# Patient Record
Sex: MALE | Race: WHITE | Employment: OTHER | URBAN - METROPOLITAN AREA
[De-identification: names, ages, dates, MRNs, and addresses within clinical notes are randomized per-mention and may not be internally consistent; named-entity substitution may affect disease eponyms.]

---

## 2017-01-04 ENCOUNTER — APPOINTMENT (OUTPATIENT)
Dept: PHYSICAL THERAPY | Age: 37
End: 2017-01-04
Payer: COMMERCIAL

## 2017-01-05 ENCOUNTER — HOSPITAL ENCOUNTER (OUTPATIENT)
Dept: PHYSICAL THERAPY | Age: 37
Discharge: HOME | End: 2017-01-05
Payer: COMMERCIAL

## 2017-01-05 PROCEDURE — 97530 THERAPEUTIC ACTIVITIES: CPT

## 2017-01-05 NOTE — PROGRESS NOTES
PT DAILY TREATMENT NOTE     Patient Name: Adilson Bowie  Date:2017  : 1980  [x]  Patient  Verified  Payor: Unknown Ades / Plan: [employer] / Product Type: HMO /    In time:1035  Out time:1105  Total Treatment Time (min): 30  Visit #: 6 of 10    Treatment Area: Vertigo [R42]    SUBJECTIVE  Pain Level (0-10 scale): 3/10  Any medication changes, allergies to medications, adverse drug reactions, diagnosis change, or new procedure performed?: [x] No    [] Yes (see summary sheet for update)  Subjective functional status/changes:   [] No changes reported  Have been having less intense dizziness. Tolerated shopping recently. Reports deep c/s ache and left C/S tightness. OBJECTIVE    30 min Therapeutic Activity:  []  See flow sheet :   Rationale: increase ROM, increase strength, improve coordination, improve balance and increase proprioception  to improve the patients ability to ease with ADL's. With   [] TE   [] TA   [] neuro   [] other: Patient Education: [x] Review HEP    [] Progressed/Changed HEP based on:   [] positioning   [] body mechanics   [] transfers   [] heat/ice application    [] other:      Other Objective/Functional Measures: Ambulate with eyes only increased sx >with head turns. Saccades x 1 min in tandem. Ball toss with mild dizziness. Unilateral and bilateral convergence. Pain Level (0-10 scale) post treatment: 6/10    ASSESSMENT/Changes in Function: Slow progression. Pt may benefit from initiating Laser tracing to encourage c/s endurance, core strength b/c pt spends a lot of time on the computer for work.     Patient will continue to benefit from skilled PT services to modify and progress therapeutic interventions, address functional mobility deficits, address ROM deficits, address strength deficits, analyze and address soft tissue restrictions, analyze and cue movement patterns, analyze and modify body mechanics/ergonomics, assess and modify postural abnormalities and address imbalance/dizziness to attain remaining goals. []  See Plan of Care  []  See progress note/recertification  []  See Discharge Summary         Progress towards goals / Updated goals:  Pt will be compliant with HEP to improve function. Met: 12/28/16  Pt will perform SOT to determine sensory input to determine function. MET. SOT=72      Long Term Goals: To be accomplished in 4 weeks:  Pt will increase FOTO by 6 pts to improve function. Not met. Decreased from 73 to 60. 12/30/16  Pt will report dizziness decreased by 70% to be able to improve QOL and return to PLOF. Pt will increase SOT score by 5 pts to improve function. Pt will preform ambulation on TM>5mins to return to the gym activities.   Met (12/28/16)    PLAN  [x]  Upgrade activities as tolerated     [x]  Continue plan of care  []  Update interventions per flow sheet       []  Discharge due to:_  []  Other:_      Danielle Zhu, PT 1/5/2017  11:02 AM    Future Appointments  Date Time Provider Cristo Buenrostro   1/6/2017 10:00 AM Cathy Soriano PT [id number]   1/9/2017 11:30 AM Cathy Soriano PT [id number] [facility]   4/24/2017 2:40 PM Lily Ennis DO [address] [place] [address]

## 2017-01-06 ENCOUNTER — HOSPITAL ENCOUNTER (OUTPATIENT)
Dept: PHYSICAL THERAPY | Age: 37
Discharge: HOME | End: 2017-01-06
Payer: COMMERCIAL

## 2017-01-06 PROCEDURE — 97110 THERAPEUTIC EXERCISES: CPT

## 2017-01-06 PROCEDURE — 97140 MANUAL THERAPY 1/> REGIONS: CPT

## 2017-01-06 NOTE — PROGRESS NOTES
PT DAILY TREATMENT NOTE - [facility] 3-16    Patient Name: Narinder Guevara  Date:2017  : 1980  [x]  Patient  Verified  Payor: Wily Bermeo / Plan: [employer] / Product Type: HMO /    In time:10:00  Out time: 10:30  Total Treatment Time (min):  30  Total Timed Codes (min):  30     Visit #: 7 of 10    Treatment Area: Vertigo [R42]    SUBJECTIVE  Pain Level (0-10 scale): 4/10  Any medication changes, allergies to medications, adverse drug reactions, diagnosis change, or new procedure performed?: [x] No    [] Yes (see summary sheet for update)  Subjective functional status/changes:   [] No changes reported  Pt. Reports he is a little more dizzy today. He reports his symptoms continue to fluctuate     OBJECTIVE    22 min Therapeutic Exercise:  [x] See flow sheet :   Rationale: increase ROM and increase strength to improve the patients ability to increase ease of ADLs    8 min Manual Therapy:   Trigger point release B SCM   Rationale: decrease pain, increase ROM and increase tissue extensibility to increase ease of ADLs          With   [x] TE   [] TA   [] neuro   [] other: Patient Education: [x] Review HEP    [] Progressed/Changed HEP based on:   [] positioning   [] body mechanics   [] transfers   [] heat/ice application    [] other:      Other Objective/Functional Measures:   Pt. Has trigger points in B SCM but reports no change in dizziness following manual  He was challenged with laser proprioception  He also had difficulty with EC with head turns on compliant surface     Pain Level (0-10 scale) post treatment: 5/10    ASSESSMENT/Changes in Function:  Pt. Is making limited progress towards goals. He continues to have decreased balance with EC on compliant surface and has decreased cervical strength.      Patient will continue to benefit from skilled PT services to modify and progress therapeutic interventions, address functional mobility deficits, address ROM deficits, address strength deficits, analyze and address soft tissue restrictions, analyze and cue movement patterns, analyze and modify body mechanics/ergonomics, assess and modify postural abnormalities and address imbalance/dizziness to attain remaining goals. Progress towards goals / Updated goals:  Pt will be compliant with HEP to improve function. Met: 12/28/16  Pt will perform SOT to determine sensory input to determine function. MET. SOT=72      Long Term Goals: To be accomplished in 4 weeks:  Pt will increase FOTO by 6 pts to improve function. Not met. Decreased from 73 to 60. 12/30/16  Pt will report dizziness decreased by 70% to be able to improve QOL and return to PLOF. Pt will increase SOT score by 5 pts to improve function. Pt will preform ambulation on TM>5mins to return to the gym activities.   Met (12/28/16)    PLAN  []  Upgrade activities as tolerated     [x]  Continue plan of care  []  Update interventions per flow sheet       []  Discharge due to:_  []  Other:_      Jonatan Parsons, PT 1/6/2017  10:03 AM

## 2017-01-09 ENCOUNTER — APPOINTMENT (OUTPATIENT)
Dept: PHYSICAL THERAPY | Age: 37
End: 2017-01-09
Payer: COMMERCIAL

## 2017-01-20 ENCOUNTER — HOSPITAL ENCOUNTER (OUTPATIENT)
Dept: PHYSICAL THERAPY | Age: 37
Discharge: HOME | End: 2017-01-20
Payer: COMMERCIAL

## 2017-01-20 PROCEDURE — 97110 THERAPEUTIC EXERCISES: CPT

## 2017-01-20 PROCEDURE — 97140 MANUAL THERAPY 1/> REGIONS: CPT

## 2017-01-20 NOTE — PROGRESS NOTES
PT DAILY TREATMENT NOTE - [facility] 3-16    Patient Name: Jacky Luz  Date:2017  : 1980  [x]  Patient  Verified  Payor: Kody Mikeopshire / Plan: [employer] / Product Type: HMO /    In time: 11:30  Out time: 12:00  Total Treatment Time (min):  30  Total Timed Codes (min):  30     Visit #: 8 of 10    Treatment Area: Vertigo [R42]    SUBJECTIVE  Pain Level (0-10 scale): 6/10  Any medication changes, allergies to medications, adverse drug reactions, diagnosis change, or new procedure performed?: [x] No    [] Yes (see summary sheet for update)  Subjective functional status/changes:   [] No changes reported  Pt. Reports he is feeling more dizzy today.      OBJECTIVE    15 min Therapeutic Exercise:  [x] See flow sheet :   Rationale: increase ROM and increase strength to improve the patients ability to increase ease of ADLs    15 min: manual: UT trigger point release, SCM trigger point release  Rationale: increase ROM to increase ease of ADLs          With   [x] TE   [] TA   [] neuro   [] other: Patient Education: [x] Review HEP    [] Progressed/Changed HEP based on:   [] positioning   [] body mechanics   [] transfers   [] heat/ice application    [] other:      Other Objective/Functional Measures:   Pt. Reports no significant improvement in PT so far  He continues to have good tolerance on treadmill  Convergence is improving but continues to have difficulty before 10 cm     Pain Level (0-10 scale) post treatment:  6/10    ASSESSMENT/Changes in Function:      []  See Plan of Care  [x]  See progress note/recertification  []  See Discharge Summary         Progress towards goals / Updated goals:  See progress note    PLAN  []  Upgrade activities as tolerated     [x]  Continue plan of care  []  Update interventions per flow sheet       []  Discharge due to:_  []  Other:_      Janie Noonan, PT 2017  12:37 PM

## 2017-01-20 NOTE — PROGRESS NOTES
[facility] L[facility] Ibarra  [address] [facility]  [phone] fax    Physical Therapy Progress Note  Patient name: Rosemary Garber Start of Care:  16   Referral source: Sonia Felton MD : 1980   Medical/Treatment Diagnosis: Vertigo [R42] Onset Date: 10 yrs plus     Prior Hospitalization: see medical history Provider#: [id number]   Medications: Verified on Patient Summary List    Comorbidities:  Low blood pressure, anxiety, depression  Prior Level of Function: self employed,   Visits from Start of Care: 8    Missed Visits: 3    Established Goals:         Excellent           Good         Limited           None  [x] Increased ROM   []  []  [x]  []  [x] Increased Strength  []  []  [x]  []  [x] Increased Mobility  []  []  [x]  []   [] Decreased Pain   []  []  []  []  [] Decreased Swelling  []  []  []  []  X dizziness        X    Key Functional Changes: pt. Continues to c/o dizziness symptoms that has not had any significant improvement since [facility]. His symptoms continue to fluctuate with recent worsening of symptoms after taking a trip to [place]. He continues to have increased symptoms with occular convergence. He also has trigger points in B UT and SCM. He also continues to have decreased balance with eye closed while in tandem stance. Skilled PT is medically necessary in order to improve balance and dizziness symptoms. If pt. Doesn't have improvement in next few visits will be D/C. Updated Goals: to be achieved in 4 weeks:  1. Patient will improve FOTO score by 6 points in order to demonstrate a significant improvement in function. 2. Patient will report a 70% improvement in symptoms since [facility] in order to improve quality of life. 3. Patient will demonstrate understanding of updated HEP in order to continue to improve following D/C.      ASSESSMENT/RECOMMENDATIONS:  [x]Continue therapy per initial plan/protocol at a frequency of  2 x per week for 4 weeks  []Continue therapy with the following recommended changes:_____________________      _____________________________________________________________________  []Discontinue therapy progressing towards or have reached established goals  []Discontinue therapy due to lack of appreciable progress towards goals  []Discontinue therapy due to lack of attendance or compliance  []Await Physician's recommendations/decisions regarding therapy  []Other:________________________________________________________________    Thank you for this referral.   Isabelle Blount, PT 1/20/2017 1:35 PM    NOTE TO PHYSICIAN:  Enedina Coronado [address]   [facility]X TO [facility]: [phone]  If you are unable to process this request in 24 hours please contact our office: [phone] I have read the above report and request that my patient continue as recommended. ? I have read the above report and request that my patient continue therapy with the following changes/special instructions:____________________________________  ? I have read the above report and request that my patient be discharged from therapy.     Physicians signature: ______________________________Date: ______Time:______

## 2017-01-25 ENCOUNTER — HOSPITAL ENCOUNTER (OUTPATIENT)
Dept: PHYSICAL THERAPY | Age: 37
Discharge: HOME | End: 2017-01-25
Payer: COMMERCIAL

## 2017-01-25 PROCEDURE — 97140 MANUAL THERAPY 1/> REGIONS: CPT

## 2017-01-25 PROCEDURE — 97110 THERAPEUTIC EXERCISES: CPT

## 2017-01-25 NOTE — PROGRESS NOTES
PT DAILY TREATMENT NOTE - [facility] 3-16    Patient Name: Phuc Outlaw  Date:2017  : 1980  [x]  Patient  Verified  Payor: Yokasta Laxmi / Plan: [employer] / Product Type: HMO /    In time: 4:30  Out time: 5:00  Total Treatment Time (min): 30  Total Timed Codes (min): 30     Visit #: 9 of 10    Treatment Area: Vertigo [R42]    SUBJECTIVE  Pain Level (0-10 scale): 4/10  Any medication changes, allergies to medications, adverse drug reactions, diagnosis change, or new procedure performed?: [x] No    [] Yes (see summary sheet for update)  Subjective functional status/changes:   [] No changes reported  Pt. Reports he is feeling a little better than last time. OBJECTIVE    10 min Therapeutic Exercise:  [x] See flow sheet :   Rationale: improve coordination and improve balance to improve the patients ability to increase ease of ADLs    20 min Manual Therapy:  Trigger point release/STM to B upper traps, SCM, scalenes    Rationale: decrease pain, increase ROM and increase tissue extensibility to increase ease of turning his head and to decrease dizziness symptoms. With   [x] TE   [] TA   [] neuro   [] other: Patient Education: [x] Review HEP    [] Progressed/Changed HEP based on:   [] positioning   [] body mechanics   [] transfers   [] heat/ice application    [] other:      Other Objective/Functional Measures:   Pt. Tolerated PT well with no reports of increased pain  He continues to be challenged with EC tandem balance but had some improvement compared to last visit  He has no difficulty with foam [place] with eyes closed     Pain Level (0-10 scale) post treatment: 4/10    ASSESSMENT/Changes in Function: pt. Is making limited progress towards goals. He continues to have significant dizziness symptoms that continue to fluctuate.      Patient will continue to benefit from skilled PT services to modify and progress therapeutic interventions, address functional mobility deficits, address ROM deficits, address strength deficits, analyze and address soft tissue restrictions, analyze and cue movement patterns, analyze and modify body mechanics/ergonomics, assess and modify postural abnormalities and address imbalance/dizziness to attain remaining goals. Progress towards goals / Updated goals:  1. Patient will improve FOTO score by 6 points in order to demonstrate a significant improvement in function. 2. Patient will report a 70% improvement in symptoms since [facility] in order to improve quality of life. 3. Patient will demonstrate understanding of updated HEP in order to continue to improve following D/C.      PLAN  []  Upgrade activities as tolerated     [x]  Continue plan of care  []  Update interventions per flow sheet       []  Discharge due to:_  []  Other:_      Dawson Glez, PT 1/25/2017  5:51 PM

## 2017-01-26 ENCOUNTER — HOSPITAL ENCOUNTER (OUTPATIENT)
Dept: PHYSICAL THERAPY | Age: 37
Discharge: HOME | End: 2017-01-26
Payer: COMMERCIAL

## 2017-01-26 PROCEDURE — 97530 THERAPEUTIC ACTIVITIES: CPT

## 2017-01-26 NOTE — PROGRESS NOTES
PT DAILY TREATMENT NOTE - [facility] 3-16    Patient Name: Abelardo Constantino  Date:2017  : 1980  [x]  Patient  Verified  Payor: Glen Loyd / Plan: [employer] / Product Type: HMO /    In time: 4:00  Out time: 4:30  Total Treatment Time (min):  30  Total Timed Codes (min):  30     Visit #: 2 of 8    Treatment Area: Vertigo [R42]    SUBJECTIVE  Pain Level (0-10 scale): 3/10  Any medication changes, allergies to medications, adverse drug reactions, diagnosis change, or new procedure performed?: [x] No    [] Yes (see summary sheet for update)  Subjective functional status/changes:   [] No changes reported   pt. Reports he doesn't feel too bad today    OBJECTIVE    30 min Therapeutic Activity:  [x]  See flow sheet : ambulation with head turns, ambulation with ball toss, ambulation with target  Fwd/back   Rationale: increase strength, improve coordination and improve balance  to improve the patients ability to increase ease of ADLs           With   [x] TE   [] TA   [] neuro   [] other: Patient Education: [x] Review HEP    [] Progressed/Changed HEP based on:   [] positioning   [] body mechanics   [] transfers   [] heat/ice application    [] other:      Other Objective/Functional Measures: FOTO: 69 points  Pt. Demonstrated improving convergence today   He continues to have increased symptoms with saccades and VORx1     Pain Level (0-10 scale) post treatment: 3/10    ASSESSMENT/Changes in Function:  Pt. Is progressing slowly towards goals. He continues to have dizziness symptoms but had some improvement today. He continues to have increased symptoms with more activity.      Patient will continue to benefit from skilled PT services to modify and progress therapeutic interventions, address functional mobility deficits, address ROM deficits, address strength deficits, analyze and address soft tissue restrictions, analyze and cue movement patterns, analyze and modify body mechanics/ergonomics and assess and modify postural abnormalities to attain remaining goals. Progress towards goals / Updated goals:  1. Patient will improve FOTO score by 6 points in order to demonstrate a significant improvement in function. Not met: 69 points (1/26/17)  2. Patient will report a 70% improvement in symptoms since [facility] in order to improve quality of life. 3. Patient will demonstrate understanding of updated HEP in order to continue to improve following D/C.     PLAN  []  Upgrade activities as tolerated     [x]  Continue plan of care  []  Update interventions per flow sheet       []  Discharge due to:_  []  Other:_      Tamiko Reza, PT 1/26/2017  3:55 PM

## 2017-02-01 ENCOUNTER — HOSPITAL ENCOUNTER (OUTPATIENT)
Dept: PHYSICAL THERAPY | Age: 37
Discharge: HOME | End: 2017-02-01
Payer: COMMERCIAL

## 2017-02-01 PROCEDURE — 97140 MANUAL THERAPY 1/> REGIONS: CPT

## 2017-02-01 PROCEDURE — 97110 THERAPEUTIC EXERCISES: CPT

## 2017-02-01 NOTE — PROGRESS NOTES
PT DAILY TREATMENT NOTE - [facility] 3-16    Patient Name: Michelle Hamlin  Date:2017  : 1980  [x]  Patient  Verified  Payor: Mika Alhaji / Plan: [employer] / Product Type: HMO /    In time: 4:33  Out time: 5:00  Total Treatment Time (min): 27  Total Timed Codes (min): 27     Visit #: 3 of 8    Treatment Area: Vertigo [R42]    SUBJECTIVE  Pain Level (0-10 scale): 3/10  Any medication changes, allergies to medications, adverse drug reactions, diagnosis change, or new procedure performed?: [x] No    [] Yes (see summary sheet for update)  Subjective functional status/changes:   [] No changes reported  Pt. Reports he isn't feeling too bad today. OBJECTIVE    17 min Therapeutic Exercise:  [x] See flow sheet :   Rationale: increase ROM and increase strength to improve the patients ability to increase ease of ADLs    10 min: manual: trigger point release B UT and SCM  Rationale: increase ROM to increase ease of ADLs          With   [x] TE   [] TA   [] neuro   [] other: Patient Education: [x] Review HEP    [] Progressed/Changed HEP based on:   [] positioning   [] body mechanics   [] transfers   [] heat/ice application    [] other:      Other Objective/Functional Measures:   Pt. Was challenged with saccades at faster pace  He reports refferal to eye with trigger point in R UT   He continues to demonstrate compliance with HEP    Pain Level (0-10 scale) post treatment: 3/10    ASSESSMENT/Changes in Function:  Pt. Is progressing slowly towards goals. He continues to have dizziness symptoms but they have been less severe the last couple of visits.      Patient will continue to benefit from skilled PT services to modify and progress therapeutic interventions, address functional mobility deficits, address ROM deficits, address strength deficits, analyze and address soft tissue restrictions, analyze and cue movement patterns, analyze and modify body mechanics/ergonomics and address imbalance/dizziness to attain remaining goals. Progress towards goals / Updated goals:  1. Patient will improve FOTO score by 6 points in order to demonstrate a significant improvement in function. Not met: 69 points (1/26/17)  2. Patient will report a 70% improvement in symptoms since [facility] in order to improve quality of life. 3. Patient will demonstrate understanding of updated HEP in order to continue to improve following D/C.   Pt. Is compliant with saccades and VORx1 for HEP (2/1/17)    PLAN  []  Upgrade activities as tolerated     [x]  Continue plan of care  []  Update interventions per flow sheet       []  Discharge due to:_  []  Other:_      Dawson Glez, PT 2/1/2017  4:45 PM

## 2017-02-03 ENCOUNTER — HOSPITAL ENCOUNTER (OUTPATIENT)
Dept: PHYSICAL THERAPY | Age: 37
Discharge: HOME | End: 2017-02-03
Payer: COMMERCIAL

## 2017-02-03 PROCEDURE — 97110 THERAPEUTIC EXERCISES: CPT

## 2017-02-03 PROCEDURE — 97140 MANUAL THERAPY 1/> REGIONS: CPT

## 2017-02-03 NOTE — PROGRESS NOTES
PT DAILY TREATMENT NOTE - [facility] 3-16    Patient Name: Ernestina Trujillo  Date:2/3/2017  : 1980  [x]  Patient  Verified  Payor: Randall Oquendo / Plan: [employer] / Product Type: HMO /    In time: 4:30  Out time: 5:00  Total Treatment Time (min):  30  Total Timed Codes (min):  30     Visit #: 4 of 8    Treatment Area: Vertigo [R42]    SUBJECTIVE  Pain Level (0-10 scale): 4/10  Any medication changes, allergies to medications, adverse drug reactions, diagnosis change, or new procedure performed?: [x] No    [] Yes (see summary sheet for update)  Subjective functional status/changes:   [] No changes reported  Pt. Reports he is feeling about the same today. OBJECTIVE    17 min Therapeutic Exercise:  [x] See flow sheet :   Rationale: increase strength, improve coordination and improve balance to improve the patients ability to increase ease of ADLs    5 min Therapeutic Activity:  []  See flow sheet : ambulation with head turns, ambulation with eyes closed, slow walking   Rationale: improve coordination and improve balance  to improve the patients ability to increase ease of ambulation    8 min Manual Therapy:  Trigger point release R UT and SCM   Rationale: decrease pain, increase ROM, increase tissue extensibility and decrease trigger points to increase ease of ADLs          With   [x] TE   [] TA   [] neuro   [] other: Patient Education: [x] Review HEP    [] Progressed/Changed HEP based on:   [] positioning   [] body mechanics   [] transfers   [] heat/ice application    [] other:      Other Objective/Functional Measures:   Pt. Was challenged with DNF nods with towel  He continues to have increased symptoms with saccades  Pt. Required modification of tandem stance in order to perform successfully     Pain Level (0-10 scale) post treatment: 4/10    ASSESSMENT/Changes in Function: pt. Tolerated PT well but has mild increase in dizziness with most visual activities.  He continues to have difficulty with eyes closed tandem balance. He demonstrates no LOB with ambulation with head turns. Patient will continue to benefit from skilled PT services to modify and progress therapeutic interventions, address functional mobility deficits, address ROM deficits, address strength deficits, analyze and address soft tissue restrictions, analyze and cue movement patterns and analyze and modify body mechanics/ergonomics to attain remaining goals. Progress towards goals / Updated goals:  1. Patient will improve FOTO score by 6 points in order to demonstrate a significant improvement in function. Not met: 69 points (1/26/17)  2. Patient will report a 70% improvement in symptoms since [facility] in order to improve quality of life. Pt. Reports improvement with quick movements to head and with taking longer to have increase in symptoms (2/3/17)  3. Patient will demonstrate understanding of updated HEP in order to continue to improve following D/C.   Pt. Is compliant with saccades and VORx1 for HEP (2/1/17)    PLAN  []  Upgrade activities as tolerated     [x]  Continue plan of care  []  Update interventions per flow sheet       []  Discharge due to:_  []  Other:_      Naina Perry, PT 2/3/2017  4:33 PM

## 2017-02-07 ENCOUNTER — HOSPITAL ENCOUNTER (OUTPATIENT)
Dept: PHYSICAL THERAPY | Age: 37
Discharge: HOME | End: 2017-02-07
Payer: COMMERCIAL

## 2017-02-07 PROCEDURE — 97110 THERAPEUTIC EXERCISES: CPT

## 2017-02-07 PROCEDURE — 97140 MANUAL THERAPY 1/> REGIONS: CPT

## 2017-02-07 NOTE — PROGRESS NOTES
PT DAILY TREATMENT NOTE - [facility]     Patient Name: Kris Conklin  Date:2017  : 1980  [x]  Patient  Verified  Payor: Danielle Screws / Plan: Leticia Naranjo HMO / Product Type: HMO /    In time:930  Out time:1004  Total Treatment Time (min): 34  Total Timed Codes (min): 34     Visit #: 5 of 8    Treatment Area: Vertigo [R42]    SUBJECTIVE  Pain Level (0-10 scale): 5/10  Any medication changes, allergies to medications, adverse drug reactions, diagnosis change, or new procedure performed?: [x] No    [] Yes (see summary sheet for update)  Subjective functional status/changes:   [] No changes reported  Same sx. No change reported    OBJECTIVE    26 min Therapeutic Activity:  []  See flow sheet :     8 min Manual Therapy:  SOR with 1st rib mob, TPR to SCM and scalene. Rationale: decrease pain, increase ROM, increase tissue extensibility and decrease trigger points to ease with ADL's. With   [] TE   [] TA   [] neuro   [] other: Patient Education: [x] Review HEP    [] Progressed/Changed HEP based on:   [] positioning   [] body mechanics   [] transfers   [] heat/ice application    [] other:      Other Objective/Functional Measures: Performing saccades with greater ease and increased speed with focus on target. Balance with EC continues to be a challenge. Scalene tenderness. Pain Level (0-10 scale) post treatment: 4-5/10 dizzy. ASSESSMENT/Changes in Function: Progressing slowly. Pt continues to have sx of dizziness but his focus is improving. HA  And c/s type sx are present. He has been prescribed Topamax but does not want to take \"meds because of side effects. \"  Pt to gradually add a busy background to his ex's. Reports he gets frequent massages and will have masseuse concentrate on his neck.     Patient will continue to benefit from skilled PT services to address functional mobility deficits, address ROM deficits, address strength deficits, analyze and address soft tissue restrictions, analyze and cue movement patterns, analyze and modify body mechanics/ergonomics, assess and modify postural abnormalities and address imbalance/dizziness to attain remaining goals. []  See Plan of Care  [x]  See progress note/recertification  []  See Discharge Summary         Progress towards goals / Updated goals:  1. Patient will improve FOTO score by 6 points in order to demonstrate a significant improvement in function. Not met: 69 points (1/26/17)  2. Patient will report a 70% improvement in symptoms since [facility] in order to improve quality of life. Pt. Reports improvement with quick movements to head and with taking longer to have increase in symptoms (2/3/17)  3. Patient will demonstrate understanding of updated HEP in order to continue to improve following D/C.   Pt. Is compliant with saccades and VORx1 for HEP (2/1/17)    PLAN  [x]  Upgrade activities as tolerated     [x]  Continue plan of care  []  Update interventions per flow sheet       []  Discharge due to:_  []  Other:_      Michael Felton, PT 2/7/2017  9:04 AM    Future Appointments  Date Time Provider Cristo Buenrostro   2/7/2017 9:30 AM Michael Felton, PT [facility]   2/9/2017 3:30 PM Delon Subramanian PT [facility]   4/24/2017 2:40 PM Mindy Connell DO [address] [place] [address]

## 2017-02-09 ENCOUNTER — HOSPITAL ENCOUNTER (OUTPATIENT)
Dept: PHYSICAL THERAPY | Age: 37
Discharge: HOME | End: 2017-02-09
Payer: COMMERCIAL

## 2017-02-09 PROCEDURE — 97110 THERAPEUTIC EXERCISES: CPT

## 2017-02-09 PROCEDURE — 97530 THERAPEUTIC ACTIVITIES: CPT

## 2017-02-09 NOTE — PROGRESS NOTES
PT DAILY TREATMENT NOTE - [facility] 3-16    Patient Name: Maury Macedo  Date:2017  : 1980  [x]  Patient  Verified  Payor: Sona Hall / Plan: Alaina Mesa HMO / Product Type: HMO /    In time: 3:30  Out time: 4:00   Total Treatment Time (min): 30  Total Timed Codes (min): 30     Visit #: 6 of 8    Treatment Area: Vertigo [R42]    SUBJECTIVE  Pain Level (0-10 scale): 3/10  Any medication changes, allergies to medications, adverse drug reactions, diagnosis change, or new procedure performed?: [x] No    [] Yes (see summary sheet for update)  Subjective functional status/changes:   [] No changes reported  Pt. Reports doesn't feel too bad today. OBJECTIVE      22 min Therapeutic Exercise:  [x] See flow sheet :   Rationale: increase ROM and increase strength to improve the patients ability to increase ease of ADLs    8 min: Therapeutic Activities: ambulation with convergence, ambulation with head turns, slow walking  Rationale: increase balance and coordination for increased ease of ambulation           With   [x] TE   [] TA   [] neuro   [] other: Patient Education: [x] Review HEP    [] Progressed/Changed HEP based on:   [] positioning   [] body mechanics   [] transfers   [] heat/ice application    [] other:      Other Objective/Functional Measures:   Pt. Was challenged with longer hold during DNF with towel    Pt.  Was challenged with head turning balance exercises with eyes closed  He had less cervical tightness and trigger points today    Pain Level (0-10 scale) post treatment: 3/10    ASSESSMENT/Changes in Function:      []  See Plan of Care  [x]  See progress note/recertification  []  See Discharge Summary         Progress towards goals / Updated goals:  See progress note    PLAN  []  Upgrade activities as tolerated     [x]  Continue plan of care  []  Update interventions per flow sheet       []  Discharge due to:_  []  Other:_      Isabelle Blount, PT 2017  3:36 PM

## 2017-02-10 NOTE — PROGRESS NOTES
[facility] [place]  [address] [facility]  [phone] fax    Physician Update  [x] Progress Note  [x] Discharge Summary  Patient name: Rajendra Sandoval Start of Care: 16   Referral source: Kathryn Tineo MD : 1980   Medical/Treatment Diagnosis: Vertigo [R42] Onset Date: 10 years plus     Prior Hospitalization: see medical history Provider#: [id number]   Medications: Verified on Patient Summary List    Comorbidities:  Low blood pressure, anxiety, depression  Prior Level of Function: self employed,   Visits from Start of Care: 14    Missed Visits: 3    Status at Evaluation/Last Progress Note:  Increased symptoms with occular convergence, multiple trigger points in B UT and SCM. He has decreased eyes closed balance in tandem stance. FOTO score 60 points    Progress towards Goals:  Pt. Has made some improvement with physical therapy. He continues to have increased dizziness with head movements but it takes longer for symptoms to increase. He also has increased ease of performing saccades, convergence, and VOR exercises. He continues to have decreased balance with eyes closed tandem stance. He also continues to have significant tightness and trigger points along cervical and shoulder musculature and has seen a massage therapist which seemed to help some. Skilled PT is medically necessary in order to continue to improve dizziness symptoms and prepare pt. For D/C in 2 visits. Goals: to be achieved in 2 visits  1. Patient will report a 70% improvement in symptoms since [facility] in order to improve quality of life. 2. Patient will demonstrate understanding of updated HEP in order to continue to improve following D/C.      ASSESSMENT/RECOMMENDATIONS:  [x]Continue therapy per initial plan/protocol at a frequency of  2 x per week for 2 visits  []Continue therapy with the following recommended changes:_____________________ _____________________________________________________________________  []Discontinue therapy progressing towards or have reached established goals  []Discontinue therapy due to lack of appreciable progress towards goals  []Discontinue therapy due to lack of attendance or compliance  []Await Physician's recommendations/decisions regarding therapy  []Other:________________________________________________________________    Thank you for this referral.   Erica Glover, PT 2/9/2017 7:09 PM  NOTE TO PHYSICIAN:  [address]   FAX TO [facility]: [phone]  If you are unable to process this request in 24 hours please contact our office: [phone]    ? I have read the above report and request that my patient continue as recommended. ? I have read the above report and request that my patient continue therapy with the following changes/special instructions:____________________________________  ? I have read the above report and request that my patient be discharged from therapy.     Physicians signature: ______________________________Date: ______Time:______

## 2017-02-15 ENCOUNTER — HOSPITAL ENCOUNTER (OUTPATIENT)
Dept: PHYSICAL THERAPY | Age: 37
Discharge: HOME | End: 2017-02-15
Payer: COMMERCIAL

## 2017-02-15 PROCEDURE — 97110 THERAPEUTIC EXERCISES: CPT

## 2017-02-15 NOTE — PROGRESS NOTES
PT DAILY TREATMENT NOTE - [facility] 3-16    Patient Name: Michelle Hamlin  Date:2/15/2017  : 1980  [x]  Patient  Verified  Payor: Mika Single / Plan: [employer] / Product Type: HMO /    In time: 3:00  Out time: 3:35  Total Treatment Time (min): 35  Total Timed Codes (min): 35     Visit #: 7 of 8    Treatment Area: Vertigo [R42]    SUBJECTIVE  Pain Level (0-10 scale): 3/10  Any medication changes, allergies to medications, adverse drug reactions, diagnosis change, or new procedure performed?: [x] No    [] Yes (see summary sheet for update)  Subjective functional status/changes:   [] No changes reported  Pt. Reports he is doing good but continues to have decreased balance with turning in shower with eyes closed. OBJECTIVE    35 min Therapeutic Exercise:  [x] See flow sheet :   Rationale: increase ROM, increase strength and improve balance to improve the patients ability to increase ease of ADLs          With   [x] TE   [] TA   [] neuro   [] other: Patient Education: [x] Review HEP    [] Progressed/Changed HEP based on:   [] positioning   [] body mechanics   [] transfers   [] heat/ice application    [] other:      Other Objective/Functional Measures:   SOT: 81 points  Pt. Was educated on [employer] for HEP  He continued to be challenged with convergence     Pain Level (0-10 scale) post treatment: 3/10    ASSESSMENT/Changes in Function:  Pt. Is progressing slowly towards goals. He continues to have decreased balance and continues to have difficulty with convergence. Patient will continue to benefit from skilled PT services to modify and progress therapeutic interventions, address functional mobility deficits, address ROM deficits, address strength deficits, analyze and address soft tissue restrictions, analyze and cue movement patterns and analyze and modify body mechanics/ergonomics to attain remaining goals. Progress towards goals / Updated goals:  1.  Patient will report a 70% improvement in symptoms since [facility] in order to improve quality of life. 2. Patient will demonstrate understanding of updated HEP in order to continue to improve following D/C.      PLAN  []  Upgrade activities as tolerated     [x]  Continue plan of care  []  Update interventions per flow sheet       []  Discharge due to:_  []  Other:_      Viktor Burris, PT 2/15/2017  3:37 PM

## 2017-02-20 ENCOUNTER — HOSPITAL ENCOUNTER (OUTPATIENT)
Dept: PHYSICAL THERAPY | Age: 37
Discharge: HOME | End: 2017-02-20
Payer: COMMERCIAL

## 2017-02-20 PROCEDURE — 97110 THERAPEUTIC EXERCISES: CPT

## 2017-02-20 NOTE — PROGRESS NOTES
PT DAILY TREATMENT NOTE - [facility] 3-16    Patient Name: Sanchez Leal  Date:2017  : 1980  [x]  Patient  Verified  Payor: Bobby Duran / Plan: Natalya Daly HMO / Product Type: HMO /    In time: 5:00  Out time: 5:30  Total Treatment Time (min): 30  Total Timed Codes (min): 30     Visit #: 8 of 8    Treatment Area: Vertigo [R42]    SUBJECTIVE  Pain Level (0-10 scale): 4/10  Any medication changes, allergies to medications, adverse drug reactions, diagnosis change, or new procedure performed?: [x] No    [] Yes (see summary sheet for update)  Subjective functional status/changes:   [] No changes reported  Pt. Reports he feels a little off today but he is doing ok    OBJECTIVE      30 min Therapeutic Exercise:  [x] See flow sheet :   Rationale: increase ROM and increase strength to improve the patients ability to increase ease of working          With   [x] TE   [] TA   [] neuro   [] other: Patient Education: [x] Review HEP    [] Progressed/Changed HEP based on:   [] positioning   [] body mechanics   [] transfers   [] heat/ice application    [] other:      Other Objective/Functional Measures:   % improvement: 50  Pt.  Was educated on updated HEP and demonstrates good understanding of progression of exercises  He was challenged with walking VORx1       Pain Level (0-10 scale) post treatment: 4/10    ASSESSMENT/Changes in Function:      []  See Plan of Care  []  See progress note/recertification  [x]  See Discharge Summary         Progress towards goals / Updated goals  See D/C note    PLAN  []  Upgrade activities as tolerated     []  Continue plan of care  []  Update interventions per flow sheet       [x]  Discharge due to:_  []  Other:_      Dami Graves, PT 2017  5:12 PM

## 2017-03-30 ENCOUNTER — HOSPITAL ENCOUNTER (OUTPATIENT)
Dept: GENERAL RADIOLOGY | Age: 37
Discharge: HOME | End: 2017-03-30
Payer: COMMERCIAL

## 2017-03-30 DIAGNOSIS — M25.561 RIGHT KNEE PAIN: ICD-10-CM

## 2017-03-30 PROCEDURE — 73562 X-RAY EXAM OF KNEE 3: CPT

## 2017-04-19 ENCOUNTER — OFFICE VISIT (OUTPATIENT)
Dept: ORTHOPEDIC SURGERY | Age: 37
End: 2017-04-19

## 2017-04-19 VITALS
SYSTOLIC BLOOD PRESSURE: 103 MMHG | WEIGHT: 154 LBS | DIASTOLIC BLOOD PRESSURE: 65 MMHG | BODY MASS INDEX: 25.66 KG/M2 | HEART RATE: 66 BPM | TEMPERATURE: 98.3 F | HEIGHT: 65 IN

## 2017-04-19 DIAGNOSIS — M25.561 RIGHT KNEE PAIN, UNSPECIFIED CHRONICITY: ICD-10-CM

## 2017-04-19 DIAGNOSIS — M54.31 SCIATICA, RIGHT SIDE: Primary | ICD-10-CM

## 2017-04-19 DIAGNOSIS — M54.16 LUMBAR RADICULOPATHY: ICD-10-CM

## 2017-04-19 DIAGNOSIS — I49.1 PAC (PREMATURE ATRIAL CONTRACTION): ICD-10-CM

## 2017-04-19 DIAGNOSIS — R55 SYNCOPE, VASOVAGAL: ICD-10-CM

## 2017-04-19 DIAGNOSIS — M77.8 TENDONITIS OF RIGHT HAND: ICD-10-CM

## 2017-04-19 DIAGNOSIS — M25.512 LEFT SHOULDER PAIN, UNSPECIFIED CHRONICITY: ICD-10-CM

## 2017-04-19 DIAGNOSIS — M79.641 RIGHT HAND PAIN: ICD-10-CM

## 2017-04-19 DIAGNOSIS — M50.30 DDD (DEGENERATIVE DISC DISEASE), CERVICAL: ICD-10-CM

## 2017-04-19 NOTE — PATIENT INSTRUCTIONS
Learning About How to Have a Healthy Back  What causes back pain? Back pain is often caused by overuse, strain, or injury. For example, people often hurt their backs playing sports or working in the yard, being jolted in a car accident, or lifting something too heavy. Aging plays a part too. Your bones and muscles tend to lose strength as you age, which makes injury more likely. The spongy discs between the bones of the spine (vertebrae) may suffer from wear and tear and no longer provide enough cushion between the bones. A disc that bulges or breaks open (herniated disc) can press on nerves, causing back pain. In some people, back pain is the result of arthritis, broken vertebrae caused by bone loss (osteoporosis), illness, or a spine problem. Although most people have back pain at one time or another, there are steps you can take to make it less likely. How can you have a healthy back? Reduce stress on your back through good posture  Slumping or slouching alone may not cause low back pain. But after the back has been strained or injured, bad posture can make pain worse. · Sleep in a position that maintains your back's normal curves and on a mattress that feels comfortable. Sleep on your side with a pillow between your knees, or sleep on your back with a pillow under your knees. These positions can reduce strain on your back. · Stand and sit up straight. \"Good posture\" generally means your ears, shoulders, and hips are in a straight line. · If you must stand for a long time, put one foot on a stool, ledge, or box. Switch feet every now and then. · Sit in a chair that is low enough to let you place both feet flat on the floor with both knees nearly level with your hips. If your chair or desk is too high, use a footrest to raise your knees. Place a small pillow, a rolled-up towel, or a lumbar roll in the curve of your back if you need extra support.   · Try a kneeling chair, which helps tilt your hips forward. This takes pressure off your lower back. · Try sitting on an exercise ball. It can rock from side to side, which helps keep your back loose. · When driving, keep your knees nearly level with your hips. Sit straight, and drive with both hands on the steering wheel. Your arms should be in a slightly bent position. Reduce stress on your back through careful lifting  · Squat down, bending at the hips and knees only. If you need to, put one knee to the floor and extend your other knee in front of you, bent at a right angle (half kneeling). · Press your chest straight forward. This helps keep your upper back straight while keeping a slight arch in your low back. · Hold the load as close to your body as possible, at the level of your belly button (navel). · Use your feet to change direction, taking small steps. · Lead with your hips as you change direction. Keep your shoulders in line with your hips as you move. · Set down your load carefully, squatting with your knees and hips only. Exercise and stretch your back  · Do some exercise on most days of the week, if your doctor says it is okay. You can walk, run, swim, or cycle. · Stretch your back muscles. Here are a few exercises to try:  [place] on your back, and gently pull one bent knee to your chest. Put that foot back on the floor, and then pull the other knee to your chest.  ¨ Do pelvic tilts. Lie on your back with your knees bent. Tighten your stomach muscles. Pull your belly button (navel) in and up toward your ribs. You should feel like your back is pressing to the floor and your hips and pelvis are slightly lifting off the floor. Hold for 6 seconds while breathing smoothly. ¨ Sit with your back flat against a wall. · Keep your core muscles strong. The muscles of your back, belly (abdomen), and buttocks support your spine. ¨ Pull in your belly and imagine pulling your navel toward your spine. Hold this for 6 seconds, then relax.  Remember to keep breathing normally as you tense your muscles. ¨ Do curl-ups. Always do them with your knees bent. Keep your low back on the floor, and curl your shoulders toward your knees using a smooth, slow motion. Keep your arms folded across your chest. If this bothers your neck, try putting your hands behind your neck (not your head), with your elbows spread apart. ¨ Lie on your back with your knees bent and your feet flat on the floor. Tighten your belly muscles, and then push with your feet and raise your buttocks up a few inches. Hold this position 6 seconds as you continue to breathe normally, then lower yourself slowly to the floor. Repeat 8 to 12 times. ¨ If you like group exercise, try Pilates or yoga. These classes have poses that strengthen the core muscles. Lead a healthy lifestyle  · Stay at a healthy weight to avoid strain on your back. · Do not smoke. Smoking increases the risk of osteoporosis, which weakens the spine. If you need help quitting, talk to your doctor about stop-smoking programs and medicines. These can increase your chances of quitting for good. Where can you learn more? Go to http://chikiCareinSynczhao.info/. Enter L315 in the search box to learn more about \"Learning About How to Have a Healthy Back. \"  Current as of: May 23, 2016  Content Version: 11.2  © 3986-8299 [employer]. Care instructions adapted under license by [employer] (which disclaims liability or warranty for this information). If you have questions about a medical condition or this instruction, always ask your healthcare professional. [place] [address] any warranty or liability for your use of this information. Sciatica: Exercises  Your Care Instructions  Here are some examples of typical rehabilitation exercises for your condition. Start each exercise slowly. Ease off the exercise if you start to have pain.   Your doctor or physical therapist will tell you when you can start these exercises and which ones will work best for you. When you are not being active, find a comfortable position for rest. Some people are comfortable on the floor or a medium-firm bed with a small pillow under their head and another under their knees. Some people prefer to lie on their side with a pillow between their knees. Don't stay in one position for too long. Take short walks (10 to 20 minutes) every 2 to 3 hours. Avoid slopes, hills, and stairs until you feel better. Walk only distances you can manage without pain, especially leg pain. How to do the exercises  Back stretches    1. Get down on your hands and knees on the floor. 2. Relax your head and allow it to droop. Round your back up toward the ceiling until you feel a nice stretch in your upper, middle, and lower back. Hold this stretch for as long as it feels comfortable, or about 15 to 30 seconds. 3. Return to the starting position with a flat back while you are on your hands and knees. 4. Let your back sway by pressing your stomach toward the floor. Lift your buttocks toward the ceiling. 5. Hold this position for 15 to 30 seconds. 6. Repeat 2 to 4 times. Follow-up care is a key part of your treatment and safety. Be sure to make and go to all appointments, and call your doctor if you are having problems. It's also a good idea to know your test results and keep a list of the medicines you take. Where can you learn more? Go to http://chiki-zhao.info/. Enter F436 in the search box to learn more about \"Sciatica: Exercises. \"  Current as of: May 23, 2016  Content Version: 11.2  © 9356-5281 [employer]. Care instructions adapted under license by [employer] (which disclaims liability or warranty for this information).  If you have questions about a medical condition or this instruction, always ask your healthcare professional. [address]  any warranty or liability for your use of this information.

## 2017-04-19 NOTE — PROGRESS NOTES
Patient: Joseph Ledbetter                MRN: [id number]       SSN: [id number]  YOB: 1980        AGE: 43 y.o. SEX: male    PCP: Leopoldo Rick, MD  04/19/17    Chief Complaint   Patient presents with    Knee Pain     Right     HISTORY:  Joseph Ledbetter is a 43 y.o. male who is seen for right leg and left shoulder pain. He has pain radiating from his right posterior thigh to his right foot. He reports increased right leg pain while in a course of PT to his neck and back in March of 2017. He was previously seen by Dr. Beka Oro for neck and back pain. He was previously seen for thumb pain about 2 years ago. He noted increased right knee pain while walking a lot on a trip to [facility] and [place], [place] with his father and brother in March of 2017. He reports chronic left shoulder pain at times with motion. Pain Assessment  4/19/2017   Location of Pain Knee   Location Modifiers Right   Severity of Pain 4   Quality of Pain Aching   Duration of Pain Persistent   Frequency of Pain Constant   Aggravating Factors Walking;Standing   Aggravating Factors Comment -   Limiting Behavior Yes   Relieving Factors Rest   Result of Injury No     Occupation, etc:  Mr. 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 He is not diabetic or hypertensive. Current weight is 156 pounds. He is 5'5\" tall. He is a fan of the [employer] 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 He lives alone in [place]. He does not exercise regularly.       Weight Metrics 4/19/2017 12/2/2016 10/11/2016 10/4/2016 7/27/2016 7/1/2016 6/28/2016   Weight 154 lb 156 lb 12.8 oz 160 lb 157 lb 12.8 oz 157 lb 158 lb 158 lb   BMI 25.63 kg/m2 26.09 kg/m2 26.63 kg/m2 26.26 kg/m2 26.13 kg/m2 26.29 kg/m2 26.29 kg/m2     Patient Active Problem List   Diagnosis Code    Palpitations R00.2  Anxiety F41.9    Depression F32.9    Chronic cough R05    Dizziness R42    Dyspnea R06.00    PAC (premature atrial contraction) I49.1    Esophageal reflux K21.9    Hypercholesterolemia E78.00    Syncope, vasovagal R55    Abnormal digital rectal exam R68.89    Cervical spinal cord injury ([address] S14.109A     REVIEW OF SYSTEMS: All Below are Negative except: See HPI   Constitutional: negative for fever, chills, and weight loss. Cardiovascular: negative for chest pain, claudication, leg swelling, SOB, CARLIN   Gastrointestinal: Negative for pain, N/V/C/D, Blood in stool or urine, dysuria,  hematuria, incontinence, pelvic pain. Musculoskeletal: See HPI   Neurological: Negative for dizziness and weakness. Negative for headaches, Visual changes, confusion, seizures   Phychiatric/Behavioral: Negative for depression, memory loss, substance  abuse. Extremities: Negative for hair changes, rash, or skin lesion changes. Hematologic: Negative for bleeding problems, bruising, pallor or swollen lymph  nodes   Peripheral Vascular: No calf pain, no circulation deficits.     Social History     Social History    Marital status: SINGLE     Spouse name: N/A    Number of children: N/A    Years of education: N/A     Occupational History    real estate      Social History Main Topics    Smoking status: Current Every Day Smoker     Packs/day: 1.00     Years: 20.00     Types: Cigarettes    Smokeless tobacco: Never Used    Alcohol use No    Drug use: No    Sexual activity: Not on file     Other Topics Concern    Not on file     Social History Narrative      Allergies   Allergen Reactions    Opioids - Morphine Analogues Myalgia    Other Medication Other (comments)     Pt reports allergy to steroids, with psychological side effects    Pollen Extracts Unable to Obtain    Zithromax [Azithromycin] Itching      Current Outpatient Prescriptions   Medication Sig    ALPRAZolam (XANAX) 0.5 mg tablet Take 0.5 mg by mouth two (2) times a day.  ascorbic acid (VITAMIN C) 500 mg tablet Take 500 mg by mouth daily.  cholecalciferol, vitamin D3, (VITAMIN D3) 2,000 unit Tab Take 1 Tab by mouth daily.  escitalopram (LEXAPRO) 10 mg tablet Take 10 mg by mouth daily.  omeprazole-sodium bicarbonate (ZEGERID) 40-1.1 mg-gram capsule Take 1 Cap by mouth daily.  acetaminophen (TYLENOL EXTRA STRENGTH) 500 mg tablet Take 500 mg by mouth every six (6) hours as needed.  MULTIVITAMIN PO Take 1 Tab by mouth daily.  mometasone-formoterol (DULERA) 100-5 mcg/actuation HFA inhaler Take 2 Puffs by inhalation two (2) times a day.  mometasone (NASONEX) 50 mcg/actuation nasal spray 1 [place] by Both Nostrils route daily. No current facility-administered medications for this visit. PHYSICAL EXAMINATION:  Visit Vitals    /65    Pulse 66    Temp 98.3 °F (36.8 °C) (Oral)    Ht 5' 5\" (1.651 m)    Wt 154 lb (69.9 kg)    BMI 25.63 kg/m2      ORTHO EXAMINATION:  Examination Right shoulder Left shoulder   Skin Intact Intact   Effusion - -   Biceps deformity - -   Atrophy - -   AC joint tenderness - -   Acromial tenderness + +   Biceps tenderness - -   Forward flexion/Elevation  170   Active abduction  160   External rotation ROM 30 30   Internal rotation ROM 70 70   Apprehension - -   Impingement - -   Drop Arm Test - -   Neurovascular Intact Intact     Examination Right knee Left knee   Skin Intact Intact   Range of motion 120-0 120-0   Effusion - -   Medial joint line tenderness + +   Lateral joint line tenderness - -   Popliteal tenderness + -   Osteophytes palpable - -   Joes - -   Patella crepitus - -   Anterior drawer - -   Lateral laxity - -   Medial laxity - -   Varus deformity - -   Valgus deformity - -   Pretibial edema - -   Calf tenderness - -   Left calf is soft and non-tender    MRI CERVICAL SPINE WO CONT 11/23/16  IMPRESSION:  Stable mild degenerative disease at C3-C4 and C4-C5.   No significant central stenosis, cord compression or foraminal stenosis. MRI LUMBAR SPINE WO CONT 6/27/11  IMPRESSION:  Mild broad-based left paracentral disc protrusion at L5-S1 without significant encroachment on the central canal but with possible contact of the descending left S1 root crossing into the lateral recess. The exit foramina are patent. Canal and foramina are patent at the remaining levels without evidence of significant abnormality. RADIOGRAPHS:  XR RIGHT KNEE 3/30/17  IMPRESSION:  Normal radiographs of right knee. IMPRESSION:      ICD-10-CM ICD-9-CM    1. Sciatica, right side M54.31 724.3    2. Right knee pain, unspecified chronicity M25.561 719.46    3. Tendonitis of right hand M77.8 727.05    4. Right hand pain M79.641 729.5    5. PAC (premature atrial contraction) I49.1 427.61    6. Syncope, vasovagal R55 780.2    7. Lumbar radiculopathy M54.16 724.4    8. DDD (degenerative disc disease), cervical M50.30 722.4     C3-5   9. Left shoulder pain, unspecified chronicity M25.512 719.41      PLAN:  He will do low back exercises on his own at home. He will follow up as needeed. He will continue to follow up with Dr. Beka Oro at the spine center if radicular low back pain continues. We discussed a possible right knee MRI in the future if pain continues.       Scribed by [facility] ([address]) as dictated by Grisel Cordoba MD

## 2017-04-19 NOTE — MR AVS SNAPSHOT
Visit Information Date & Time Provider Department Dept. Phone Encounter #  
 4/19/2017 11:30 AM Diane Cleo, [address] [facility] [phone] [id number] Follow-up Instructions Return if symptoms worsen or fail to improve. Your Appointments 4/24/2017  2:40 PM  
Follow Up with Laura Sarmiento DO [facility] Appt Note: Return in about 6 months [place] [address] [address] [address] [phone] [address] [address] [address] [address]  
  
    
 5/1/2017  1:45 PM  
PROBLEM VISIT with Ada Mcleod MD  
[address] [facility], [address] and [facility]) Appt Note: BACK & NECK PAIN  
 [address] [facility] [address]  
[phone]  
  
   
 [address] [facility] [address] Upcoming Health Maintenance Date Due Pneumococcal 19-64 Medium Risk (1 of 1 - PPSV23) 5/3/1997 DTaP/Tdap/Td series (1 - Tdap) 5/3/1999 INFLUENZA AGE 9 TO ADULT 8/1/2016 Allergies as of 4/19/2017  Review Complete On: 4/19/2017 By: Harjeet Arias Severity Noted Reaction Type Reactions Opioids - Morphine Analogues  08/27/2015    Myalgia Other Medication  03/03/2016    Other (comments) Pt reports allergy to steroids, with psychological side effects Pollen Extracts  08/07/2012    Unable to Obtain Zithromax [Azithromycin]    Itching Current Immunizations  Never Reviewed No immunizations on file. Not reviewed this visit You Were Diagnosed With   
  
 Codes Comments Sciatica, right side    -  Primary ICD-10-CM: M54.31 
ICD-9-CM: 724.3 Right knee pain, unspecified chronicity     ICD-10-CM: M25.561 ICD-9-CM: 719.46 Tendonitis of right hand     ICD-10-CM: M77.8 ICD-9-CM: 727.05 Right hand pain     ICD-10-CM: M79.641 ICD-9-CM: 729.5 PAC (premature atrial contraction)     ICD-10-CM: I49.1 ICD-9-CM: 427.61 Syncope, vasovagal     ICD-10-CM: R55 
ICD-9-CM: 780.2 Lumbar radiculopathy     ICD-10-CM: M54.16 
ICD-9-CM: 724.4 DDD (degenerative disc disease), cervical     ICD-10-CM: M50.30 ICD-9-CM: 722.4 C3-5 Left shoulder pain, unspecified chronicity     ICD-10-CM: M25.512 ICD-9-CM: 719.41 Vitals BP Pulse Temp Height(growth percentile) Weight(growth percentile) BMI  
 103/65 66 98.3 °F (36.8 °C) (Oral) 5' 5\" (1.651 m) 154 lb (69.9 kg) 25.63 kg/m2 Smoking Status Current Every Day Smoker BMI and BSA Data Body Mass Index Body Surface Area  
 25.63 kg/m 2 1.79 m 2 Preferred Pharmacy Pharmacy Name Phone [id number]- [phone] Your Updated Medication List  
  
   
This list is accurate as of: 4/19/17 12:32 PM.  Always use your most recent med list.  
  
  
  
  
 ALPRAZolam 0.5 mg tablet Commonly known as:  Giselle Dials Take 0.5 mg by mouth two (2) times a day. LEXAPRO 10 mg tablet Generic drug:  escitalopram oxalate Take 10 mg by mouth daily. mometasone 50 mcg/actuation nasal spray Commonly known as:  NASONEX  
1 [place] by Both Nostrils route daily. mometasone-formoterol 100-5 mcg/actuation HFA inhaler Commonly known as:  Marijean Balls Take 2 Puffs by inhalation two (2) times a day. MULTIVITAMIN PO Take 1 Tab by mouth daily. TYLENOL EXTRA STRENGTH 500 mg tablet Generic drug:  acetaminophen Take 500 mg by mouth every six (6) hours as needed. VITAMIN C 500 mg tablet Generic drug:  ascorbic acid (vitamin C) Take 500 mg by mouth daily. VITAMIN D3 2,000 unit Tab Generic drug:  cholecalciferol (vitamin D3) Take 1 Tab by mouth daily. ZEGERID 40-1.1 mg-gram capsule Generic drug:  omeprazole-sodium bicarbonate Take 1 Cap by mouth daily. Follow-up Instructions Return if symptoms worsen or fail to improve. Patient Instructions Learning About How to Have a Healthy Back What causes back pain? Back pain is often caused by overuse, strain, or injury. For example, people often hurt their backs playing sports or working in the yard, being jolted in a car accident, or lifting something too heavy. Aging plays a part too. Your bones and muscles tend to lose strength as you age, which makes injury more likely. The spongy discs between the bones of the spine (vertebrae) may suffer from wear and tear and no longer provide enough cushion between the bones. A disc that bulges or breaks open (herniated disc) can press on nerves, causing back pain. In some people, back pain is the result of arthritis, broken vertebrae caused by bone loss (osteoporosis), illness, or a spine problem. Although most people have back pain at one time or another, there are steps you can take to make it less likely. How can you have a healthy back? Reduce stress on your back through good posture Slumping or slouching alone may not cause low back pain. But after the back has been strained or injured, bad posture can make pain worse. · Sleep in a position that maintains your back's normal curves and on a mattress that feels comfortable. Sleep on your side with a pillow between your knees, or sleep on your back with a pillow under your knees. These positions can reduce strain on your back. · Stand and sit up straight. \"Good posture\" generally means your ears, shoulders, and hips are in a straight line. · If you must stand for a long time, put one foot on a stool, ledge, or box. Switch feet every now and then. · Sit in a chair that is low enough to let you place both feet flat on the floor with both knees nearly level with your hips. If your chair or desk is too high, use a footrest to raise your knees.  Place a small pillow, a rolled-up towel, or a lumbar roll in the curve of your back if you need extra support. · Try a kneeling chair, which helps tilt your hips forward. This takes pressure off your lower back. · Try sitting on an exercise ball. It can rock from side to side, which helps keep your back loose. · When driving, keep your knees nearly level with your hips. Sit straight, and drive with both hands on the steering wheel. Your arms should be in a slightly bent position. Reduce stress on your back through careful lifting · Squat down, bending at the hips and knees only. If you need to, put one knee to the floor and extend your other knee in front of you, bent at a right angle (half kneeling). · Press your chest straight forward. This helps keep your upper back straight while keeping a slight arch in your low back. · Hold the load as close to your body as possible, at the level of your belly button (navel). · Use your feet to change direction, taking small steps. · Lead with your hips as you change direction. Keep your shoulders in line with your hips as you move. · Set down your load carefully, squatting with your knees and hips only. Exercise and stretch your back · Do some exercise on most days of the week, if your doctor says it is okay. You can walk, run, swim, or cycle. · Stretch your back muscles. Here are a few exercises to try: ¨ Lie on your back, and gently pull one bent knee to your chest. Put that foot back on the floor, and then pull the other knee to your chest. 
¨ Do pelvic tilts. Lie on your back with your knees bent. Tighten your stomach muscles. Pull your belly button (navel) in and up toward your ribs. You should feel like your back is pressing to the floor and your hips and pelvis are slightly lifting off the floor. Hold for 6 seconds while breathing smoothly. ¨ Sit with your back flat against a wall. · Keep your core muscles strong.  The muscles of your back, belly (abdomen), and buttocks support your spine. ¨ Pull in your belly and imagine pulling your navel toward your spine. Hold this for 6 seconds, then relax. Remember to keep breathing normally as you tense your muscles. ¨ Do curl-ups. Always do them with your knees bent. Keep your low back on the floor, and curl your shoulders toward your knees using a smooth, slow motion. Keep your arms folded across your chest. If this bothers your neck, try putting your hands behind your neck (not your head), with your elbows spread apart. ¨ Lie on your back with your knees bent and your feet flat on the floor. Tighten your belly muscles, and then push with your feet and raise your buttocks up a few inches. Hold this position 6 seconds as you continue to breathe normally, then lower yourself slowly to the floor. Repeat 8 to 12 times. ¨ If you like group exercise, try Pilates or yoga. These classes have poses that strengthen the core muscles. Lead a healthy lifestyle · Stay at a healthy weight to avoid strain on your back. · Do not smoke. Smoking increases the risk of osteoporosis, which weakens the spine. If you need help quitting, talk to your doctor about stop-smoking programs and medicines. These can increase your chances of quitting for good. Where can you learn more? Go to http://chiki-zhao.info/. Enter L315 in the search box to learn more about \"Learning About How to Have a Healthy Back. \" Current as of: May 23, 2016 Content Version: 11.2 © 4859-6416 [employer]. Care instructions adapted under license by [employer] (which disclaims liability or warranty for this information). If you have questions about a medical condition or this instruction, always ask your healthcare professional. [address] any warranty or liability for your use of this information. Sciatica: Exercises Your Care Instructions Here are some examples of typical rehabilitation exercises for your condition. Start each exercise slowly. Ease off the exercise if you start to have pain. Your doctor or physical therapist will tell you when you can start these exercises and which ones will work best for you. When you are not being active, find a comfortable position for rest. Some people are comfortable on the floor or a medium-firm bed with a small pillow under their head and another under their knees. Some people prefer to lie on their side with a pillow between their knees. Don't stay in one position for too long. Take short walks (10 to 20 minutes) every 2 to 3 hours. Avoid slopes, hills, and stairs until you feel better. Walk only distances you can manage without pain, especially leg pain. How to do the exercises Back stretches 1. Get down on your hands and knees on the floor. 2. Relax your head and allow it to droop. Round your back up toward the ceiling until you feel a nice stretch in your upper, middle, and lower back. Hold this stretch for as long as it feels comfortable, or about 15 to 30 seconds. 3. Return to the starting position with a flat back while you are on your hands and knees. 4. Let your back sway by pressing your stomach toward the floor. Lift your buttocks toward the ceiling. 5. Hold this position for 15 to 30 seconds. 6. Repeat 2 to 4 times. Follow-up care is a key part of your treatment and safety. Be sure to make and go to all appointments, and call your doctor if you are having problems. It's also a good idea to know your test results and keep a list of the medicines you take. Where can you learn more? Go to http://chiki-zhao.info/. Enter T258 in the search box to learn more about \"Sciatica: Exercises. \" Current as of: May 23, 2016 Content Version: 11.2 © 0565-9796 [employer].  Care instructions adapted under license by [address] (which disclaims liability or warranty for this information). If you have questions about a medical condition or this instruction, always ask your healthcare professional. [address] any warranty or liability for your use of this information. Introducing [facility] & HEALTH SERVICES! Dear Blanco Villalobos: Thank you for requesting a [facility] account. Our records indicate that you already have an active [facility] account. You can access your account anytime at [email or web address] Did you know that you can access your hospital and ER discharge instructions at any time in [facility]? You can also review all of your test results from your hospital stay or ER visit. Additional Information If you have questions, please visit the Frequently Asked Questions section of the [employer] website at [email or web address] ([email or web address] Remember, [facility] is NOT to be used for urgent needs. For medical emergencies, dial 911. Now available from your iPhone and Android! Please provide this summary of care documentation to your next provider. Your primary care clinician is listed as SAROJ MCNEIL. If you have any questions after today's visit, please call [phone].

## 2017-04-24 ENCOUNTER — OFFICE VISIT (OUTPATIENT)
Dept: CARDIOLOGY CLINIC | Age: 37
End: 2017-04-24

## 2017-04-24 VITALS
DIASTOLIC BLOOD PRESSURE: 68 MMHG | HEART RATE: 82 BPM | SYSTOLIC BLOOD PRESSURE: 102 MMHG | BODY MASS INDEX: 25.83 KG/M2 | HEIGHT: 65 IN | OXYGEN SATURATION: 98 % | WEIGHT: 155 LBS

## 2017-04-24 DIAGNOSIS — F41.9 ANXIETY: ICD-10-CM

## 2017-04-24 DIAGNOSIS — R00.2 PALPITATIONS: ICD-10-CM

## 2017-04-24 DIAGNOSIS — I49.1 PAC (PREMATURE ATRIAL CONTRACTION): Primary | ICD-10-CM

## 2017-04-24 DIAGNOSIS — E78.00 HYPERCHOLESTEROLEMIA: ICD-10-CM

## 2017-04-24 NOTE — PROGRESS NOTES
HPI: I saw Carlos Spencer in my office today in cardiovascular evaluation regarding his problems with palpitations in the past and some dizziness issues. Mr. Brooklyn Sevilla is a pleasant 43year old white male with history of palpitations and some anxiety issues with Holters in the past, dating back to 2006, showing some PACs and reconfirmed on a Holter in November of 2014. He also has history of dizziness problems, which back in November of 2012 prompted us to do a tilt table test and the stress portion of that test using sublingual nitroglycerin demonstrated a severe bradycardia with junctional rhythm and an 8 second pause secondary to very high vagal tone. We decided not to place a pacemaker and the patient has had no further syncopal episodes, but he still complaints of some vague lightheadedness from time to time. He unfortunately continues to smoke a pack of cigarettes per day. He has been somewhat concerned about the possibility of developing heart disease, so we did do a calcium score on him back in February of 2011, which was 0, suggesting he has a very low ten year risk of cardiovascular event. He comes into the office today and relates that he continues to have palpitations lasting for a few seconds described as a fluttering or sometimes hard heart beating occurring a few times a day may be a few days in a row and then stopping for a few days, but these are not significantly worse than they were before. He still has some potentially vaguely mediated episodes where he had significant palpitations if he tries to be active after eating and he continues to relate that he gets anxiety with exercise because of the problem he had with pauses when he stopped on his tilt table test. He has been trying to cut down his cigarettes and is down to three quarters of a pack of cigarettes per day. Encounter Diagnoses   Name Primary?     Palpitations     PAC (premature atrial contraction) Yes    Hypercholesterolemia     Anxiety        Discussion: This gentleman I suspect he has simply high vagal tone which is creating some of his problems with feeling bad after he exercises with his heart rate blood pressure dropping fairly quickly, but I told him that I thought it would be reasonable for him to exercise to 60-80% of his predicted maximal heart rate which would be in the 125 to 145 range and see if by keeping his exercise and only a moderate increased level that he can decrease some of his post exercise symptomatology. He does have hypercholesterolemia which is not being treated, but he does also have a 0 calcium score in the past couple of years. I did tell him however that I thought with his persistent smoking and high cholesterol that he could develop significant coronary artery disease over time and again quitting smoking would be paramount to improving his long-term functional capacity and possibly his longevity. I will see him again in several months or as needed if new cardiovascular symptoms should surface in the interim. PCP:  Lisandro Diaz MD      Past Medical History:   Diagnosis Date    Anxiety     Arm fatigue     Forearms    Balance problems     Cardiac Agatston CAC score 100-199 02/04/2015    Coronary calcium score 0.    Cardiac echocardiogram 10/29/2014    EF 55-60%. No RWMA. Normal diastolic fx. RVSP 30 mmHg.  Cardiac Holter monitor study 10/29/2014    Normal Holter study.     Chronic cough     chronically coughing up sputum    Depression     Dizziness     Dyspnea     Esophageal reflux     Headache     Hypercholesterolemia     Lumbar pain     Myofascial pain     Neck pain     Numbness and tingling     PAC (premature atrial contraction)     Palpitations     presumably benign    Panic disorder     Reflux     Thoracic back pain     Mid-thoracic    Upper extremity weakness     Vertigo, intermittent          Past Surgical History:   Procedure Laterality Date    APPENDECTOMY  11/23/2010    HX HEENT      corrective hearing for left ear     HX HEENT      plastic surgery on both ears    HX HEENT  10/08 & 09/09    Reformed jaw bone, bone graft    HX RENAL BIOPSY      HX TONSILLECTOMY           Current Outpatient Rx   Name  Route  Sig  Dispense  Refill    ALPRAZolam (XANAX) 0.5 mg tablet    Oral    Take 0.5 mg by mouth two (2) times a day.  mometasone (NASONEX) 50 mcg/actuation nasal spray    Both Nostrils    1 Spray by Both Nostrils route daily.  ascorbic acid (VITAMIN C) 500 mg tablet    Oral    Take 500 mg by mouth daily.  cholecalciferol, vitamin D3, (VITAMIN D3) 2,000 unit Tab    Oral    Take 1 Tab by mouth daily.  escitalopram (LEXAPRO) 10 mg tablet    Oral    Take 10 mg by mouth daily.  omeprazole-sodium bicarbonate (ZEGERID) 40-1.1 mg-gram capsule    Oral    Take 1 Cap by mouth daily.  acetaminophen (TYLENOL EXTRA STRENGTH) 500 mg tablet    Oral    Take 500 mg by mouth every six (6) hours as needed.  MULTIVITAMIN PO    Oral    Take 1 Tab by mouth daily. Allergies   Allergen Reactions    Opioids - Morphine Analogues Myalgia    Other Medication Other (comments)     Pt reports allergy to steroids, with psychological side effects    Pollen Extracts Unable to Obtain    Zithromax [Azithromycin] Itching       Review of Systems:   Constitutional: Positive for diaphoresis and malaise/fatigue. Respiratory: Positive for cough, sputum production, shortness of breath and wheezing. Cardiovascular: Positive for palpitations and claudication. Gastrointestinal: Positive for abdominal pain, heartburn and nausea. Musculoskeletal: Positive for back pain, myalgias and neck pain. Physical Exam:  This is a well-developed, well-nourished, anxious 43year-old  male in no acute distress at the time of the examination.    Visit Vitals    BP 102/68 (BP 1 Location: Right arm, BP Patient Position: Sitting)    Pulse 82    Ht 5' 5\" (1.651 m)    Wt 70.3 kg (155 lb)    SpO2 98%    BMI 25.79 kg/m2       HEENT: Conjuctiva white, mucosa moist, no pallor or cyanosis. NECK: Supple without masses, tenderness or thyromegaly. There was no jugular venous distention. Carotid are full bilaterally without bruits. CHEST: Symmetrical with good excursion. LUNGS: Clear to auscultation in all fields. HEART: The apex is not displaced. There were no lifts, thrills or heaves. There is a normal S1 and S2 without appreciable murmurs rubs clicks or gallops auscultated. ABDOMEN: Soft without masses, tenderness or organomegaly. EXTREMITIES: Full peripheral pulses without peripheral edema. Review of Data: Please refer to past medical history for most recent cardiac testing. Results for orders placed or performed in visit on 04/24/17   AMB POC EKG ROUTINE W/ 12 LEADS, INTER & REP     Status: None    Narrative    Normal sinus rhythm rate 82. There is artifact in lead V2, but this tracing is otherwise within normal limits and similar to the EKG of October 4, 2016. Michel Scanlon D.O., F.A.C.C. [facility]  [facility] [place]  [address] [place] [address]. [address] [place]    PLEASE NOTE:  This document has been produced using voice recognition software. Unrecognized errors in transcription may be present.

## 2017-04-24 NOTE — PROGRESS NOTES
Review of Systems   Constitutional: Positive for diaphoresis and malaise/fatigue. Respiratory: Positive for cough, sputum production, shortness of breath and wheezing. Cardiovascular: Positive for palpitations and claudication. Gastrointestinal: Positive for abdominal pain, heartburn and nausea. Musculoskeletal: Positive for back pain, myalgias and neck pain.

## 2017-04-24 NOTE — PROGRESS NOTES
1. Have you been to the ER, urgent care clinic since your last visit? Hospitalized since your last visit? no  2. Have you seen or consulted any other health care providers outside of the [address] [place] [address] since your last visit? Include any pap smears or colon screening.   no

## 2017-04-24 NOTE — MR AVS SNAPSHOT
Visit Information Date & Time Provider Department Dept. Phone Encounter #  
 4/24/2017  2:40 PM Sara Ziegler, [address] [facility] [address] [id number] Follow-up Instructions Return in about 6 months (around 10/24/2017), or if symptoms worsen or fail to improve. Your Appointments 5/1/2017  1:45 PM  
PROBLEM VISIT with Tomas Traore MD  
[address] [facility], [address] and [facility] [address]) Appt Note: BACK & NECK PAIN  
 [address] [facility] [id number] [phone]  
  
   
 [address] [facility] [address] Upcoming Health Maintenance Date Due Pneumococcal 19-64 Medium Risk (1 of 1 - PPSV23) 5/3/1997 DTaP/Tdap/Td series (1 - Tdap) 5/3/1999 INFLUENZA AGE 9 TO ADULT 8/1/2016 Allergies as of 4/24/2017  Review Complete On: 4/24/2017 By: Sara Ziegler DO Severity Noted Reaction Type Reactions Opioids - Morphine Analogues  08/27/2015    Myalgia Other Medication  03/03/2016    Other (comments) Pt reports allergy to steroids, with psychological side effects Pollen Extracts  08/07/2012    Unable to Obtain Zithromax [Azithromycin]    Itching Current Immunizations  Never Reviewed No immunizations on file. Not reviewed this visit You Were Diagnosed With   
  
 Codes Comments PAC (premature atrial contraction)    -  Primary ICD-10-CM: I49.1 ICD-9-CM: 427.61 Palpitations     ICD-10-CM: R00.2 ICD-9-CM: 785.1 Hypercholesterolemia     ICD-10-CM: E78.00 ICD-9-CM: 272.0 Anxiety     ICD-10-CM: F41.9 ICD-9-CM: 300.00 Vitals BP Pulse Height(growth percentile) Weight(growth percentile) SpO2 BMI  
 102/68 (BP 1 Location: Right arm, BP Patient Position: Sitting) 82 5' 5\" (1.651 m) 155 lb (70.3 kg) 98% 25.79 kg/m2 Smoking Status Current Every Day Smoker Vitals History BMI and BSA Data Body Mass Index Body Surface Area 25.79 kg/m 2 1.8 m 2 Preferred Pharmacy Pharmacy Name Phone [id number], [address] [place] [address] Your Updated Medication List  
  
   
This list is accurate as of: 4/24/17  3:31 PM.  Always use your most recent med list.  
  
  
  
  
 ALPRAZolam 0.5 mg tablet Commonly known as:  Kathy Cumins Take 0.5 mg by mouth two (2) times a day. LEXAPRO 10 mg tablet Generic drug:  escitalopram oxalate Take 10 mg by mouth daily. MULTIVITAMIN PO Take 1 Tab by mouth daily. TYLENOL EXTRA STRENGTH 500 mg tablet Generic drug:  acetaminophen Take 500 mg by mouth every six (6) hours as needed. VITAMIN C 500 mg tablet Generic drug:  ascorbic acid (vitamin C) Take 500 mg by mouth daily. VITAMIN D3 2,000 unit Tab Generic drug:  cholecalciferol (vitamin D3) Take 1 Tab by mouth daily. ZEGERID 40-1.1 mg-gram capsule Generic drug:  omeprazole-sodium bicarbonate Take 1 Cap by mouth daily. We Performed the Following AMB POC EKG ROUTINE W/ 12 LEADS, INTER & REP [43909 CPT(R)] Follow-up Instructions Return in about 6 months (around 10/24/2017), or if symptoms worsen or fail to improve. Introducing [facility] & HEALTH SERVICES! Dear Randee Kam: Thank you for requesting a [employer] account. Our records indicate that you already have an active [employer] account. You can access your account anytime at [email or web address] Did you know that you can access your hospital and ER discharge instructions at any time in [employer]? You can also review all of your test results from your hospital stay or ER visit. Additional Information If you have questions, please visit the Frequently Asked Questions section of the [employer] website at [email or web address] Remember, [employer] is NOT to be used for urgent needs. For medical emergencies, dial 911. Now available from your iPhone and Android! Please provide this summary of care documentation to your next provider. Your primary care clinician is listed as SAROJ MCNEIL. If you have any questions after today's visit, please call [phone].

## 2017-05-01 ENCOUNTER — OFFICE VISIT (OUTPATIENT)
Dept: ORTHOPEDIC SURGERY | Age: 37
End: 2017-05-01

## 2017-05-01 VITALS
WEIGHT: 152.2 LBS | HEART RATE: 78 BPM | HEIGHT: 65 IN | RESPIRATION RATE: 20 BRPM | DIASTOLIC BLOOD PRESSURE: 57 MMHG | TEMPERATURE: 98.1 F | OXYGEN SATURATION: 99 % | BODY MASS INDEX: 25.36 KG/M2 | SYSTOLIC BLOOD PRESSURE: 102 MMHG

## 2017-05-01 DIAGNOSIS — M54.16 LUMBAR RADICULOPATHY: Primary | ICD-10-CM

## 2017-05-01 NOTE — MR AVS SNAPSHOT
Visit Information Date & Time Provider Department Dept. Phone Encounter #  
 5/1/2017  1:45 PM Olman Mancilla MD [facility] [phone] [id number] Follow-up Instructions Return in about 1 week (around 5/8/2017), or if symptoms worsen or fail to improve. Your Appointments 11/7/2017  2:00 PM  
Follow Up with Michel Scanlon DO [facility] [address] [facility] Appt Note: 6 month follow up [place] [address] [address] [id number] [phone] [address] Upcoming Health Maintenance Date Due Pneumococcal 19-64 Medium Risk (1 of 1 - PPSV23) 5/3/1997 DTaP/Tdap/Td series (1 - Tdap) 5/3/1999 INFLUENZA AGE 9 TO ADULT 8/1/2017 Allergies as of 5/1/2017  Review Complete On: 5/1/2017 By: Ora Reyes LPN Severity Noted Reaction Type Reactions Opioids - Morphine Analogues  08/27/2015    Myalgia Other Medication  03/03/2016    Other (comments) Pt reports allergy to steroids, with psychological side effects Pollen Extracts  08/07/2012    Unable to Obtain Zithromax [Azithromycin]    Itching Current Immunizations  Never Reviewed No immunizations on file. Not reviewed this visit You Were Diagnosed With   
  
 Codes Comments Lumbar radiculopathy    -  Primary ICD-10-CM: M54.16 
ICD-9-CM: 724.4 Vitals BP Pulse Temp Resp Height(growth percentile) Weight(growth percentile) 102/57 78 98.1 °F (36.7 °C) (Oral) 20 5' 5\" (1.651 m) 152 lb 3.2 oz (69 kg) SpO2 BMI Smoking Status 99% 25.33 kg/m2 Current Every Day Smoker BMI and BSA Data Body Mass Index Body Surface Area  
 25.33 kg/m 2 1.78 m 2 Preferred Pharmacy Pharmacy Name Phone [id number]ger, [address] [place] [address] Your Updated Medication List  
  
   
 This list is accurate as of: 5/1/17  2:49 PM.  Always use your most recent med list.  
  
  
  
  
 ALPRAZolam 0.5 mg tablet Commonly known as:  David Messier Take 0.5 mg by mouth two (2) times a day. LEXAPRO 10 mg tablet Generic drug:  escitalopram oxalate Take 10 mg by mouth daily. MULTIVITAMIN PO Take 1 Tab by mouth daily. TYLENOL EXTRA STRENGTH 500 mg tablet Generic drug:  acetaminophen Take 500 mg by mouth every six (6) hours as needed. VITAMIN C 500 mg tablet Generic drug:  ascorbic acid (vitamin C) Take 500 mg by mouth daily. VITAMIN D3 2,000 unit Tab Generic drug:  cholecalciferol (vitamin D3) Take 1 Tab by mouth daily. ZEGERID 40-1.1 mg-gram capsule Generic drug:  omeprazole-sodium bicarbonate Take 1 Cap by mouth daily. Follow-up Instructions Return in about 1 week (around 5/8/2017), or if symptoms worsen or fail to improve. Patient Instructions Herniated Disc: Exercises Your Care Instructions Here are some examples of typical rehabilitation exercises for your condition. Start each exercise slowly. Ease off the exercise if you start to have pain. Your doctor or physical therapist will tell you when you can start these exercises and which ones will work best for you. How to do the exercises Note: These exercises can help you move easier and feel better. But when you first start doing them, you may have more pain in your back. This is normal. But it is important to pay close attention to your pain during and after each exercise. · Keep doing these exercises if your pain stays the same or moves from your leg and buttock more toward the middle of your spine. Pain moving out of your leg and buttock is a good sign. · Stop doing these exercises if your pain gets worse in your leg and buttock. Stop if you start to have pain in your leg and buttock that you didn't have before. Be sure to do these exercises in the order they appear. Note how your pain changes before you move to the next one. If your pain is much worse right after exercise and stays worse the next day, do not do any of these exercises. 1. Rest on belly 1. Lie on your stomach, face down, with your head turned to the side. Place your arms beside your body. If this bothers your neck, place your hands, one on top of the other, underneath your forehead. This will help support your head and neck. 2. Try to relax your lower back muscles as much as you can. 3. Continue to lie on your stomach for 2 minutes. 4. If your pain spreads down your leg or increases down your leg, stop this exercise and do not do the next exercises. 2. Press-up 1. Lie on your stomach, face down. Keep your elbows tucked into your sides and under your shoulders. 2. Press your elbows down into the floor to raise your upper back. As you do this, relax your stomach muscles. Allow your back to arch without using your back muscles. Let your low back relax completely as you arch up. 3. Hold this position for 2 minutes. 4. Repeat 2 to 4 times. 5. If your pain spreads down your leg or increases down your leg, stop this exercise and do not do the next exercises. 3. Full press-up 1. Lie on your stomach, face down. Keep your elbows tucked into your sides and under your shoulders. 2. Straighten your elbows, and push your upper body up as far as you can. Allow your lower back to sag. Keep your hips, pelvis, and legs relaxed. 3. Hold this position for 5 seconds, and then relax. 4. Repeat 10 times. Each time, try to raise your upper body a little higher and hold your arms a bit straighter. 5. If your pain spreads down your leg or gets worse down your leg, stop this exercise and do not move to the next exercise. 6. If you can't do this exercise, you may instead try the backward bend exercise that follows. 4. Backward bend · Stand with your feet hip-width apart. Your toes should point forward. Do not lock your knees. · Place your hands in the small of your back. · Bend backward as far as you can, keeping your knees straight. Hold this position for 2 to 3 seconds. Then return to your starting position. · Repeat 2 to 4 times. Each time, try to bend backward a little farther, until you bend backward as far as you can. · If your pain spreads down your leg or increases down your leg, stop this exercise. Follow-up care is a key part of your treatment and safety. Be sure to make and go to all appointments, and call your doctor if you are having problems. It's also a good idea to know your test results and keep a list of the medicines you take. Where can you learn more? Go to http://chiki-zhao.info/. Enter [phone] in the search box to learn more about \"Herniated Disc: Exercises. \" Current as of: May 23, 2016 Content Version: 11.2 © 7260-3226 [employer]. Care instructions adapted under license by [employer] (which disclaims liability or warranty for this information). If you have questions about a medical condition or this instruction, always ask your healthcare professional. [place] [address] any warranty or liability for your use of this information. Exercises discussed today: Patrizia exercises Introducing [facility] & HEALTH SERVICES! Dear Will Little: Thank you for requesting a [employer] account. Our records indicate that you already have an active [employer] account. You can access your account anytime at [email or web address] Did you know that you can access your hospital and ER discharge instructions at any time in [employer]? You can also review all of your test results from your hospital stay or ER visit. Additional Information If you have questions, please visit the Frequently Asked Questions section of the [employer] website at [email or web address] Remember, [employer] is NOT to be used for urgent needs. For medical emergencies, dial 911. Now available from your iPhone and Android! Please provide this summary of care documentation to your next provider. Your primary care clinician is listed as SAROJ MCNEIL. If you have any questions after today's visit, please call [phone].

## 2017-05-01 NOTE — PROGRESS NOTES
[address], [address]  [place] [address]  Phone: [phone]  Fax: [phone]        Michelle Floresby  : 1980  PCP: Sierra Painting MD  2017    PROGRESS NOTE      ASSESSMENT AND PLAN    Ewa Muniz comes in to the office today for a prn f/u. He previously saw me four months ago for cervical pain which was managed with a HEP. Today he returns for a new pain in his posterior right leg. He states that over the last three years, he has had three incidents of this pain and comes today to have it evaluated. This is likely due to a lumbar radiculopathy. He was given a HEP to use and see if it will relieve this intermittent pain. We discussed a Toradol injection but pt states he would like to defer this until after his upcoming trip to [facility]. Pt also continues to have pain near his left masseter muscle. Another physician believed he may have TMJ and gave him a mouth guard which provided slight relief. Pt states he may get botox injections soon to see if they will provide relief. Pt will f/u in 1 week or prn. Will Little was seen today for back pain and neck pain. Diagnoses and all orders for this visit:    Lumbar radiculopathy         Follow-up Disposition:  Return in about 1 week (around 2017), or if symptoms worsen or fail to improve. HISTORY OF PRESENT ILLNESS  Ewa Muniz is a 43 y.o. male. A&P / HPI from 2016:  Monica Daley was in the office today for a f/u appointment. His pain has remained myofascial in nature. He has not found relief from PT in the past or from using an antiinflammatory cream. Pt was advised to continue using his theracane and doing his home PT exercises.       Since the cream did not provide relief and NSAIDs upset his stomach, he was prescribed Celebrex 200mg BID prn.  The risks, benefits, and potential side effects of this medication were discussed.       Trigger point injections were offered but declined at this time. Pt said he would like to get a massage first.       He will f/u prn.      Updates from 12/02/16:  Pt presents for a prn f/u. He has had a cervical and brain MRI since his last visit. The cervical MRI does not show any obvious cause for his pain. 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     He comes in today with c/o cervical pain. His biggest complaint is a headache that seems consistent with occipital neuralgia, radiating from his left eye to a region below his left ear. Updates from 05/01/17:  Pt presents for a prn f/u. He previously saw me four months ago for cervical pain which was managed with a HEP. Today he returns for a new pain in his posterior right leg. He states that over the last three years, he has had three incidents of this pain and comes today to have it evaluated. Pt also continues to have pain near his left masseter muscle. Another physician believed he may have TMJ and gave him a mouth guard which provided slight relief. Pt states he may get botox injections soon to see if they will provide relief. Pt states he cannot take steroid packs because it gives him anxiety. PAST MEDICAL HISTORY   Past Medical History:   Diagnosis Date    Anxiety     Arm fatigue     Forearms    Balance problems     Cardiac Agatston CAC score 100-199 02/04/2015    Coronary calcium score 0.    Cardiac echocardiogram 10/29/2014    EF 55-60%. No RWMA. Normal diastolic fx. RVSP 30 mmHg.  Cardiac Holter monitor study 10/29/2014    Normal Holter study.     Chronic cough     chronically coughing up sputum    Depression     Dizziness     Dyspnea     Esophageal reflux     Headache     Hypercholesterolemia     Lumbar pain     Myofascial pain     Neck pain     Numbness and tingling     PAC (premature atrial contraction)     Palpitations     presumably benign    Panic disorder     Reflux     Thoracic back pain     Mid-thoracic  Upper extremity weakness     Vertigo, intermittent        Past Surgical History:   Procedure Laterality Date    APPENDECTOMY  11/23/2010    HX HEENT      corrective hearing for left ear     HX HEENT      plastic surgery on both ears    HX HEENT  10/08 & 09/09    Reformed jaw bone, bone graft    HX RENAL BIOPSY      HX TONSILLECTOMY     . MEDICATIONS      Current Outpatient Prescriptions   Medication Sig Dispense Refill    ALPRAZolam (XANAX) 0.5 mg tablet Take 0.5 mg by mouth two (2) times a day.  ascorbic acid (VITAMIN C) 500 mg tablet Take 500 mg by mouth daily.  cholecalciferol, vitamin D3, (VITAMIN D3) 2,000 unit Tab Take 1 Tab by mouth daily.  escitalopram (LEXAPRO) 10 mg tablet Take 10 mg by mouth daily.  omeprazole-sodium bicarbonate (ZEGERID) 40-1.1 mg-gram capsule Take 1 Cap by mouth daily.  acetaminophen (TYLENOL EXTRA STRENGTH) 500 mg tablet Take 500 mg by mouth every six (6) hours as needed.  MULTIVITAMIN PO Take 1 Tab by mouth daily.           ALLERGIES    Allergies   Allergen Reactions    Opioids - Morphine Analogues Myalgia    Other Medication Other (comments)     Pt reports allergy to steroids, with psychological side effects    Pollen Extracts Unable to Obtain    Zithromax [Azithromycin] Itching          SOCIAL HISTORY    Social History     Social History    Marital status: SINGLE     Spouse name: N/A    Number of children: N/A    Years of education: N/A     Occupational History    real estate      Social History Main Topics    Smoking status: Current Every Day Smoker     Packs/day: 1.00     Years: 20.00     Types: Cigarettes    Smokeless tobacco: Never Used    Alcohol use No    Drug use: No    Sexual activity: Not Asked     Other Topics Concern    None     Social History Narrative     Social History Narrative      Problem Relation Age of Onset    Hypertension Father     Liver Disease Father     Hypertension Mother          REVIEW OF SYSTEMS  Review of Systems   Constitutional: Negative for chills, diaphoresis, fever, malaise/fatigue and weight loss. Respiratory: Negative for shortness of breath. Cardiovascular: Negative for chest pain and leg swelling. Gastrointestinal: Negative for constipation, nausea and vomiting. Neurological: Negative for dizziness, tingling, seizures, loss of consciousness and headaches. Psychiatric/Behavioral: The patient does not have insomnia. PHYSICAL EXAMINATION  Visit Vitals    /57    Pulse 78    Temp 98.1 °F (36.7 °C) (Oral)    Resp 20    Ht 5' 5\" (1.651 m)    Wt 152 lb 3.2 oz (69 kg)    SpO2 99%    BMI 25.33 kg/m2       Pain Assessment  4/19/2017   Location of Pain Knee   Location Modifiers Right   Severity of Pain 4   Quality of Pain Aching   Duration of Pain Persistent   Frequency of Pain Constant   Aggravating Factors Walking;Standing   Aggravating Factors Comment -   Limiting Behavior Yes   Relieving Factors Rest   Result of Injury No           Constitutional:  Well developed, well nourished, in no acute distress. Psychiatric: Affect and mood are appropriate. Integumentary: No rashes or abrasions noted on exposed areas. SPINE/MUSCULOSKELETAL EXAM    Cervical spine:  Neck is midline.    Normal muscle tone.    No focal atrophy is noted.    ROM pain free.    Shoulder ROM intact.    Tenderness to palpation at left scalene's.    Negative Spurling's sign.    Negative Tinel's sign.    Negative Garcia's sign.    Flex forward posture.    No clonus.      Sensation in the bilateral arms grossly intact to light touch.        Lumbar spine:  No rash, ecchymosis, or gross obliquity.    No fasciculations.    No focal atrophy is noted.    No pain with hip ROM.    Range of motion is normal.    Tenderness to palpation.   No tenderness to palpation at the sciatic notch.    SI joints non-tender.    Trochanters non tender.      Sensation in the bilateral legs grossly intact to light touch.     Updates from 12/02/16:  Negative Garcia's sign bilaterally. No clonus. Pain in left scalene muscles with turning head to the right. Updates from 05/01/17:  Positive right straight leg raise. MOTOR:      Biceps  Triceps Deltoids Wrist Ext Wrist Flex Hand Intrin   Right 5/5 5/5 5/5 5/5 5/5 5/5   Left 5/5 5/5 5/5 5/5 5/5 5/5             Hip Flex  Quads Hamstrings Ankle DF EHL Ankle PF   Right 5/5 5/5 5/5 5/5 5/5 5/5   Left 5/5 5/5 5/5 5/5 5/5 5/5     DTRs are 2+ biceps, triceps, brachioradialis, patella, and Achilles.      Negative Straight Leg raise.    Squat not tested.    No difficulty with tandem gait.    Normal heel walk.    Normal toe rise.       Ambulation without assistive device. FWB.       RADIOGRAPHS  Cervical MRI from 9/15/2014 personally reviewed with patient:  1. Stable appearance of cervical spine examination. 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 Mild multilevel degenerative changes. No high-grade spinal or foraminal  Stenosis.     Cervical MRI images taken on 11/23/2016 personally reviewed with patient:  Mild reversal of cervical curvature, centered at C3-C4, stable. Partial fusion  at C6-C7, stable. No compression fracture or pathologic marrow signal. No  prevertebral soft tissue abnormalities. Spinous processes and posterior soft  tissues unremarkable. Craniocervical junction and spinal cord are also  unremarkable. Dominant left vertebral artery, stable.      C2-C3: No disc herniation, central or foraminal stenosis.     C3-C4: Mild posterior disc bulge. No central stenosis, cord contact or foraminal  stenosis.     C4-C5: Mild posterior disc bulge with no central or foraminal stenosis.     C5-C6: Mild posterior disc bulge. No central or foraminal stenosis.     C6-7 and C7-T1: No disc herniation, central or foraminal stenosis.      IMPRESSION  IMPRESSION: Stable mild degenerative disease at C3-C4 and C4-C5.  No significant  central stenosis, cord compression or foraminal stenosis.      reviewed      This plan was reviewed with the patient and patient agrees. All questions were answered. More than half of this 30 minute visit today was spent on counseling.     Written by Aleen Babinski, as dictated by Dr. Albert Ledesma, Dr. Caity Choi, confirm that all documentation is accurate.

## 2017-07-28 ENCOUNTER — OFFICE VISIT (OUTPATIENT)
Dept: ORTHOPEDIC SURGERY | Age: 37
End: 2017-07-28

## 2017-07-28 VITALS
HEART RATE: 74 BPM | WEIGHT: 152.8 LBS | RESPIRATION RATE: 20 BRPM | BODY MASS INDEX: 25.46 KG/M2 | SYSTOLIC BLOOD PRESSURE: 101 MMHG | HEIGHT: 65 IN | DIASTOLIC BLOOD PRESSURE: 53 MMHG | TEMPERATURE: 98.3 F | OXYGEN SATURATION: 98 %

## 2017-07-28 DIAGNOSIS — M54.12 CERVICAL RADICULOPATHY: Primary | ICD-10-CM

## 2017-07-28 NOTE — PROGRESS NOTES
[address] [facility] [address]  [address], [address] [facility],[address]  [place], [address] [place] [address] Street  Phone: [phone]  Fax: [phone]        Suzanne Life  : 1980  PCP: Luis E Obando MD  2017    PROGRESS NOTE      ASSESSMENT AND PLAN    Naresh Hernandez comes in to the office today for a new onset of symptoms that include numbness and a tingling sensation that is more prominent in the left upper extremity and ends in the last two fingers. He also has some symptoms in the right hand encompassing all the fingers intermittently    At this time, I referred him to get a BUE EMG to determine peripheral nerve damage versus cervical radiculopathy. I gave him rotator cuff exercises, due to his pain with Montoya test and empty can testing. Pt will f/u in 5 weeks or sooner as needed. Diagnoses and all orders for this visit:    1. Cervical radiculopathy  -     EMG TWO EXTREMITIES UPPER; Future       Follow-up Disposition:  Return in about 5 weeks (around 2017), or if symptoms worsen or fail to improve. HISTORY OF PRESENT ILLNESS  Naresh Hernandez is a 42 y.o. male. A&P / HPI from 2016:  Matt Hackettronnievianey was in the office today for a f/u appointment. His pain has remained myofascial in nature. He has not found relief from PT in the past or from using an antiinflammatory cream. Pt was advised to continue using his theracane and doing his home PT exercises.       Since the cream did not provide relief and NSAIDs upset his stomach, he was prescribed Celebrex 200mg BID prn. The risks, benefits, and potential side effects of this medication were discussed.       Trigger point injections were offered but declined at this time. Pt said he would like to get a massage first.       He will f/u prn.      Updates from 16:  Pt presents for a prn f/u. He has had a cervical and brain MRI since his last visit. The cervical MRI does not show any obvious cause for his pain.  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     He comes in today with c/o cervical pain. His biggest complaint is a headache that seems consistent with occipital neuralgia, radiating from his left eye to a region below his left ear.       Updates from 07/28/17:  Pt presents for a new onset of numbness and tingling that radiate down the left upper extremity that ends in the last two finger. He states that his pain in the cervical region has decreased since the last visit. He is also complaining of chronic pain in the left shoulder. PAST MEDICAL HISTORY   Past Medical History:   Diagnosis Date    Anxiety     Arm fatigue     Forearms    Balance problems     Cardiac Agatston CAC score 100-199 02/04/2015    Coronary calcium score 0.    Cardiac echocardiogram 10/29/2014    EF 55-60%. No RWMA. Normal diastolic fx. RVSP 30 mmHg.  Cardiac Holter monitor study 10/29/2014    Normal Holter study.  Chronic cough     chronically coughing up sputum    Depression     Dizziness     Dyspnea     Esophageal reflux     Headache     Hypercholesterolemia     Lumbar pain     Myofascial pain     Neck pain     Numbness and tingling     PAC (premature atrial contraction)     Palpitations     presumably benign    Panic disorder     Reflux     Thoracic back pain     Mid-thoracic    Upper extremity weakness     Vertigo, intermittent        Past Surgical History:   Procedure Laterality Date    APPENDECTOMY  11/23/2010    HX HEENT      corrective hearing for left ear     HX HEENT      plastic surgery on both ears    HX HEENT  10/08 & 09/09    Reformed jaw bone, bone graft    HX RENAL BIOPSY      HX TONSILLECTOMY     . MEDICATIONS      Current Outpatient Prescriptions   Medication Sig Dispense Refill    ALPRAZolam (XANAX) 0.5 mg tablet Take 0.5 mg by mouth two (2) times a day.  ascorbic acid (VITAMIN C) 500 mg tablet Take 500 mg by mouth daily.       cholecalciferol, vitamin D3, (VITAMIN D3) 2,000 unit Tab Take 1 Tab by mouth daily.  escitalopram (LEXAPRO) 10 mg tablet Take 10 mg by mouth daily.  omeprazole-sodium bicarbonate (ZEGERID) 40-1.1 mg-gram capsule Take 1 Cap by mouth daily.  acetaminophen (TYLENOL EXTRA STRENGTH) 500 mg tablet Take 500 mg by mouth every six (6) hours as needed.  MULTIVITAMIN PO Take 1 Tab by mouth daily. ALLERGIES    Allergies   Allergen Reactions    Opioids - Morphine Analogues Myalgia    Other Medication Other (comments)     Pt reports allergy to steroids, with psychological side effects    Pollen Extracts Unable to Obtain    Zithromax [Azithromycin] Itching          SOCIAL HISTORY    Social History     Social History    Marital status: SINGLE     Spouse name: N/A    Number of children: N/A    Years of education: N/A     Occupational History    real estate      Social History Main Topics    Smoking status: Current Every Day Smoker     Packs/day: 1.00     Years: 20.00     Types: Cigarettes    Smokeless tobacco: Never Used    Alcohol use No    Drug use: No    Sexual activity: Not Asked     Other Topics Concern    None     Social History Narrative       FAMILY HISTORY    Family History   Problem Relation Age of Onset    Hypertension Father     Liver Disease Father     Hypertension Mother          REVIEW OF SYSTEMS  Review of Systems   Constitutional: Negative for chills, diaphoresis, fever, malaise/fatigue and weight loss. Respiratory: Negative for shortness of breath. Cardiovascular: Negative for chest pain and leg swelling. Gastrointestinal: Negative for constipation, nausea and vomiting. Neurological: Negative for dizziness, tingling, seizures, loss of consciousness, weakness and headaches. Psychiatric/Behavioral: The patient does not have insomnia.            PHYSICAL EXAMINATION  Visit Vitals    /53    Pulse 74    Temp 98.3 °F (36.8 °C) (Oral)    Resp 20    Ht 5' 5\" (1.651 m)    Wt 152 lb 12.8 oz (69.3 kg)    SpO2 98%    BMI 25.43 kg/m2       Pain Assessment  7/28/2017   Location of Pain -   Location Modifiers -   Severity of Pain 0   Quality of Pain -   Quality of Pain Comment -   Duration of Pain -   Frequency of Pain -   Date Pain First Started -   Aggravating Factors -   Aggravating Factors Comment -   Limiting Behavior -   Relieving Factors -   Relieving Factors Comment -   Result of Injury -           Constitutional:  Well developed, well nourished, in no acute distress. Psychiatric: Affect and mood are appropriate. Integumentary: No rashes or abrasions noted on exposed areas. SPINE/MUSCULOSKELETAL EXAM    Cervical spine:  Neck is midline.    Normal muscle tone.    No focal atrophy is noted.    ROM pain free.    Shoulder ROM intact.    Tenderness to palpation at left scalene's.    Negative Spurling's sign.    Negative Tinel's sign.    Negative Garcia's sign.    Flex forward posture.    No clonus.      Sensation in the bilateral arms grossly intact to light touch.        Lumbar spine:  No rash, ecchymosis, or gross obliquity.    No fasciculations.    No focal atrophy is noted.    No pain with hip ROM.    Range of motion is normal.    Tenderness to palpation. No tenderness to palpation at the sciatic notch.    SI joints non-tender.    Trochanters non tender.      Sensation in the bilateral legs grossly intact to light touch.     Updates from 12/02/16:  Negative Garcia's sign bilaterally. No clonus. Pain in left scalene muscles with turning head to the right.      MOTOR:      Biceps  Triceps Deltoids Wrist Ext Wrist Flex Hand Intrin   Right 5/5 5/5 5/5 5/5 5/5 5/5   Left 5/5 5/5 5/5 5/5 5/5 5/5             Hip Flex  Quads Hamstrings Ankle DF EHL Ankle PF   Right 5/5 5/5 5/5 5/5 5/5 5/5   Left 5/5 5/5 5/5 5/5 5/5 5/5     DTRs are 2+ biceps, triceps, brachioradialis, patella, and Achilles.      Negative Straight Leg raise.    Squat not tested.    No difficulty with tandem gait.    Normal heel walk.    Normal toe rise.       Ambulation without assistive device. FWB.       RADIOGRAPHS  Cervical MRI from 9/15/2014 personally reviewed with patient:  1. Stable appearance of cervical spine examination. 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 Mild multilevel degenerative changes. No high-grade spinal or foraminal  Stenosis.     Cervical MRI images taken on 11/23/2016 personally reviewed with patient:  Mild reversal of cervical curvature, centered at C3-C4, stable. Partial fusion  at C6-C7, stable. No compression fracture or pathologic marrow signal. No  prevertebral soft tissue abnormalities. Spinous processes and posterior soft  tissues unremarkable. Craniocervical junction and spinal cord are also  unremarkable. Dominant left vertebral artery, stable.      C2-C3: No disc herniation, central or foraminal stenosis.     C3-C4: Mild posterior disc bulge. No central stenosis, cord contact or foraminal  stenosis.     C4-C5: Mild posterior disc bulge with no central or foraminal stenosis.     C5-C6: Mild posterior disc bulge. No central or foraminal stenosis.     C6-7 and C7-T1: No disc herniation, central or foraminal stenosis.      IMPRESSION  IMPRESSION: Stable mild degenerative disease at C3-C4 and C4-C5. No significant  central stenosis, cord compression or foraminal stenosis.      reviewed      This plan was reviewed with the patient and patient agrees. All questions were answered. More than half of this 30 minute visit today was spent on counseling. Written by Jacquie Pacheco, as dictated by Dr. Thai Woo. I, Dr. Thai Woo, confirm that all documentation is accurate.

## 2017-07-28 NOTE — PATIENT INSTRUCTIONS
Rotator Cuff: Exercises  Your Care Instructions  Here are some examples of typical rehabilitation exercises for your condition. Start each exercise slowly. Ease off the exercise if you start to have pain. Your doctor or physical therapist will tell you when you can start these exercises and which ones will work best for you. How to do the exercises  Pendulum swing    Note: If you have pain in your back, do not do this exercise. 1. Hold on to a table or the back of a chair with your good arm. Then bend forward a little and let your sore arm hang straight down. This exercise does not use the arm muscles. Rather, use your legs and your hips to create movement that makes your arm swing freely. 2. Use the movement from your hips and legs to guide the slightly swinging arm back and forth like a pendulum (or elephant trunk). Then guide it in circles that start small (about the size of a dinner plate). Make the circles a bit larger each day, as your pain allows. 3. Do this exercise for 5 minutes, 5 to 7 times each day. 4. As you have less pain, try bending over a little farther to do this exercise. This will increase the amount of movement at your shoulder. Posterior stretching exercise    1. Hold the elbow of your injured arm with your other hand. 2. Use your hand to pull your injured arm gently up and across your body. You will feel a gentle stretch across the back of your injured shoulder. 3. Hold for at least 15 to 30 seconds. Then slowly lower your arm. 4. Repeat 2 to 4 times. Up-the-back stretch    Note: Your doctor or physical therapist may want you to wait to do this stretch until you have regained most of your range of motion and strength. You can do this stretch in different ways. Hold any of these stretches for at least 15 to 30 seconds. Repeat them 2 to 4 times. 1. Put your hand in your back pocket. Let it rest there to stretch your shoulder.   2. With your other hand, hold your injured arm (palm outward) behind your back by the wrist. Pull your arm up gently to stretch your shoulder. 3. Next, put a towel over your other shoulder. Put the hand of your injured arm behind your back. Now hold the back end of the towel. With the other hand, hold the front end of the towel in front of your body. Pull gently on the front end of the towel. This will bring your hand farther up your back to stretch your shoulder. Overhead stretch    1. Standing about an arm's length away, grasp onto a solid surface. You could use a countertop, a doorknob, or the back of a sturdy chair. 2. With your knees slightly bent, bend forward with your arms straight. Lower your upper body, and let your shoulders stretch. 3. As your shoulders are able to stretch farther, you may need to take a step or two backward. 4. Hold for at least 15 to 30 seconds. Then stand up and relax. If you had stepped back during your stretch, step forward so you can keep your hands on the solid surface. 5. Repeat 2 to 4 times. Shoulder flexion (lying down)    Note: To make a wand for this exercise, use a piece of PVC pipe or a broom handle with the broom removed. Make the wand about a foot wider than your shoulders. 1. Lie on your back, holding a wand with both hands. Your palms should face down as you hold the wand. 2. Keeping your elbows straight, slowly raise your arms over your head. Raise them until you feel a stretch in your shoulders, upper back, and chest.  3. Hold for 15 to 30 seconds. 4. Repeat 2 to 4 times. Shoulder rotation (lying down)    Note: To make a wand for this exercise, use a piece of PVC pipe or a broom handle with the broom removed. Make the wand about a foot wider than your shoulders. 1. Lie on your back. Hold a wand with both hands with your elbows bent and palms up. 2. Keep your elbows close to your body, and move the wand across your body toward the sore arm. 3. Hold for 8 to 12 seconds. 4. Repeat 2 to 4 times.   [place] climbing (to the side)    Note: Avoid any movement that is straight to your side, and be careful not to arch your back. Your arm should stay about 30 degrees to the front of your side. 1. Stand with your side to a wall so that your fingers can just touch it at an angle about 30 degrees toward the front of your body. 2. Walk the fingers of your injured arm up the wall as high as pain permits. Try not to shrug your shoulder up toward your ear as you move your arm up. 3. Hold that position for a count of at least 15 to 20.  4. Walk your fingers back down to the starting position. 5. Repeat at least 2 to 4 times. Try to reach higher each time. Wall climbing (to the front)    Note: During this stretching exercise, be careful not to arch your back. 1. Face a wall, and stand so your fingers can just touch it. 2. Keeping your shoulder down, walk the fingers of your injured arm up the wall as high as pain permits. (Don't shrug your shoulder up toward your ear.)  3. Hold your arm in that position for at least 15 to 30 seconds. 4. Slowly walk your fingers back down to where you started. 5. Repeat at least 2 to 4 times. Try to reach higher each time. Shoulder blade squeeze    1. Stand with your arms at your sides, and squeeze your shoulder blades together. Do not raise your shoulders up as you squeeze. 2. Hold 6 seconds. 3. Repeat 8 to 12 times. Scapular exercise: Arm reach    1. Lie flat on your back. This exercise is a very slight motion that starts with your arms raised (elbows straight, arms straight). 2. From this position, reach higher toward the kobe or ceiling. Keep your elbows straight. All motion should be from your shoulder blade only. 3. Relax your arms back to where you started. 4. Repeat 8 to 12 times. Arm raise to the side    Note: During this strengthening exercise, your arm should stay about 30 degrees to the front of your side.   1. Slowly raise your injured arm to the side, with your thumb facing up. Raise your arm 60 degrees at the most (shoulder level is 90 degrees). 2. Hold the position for 3 to 5 seconds. Then lower your arm back to your side. If you need to, bring your \"good\" arm across your body and place it under the elbow as you lower your injured arm. Use your good arm to keep your injured arm from dropping down too fast.  3. Repeat 8 to 12 times. 4. When you first start out, don't hold any extra weight in your hand. As you get stronger, you may use a 1-pound to 2-pound dumbbell or a small can of food. Shoulder flexor and extensor exercise    Note: These are isometric exercises. That means you contract your muscles without actually moving. · Push forward (flex): Stand facing a wall or doorjamb, about 6 inches or less back. Hold your injured arm against your body. Make a closed fist with your thumb on top. Then gently push your hand forward into the wall with about 25% to 50% of your strength. Don't let your body move backward as you push. Hold for about 6 seconds. Relax for a few seconds. Repeat 8 to 12 times. · Push backward (extend): Stand with your back flat against a wall. Your upper arm should be against the wall, with your elbow bent 90 degrees (your hand straight ahead). Push your elbow gently back against the wall with about 25% to 50% of your strength. Don't let your body move forward as you push. Hold for about 6 seconds. Relax for a few seconds. Repeat 8 to 12 times. Scapular exercise: Wall push-ups    Note: This exercise is best done with your fingers somewhat turned out, rather than straight up and down. 1. Stand facing a wall, about 12 inches to 18 inches away. 2. Place your hands on the wall at shoulder height. 3. Slowly bend your elbows and bring your face to the wall. Keep your back and hips straight. 4. Push back to where you started. 5. Repeat 8 to 12 times. 6. When you can do this exercise against a wall comfortably, you can try it against a counter.  You can then slowly progress to the end of a couch, then to a sturdy chair, and finally to the floor. Scapular exercise: Retraction    Note: For this exercise, you will need elastic exercise material, such as surgical tubing or Thera-Band. 1. Put the band around a solid object at about waist level. (A bedpost will work well.) Each hand should hold an end of the band. 2. With your elbows at your sides and bent to 90 degrees, pull the band back. Your shoulder blades should move toward each other. Then move your arms back where you started. 3. Repeat 8 to 12 times. 4. If you have good range of motion in your shoulders, try this exercise with your arms lifted out to the sides. Keep your elbows at a 90-degree angle. Raise the elastic band up to about shoulder level. Pull the band back to move your shoulder blades toward each other. Then move your arms back where you started. Internal rotator strengthening exercise    1. Start by tying a piece of elastic exercise material to a doorknob. You can use surgical tubing or Thera-Band. 2. Stand or sit with your shoulder relaxed and your elbow bent 90 degrees. Your upper arm should rest comfortably against your side. Squeeze a rolled towel between your elbow and your body for comfort. This will help keep your arm at your side. 3. Hold one end of the elastic band in the hand of the painful arm. 4. Slowly rotate your forearm toward your body until it touches your belly. Slowly move it back to where you started. 5. Keep your elbow and upper arm firmly tucked against the towel roll or at your side. 6. Repeat 8 to 12 times. External rotator strengthening exercise    1. Start by tying a piece of elastic exercise material to a doorknob. You can use surgical tubing or Thera-Band. (You may also hold one end of the band in each hand.)  2. Stand or sit with your shoulder relaxed and your elbow bent 90 degrees. Your upper arm should rest comfortably against your side.  Squeeze a rolled towel between your elbow and your body for comfort. This will help keep your arm at your side. 3. Hold one end of the elastic band with the hand of the painful arm. 4. Start with your forearm across your belly. Slowly rotate the forearm out away from your body. Keep your elbow and upper arm tucked against the towel roll or the side of your body until you begin to feel tightness in your shoulder. Slowly move your arm back to where you started. 5. Repeat 8 to 12 times. Follow-up care is a key part of your treatment and safety. Be sure to make and go to all appointments, and call your doctor if you are having problems. It's also a good idea to know your test results and keep a list of the medicines you take. Where can you learn more? Go to http://chiki-zhao.info/. Enter Jennifer Tran in the search box to learn more about \"Rotator Cuff: Exercises. \"  Current as of: March 21, 2017  Content Version: 11.3  © 3022-1523 [employer]. Care instructions adapted under license by [employer] (which disclaims liability or warranty for this information). If you have questions about a medical condition or this instruction, always ask your healthcare professional. [address] any warranty or liability for your use of this information. Rotator Cuff: Exercises  Your Care Instructions  Here are some examples of typical rehabilitation exercises for your condition. Start each exercise slowly. Ease off the exercise if you start to have pain. Your doctor or physical therapist will tell you when you can start these exercises and which ones will work best for you. How to do the exercises  Pendulum swing    Note: If you have pain in your back, do not do this exercise. 5. Hold on to a table or the back of a chair with your good arm. Then bend forward a little and let your sore arm hang straight down. This exercise does not use the arm muscles.  Rather, use your legs and your hips to create movement that makes your arm swing freely. 6. Use the movement from your hips and legs to guide the slightly swinging arm back and forth like a pendulum (or elephant trunk). Then guide it in circles that start small (about the size of a dinner plate). Make the circles a bit larger each day, as your pain allows. 7. Do this exercise for 5 minutes, 5 to 7 times each day. 8. As you have less pain, try bending over a little farther to do this exercise. This will increase the amount of movement at your shoulder. Posterior stretching exercise    5. Hold the elbow of your injured arm with your other hand. 6. Use your hand to pull your injured arm gently up and across your body. You will feel a gentle stretch across the back of your injured shoulder. 7. Hold for at least 15 to 30 seconds. Then slowly lower your arm. 8. Repeat 2 to 4 times. Up-the-back stretch    Note: Your doctor or physical therapist may want you to wait to do this stretch until you have regained most of your range of motion and strength. You can do this stretch in different ways. Hold any of these stretches for at least 15 to 30 seconds. Repeat them 2 to 4 times. 4. Put your hand in your back pocket. Let it rest there to stretch your shoulder. 5. With your other hand, hold your injured arm (palm outward) behind your back by the wrist. Pull your arm up gently to stretch your shoulder. 6. Next, put a towel over your other shoulder. Put the hand of your injured arm behind your back. Now hold the back end of the towel. With the other hand, hold the front end of the towel in front of your body. Pull gently on the front end of the towel. This will bring your hand farther up your back to stretch your shoulder. Overhead stretch    6. Standing about an arm's length away, grasp onto a solid surface. You could use a countertop, a doorknob, or the back of a sturdy chair.   7. With your knees slightly bent, bend forward with your arms straight. Lower your upper body, and let your shoulders stretch. 8. As your shoulders are able to stretch farther, you may need to take a step or two backward. 9. Hold for at least 15 to 30 seconds. Then stand up and relax. If you had stepped back during your stretch, step forward so you can keep your hands on the solid surface. 10. Repeat 2 to 4 times. Shoulder flexion (lying down)    Note: To make a wand for this exercise, use a piece of PVC pipe or a broom handle with the broom removed. Make the wand about a foot wider than your shoulders. 5. Lie on your back, holding a wand with both hands. Your palms should face down as you hold the wand. 6. Keeping your elbows straight, slowly raise your arms over your head. Raise them until you feel a stretch in your shoulders, upper back, and chest.  7. Hold for 15 to 30 seconds. 8. Repeat 2 to 4 times. Shoulder rotation (lying down)    Note: To make a wand for this exercise, use a piece of PVC pipe or a broom handle with the broom removed. Make the wand about a foot wider than your shoulders. 5. Lie on your back. Hold a wand with both hands with your elbows bent and palms up. 6. Keep your elbows close to your body, and move the wand across your body toward the sore arm. 7. Hold for 8 to 12 seconds. 8. Repeat 2 to 4 times. Wall climbing (to the side)    Note: Avoid any movement that is straight to your side, and be careful not to arch your back. Your arm should stay about 30 degrees to the front of your side. 6. Stand with your side to a wall so that your fingers can just touch it at an angle about 30 degrees toward the front of your body. 7. Walk the fingers of your injured arm up the wall as high as pain permits. Try not to shrug your shoulder up toward your ear as you move your arm up. 8. Hold that position for a count of at least 15 to 20.  9. Walk your fingers back down to the starting position. 10. Repeat at least 2 to 4 times.  Try to reach higher each time.  Wall climbing (to the front)    Note: During this stretching exercise, be careful not to arch your back. 6. Face a wall, and stand so your fingers can just touch it. 7. Keeping your shoulder down, walk the fingers of your injured arm up the wall as high as pain permits. (Don't shrug your shoulder up toward your ear.)  8. Hold your arm in that position for at least 15 to 30 seconds. 9. Slowly walk your fingers back down to where you started. 10. Repeat at least 2 to 4 times. Try to reach higher each time. Shoulder blade squeeze    4. Stand with your arms at your sides, and squeeze your shoulder blades together. Do not raise your shoulders up as you squeeze. 5. Hold 6 seconds. 6. Repeat 8 to 12 times. Scapular exercise: Arm reach    5. Lie flat on your back. This exercise is a very slight motion that starts with your arms raised (elbows straight, arms straight). 6. From this position, reach higher toward the kobe or ceiling. Keep your elbows straight. All motion should be from your shoulder blade only. 7. Relax your arms back to where you started. 8. Repeat 8 to 12 times. Arm raise to the side    Note: During this strengthening exercise, your arm should stay about 30 degrees to the front of your side. 5. Slowly raise your injured arm to the side, with your thumb facing up. Raise your arm 60 degrees at the most (shoulder level is 90 degrees). 6. Hold the position for 3 to 5 seconds. Then lower your arm back to your side. If you need to, bring your \"good\" arm across your body and place it under the elbow as you lower your injured arm. Use your good arm to keep your injured arm from dropping down too fast.  7. Repeat 8 to 12 times. 8. When you first start out, don't hold any extra weight in your hand. As you get stronger, you may use a 1-pound to 2-pound dumbbell or a small can of food. Shoulder flexor and extensor exercise    Note: These are isometric exercises.  That means you contract your muscles without actually moving. · Push forward (flex): Stand facing a wall or doorjamb, about 6 inches or less back. Hold your injured arm against your body. Make a closed fist with your thumb on top. Then gently push your hand forward into the wall with about 25% to 50% of your strength. Don't let your body move backward as you push. Hold for about 6 seconds. Relax for a few seconds. Repeat 8 to 12 times. · Push backward (extend): Stand with your back flat against a wall. Your upper arm should be against the wall, with your elbow bent 90 degrees (your hand straight ahead). Push your elbow gently back against the wall with about 25% to 50% of your strength. Don't let your body move forward as you push. Hold for about 6 seconds. Relax for a few seconds. Repeat 8 to 12 times. Scapular exercise: Wall push-ups    Note: This exercise is best done with your fingers somewhat turned out, rather than straight up and down. 7. Stand facing a wall, about 12 inches to 18 inches away. 8. Place your hands on the wall at shoulder height. 9. Slowly bend your elbows and bring your face to the wall. Keep your back and hips straight. 10. Push back to where you started. 11. Repeat 8 to 12 times. 12. When you can do this exercise against a wall comfortably, you can try it against a counter. You can then slowly progress to the end of a couch, then to a sturdy chair, and finally to the floor. Scapular exercise: Retraction    Note: For this exercise, you will need elastic exercise material, such as surgical tubing or Thera-Band. 5. Put the band around a solid object at about waist level. (A bedpost will work well.) Each hand should hold an end of the band. 6. With your elbows at your sides and bent to 90 degrees, pull the band back. Your shoulder blades should move toward each other. Then move your arms back where you started. 7. Repeat 8 to 12 times.   8. If you have good range of motion in your shoulders, try this exercise with your arms lifted out to the sides. Keep your elbows at a 90-degree angle. Raise the elastic band up to about shoulder level. Pull the band back to move your shoulder blades toward each other. Then move your arms back where you started. Internal rotator strengthening exercise    7. Start by tying a piece of elastic exercise material to a doorknob. You can use surgical tubing or Thera-Band. 8. Stand or sit with your shoulder relaxed and your elbow bent 90 degrees. Your upper arm should rest comfortably against your side. Squeeze a rolled towel between your elbow and your body for comfort. This will help keep your arm at your side. 9. Hold one end of the elastic band in the hand of the painful arm. 10. Slowly rotate your forearm toward your body until it touches your belly. Slowly move it back to where you started. 11. Keep your elbow and upper arm firmly tucked against the towel roll or at your side. 12. Repeat 8 to 12 times. External rotator strengthening exercise    6. Start by tying a piece of elastic exercise material to a doorknob. You can use surgical tubing or Thera-Band. (You may also hold one end of the band in each hand.)  7. Stand or sit with your shoulder relaxed and your elbow bent 90 degrees. Your upper arm should rest comfortably against your side. Squeeze a rolled towel between your elbow and your body for comfort. This will help keep your arm at your side. 8. Hold one end of the elastic band with the hand of the painful arm. 9. Start with your forearm across your belly. Slowly rotate the forearm out away from your body. Keep your elbow and upper arm tucked against the towel roll or the side of your body until you begin to feel tightness in your shoulder. Slowly move your arm back to where you started. 10. Repeat 8 to 12 times. Follow-up care is a key part of your treatment and safety. Be sure to make and go to all appointments, and call your doctor if you are having problems.  It's also a good idea to know your test results and keep a list of the medicines you take. Where can you learn more? Go to http://chiki-zhao.info/. Enter Natalya Mota in the search box to learn more about \"Rotator Cuff: Exercises. \"  Current as of: March 21, 2017  Content Version: 11.3  © 3855-1074 [employer]. Care instructions adapted under license by [employer] (which disclaims liability or warranty for this information). If you have questions about a medical condition or this instruction, always ask your healthcare professional. [address] any warranty or liability for your use of this information.

## 2017-07-28 NOTE — MR AVS SNAPSHOT
Visit Information Date & Time Provider Department Dept. Phone Encounter #  
 7/28/2017  3:45 PM Patricia Vieyra MD [facility] [phone] [id number] Follow-up Instructions Return in about 5 weeks (around 9/1/2017), or if symptoms worsen or fail to improve. Your Appointments 11/7/2017  2:00 PM  
Follow Up with Leah Nichols DO [facility] ([address]) Appt Note: 6 month follow up [place] [id number] [address] [address] [phone] [address] [place] [address] [address] Upcoming Health Maintenance Date Due Pneumococcal 19-64 Medium Risk (1 of 1 - PPSV23) 5/3/1997 DTaP/Tdap/Td series (1 - Tdap) 5/3/1999 INFLUENZA AGE 9 TO ADULT 8/1/2017 Allergies as of 7/28/2017  Review Complete On: 7/28/2017 By: Cj Lopez LPN Severity Noted Reaction Type Reactions Opioids - Morphine Analogues  08/27/2015    Myalgia Other Medication  03/03/2016    Other (comments) Pt reports allergy to steroids, with psychological side effects Pollen Extracts  08/07/2012    Unable to Obtain Zithromax [Azithromycin]    Itching Current Immunizations  Never Reviewed No immunizations on file. Not reviewed this visit You Were Diagnosed With   
  
 Codes Comments Cervical radiculopathy    -  Primary ICD-10-CM: M54.12 
ICD-9-CM: 723.4 Vitals BP Pulse Temp Resp Height(growth percentile) Weight(growth percentile) 101/53 74 98.3 °F (36.8 °C) (Oral) 20 5' 5\" (1.651 m) 152 lb 12.8 oz (69.3 kg) SpO2 BMI Smoking Status 98% 25.43 kg/m2 Current Every Day Smoker BMI and BSA Data Body Mass Index Body Surface Area  
 25.43 kg/m 2 1.78 m 2 Preferred Pharmacy Pharmacy Name Phone [id number] [address] Your Updated Medication List  
  
   
This list is accurate as of: 7/28/17  4:37 PM.  Always use your most recent med list.  
  
  
  
  
 ALPRAZolam 0.5 mg tablet Commonly known as:  Shermon Collie Take 0.5 mg by mouth two (2) times a day. LEXAPRO 10 mg tablet Generic drug:  escitalopram oxalate Take 10 mg by mouth daily. MULTIVITAMIN PO Take 1 Tab by mouth daily. TYLENOL EXTRA STRENGTH 500 mg tablet Generic drug:  acetaminophen Take 500 mg by mouth every six (6) hours as needed. VITAMIN C 500 mg tablet Generic drug:  ascorbic acid (vitamin C) Take 500 mg by mouth daily. VITAMIN D3 2,000 unit Tab Generic drug:  cholecalciferol (vitamin D3) Take 1 Tab by mouth daily. ZEGERID 40-1.1 mg-gram capsule Generic drug:  omeprazole-sodium bicarbonate Take 1 Cap by mouth daily. Follow-up Instructions Return in about 5 weeks (around 9/1/2017), or if symptoms worsen or fail to improve. To-Do List   
 08/04/2017 Neurology:  [facility] TWO EXTREMITIES UPPER Referral Information Referral ID Referred By Referred To  
  
 [id number] Digna Jeff Not Available Visits Status Start Date End Date 1 New Request 7/28/17 7/28/18 If your referral has a status of pending review or denied, additional information will be sent to support the outcome of this decision. Patient Instructions Rotator Cuff: Exercises Your Care Instructions Here are some examples of typical rehabilitation exercises for your condition. Start each exercise slowly. Ease off the exercise if you start to have pain. Your doctor or physical therapist will tell you when you can start these exercises and which ones will work best for you. How to do the exercises Pendulum swing Note: If you have pain in your back, do not do this exercise. 1. Hold on to a table or the back of a chair with your good arm.  Then bend forward a little and let your sore arm hang straight down. This exercise does not use the arm muscles. Rather, use your legs and your hips to create movement that makes your arm swing freely. 2. Use the movement from your hips and legs to guide the slightly swinging arm back and forth like a pendulum (or elephant trunk). Then guide it in circles that start small (about the size of a dinner plate). Make the circles a bit larger each day, as your pain allows. 3. Do this exercise for 5 minutes, 5 to 7 times each day. 4. As you have less pain, try bending over a little farther to do this exercise. This will increase the amount of movement at your shoulder. Posterior stretching exercise 1. Hold the elbow of your injured arm with your other hand. 2. Use your hand to pull your injured arm gently up and across your body. You will feel a gentle stretch across the back of your injured shoulder. 3. Hold for at least 15 to 30 seconds. Then slowly lower your arm. 4. Repeat 2 to 4 times. Up-the-back stretch Note: Your doctor or physical therapist may want you to wait to do this stretch until you have regained most of your range of motion and strength. You can do this stretch in different ways. Hold any of these stretches for at least 15 to 30 seconds. Repeat them 2 to 4 times. 1. Put your hand in your back pocket. Let it rest there to stretch your shoulder. 2. With your other hand, hold your injured arm (palm outward) behind your back by the wrist. Pull your arm up gently to stretch your shoulder. 3. Next, put a towel over your other shoulder. Put the hand of your injured arm behind your back. Now hold the back end of the towel. With the other hand, hold the front end of the towel in front of your body. Pull gently on the front end of the towel. This will bring your hand farther up your back to stretch your shoulder. Overhead stretch 1. Standing about an arm's length away, grasp onto a solid surface.  You could use a countertop, a doorknob, or the back of a sturdy chair. 2. With your knees slightly bent, bend forward with your arms straight. Lower your upper body, and let your shoulders stretch. 3. As your shoulders are able to stretch farther, you may need to take a step or two backward. 4. Hold for at least 15 to 30 seconds. Then stand up and relax. If you had stepped back during your stretch, step forward so you can keep your hands on the solid surface. 5. Repeat 2 to 4 times. Shoulder flexion (lying down) Note: To make a wand for this exercise, use a piece of PVC pipe or a broom handle with the broom removed. Make the wand about a foot wider than your shoulders. 1. Lie on your back, holding a wand with both hands. Your palms should face down as you hold the wand. 2. Keeping your elbows straight, slowly raise your arms over your head. Raise them until you feel a stretch in your shoulders, upper back, and chest. 
3. Hold for 15 to 30 seconds. 4. Repeat 2 to 4 times. Shoulder rotation (lying down) Note: To make a wand for this exercise, use a piece of PVC pipe or a broom handle with the broom removed. Make the wand about a foot wider than your shoulders. 1. Lie on your back. Hold a wand with both hands with your elbows bent and palms up. 2. Keep your elbows close to your body, and move the wand across your body toward the sore arm. 3. Hold for 8 to 12 seconds. 4. Repeat 2 to 4 times. Wall climbing (to the side) Note: Avoid any movement that is straight to your side, and be careful not to arch your back. Your arm should stay about 30 degrees to the front of your side. 1. Stand with your side to a wall so that your fingers can just touch it at an angle about 30 degrees toward the front of your body. 2. Walk the fingers of your injured arm up the wall as high as pain permits. Try not to shrug your shoulder up toward your ear as you move your arm up. 3. Hold that position for a count of at least 15 to 20. 
4. Walk your fingers back down to the starting position. 5. Repeat at least 2 to 4 times. Try to reach higher each time. Wall climbing (to the front) Note: During this stretching exercise, be careful not to arch your back. 1. Face a wall, and stand so your fingers can just touch it. 2. Keeping your shoulder down, walk the fingers of your injured arm up the wall as high as pain permits. (Don't shrug your shoulder up toward your ear.) 3. Hold your arm in that position for at least 15 to 30 seconds. 4. Slowly walk your fingers back down to where you started. 5. Repeat at least 2 to 4 times. Try to reach higher each time. Shoulder blade squeeze 1. Stand with your arms at your sides, and squeeze your shoulder blades together. Do not raise your shoulders up as you squeeze. 2. Hold 6 seconds. 3. Repeat 8 to 12 times. Scapular exercise: Arm reach 1. Lie flat on your back. This exercise is a very slight motion that starts with your arms raised (elbows straight, arms straight). 2. From this position, reach higher toward the kobe or ceiling. Keep your elbows straight. All motion should be from your shoulder blade only. 3. Relax your arms back to where you started. 4. Repeat 8 to 12 times. Arm raise to the side Note: During this strengthening exercise, your arm should stay about 30 degrees to the front of your side. 1. Slowly raise your injured arm to the side, with your thumb facing up. Raise your arm 60 degrees at the most (shoulder level is 90 degrees). 2. Hold the position for 3 to 5 seconds. Then lower your arm back to your side. If you need to, bring your \"good\" arm across your body and place it under the elbow as you lower your injured arm. Use your good arm to keep your injured arm from dropping down too fast. 
3. Repeat 8 to 12 times. 4. When you first start out, don't hold any extra weight in your hand.  As you get stronger, you may use a 1-pound to 2-pound dumbbell or a small can of food. Shoulder flexor and extensor exercise Note: These are isometric exercises. That means you contract your muscles without actually moving. · Push forward (flex): Stand facing a wall or doorjamb, about 6 inches or less back. Hold your injured arm against your body. Make a closed fist with your thumb on top. Then gently push your hand forward into the wall with about 25% to 50% of your strength. Don't let your body move backward as you push. Hold for about 6 seconds. Relax for a few seconds. Repeat 8 to 12 times. · Push backward (extend): Stand with your back flat against a wall. Your upper arm should be against the wall, with your elbow bent 90 degrees (your hand straight ahead). Push your elbow gently back against the wall with about 25% to 50% of your strength. Don't let your body move forward as you push. Hold for about 6 seconds. Relax for a few seconds. Repeat 8 to 12 times. Scapular exercise: Wall push-ups Note: This exercise is best done with your fingers somewhat turned out, rather than straight up and down. 1. Stand facing a wall, about 12 inches to 18 inches away. 2. Place your hands on the wall at shoulder height. 3. Slowly bend your elbows and bring your face to the wall. Keep your back and hips straight. 4. Push back to where you started. 5. Repeat 8 to 12 times. 6. When you can do this exercise against a wall comfortably, you can try it against a counter. You can then slowly progress to the end of a couch, then to a sturdy chair, and finally to the floor. Scapular exercise: Retraction Note: For this exercise, you will need elastic exercise material, such as surgical tubing or Thera-Band. 1. Put the band around a solid object at about waist level. (A bedpost will work well.) Each hand should hold an end of the band.  
2. With your elbows at your sides and bent to 90 degrees, pull the band back. Your shoulder blades should move toward each other. Then move your arms back where you started. 3. Repeat 8 to 12 times. 4. If you have good range of motion in your shoulders, try this exercise with your arms lifted out to the sides. Keep your elbows at a 90-degree angle. Raise the elastic band up to about shoulder level. Pull the band back to move your shoulder blades toward each other. Then move your arms back where you started. Internal rotator strengthening exercise 1. Start by tying a piece of elastic exercise material to a doorknob. You can use surgical tubing or Thera-Band. 2. Stand or sit with your shoulder relaxed and your elbow bent 90 degrees. Your upper arm should rest comfortably against your side. Squeeze a rolled towel between your elbow and your body for comfort. This will help keep your arm at your side. 3. Hold one end of the elastic band in the hand of the painful arm. 4. Slowly rotate your forearm toward your body until it touches your belly. Slowly move it back to where you started. 5. Keep your elbow and upper arm firmly tucked against the towel roll or at your side. 6. Repeat 8 to 12 times. External rotator strengthening exercise 1. Start by tying a piece of elastic exercise material to a doorknob. You can use surgical tubing or Thera-Band. (You may also hold one end of the band in each hand.) 2. Stand or sit with your shoulder relaxed and your elbow bent 90 degrees. Your upper arm should rest comfortably against your side. Squeeze a rolled towel between your elbow and your body for comfort. This will help keep your arm at your side. 3. Hold one end of the elastic band with the hand of the painful arm. 4. Start with your forearm across your belly. Slowly rotate the forearm out away from your body.  Keep your elbow and upper arm tucked against the towel roll or the side of your body until you begin to feel tightness in your shoulder. Slowly move your arm back to where you started. 5. Repeat 8 to 12 times. Follow-up care is a key part of your treatment and safety. Be sure to make and go to all appointments, and call your doctor if you are having problems. It's also a good idea to know your test results and keep a list of the medicines you take. Where can you learn more? Go to http://chiki-zhao.info/. Enter Haylee Sanchez in the search box to learn more about \"Rotator Cuff: Exercises. \" Current as of: March 21, 2017 Content Version: 11.3 © 1281-9459 [employer]. Care instructions adapted under license by [employer] (which disclaims liability or warranty for this information). If you have questions about a medical condition or this instruction, always ask your healthcare professional. [address] any warranty or liability for your use of this information. Rotator Cuff: Exercises Your Care Instructions Here are some examples of typical rehabilitation exercises for your condition. Start each exercise slowly. Ease off the exercise if you start to have pain. Your doctor or physical therapist will tell you when you can start these exercises and which ones will work best for you. How to do the exercises Pendulum swing Note: If you have pain in your back, do not do this exercise. 5. Hold on to a table or the back of a chair with your good arm. Then bend forward a little and let your sore arm hang straight down. This exercise does not use the arm muscles. Rather, use your legs and your hips to create movement that makes your arm swing freely. 6. Use the movement from your hips and legs to guide the slightly swinging arm back and forth like a pendulum (or elephant trunk). Then guide it in circles that start small (about the size of a dinner plate). Make the circles a bit larger each day, as your pain allows. 7. Do this exercise for 5 minutes, 5 to 7 times each day. 8. As you have less pain, try bending over a little farther to do this exercise. This will increase the amount of movement at your shoulder. Posterior stretching exercise 5. Hold the elbow of your injured arm with your other hand. 6. Use your hand to pull your injured arm gently up and across your body. You will feel a gentle stretch across the back of your injured shoulder. 7. Hold for at least 15 to 30 seconds. Then slowly lower your arm. 8. Repeat 2 to 4 times. Up-the-back stretch Note: Your doctor or physical therapist may want you to wait to do this stretch until you have regained most of your range of motion and strength. You can do this stretch in different ways. Hold any of these stretches for at least 15 to 30 seconds. Repeat them 2 to 4 times. 4. Put your hand in your back pocket. Let it rest there to stretch your shoulder. 5. With your other hand, hold your injured arm (palm outward) behind your back by the wrist. Pull your arm up gently to stretch your shoulder. 6. Next, put a towel over your other shoulder. Put the hand of your injured arm behind your back. Now hold the back end of the towel. With the other hand, hold the front end of the towel in front of your body. Pull gently on the front end of the towel. This will bring your hand farther up your back to stretch your shoulder. Overhead stretch 6. Standing about an arm's length away, grasp onto a solid surface. You could use a countertop, a doorknob, or the back of a sturdy chair. 7. With your knees slightly bent, bend forward with your arms straight. Lower your upper body, and let your shoulders stretch. 8. As your shoulders are able to stretch farther, you may need to take a step or two backward. 9. Hold for at least 15 to 30 seconds. Then stand up and relax. If you had stepped back during your stretch, step forward so you can keep your hands on the solid surface. 10. Repeat 2 to 4 times. Shoulder flexion (lying down) Note: To make a wand for this exercise, use a piece of PVC pipe or a broom handle with the broom removed. Make the wand about a foot wider than your shoulders. 5. Lie on your back, holding a wand with both hands. Your palms should face down as you hold the wand. 6. Keeping your elbows straight, slowly raise your arms over your head. Raise them until you feel a stretch in your shoulders, upper back, and chest. 
7. Hold for 15 to 30 seconds. 8. Repeat 2 to 4 times. Shoulder rotation (lying down) Note: To make a wand for this exercise, use a piece of PVC pipe or a broom handle with the broom removed. Make the wand about a foot wider than your shoulders. 5. Lie on your back. Hold a wand with both hands with your elbows bent and palms up. 6. Keep your elbows close to your body, and move the wand across your body toward the sore arm. 7. Hold for 8 to 12 seconds. 8. Repeat 2 to 4 times. Wall climbing (to the side) Note: Avoid any movement that is straight to your side, and be careful not to arch your back. Your arm should stay about 30 degrees to the front of your side. 6. Stand with your side to a wall so that your fingers can just touch it at an angle about 30 degrees toward the front of your body. 7. Walk the fingers of your injured arm up the wall as high as pain permits. Try not to shrug your shoulder up toward your ear as you move your arm up. 8. Hold that position for a count of at least 15 to 20. 
9. Walk your fingers back down to the starting position. 10. Repeat at least 2 to 4 times. Try to reach higher each time. Wall climbing (to the front) Note: During this stretching exercise, be careful not to arch your back. 6. Face a wall, and stand so your fingers can just touch it. 7. Keeping your shoulder down, walk the fingers of your injured arm up the wall as high as pain permits. (Don't shrug your shoulder up toward your ear.) 8. Hold your arm in that position for at least 15 to 30 seconds. 9. Slowly walk your fingers back down to where you started. 10. Repeat at least 2 to 4 times. Try to reach higher each time. Shoulder blade squeeze 4. Stand with your arms at your sides, and squeeze your shoulder blades together. Do not raise your shoulders up as you squeeze. 5. Hold 6 seconds. 6. Repeat 8 to 12 times. Scapular exercise: Arm reach 5. Lie flat on your back. This exercise is a very slight motion that starts with your arms raised (elbows straight, arms straight). 6. From this position, reach higher toward the kobe or ceiling. Keep your elbows straight. All motion should be from your shoulder blade only. 7. Relax your arms back to where you started. 8. Repeat 8 to 12 times. Arm raise to the side Note: During this strengthening exercise, your arm should stay about 30 degrees to the front of your side. 5. Slowly raise your injured arm to the side, with your thumb facing up. Raise your arm 60 degrees at the most (shoulder level is 90 degrees). 6. Hold the position for 3 to 5 seconds. Then lower your arm back to your side. If you need to, bring your \"good\" arm across your body and place it under the elbow as you lower your injured arm. Use your good arm to keep your injured arm from dropping down too fast. 
7. Repeat 8 to 12 times. 8. When you first start out, don't hold any extra weight in your hand. As you get stronger, you may use a 1-pound to 2-pound dumbbell or a small can of food. Shoulder flexor and extensor exercise Note: These are isometric exercises. That means you contract your muscles without actually moving. · Push forward (flex): Stand facing a wall or doorjamb, about 6 inches or less back. Hold your injured arm against your body. Make a closed fist with your thumb on top. Then gently push your hand forward into the wall with about 25% to 50% of your strength.  Don't let your body move backward as you push. Hold for about 6 seconds. Relax for a few seconds. Repeat 8 to 12 times. · Push backward (extend): Stand with your back flat against a wall. Your upper arm should be against the wall, with your elbow bent 90 degrees (your hand straight ahead). Push your elbow gently back against the wall with about 25% to 50% of your strength. Don't let your body move forward as you push. Hold for about 6 seconds. Relax for a few seconds. Repeat 8 to 12 times. Scapular exercise: Wall push-ups Note: This exercise is best done with your fingers somewhat turned out, rather than straight up and down. 7. Stand facing a wall, about 12 inches to 18 inches away. 8. Place your hands on the wall at shoulder height. 9. Slowly bend your elbows and bring your face to the wall. Keep your back and hips straight. 10. Push back to where you started. 11. Repeat 8 to 12 times. 12. When you can do this exercise against a wall comfortably, you can try it against a counter. You can then slowly progress to the end of a couch, then to a sturdy chair, and finally to the floor. Scapular exercise: Retraction Note: For this exercise, you will need elastic exercise material, such as surgical tubing or Thera-Band. 5. Put the band around a solid object at about waist level. (A bedpost will work well.) Each hand should hold an end of the band. 6. With your elbows at your sides and bent to 90 degrees, pull the band back. Your shoulder blades should move toward each other. Then move your arms back where you started. 7. Repeat 8 to 12 times. 8. If you have good range of motion in your shoulders, try this exercise with your arms lifted out to the sides. Keep your elbows at a 90-degree angle. Raise the elastic band up to about shoulder level. Pull the band back to move your shoulder blades toward each other. Then move your arms back where you started. Internal rotator strengthening exercise 7. Start by tying a piece of elastic exercise material to a doorknob. You can use surgical tubing or Thera-Band. 8. Stand or sit with your shoulder relaxed and your elbow bent 90 degrees. Your upper arm should rest comfortably against your side. Squeeze a rolled towel between your elbow and your body for comfort. This will help keep your arm at your side. 9. Hold one end of the elastic band in the hand of the painful arm. 10. Slowly rotate your forearm toward your body until it touches your belly. Slowly move it back to where you started. 11. Keep your elbow and upper arm firmly tucked against the towel roll or at your side. 12. Repeat 8 to 12 times. External rotator strengthening exercise 6. Start by tying a piece of elastic exercise material to a doorknob. You can use surgical tubing or Thera-Band. (You may also hold one end of the band in each hand.) 7. Stand or sit with your shoulder relaxed and your elbow bent 90 degrees. Your upper arm should rest comfortably against your side. Squeeze a rolled towel between your elbow and your body for comfort. This will help keep your arm at your side. 8. Hold one end of the elastic band with the hand of the painful arm. 9. Start with your forearm across your belly. Slowly rotate the forearm out away from your body. Keep your elbow and upper arm tucked against the towel roll or the side of your body until you begin to feel tightness in your shoulder. Slowly move your arm back to where you started. 10. Repeat 8 to 12 times. Follow-up care is a key part of your treatment and safety. Be sure to make and go to all appointments, and call your doctor if you are having problems. It's also a good idea to know your test results and keep a list of the medicines you take. Where can you learn more? Go to http://chiki-zhao.info/. Enter Mahendra Durant in the search box to learn more about \"Rotator Cuff: Exercises. \" Current as of: March 21, 2017 Content Version: 11.3 © 4502-6845 phorus. Care instructions adapted under license by [employer] (which disclaims liability or warranty for this information). If you have questions about a medical condition or this instruction, always ask your healthcare professional. [address] any warranty or liability for your use of this information. Introducing [address]! Dear Vilma Palomares: Thank you for requesting a "[employer] Simple[employer] Fast [employer] Eleg[employer]t [employer] App" account. Our records indicate that you already have an active "[employer] Simple, Fast and Elegant Tablet Sales App" account. You can access your account anytime at [email or web address] Did you know that you can access your hospital and ER discharge instructions at any time in "[employer] The Simple, Fast and Elegant Tablet Sales App"? You can also review all of your test results from your hospital stay or ER visit. Additional Information If you have questions, please visit the Frequently Asked Questions section of the "[employer] Simple, Fast and Elegant Tablet Sales App" website at [email or web address] Remember, "[employer]ell - The Simple, Fast and Elegant Tablet Sales App" is NOT to be used for urgent needs. For medical emergencies, dial 911. Now available from your iPhone and Android! Please provide this summary of care documentation to your next provider. Your primary care clinician is listed as SAROJ MCNEIL. If you have any questions after today's visit, please call [phone].

## 2017-07-31 ENCOUNTER — TELEPHONE (OUTPATIENT)
Dept: ORTHOPEDIC SURGERY | Age: 37
End: 2017-07-31

## 2017-08-04 ENCOUNTER — OFFICE VISIT (OUTPATIENT)
Dept: NEUROLOGY | Age: 37
End: 2017-08-04

## 2017-08-04 DIAGNOSIS — R20.9 DISTURBANCE OF SKIN SENSATION: Primary | ICD-10-CM

## 2017-08-04 NOTE — LETTER
8/4/2017 10:22 AM 
 
Patient:  Trino Howard YOB: 1980 Date of Visit: 8/4/2017 Dear Kevin Matta MD 
[address] [address] VIA In Basket Zehra Solorzano MD 
[address] [place] [address] 200 [facility] [address] VIA In Basket 
 : Thank you for referring Mr. Dennis Arellano to me for evaluation/treatment. Below are the relevant portions of my assessment and plan of care. [facility] [address] [place], [address] 
[phone]      [facility] 117 Neurophysiology Report Patient: Dennis Arellano ID: [id number] Physician: Burke Foy MD  
Gender: Male Ref Phys: Duane Christensen MD  
Handedness:     
Study Date: August 4, 2017 Patient History: This 38-year-old man has had chronic neck pain with tingling in his fingers for at least the last 10 years. Over the past several months she has noted an increase in the left much worse than the right side in terms of tingling of the fingers. On brief exam he has intact strength and sensation. Reflexes are trace and symmetrical. 
 
 
Nerve Conduction Studies Anti Sensory Summary Table Stim Site NR Peak (ms) Norm Peak (ms) O-P Amp (µV) Norm O-P Amp Dist (cm) Mehul (m/s) Left Median 2nd Digit Anti Sensory (2nd Digit) Wrist    2.5 <3.5 63.6 >20 13.0 65.0 Site 2    2.5  67.5 Right Median 2nd Digit Anti Sensory (2nd Digit) Wrist    2.8 <3.5 47.8 >20 13.0 59.1  
    2.8  47.4 Left Ulnar Anti Sensory (5th Digit ) Wrist    2.3 <3.1 45.5 >17.0 11.0 57.9 Site 2    2.3  43.6 Right Ulnar Anti Sensory (5th Digit ) Wrist    2.4 <3.1 37.2 >17.0 11.0 57.9 Site 2    2.5  41.2 Motor Summary Table Stim Site NR Onset (ms) Norm Onset (ms) O-P Amp (mV) Norm O-P Amp Dist (cm) Mehul (m/s) Norm Mehul (m/s) Left Median Motor (Abd Poll Brev) Wrist    2.5 <4.4 9.0 >4.0 25.9 70.0 >49 Elbow    6.2  5.1 Right Median Motor (Abd Poll Brev) Wrist    2.7 <4.4 6.5 >4.0 24.0 66.7 >49 Elbow    6.3  5.5 Left Ulnar Motor (Abd Dig Minimi ) Wrist    2.1 <3.3 8.7 >6.0 16.0 69.6 >49 B Elbow    4.4  7.9  16.0 59.3 >50 A Elbow    7.1  7.6 Right Ulnar Motor (Abd Dig Minimi ) Wrist    2.3 <3.3 7.2 >6.0 16.0 64.0 >49 B Elbow    4.8  6.8  16.0 72.7 >50 A Elbow    7.0  6.5 EMG Side Muscle Nerve Root Ins Act Fibs Psw Fasc Amp Dur Poly Recrt Int Bardales Pickerel Comment Left Deltoid Axillary C5-6 Nml Nml Nml None Nml Nml 0 Nml Nml Left Biceps Musculocut C5-6 Nml Nml Nml None Nml Nml 0 Nml Nml Left Triceps Radial C6-7-8 Nml Nml Nml None Nml Nml 0 Nml Nml Left FlexCarRad Median C6-7 Nml Nml Nml None Nml Nml 0 Nml Nml Left 1stDorInt Ulnar C8-T1 Nml Nml Nml None Nml Nml 0 Nml Nml Left Abd Poll Brev Median C8-T1 Nml Nml Nml None Nml Nml 0 Nml Nml Left Cervical Parasp Up Rami C1-3 Nml Nml Nml Left Cervical Parasp Mid Rami C4-6 Nml Nml Nml Left Cervical Parasp Low Rami C7-8 Nml Nml Nml NCS/EMG FINDINGS: 
 
? Evaluation of the Left median motor, the Right median motor, the Left ulnar motor, the Right ulnar motor, the Left Median 2nd Digit sensory, the Right Median 2nd Digit sensory, the Left ulnar sensory, and the Right ulnar sensory nerves were unremarkable. INTERPRETATION: This was a normal nerve conduction and EMG study showing no signs of neuropathy myopathy or radiculopathy in the nerves and muscles tested. 
 
 
___________________________ Dayton Wiley MD 
 
 
 
 
Waveforms: If you have questions, please do not hesitate to call me. I look forward to following Mr. Valentina Becka along with you.  
 
 
 
Sincerely, 
 
 
Sudheer Akins MD

## 2017-08-04 NOTE — COMMUNICATION BODY
[facility]  [address] [facility] [place], [address]  [phone]      [address]    Neurophysiology Report      Patient: Yury Deaner     ID: [id number] Physician: Mikaela Luciano MD   Gender: Male Ref Phys: Willard Mcclain MD   Handedness:      Study Date: August 4, 2017         Patient History: This 27-year-old man has had chronic neck pain with tingling in his fingers for at least the last 10 years. Over the past several months she has noted an increase in the left much worse than the right side in terms of tingling of the fingers. On brief exam he has intact strength and sensation.   Reflexes are trace and symmetrical.      Nerve Conduction Studies  Anti Sensory Summary Table     Stim Site NR Peak (ms) Norm Peak (ms) O-P Amp (µV) Norm O-P Amp Dist (cm) Mehul (m/s)   Left Median 2nd Digit Anti Sensory (2nd Digit)   Wrist    2.5 <3.5 63.6 >20 13.0 65.0   Site 2    2.5  67.5      Right Median 2nd Digit Anti Sensory (2nd Digit)   Wrist    2.8 <3.5 47.8 >20 13.0 59.1       2.8  47.4      Left Ulnar Anti Sensory (5th Digit )   Wrist    2.3 <3.1 45.5 >17.0 11.0 57.9   Site 2    2.3  43.6      Right Ulnar Anti Sensory (5th Digit )   Wrist    2.4 <3.1 37.2 >17.0 11.0 57.9   Site 2    2.5  41.2        Motor Summary Table     Stim Site NR Onset (ms) Norm Onset (ms) O-P Amp (mV) Norm O-P Amp Dist (cm) Mehul (m/s) Norm Mehul (m/s)   Left Median Motor (Abd Poll Brev)   Wrist    2.5 <4.4 9.0 >4.0 25.9 70.0 >49   Elbow    6.2  5.1       Right Median Motor (Abd Poll Brev)   Wrist    2.7 <4.4 6.5 >4.0 24.0 66.7 >49   Elbow    6.3  5.5       Left Ulnar Motor (Abd Dig Minimi )   Wrist    2.1 <3.3 8.7 >6.0 16.0 69.6 >49   B Elbow    4.4  7.9  16.0 59.3 >50   A Elbow    7.1  7.6       Right Ulnar Motor (Abd Dig Minimi )   Wrist    2.3 <3.3 7.2 >6.0 16.0 64.0 >49   B Elbow    4.8  6.8  16.0 72.7 >50   A Elbow    7.0  6.5           EMG     Side Muscle Nerve Root Ins Act Fibs Psw Fasc Amp Dur Poly Recrt Int Pat Comment   Left Deltoid Axillary C5-6 Nml Nml Nml None Nml Nml 0 Nml Nml    Left Biceps Musculocut C5-6 Nml Nml Nml None Nml Nml 0 Nml Nml    Left Triceps Radial C6-7-8 Nml Nml Nml None Nml Nml 0 Nml Nml    Left FlexCarRad Median C6-7 Nml Nml Nml None Nml Nml 0 Nml Nml    Left 1stDorInt Ulnar C8-T1 Nml Nml Nml None Nml Nml 0 Nml Nml    Left Abd Poll Brev Median C8-T1 Nml Nml Nml None Nml Nml 0 Nml Nml    Left Cervical Parasp Up Rami C1-3 Nml Nml Nml          Left Cervical Parasp Mid Rami C4-6 Nml Nml Nml          Left Cervical Parasp Low Rami C7-8 Nml Nml Nml            NCS/EMG FINDINGS:     Evaluation of the Left median motor, the Right median motor, the Left ulnar motor, the Right ulnar motor, the Left Median 2nd Digit sensory, the Right Median 2nd Digit sensory, the Left ulnar sensory, and the Right ulnar sensory nerves were unremarkable.       INTERPRETATION: This was a normal nerve conduction and EMG study showing no signs of neuropathy myopathy or radiculopathy in the nerves and muscles tested.      ___________________________  Uriel Hatch MD          Waveforms:

## 2017-08-04 NOTE — PROGRESS NOTES
[facility]  [address] [facility] [place], [address]  [phone]      [facility] [address]    Neurophysiology Report      Patient: Bonney Boxer     ID: [id number] Physician: Eliud Huff MD   Gender: Male Ref Phys: Vianca Segura MD   Handedness:      Study Date: August 4, 2017         Patient History: This 20-year-old man has had chronic neck pain with tingling in his fingers for at least the last 10 years. Over the past several months she has noted an increase in the left much worse than the right side in terms of tingling of the fingers. On brief exam he has intact strength and sensation.   Reflexes are trace and symmetrical.      Nerve Conduction Studies  Anti Sensory Summary Table     Stim Site NR Peak (ms) Norm Peak (ms) O-P Amp (µV) Norm O-P Amp Dist (cm) Mehul (m/s)   Left Median 2nd Digit Anti Sensory (2nd Digit)   Wrist    2.5 <3.5 63.6 >20 13.0 65.0   Site 2    2.5  67.5      Right Median 2nd Digit Anti Sensory (2nd Digit)   Wrist    2.8 <3.5 47.8 >20 13.0 59.1       2.8  47.4      Left Ulnar Anti Sensory (5th Digit )   Wrist    2.3 <3.1 45.5 >17.0 11.0 57.9   Site 2    2.3  43.6      Right Ulnar Anti Sensory (5th Digit )   Wrist    2.4 <3.1 37.2 >17.0 11.0 57.9   Site 2    2.5  41.2        Motor Summary Table     Stim Site NR Onset (ms) Norm Onset (ms) O-P Amp (mV) Norm O-P Amp Dist (cm) Mehul (m/s) Norm Mehul (m/s)   Left Median Motor (Abd Poll Brev)   Wrist    2.5 <4.4 9.0 >4.0 25.9 70.0 >49   Elbow    6.2  5.1       Right Median Motor (Abd Poll Brev)   Wrist    2.7 <4.4 6.5 >4.0 24.0 66.7 >49   Elbow    6.3  5.5       Left Ulnar Motor (Abd Dig Minimi )   Wrist    2.1 <3.3 8.7 >6.0 16.0 69.6 >49   B Elbow    4.4  7.9  16.0 59.3 >50   A Elbow    7.1  7.6       Right Ulnar Motor (Abd Dig Minimi )   Wrist    2.3 <3.3 7.2 >6.0 16.0 64.0 >49   B Elbow    4.8  6.8  16.0 72.7 >50   A Elbow    7.0  6.5           EMG     Side Muscle Nerve Root Ins Act Fibs Psw Fasc Amp Dur Poly Recrt Int Pat Comment   Left Deltoid Axillary C5-6 Nml Nml Nml None Nml Nml 0 Nml Nml    Left Biceps Musculocut C5-6 Nml Nml Nml None Nml Nml 0 Nml Nml    Left Triceps Radial C6-7-8 Nml Nml Nml None Nml Nml 0 Nml Nml    Left FlexCarRad Median C6-7 Nml Nml Nml None Nml Nml 0 Nml Nml    Left 1stDorInt Ulnar C8-T1 Nml Nml Nml None Nml Nml 0 Nml Nml    Left Abd Poll Brev Median C8-T1 Nml Nml Nml None Nml Nml 0 Nml Nml    Left Cervical Parasp Up Rami C1-3 Nml Nml Nml          Left Cervical Parasp Mid Rami C4-6 Nml Nml Nml          Left Cervical Parasp Low Rami C7-8 Nml Nml Nml            NCS/EMG FINDINGS:     Evaluation of the Left median motor, the Right median motor, the Left ulnar motor, the Right ulnar motor, the Left Median 2nd Digit sensory, the Right Median 2nd Digit sensory, the Left ulnar sensory, and the Right ulnar sensory nerves were unremarkable.       INTERPRETATION: This was a normal nerve conduction and EMG study showing no signs of neuropathy myopathy or radiculopathy in the nerves and muscles tested.      ___________________________  Mikaela Luciano MD          Waveforms:                      [id number] AT [facility]  OFFICE PROCEDURE PROGRESS NOTE        Chart reviewed for the following:   Awa Castillo MD, have reviewed the History, Physical and updated the Allergic reactions for [address] performed immediately prior to start of procedure:   Awa Castillo MD, have performed the following reviews on Ilya Haagensen prior to the start of the procedure:            * Patient was identified by name and date of birth   * Agreement on procedure being performed was verified  * Risks and Benefits explained to the patient  * Procedure site verified and marked as necessary  * Patient was positioned for comfort  * Consent was signed and verified     Time: 10:21 AM    Date of procedure: 8/4/2017    Procedure performed by:  Patricia Tucker MD    Patient assisted by: self    How tolerated by patient: tolerated the procedure well with no complications    Post Procedural Pain Scale: 0 - No Hurt    Comments: none

## 2017-08-23 ENCOUNTER — OFFICE VISIT (OUTPATIENT)
Dept: ORTHOPEDIC SURGERY | Age: 37
End: 2017-08-23

## 2017-08-23 VITALS
DIASTOLIC BLOOD PRESSURE: 54 MMHG | TEMPERATURE: 98.1 F | HEART RATE: 75 BPM | HEIGHT: 65 IN | SYSTOLIC BLOOD PRESSURE: 100 MMHG | RESPIRATION RATE: 18 BRPM | OXYGEN SATURATION: 95 % | BODY MASS INDEX: 25.66 KG/M2 | WEIGHT: 154 LBS

## 2017-08-23 DIAGNOSIS — M79.18 MYOFASCIAL PAIN: Primary | ICD-10-CM

## 2017-08-23 NOTE — MR AVS SNAPSHOT
Visit Information Date & Time Provider Department Dept. Phone Encounter #  
 8/23/2017 10:15 AM Lynette Dumas MD [facility] [phone] [id number] Follow-up Instructions Return in about 6 months (around 2/23/2018), or if symptoms worsen or fail to improve. Your Appointments 11/7/2017  2:00 PM  
Follow Up with Donya Guo DO [facility] Appt Note: 6 month follow up Yvette Lim [id number] [phone] [facility] [place] [address] Upcoming Health Maintenance Date Due Pneumococcal 19-64 Medium Risk (1 of 1 - PPSV23) 5/3/1997 DTaP/Tdap/Td series (1 - Tdap) 5/3/1999 INFLUENZA AGE 9 TO ADULT 8/1/2017 Allergies as of 8/23/2017  Review Complete On: 8/23/2017 By: Lynette Figueroa Severity Noted Reaction Type Reactions Opioids - Morphine Analogues  08/27/2015    Myalgia Other Medication  03/03/2016    Other (comments) Pt reports allergy to steroids, with psychological side effects Pollen Extracts  08/07/2012    Unable to Obtain Zithromax [Azithromycin]    Itching Current Immunizations  Never Reviewed No immunizations on file. Not reviewed this visit You Were Diagnosed With   
  
 Codes Comments Myofascial pain    -  Primary ICD-10-CM: M79.1 ICD-9-CM: 729.1 Vitals BP Pulse Temp Resp Height(growth percentile) Weight(growth percentile) 100/54 75 98.1 °F (36.7 °C) (Oral) 18 5' 5\" (1.651 m) 154 lb (69.9 kg) SpO2 BMI Smoking Status 95% 25.63 kg/m2 Current Every Day Smoker BMI and BSA Data Body Mass Index Body Surface Area  
 25.63 kg/m 2 1.79 m 2 Preferred Pharmacy Pharmacy Name Phone [id number] [place], [address] [facility] [id number] [place] [address] Your Updated Medication List  
  
   
 This list is accurate as of: 8/23/17 10:59 AM.  Always use your most recent med list.  
  
  
  
  
 ALPRAZolam 0.5 mg tablet Commonly known as:  Trula Crick Take 0.5 mg by mouth two (2) times a day. LEXAPRO 10 mg tablet Generic drug:  escitalopram oxalate Take 10 mg by mouth daily. MULTIVITAMIN PO Take 1 Tab by mouth daily. TYLENOL EXTRA STRENGTH 500 mg tablet Generic drug:  acetaminophen Take 500 mg by mouth every six (6) hours as needed. VITAMIN C 500 mg tablet Generic drug:  ascorbic acid (vitamin C) Take 500 mg by mouth daily. VITAMIN D3 2,000 unit Tab Generic drug:  cholecalciferol (vitamin D3) Take 1 Tab by mouth daily. ZEGERID 40-1.1 mg-gram capsule Generic drug:  omeprazole-sodium bicarbonate Take 1 Cap by mouth daily. Follow-up Instructions Return in about 6 months (around 2/23/2018), or if symptoms worsen or fail to improve. Introducing [facility]! Dear Anna Abrams: Thank you for requesting a [employer] account. Our records indicate that you already have an active [employer] account. You can access your account anytime at [email or web address] Did you know that you can access your hospital and ER discharge instructions at any time in [employer]? You can also review all of your test results from your hospital stay or ER visit. Additional Information If you have questions, please visit the Frequently Asked Questions section of the [employer] website at [email or web address] Remember, [employer] is NOT to be used for urgent needs. For medical emergencies, dial 911. Now available from your iPhone and Android! Please provide this summary of care documentation to your next provider. Your primary care clinician is listed as SAROJ MCNEIL. If you have any questions after today's visit, please call [phone].

## 2017-08-23 NOTE — PROGRESS NOTES
[address]  [address], [address]  [place]  Phone: [phone]  Fax: [phone]        Leobardo Cha  : 1980  PCP: Juanice Lesch, MD  2017    PROGRESS NOTE      ASSESSMENT AND PLAN    Stevan Cabrera comes in to the office today for a BUE EMG f/u. The study was a normal nerve conduction with no positive findings of neuropathy myelopathy, or radiculopathy. These findings do not show a significant cause for his radicular symptoms. We discussed his HEP in detail. Pt will f/u in 6 months or sooner as needed. Diagnoses and all orders for this visit:    1. Myofascial pain       Follow-up Disposition:  Return in about 6 months (around 2018), or if symptoms worsen or fail to improve. HISTORY OF PRESENT ILLNESS  Stevan Cabrera is a 42 y.o. male.       A&P / HPI from 2016:  Esvin Lewis was in the office today for a f/u appointment. His pain has remained myofascial in nature. He has not found relief from PT in the past or from using an antiinflammatory cream. Pt was advised to continue using his theracane and doing his home PT exercises.       Since the cream did not provide relief and NSAIDs upset his stomach, he was prescribed Celebrex 200mg BID prn. The risks, benefits, and potential side effects of this medication were discussed.       Trigger point injections were offered but declined at this time. Pt said he would like to get a massage first.       He will f/u prn.       Updates from 16:  Pt presents for a prn f/u. He has had a cervical and brain MRI since his last visit. The cervical MRI does not show any obvious cause for his pain. 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      He comes in today with c/o cervical pain.  His biggest complaint is a headache that seems consistent with occipital neuralgia, radiating from his left eye to a region below his left ear.        Updates from 07/28/17:  Pt presents for a new onset of numbness and tingling that radiate down the left upper extremity that ends in the last two finger. He states that his pain in the cervical region has decreased since the last visit. He is also complaining of chronic pain in the left shoulder. Updates from 08/23/17:  Pt presents with continued numbness and tingling in the left upper extremity. He was last referred to get a BUE EMG, which did not show significant signs of neuropathy myopathy or radiculopathy. PAST MEDICAL HISTORY   Past Medical History:   Diagnosis Date    Anxiety     Arm fatigue     Forearms    Balance problems     Cardiac Agatston CAC score 100-199 02/04/2015    Coronary calcium score 0.    Cardiac echocardiogram 10/29/2014    EF 55-60%. No RWMA. Normal diastolic fx. RVSP 30 mmHg.  Cardiac Holter monitor study 10/29/2014    Normal Holter study.  Chronic cough     chronically coughing up sputum    Depression     Dizziness     Dyspnea     Esophageal reflux     Headache     Hypercholesterolemia     Lumbar pain     Myofascial pain     Neck pain     Numbness and tingling     PAC (premature atrial contraction)     Palpitations     presumably benign    Panic disorder     Reflux     Thoracic back pain     Mid-thoracic    Upper extremity weakness     Vertigo, intermittent        Past Surgical History:   Procedure Laterality Date    APPENDECTOMY  11/23/2010    HX HEENT      corrective hearing for left ear     HX HEENT      plastic surgery on both ears    HX HEENT  10/08 & 09/09    Reformed jaw bone, bone graft    HX RENAL BIOPSY      HX TONSILLECTOMY     . MEDICATIONS      Current Outpatient Prescriptions   Medication Sig Dispense Refill    ALPRAZolam (XANAX) 0.5 mg tablet Take 0.5 mg by mouth two (2) times a day.  ascorbic acid (VITAMIN C) 500 mg tablet Take 500 mg by mouth daily.       cholecalciferol, vitamin D3, (VITAMIN D3) 2,000 unit Tab Take 1 Tab by mouth daily.  escitalopram (LEXAPRO) 10 mg tablet Take 10 mg by mouth daily.  omeprazole-sodium bicarbonate (ZEGERID) 40-1.1 mg-gram capsule Take 1 Cap by mouth daily.  acetaminophen (TYLENOL EXTRA STRENGTH) 500 mg tablet Take 500 mg by mouth every six (6) hours as needed.  MULTIVITAMIN PO Take 1 Tab by mouth daily. ALLERGIES    Allergies   Allergen Reactions    Opioids - Morphine Analogues Myalgia    Other Medication Other (comments)     Pt reports allergy to steroids, with psychological side effects    Pollen Extracts Unable to Obtain    Zithromax [Azithromycin] Itching          SOCIAL HISTORY    Social History     Social History    Marital status: SINGLE     Spouse name: N/A    Number of children: N/A    Years of education: N/A     Occupational History    real estate      Social History Main Topics    Smoking status: Current Every Day Smoker     Packs/day: 1.00     Years: 20.00     Types: Cigarettes    Smokeless tobacco: Never Used    Alcohol use No    Drug use: No    Sexual activity: Not on file     Other Topics Concern    Not on file     Social History Narrative       FAMILY HISTORY    Family History   Problem Relation Age of Onset    Hypertension Father     Liver Disease Father     Hypertension Mother          REVIEW OF SYSTEMS  Review of Systems   Constitutional: Negative for chills, diaphoresis, fever, malaise/fatigue and weight loss. Respiratory: Negative for shortness of breath. Cardiovascular: Negative for chest pain and leg swelling. Gastrointestinal: Negative for constipation, nausea and vomiting. Neurological: Negative for dizziness, tingling, seizures, loss of consciousness, weakness and headaches. Psychiatric/Behavioral: The patient does not have insomnia.            PHYSICAL EXAMINATION  Visit Vitals    /54    Pulse 75    Temp 98.1 °F (36.7 °C) (Oral)    Resp 18    Ht 5' 5\" (1.651 m)    Wt 154 lb (69.9 kg)    SpO2 95%    BMI 25.63 kg/m2       Pain Assessment  7/28/2017   Location of Pain -   Location Modifiers -   Severity of Pain 0   Quality of Pain -   Quality of Pain Comment -   Duration of Pain -   Frequency of Pain -   Date Pain First Started -   Aggravating Factors -   Aggravating Factors Comment -   Limiting Behavior -   Relieving Factors -   Relieving Factors Comment -   Result of Injury -           Constitutional:  Well developed, well nourished, in no acute distress. Psychiatric: Affect and mood are appropriate. Integumentary: No rashes or abrasions noted on exposed areas. SPINE/MUSCULOSKELETAL EXAM    Cervical spine:  Neck is midline.    Normal muscle tone.    No focal atrophy is noted.    ROM pain free.    Shoulder ROM intact.    Tenderness to palpation at left scalene's.    Negative Spurling's sign.    Negative Tinel's sign.    Negative Garcia's sign.    Flex forward posture.    No clonus.      Sensation in the bilateral arms grossly intact to light touch.        Lumbar spine:  No rash, ecchymosis, or gross obliquity.    No fasciculations.    No focal atrophy is noted.    No pain with hip ROM.    Range of motion is normal.    Tenderness to palpation. No tenderness to palpation at the sciatic notch.    SI joints non-tender.    Trochanters non tender.      Sensation in the bilateral legs grossly intact to light touch.      Updates from 12/02/16:  Negative Garcia's sign bilaterally. No clonus. Pain in left scalene muscles with turning head to the right. MOTOR:      Biceps  Triceps Deltoids Wrist Ext Wrist Flex Hand Intrin   Right 5/5 5/5 5/5 5/5 5/5 5/5   Left 5/5 5/5 5/5 5/5 5/5 5/5             Hip Flex  Quads Hamstrings Ankle DF EHL Ankle PF   Right 5/5 5/5 5/5 5/5 5/5 5/5   Left 5/5 5/5 5/5 5/5 5/5 5/5     DTRs are 2+ biceps, triceps, brachioradialis, patella, and Achilles.      Negative Straight Leg raise.    Squat not tested.    No difficulty with tandem gait.    Normal heel walk.    Normal toe rise.     Ambulation without assistive device. FWB.       RADIOGRAPHS  BUE EMG taken on 08/04/2017 personally reviewed with patient:  NCS/EMG FINDINGS:     · Evaluation of the Left median motor, the Right median motor, the Left ulnar motor, the Right ulnar motor, the Left Median 2nd Digit sensory, the Right Median 2nd Digit sensory, the Left ulnar sensory, and the Right ulnar sensory nerves were unremarkable.        INTERPRETATION: This was a normal nerve conduction and EMG study showing no signs of neuropathy myopathy or radiculopathy in the nerves and muscles tested. Cervical MRI from 9/15/2014 personally reviewed with patient:  1. Stable appearance of cervical spine examination. 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 Mild multilevel degenerative changes. No high-grade spinal or foraminal  Stenosis.     Cervical MRI images taken on 11/23/2016 personally reviewed with patient:  Mild reversal of cervical curvature, centered at C3-C4, stable. Partial fusion  at C6-C7, stable. No compression fracture or pathologic marrow signal. No  prevertebral soft tissue abnormalities. Spinous processes and posterior soft  tissues unremarkable. Craniocervical junction and spinal cord are also  unremarkable. Dominant left vertebral artery, stable.      C2-C3: No disc herniation, central or foraminal stenosis.     C3-C4: Mild posterior disc bulge. No central stenosis, cord contact or foraminal  stenosis.     C4-C5: Mild posterior disc bulge with no central or foraminal stenosis.     C5-C6: Mild posterior disc bulge. No central or foraminal stenosis.     C6-7 and C7-T1: No disc herniation, central or foraminal stenosis.      IMPRESSION  IMPRESSION: Stable mild degenerative disease at C3-C4 and C4-C5. No significant  central stenosis, cord compression or foraminal stenosis.         reviewed      This plan was reviewed with the patient and patient agrees. All questions were answered.  More than half of this 30 minute visit today was spent on counseling.     Written by [employer], as dictated by Dr. Selina Horne, Dr. Greg Retana, confirm that all documentation is accurate.

## 2017-09-17 ENCOUNTER — APPOINTMENT (OUTPATIENT)
Dept: GENERAL RADIOLOGY | Age: 37
End: 2017-09-17
Attending: PHYSICIAN ASSISTANT
Payer: COMMERCIAL

## 2017-09-17 ENCOUNTER — HOSPITAL ENCOUNTER (EMERGENCY)
Age: 37
Discharge: HOME | End: 2017-09-17
Attending: EMERGENCY MEDICINE
Payer: COMMERCIAL

## 2017-09-17 VITALS
HEART RATE: 70 BPM | HEIGHT: 65 IN | WEIGHT: 155 LBS | SYSTOLIC BLOOD PRESSURE: 108 MMHG | RESPIRATION RATE: 18 BRPM | DIASTOLIC BLOOD PRESSURE: 61 MMHG | OXYGEN SATURATION: 100 % | BODY MASS INDEX: 25.83 KG/M2 | TEMPERATURE: 98.4 F

## 2017-09-17 DIAGNOSIS — T50.Z95A VACCINATION REACTION, INITIAL ENCOUNTER: ICD-10-CM

## 2017-09-17 DIAGNOSIS — J06.9 ACUTE UPPER RESPIRATORY INFECTION: Primary | ICD-10-CM

## 2017-09-17 DIAGNOSIS — R59.1 LYMPHADENOPATHY: ICD-10-CM

## 2017-09-17 PROCEDURE — 73000 X-RAY EXAM OF COLLAR BONE: CPT

## 2017-09-17 PROCEDURE — 99282 EMERGENCY DEPT VISIT SF MDM: CPT

## 2017-09-17 RX ORDER — AMOXICILLIN AND CLAVULANATE POTASSIUM 875; 125 MG/1; MG/1
1 TABLET, FILM COATED ORAL 2 TIMES DAILY
Qty: 14 TAB | Refills: 0 | Status: SHIPPED | OUTPATIENT
Start: 2017-09-17 | End: 2017-09-24

## 2017-09-17 NOTE — ED TRIAGE NOTES
Pt presents to the ED with left neck and shoulder pain onset Thursday. Pt reports injury hand on the cat cage onset Tuesday, states seen at PCP for Tetanus vaccine on Thursday. Pt reports soreness and redness of the injection site on the left deltoid. Pt reports feeling \"nodule\" in the left clavicular region on this morning, states concerns because felt \"pulsating\" in the area.

## 2017-09-17 NOTE — ED PROVIDER NOTES
HPI Comments: Pt is a 43 yo male with noted PMH presents to the ED for a \"painful lump above the left clavicle\", as well as pain and erythema surround tetanus vaccination site. Pt reports that he has had nasal congestion for the past week as well. 2 days ago he cut his right hand on a cat cage, and was seen at PCP for tetanus booster at that time. Since then the left deltoid has had worsening pain and erythema. Then this morning he felt a small nodule in left anterior neck/clavicle region that was tender. He also notes hx of chronic neck pain and \"pinched nerves\". Pt has not taken anything for his symptoms. Also notes non-productive cough, but feels this is chronic. Pt denies fever, chills, sore throat, HA, ear pain, N/V/D/C, abd pain, weakness, new numbness, wheezing, SOB, CP, dizziness, lightheadedness, or any other complaints at this time. The history is provided by the patient. Past Medical History:   Diagnosis Date    Anxiety     Arm fatigue     Forearms    Balance problems     Cardiac Agatston CAC score 100-199 02/04/2015    Coronary calcium score 0.    Cardiac echocardiogram 10/29/2014    EF 55-60%. No RWMA. Normal diastolic fx. RVSP 30 mmHg.  Cardiac Holter monitor study 10/29/2014    Normal Holter study.     Chronic cough     chronically coughing up sputum    Depression     Dizziness     Dyspnea     Esophageal reflux     Headache     Hypercholesterolemia     Lumbar pain     Myofascial pain     Neck pain     Numbness and tingling     PAC (premature atrial contraction)     Palpitations     presumably benign    Panic disorder     Reflux     Thoracic back pain     Mid-thoracic    Upper extremity weakness     Vertigo, intermittent        Past Surgical History:   Procedure Laterality Date    APPENDECTOMY  11/23/2010    HX HEENT      corrective hearing for left ear     HX HEENT      plastic surgery on both ears    HX HEENT  10/08 & 09/09    Reformed jaw bone, bone graft  HX RENAL BIOPSY      HX TONSILLECTOMY           Family History:   Problem Relation Age of Onset    Hypertension Father     Liver Disease Father     Hypertension Mother        Social History     Social History    Marital status: SINGLE     Spouse name: N/A    Number of children: N/A    Years of education: N/A     Occupational History    real estate      Social History Main Topics    Smoking status: Current Every Day Smoker     Packs/day: 1.00     Years: 20.00     Types: Cigarettes    Smokeless tobacco: Never Used    Alcohol use No    Drug use: No    Sexual activity: Not on file     Other Topics Concern    Not on file     Social History Narrative         ALLERGIES: Opioids - morphine analogues; Other medication; Pollen extracts; and Zithromax [azithromycin]    Review of Systems   Constitutional: Negative for chills and fever. HENT: Positive for congestion and rhinorrhea. Negative for drooling, ear pain, facial swelling, sneezing, sore throat, trouble swallowing and voice change. Eyes: Negative for pain and redness. Respiratory: Positive for cough. Negative for shortness of breath, wheezing and stridor. Cardiovascular: Negative for chest pain and leg swelling. Gastrointestinal: Negative for abdominal pain, constipation, diarrhea, nausea and vomiting. Genitourinary: Negative for dysuria. Musculoskeletal: Positive for neck pain. Negative for arthralgias, back pain and joint swelling. Skin: Positive for color change and wound. Neurological: Negative for dizziness, weakness, light-headedness, numbness and headaches. Hematological: Positive for adenopathy. Psychiatric/Behavioral: Negative. Vitals:    09/17/17 1642   BP: 108/61   Pulse: 70   Resp: 18   Temp: 98.4 °F (36.9 °C)   SpO2: 100%   Weight: 70.3 kg (155 lb)   Height: 5' 5\" (1.651 m)            Physical Exam   Constitutional: He is oriented to person, place, and time.  Vital signs are normal. He appears well-developed and well-nourished. Non-toxic appearance. He does not have a sickly appearance. He does not appear ill. No distress. HENT:   Head: Normocephalic and atraumatic. Right Ear: Hearing, tympanic membrane, external ear and ear canal normal.   Left Ear: Hearing, tympanic membrane, external ear and ear canal normal.   Nose: Rhinorrhea present. Right sinus exhibits no maxillary sinus tenderness and no frontal sinus tenderness. Left sinus exhibits no maxillary sinus tenderness and no frontal sinus tenderness. Mouth/Throat: Uvula is midline, oropharynx is clear and moist and mucous membranes are normal. No oropharyngeal exudate, posterior oropharyngeal edema or posterior oropharyngeal erythema. Eyes: Conjunctivae and EOM are normal. Pupils are equal, round, and reactive to light. Neck: Normal range of motion. Neck supple. Muscular tenderness present. No spinous process tenderness present. No rigidity. No edema and no erythema present. Cardiovascular: Normal rate, regular rhythm and normal heart sounds. Pulmonary/Chest: Effort normal and breath sounds normal.   Abdominal: Soft. Bowel sounds are normal. There is no tenderness. Musculoskeletal: Normal range of motion. Lymphadenopathy:        Head (right side): Submandibular adenopathy present. No occipital adenopathy present. Head (left side): Submandibular adenopathy present. No occipital adenopathy present. Left supraclavicular TTP lymphadenopathy. Neurological: He is alert and oriented to person, place, and time. Skin: Skin is warm and dry. He is not diaphoretic. Psychiatric: He has a normal mood and affect. His behavior is normal. Judgment and thought content normal.   Nursing note and vitals reviewed. MDM  Number of Diagnoses or Management Options  Diagnosis management comments: Clavicular xr reviewed, no acute findings noted.      Exam c/w lymphadenopathy, likely reactive d/t URI and vaccination reaction, will rx antibiotics for URI. Pt had tetanus in left deltoid, erythema likely d/t this, not indurated or fluctuant. Outlined area and instructed pt to return should it spread. Will have pt f/u with PCP in 2 days. Pt given strict ED return precautions. Pt results have been reviewed with them. They have been counseled regarding diagnosis, treatment, and plan. Pt verbally conveys understanding and agreement of the signs, symptoms, diagnosis, treatment and prognosis and additionally agrees to follow up as discussed. Pt also agrees with the care-plan and conveys that all of their questions have been answered. I have also provided discharge instructions for them that include: educational information regarding their diagnosis and treatment, and list of reasons why they would want to return to the ED prior to their follow-up appointment, should their condition change. Aida Escamilla PA-C 5:55 PM        Amount and/or Complexity of Data Reviewed  Tests in the radiology section of CPT®: ordered and reviewed  Review and summarize past medical records: yes  Discuss the patient with other providers: yes    Risk of Complications, Morbidity, and/or Mortality  Presenting problems: low  Diagnostic procedures: low  Management options: low    Patient Progress  Patient progress: stable    ED Course       Procedures    Diagnosis:   1. Acute upper respiratory infection    2. Vaccination reaction, initial encounter    3. Lymphadenopathy          Disposition: Discharge to home. Follow-up Information     Follow up With Details Comments Contact Info    [facility] EMERGENCY DEPT  As needed, If symptoms worsen [address] [facility] Maty Hernandez MD Go in 2 days  [address] [address] [phone]            Patient's Medications   Start Taking    AMOXICILLIN-CLAVULANATE (AUGMENTIN) 875-125 MG PER TABLET    Take 1 Tab by mouth two (2) times a day for 7 days.    Continue Taking    ACETAMINOPHEN (TYLENOL EXTRA STRENGTH) 500 MG TABLET    Take 500 mg by mouth every six (6) hours as needed. ALPRAZOLAM (XANAX) 0.5 MG TABLET    Take 0.5 mg by mouth two (2) times a day. ASCORBIC ACID (VITAMIN C) 500 MG TABLET    Take 500 mg by mouth daily. CHOLECALCIFEROL, VITAMIN D3, (VITAMIN D3) 2,000 UNIT TAB    Take 1 Tab by mouth daily. ESCITALOPRAM (LEXAPRO) 10 MG TABLET    Take 10 mg by mouth daily. MULTIVITAMIN PO    Take 1 Tab by mouth daily. OMEPRAZOLE-SODIUM BICARBONATE (ZEGERID) 40-1.1 MG-GRAM CAPSULE    Take 1 Cap by mouth daily.    These Medications have changed    No medications on file   Stop Taking    No medications on file

## 2017-09-27 ENCOUNTER — HOSPITAL ENCOUNTER (OUTPATIENT)
Dept: OTHER | Age: 37
Discharge: HOME | End: 2017-09-27
Payer: COMMERCIAL

## 2017-09-27 DIAGNOSIS — M25.571 RIGHT ANKLE PAIN, UNSPECIFIED CHRONICITY: ICD-10-CM

## 2017-09-27 PROCEDURE — 73610 X-RAY EXAM OF ANKLE: CPT

## 2017-11-06 ENCOUNTER — HOSPITAL ENCOUNTER (OUTPATIENT)
Dept: PHYSICAL THERAPY | Age: 37
Discharge: HOME | End: 2017-11-06
Payer: COMMERCIAL

## 2017-11-06 PROCEDURE — 97110 THERAPEUTIC EXERCISES: CPT

## 2017-11-06 PROCEDURE — 97162 PT EVAL MOD COMPLEX 30 MIN: CPT

## 2017-11-06 NOTE — PROGRESS NOTES
[facility]  [place] [employer] [facility]  [address] [place] [address]  [phone] fax    Plan of Care/ Statement of Necessity for Physical Therapy Services    Patient name: Anne Rico Start of Care: 2017   Referral source: Luis Pompa MD : 1980    Medical Diagnosis: Left shoulder pain [M25.512]   Onset Date:2 months ago    Treatment Diagnosis: Left Shoulder Pain   Prior Hospitalization: see medical history Provider#: [id number]   Medications: Verified on Patient summary List    Comorbidities: Scoliosis,    Prior Level of Function: Full use of Left Shoulder. Pt is self Employed. The Plan of Care and following information is based on the information from the initial evaluation. Assessment/ key information: Pt is a 42 yr old male with complaints of anterior shoulder pain that flared up 2 months ago. He received a cortisone shot 2 weeks ago and reports 50% relief. Pt is right handed. He reports pain mostly at >90 deg and during IR with resistance. He has a positive [employer]. There is TTP along the Bicipital groove and muscle belly. MMT is grossly 4/5. ROM is WNL with pain at end range. Pt reports there is no pain at rest. Scapulohumeral rhythm is decreased. There is slight winging of his left scapula as well. TTP tu left UT and Rhomboids. Pt will benefit from skilled therapy to improve left shoulder ROM, decrease pain and improve strength to return to PLOF. Evaluation Complexity History MEDIUM  Complexity : 1-2 comorbidities / personal factors will impact the outcome/ POC ; Examination MEDIUM Complexity : 3 Standardized tests and measures addressing body structure, function, activity limitation and / or participation in recreation  ;Presentation MEDIUM Complexity : Evolving with changing characteristics  ; Clinical Decision Making MEDIUM Complexity : FOTO score of 26-74  Overall Complexity Rating: MEDIUM  Problem List: pain affecting function, decrease ROM, decrease strength, impaired gait/ balance, decrease ADL/ functional abilitiies, decrease activity tolerance and decrease flexibility/ joint mobility   Treatment Plan may include any combination of the following: Therapeutic exercise, Therapeutic activities, Neuromuscular re-education, Physical agent/modality, Gait/balance training, Manual therapy and Patient education  Patient / Family readiness to learn indicated by: asking questions, trying to perform skills and interest  Persons(s) to be included in education: patient (P)  Barriers to Learning/Limitations: None  Patient Goal (s): Pain free, strength and mobility  Patient Self Reported Health Status: fair  Rehabilitation Potential: good    Short Term Goals: To be accomplished in 1 weeks:   1. Pt will be compliant with a HEP to improve the function for over head reaching. Long Term Goals: To be accomplished in 4 weeks:   1. Pt will increase FOTO score by 8 pts to improve function. 2. Pt will increase left shoulder strength to 4+/5 or greater. to ease with lifting activities. 3. Pt will increase left shoulder ROM to WNL in all planes w/o pain to return to PLOF. Frequency / Duration: Patient to be seen 2 times per week for 4 weeks. Patient/ CarPatient/ Caregiver education and instruction: Diagnosis, prognosis, exercises   [x]  Plan of care has been reviewed with NAKIA Tejeda, PT 11/6/2017 2:37 PM    ________________________________________________________________________    I certify that the above Therapy Services are being furnished while the patient is under my care. I agree with the treatment plan and certify that this therapy is necessary.     Physician's Signature:____________________  Date:____________Time: _________    Please sign and return to [facility] [address] [facility]  [phone] fax

## 2017-11-07 ENCOUNTER — OFFICE VISIT (OUTPATIENT)
Dept: CARDIOLOGY CLINIC | Age: 37
End: 2017-11-07

## 2017-11-07 VITALS
WEIGHT: 155 LBS | SYSTOLIC BLOOD PRESSURE: 102 MMHG | DIASTOLIC BLOOD PRESSURE: 68 MMHG | HEIGHT: 65 IN | BODY MASS INDEX: 25.83 KG/M2 | HEART RATE: 71 BPM | OXYGEN SATURATION: 97 %

## 2017-11-07 DIAGNOSIS — E78.00 HYPERCHOLESTEROLEMIA: ICD-10-CM

## 2017-11-07 DIAGNOSIS — F41.9 ANXIETY: ICD-10-CM

## 2017-11-07 DIAGNOSIS — R00.2 PALPITATIONS: Primary | ICD-10-CM

## 2017-11-07 DIAGNOSIS — I49.1 PAC (PREMATURE ATRIAL CONTRACTION): ICD-10-CM

## 2017-11-07 DIAGNOSIS — R42 DIZZINESS: ICD-10-CM

## 2017-11-07 NOTE — PROGRESS NOTES
1. Have you been to the ER, urgent care clinic since your last visit? Hospitalized since your last visit? yes, 9-17- collar bone pain    2. Have you seen or consulted any other health care providers outside of the [address] [place] [address] since your last visit? Include any pap smears or colon screening.  no

## 2017-11-07 NOTE — MR AVS SNAPSHOT
Visit Information Date & Time Provider Department Dept. Phone Encounter #  
 11/7/2017  2:00 PM Rolando Dash, [address] [facility] [id number] Follow-up Instructions Return in about 6 months (around 5/7/2018), or if symptoms worsen or fail to improve. Your Appointments 2/21/2018 10:15 AM  
Follow Up with Devante Lyman MD  
[address] [facility], [address] and [facility] ONE [address] [place] [address]) Appt Note: 6 mo f/u  
 [address] [facility] [address]  
[phone]  
  
   
 [address] [facility] [address] Upcoming Health Maintenance Date Due Pneumococcal 19-64 Medium Risk (1 of 1 - PPSV23) 5/3/1997 DTaP/Tdap/Td series (1 - Tdap) 5/3/1999 Influenza Age 5 to Adult 8/1/2017 Allergies as of 11/7/2017  Review Complete On: 11/6/2017 By: Bhaskar Tolentino, PT Severity Noted Reaction Type Reactions Opioids - Morphine Analogues  08/27/2015    Myalgia Other Medication  03/03/2016    Other (comments) Pt reports allergy to steroids, with psychological side effects Pollen Extracts  08/07/2012    Unable to Obtain Zithromax [Azithromycin]    Itching Current Immunizations  Never Reviewed No immunizations on file. Not reviewed this visit You Were Diagnosed With   
  
 Codes Comments PAC (premature atrial contraction)    -  Primary ICD-10-CM: I49.1 ICD-9-CM: 427.61 Palpitations     ICD-10-CM: R00.2 ICD-9-CM: 785.1 Hypercholesterolemia     ICD-10-CM: E78.00 ICD-9-CM: 272.0 Dizziness     ICD-10-CM: A01 ICD-9-CM: 780.4 Vitals BP Pulse Height(growth percentile) Weight(growth percentile) SpO2 BMI  
 102/68 71 5' 5\" (1.651 m) 155 lb (70.3 kg) 97% 25.79 kg/m2 Smoking Status Current Every Day Smoker Vitals History BMI and BSA Data Body Mass Index Body Surface Area  25.79 kg/m 2 1.8 m 2  
  
  
 Preferred Pharmacy Pharmacy Name Phone [id number] [address] [place] [address] Your Updated Medication List  
  
   
This list is accurate as of: 11/7/17  2:55 PM.  Always use your most recent med list.  
  
  
  
  
 ALPRAZolam 0.5 mg tablet Commonly known as:  Maribel Fell Take 0.5 mg by mouth two (2) times a day. LEXAPRO 10 mg tablet Generic drug:  escitalopram oxalate Take 10 mg by mouth daily. MULTIVITAMIN PO Take 1 Tab by mouth daily. TYLENOL EXTRA STRENGTH 500 mg tablet Generic drug:  acetaminophen Take 500 mg by mouth every six (6) hours as needed. VITAMIN C 500 mg tablet Generic drug:  ascorbic acid (vitamin C) Take 500 mg by mouth daily. VITAMIN D3 2,000 unit Tab Generic drug:  cholecalciferol (vitamin D3) Take 1 Tab by mouth daily. ZEGERID 40-1.1 mg-gram capsule Generic drug:  omeprazole-sodium bicarbonate Take 1 Cap by mouth daily. We Performed the Following AMB POC EKG ROUTINE W/ 12 LEADS, INTER & REP [01819 CPT(R)] Follow-up Instructions Return in about 6 months (around 5/7/2018), or if symptoms worsen or fail to improve. To-Do List   
 11/09/2017 10:30 AM  
  Appointment with Pramod Law PT at [facility] PT [address] ([phone])  
  
 11/14/2017 9:30 AM  
  Appointment with Sparkle Banuelos PTA at [facility] PT [address] ([phone])  
  
 11/16/2017 10:00 AM  
  Appointment with Dawson Glez PT at [facility] PT [address] ([phone])  
  
 11/20/2017 10:00 AM  
  Appointment with Pramod Law PT at [facility] PT [address] ([phone])  
  
 11/22/2017 10:00 AM  
  Appointment with Dawson Glez PT at [facility] PT [address] ([phone])  
  
 11/28/2017 10:30 AM  
  Appointment with Pramod Law PT at [facility] PT [address] ([phone])  
  
 11/30/2017 10:00 AM  
  Appointment with Dawson Glez PT at [facility] PT [address] [facility] ([phone]) Introducing [facility]! Dear Vijay Worley: Thank you for requesting a New Screens account. Our records indicate that you already have an active New Screens account. You can access your account anytime at [email or web address] Did you know that you can access your hospital and ER discharge instructions at any time in New Screens? You can also review all of your test results from your hospital stay or ER visit. Additional Information If you have questions, please visit the Frequently Asked Questions section of the New Screens website at [email or web address] Remember, New Screens is NOT to be used for urgent needs. For medical emergencies, dial 911. Now available from your iPhone and Android! Please provide this summary of care documentation to your next provider. Your primary care clinician is listed as SAROJ MCNEIL. If you have any questions after today's visit, please call [phone].

## 2017-11-07 NOTE — PROGRESS NOTES
HPI:  I saw Roman Flores in my office today in cardiovascular evaluation regarding his problems with palpitations in the past and some dizziness issues. Mr. Lit Estrada is a pleasant 42year old white male with history of palpitations and some anxiety issues with Holters in the past, dating back to 2006, showing some PACs and reconfirmed on a Holter in November of 2014. He also has history of dizziness problems, which back in November of 2012 prompted us to do a tilt table test and the stress portion of that test using sublingual nitroglycerin demonstrated a severe bradycardia with junctional rhythm and an 8 second pause secondary to very high vagal tone. We decided not to place a pacemaker and the patient has had no further syncopal episodes, but he still complaints of some vague lightheadedness from time to time. He unfortunately continues to smoke a pack of cigarettes per day. He has been somewhat concerned about the possibility of developing heart disease, so we did do a calcium score on him back in February of 2011, which was 0, suggesting he has a very low ten year risk of cardiovascular event. He comes in the office today and relates that he is continuing to do about the same. He does have palpitations which seem to run cycles and sometimes or every day for a few days and then he will have them just a few times a week. The palpitations very between fast heart beats for a few seconds or a skipped or forceful beat from time to time during the day. These palpitations do not seem to be worsening. He does get a little lightheaded after eating and occasionally when he lies down he gets up quickly, but relates that these symptoms are unchanged from what they have been and he is been fairly stable in this regard for some time. Encounter Diagnoses   Name Primary?     Palpitations Yes    PAC (premature atrial contraction)     Hypercholesterolemia     Dizziness     Anxiety        Discussion: This patient appears to be doing about as well as we could expect and really of no recommendations for change at this time. His palpitations are rather infrequent and rather transient and I do not feel that any specific therapy needs to be considered for these palpitations. He does have some dizziness issues but he is known to have a high vagal tone and his dizziness is not markedly different than what it has been in the past so I do not think any workup is needed for this problem either. He does have history of hypercholesterolemia with total cholesterols in the 200-250 range. He has a coronary calcium score of 0, but I told him that over time the high cholesterol could be an issue and he may wish to consider taking a statin drug at some point in the future to get his total cholesterol down closer to the 150 range and hopefully decrease his risk of developing symptomatic coronary artery disease in his 52's or 60's and he is going to consider this. Since he is otherwise doing well I will plan to see him again in several months. PCP:  Robin Mcfarland MD      Past Medical History:   Diagnosis Date    Anxiety     Arm fatigue     Forearms    Balance problems     Cardiac Agatston CAC score 100-199 02/04/2015    Coronary calcium score 0.    Cardiac echocardiogram 10/29/2014    EF 55-60%. No RWMA. Normal diastolic fx. RVSP 30 mmHg.  Cardiac Holter monitor study 10/29/2014    Normal Holter study.     Chronic cough     chronically coughing up sputum    Depression     Dizziness     Dyspnea     Esophageal reflux     Headache     Hypercholesterolemia     Lumbar pain     Myofascial pain     Neck pain     Numbness and tingling     PAC (premature atrial contraction)     Palpitations     presumably benign    Panic disorder     Reflux     Thoracic back pain     Mid-thoracic    Upper extremity weakness     Vertigo, intermittent          Past Surgical History:   Procedure Laterality Date    APPENDECTOMY  11/23/2010    HX HEENT      corrective hearing for left ear     HX HEENT      plastic surgery on both ears    HX HEENT  10/08 & 09/09    Reformed jaw bone, bone graft    HX RENAL BIOPSY      HX TONSILLECTOMY           Current Outpatient Rx   Name  Route  Sig  Dispense  Refill    ALPRAZolam (XANAX) 0.5 mg tablet    Oral    Take 0.5 mg by mouth two (2) times a day.  mometasone (NASONEX) 50 mcg/actuation nasal spray    Both Nostrils    1 Spray by Both Nostrils route daily.  ascorbic acid (VITAMIN C) 500 mg tablet    Oral    Take 500 mg by mouth daily.  cholecalciferol, vitamin D3, (VITAMIN D3) 2,000 unit Tab    Oral    Take 1 Tab by mouth daily.  escitalopram (LEXAPRO) 10 mg tablet    Oral    Take 10 mg by mouth daily.  omeprazole-sodium bicarbonate (ZEGERID) 40-1.1 mg-gram capsule    Oral    Take 1 Cap by mouth daily.  acetaminophen (TYLENOL EXTRA STRENGTH) 500 mg tablet    Oral    Take 500 mg by mouth every six (6) hours as needed.  MULTIVITAMIN PO    Oral    Take 1 Tab by mouth daily. Allergies   Allergen Reactions    Opioids - Morphine Analogues Myalgia    Other Medication Other (comments)     Pt reports allergy to steroids, with psychological side effects    Pollen Extracts Unable to Obtain    Zithromax [Azithromycin] Itching       Review of Systems:   Constitutional: Positive for some fatigue, weakness, and occasional diaphoresis. Respiratory: Positive for cough and some sputum production. Gastrointestinal: Positive for heartburn, nausea, and some abdominal pain  Musculoskeletal positive for myalgias neck pain and back pain. Physical Exam:  This is a well-developed, well-nourished, anxious 43year-old  male in no acute distress at the time of the examination.    Visit Vitals    /68    Pulse 71    Ht 5' 5\" (1.651 m)    Wt 70.3 kg (155 lb)    SpO2 97%    BMI 25.79 kg/m2       HEENT: Conjuctiva white, mucosa moist, no pallor or cyanosis. NECK: Supple without masses, tenderness or thyromegaly. There was no jugular venous distention. Carotid are full bilaterally without bruits. CHEST: Symmetrical with good excursion. LUNGS: Clear to auscultation in all fields. HEART: The apex is not displaced. There were no lifts, thrills or heaves. There is a normal S1 and S2 without appreciable murmurs rubs clicks or gallops auscultated. ABDOMEN: Soft without masses, tenderness or organomegaly. EXTREMITIES: Full peripheral pulses without peripheral edema. Review of Data: Please refer to past medical history for most recent cardiac testing. Results for orders placed or performed in visit on 11/07/17   AMB POC EKG ROUTINE W/ 12 LEADS, INTER & REP     Status: None    Narrative    Normal sinus rhythm, rate 71. This EKG is within normal limits and similar to the EKG of April 24, 2017. Deandre Fisher D.O., F.A.C.C. [facility]  [facility]  [address] [place] [address]. [address]    PLEASE NOTE:  This document has been produced using voice recognition software. Unrecognized errors in transcription may be present.

## 2017-11-09 ENCOUNTER — HOSPITAL ENCOUNTER (OUTPATIENT)
Dept: PHYSICAL THERAPY | Age: 37
Discharge: HOME | End: 2017-11-09
Payer: COMMERCIAL

## 2017-11-09 PROCEDURE — 97110 THERAPEUTIC EXERCISES: CPT

## 2017-11-09 PROCEDURE — 97035 APP MDLTY 1+ULTRASOUND EA 15: CPT

## 2017-11-09 NOTE — PROGRESS NOTES
PT DAILY TREATMENT NOTE - [facility]     Patient Name: Nahed Goodwin  Date:2017  : 1980  [x]  Patient  Verified  Payor: Darby Jimenez / Plan: [address] [place] [employer] / Product Type: HMO /    In time:1040  Out time:1115  Total Treatment Time (min): 35  Visit #: 2 of 8    Treatment Area: Left shoulder pain [M25.512]    SUBJECTIVE  Pain Level (0-10 scale): 3/10  Any medication changes, allergies to medications, adverse drug reactions, diagnosis change, or new procedure performed?: [x] No    [] Yes (see summary sheet for update)  Subjective functional status/changes:   [] No changes reported  Reports his shoulder has been irritated since eval.    OBJECTIVE    Modality rationale: decrease inflammation and decrease pain to improve the patients ability to ease with ADL's   Min Type Additional Details    [] Estim:  []Unatt       []IFC  []Premod                        []Other:  []w/ice   []w/heat  Position:  Location:    [] Estim: []Att    []TENS instruct  []NMES                    []Other:  []w/US   []w/ice   []w/heat  Position:  Location:    []  Traction: [] Cervical       []Lumbar                       [] Prone          []Supine                       []Intermittent   []Continuous Lbs:  [] before manual  [] after manual   8 [x]  Ultrasound: []Continuous   [x] Pulsed                           [x]1MHz   []3MHz W/cm2:1.5  Location:bicep tendon insertion    []  Iontophoresis with dexamethasone         Location: [] Take home patch   [] In clinic   10 []  Ice     [x]  heat  []  Ice massage  []  Laser   []  Anodyne Position:seated  Location:left shoulder    []  Laser with stim  []  Other:  Position:  Location:    []  Vasopneumatic Device Pressure:       [] lo [] med [] hi   Temperature: [] lo [] med [] hi   [] Skin assessment post-treatment:  []intact []redness- no adverse reaction    []redness  adverse reaction:     17 min Therapeutic Exercise:  [] See flow sheet :   Rationale: increase ROM and increase strength to improve the patients ability to ease with overhead reaching        With   [] TE   [] TA   [] neuro   [] other: Patient Education: [x] Review HEP    [] Progressed/Changed HEP based on:   [] positioning   [] body mechanics   [] transfers   [] heat/ice application    [] other:      Other Objective/Functional Measures: IR with theraband increased pain to shoulder, anterior portion. Tolerated Ultrasound well   TTP to left bicep tendon insertion and bicep belly. Pain Level (0-10 scale) post treatment: 2/10    ASSESSMENT/Changes in Function: slow progress. Pt going for massage at 2 pm today. Pt educated on concentrating on left UT release. Patient will continue to benefit from skilled PT services to modify and progress therapeutic interventions, address functional mobility deficits, address ROM deficits, address strength deficits, analyze and address soft tissue restrictions, analyze and cue movement patterns, analyze and modify body mechanics/ergonomics and assess and modify postural abnormalities to attain remaining goals. [x]  See Plan of Care  []  See progress note/recertification  []  See Discharge Summary         Progress towards goals / Updated goals:                 1. Pt will be compliant with a HEP to improve the function for over head reaching. MET. 11/19/17  Long Term Goals: To be accomplished in 4 weeks:                         1. Pt will increase FOTO score by 8 pts to improve function. 2. Pt will increase left shoulder strength to 4+/5 or greater. to ease with lifting activities. 3. Pt will increase left shoulder ROM to WNL in all planes w/o pain to return to PLOF.                                PLAN  []  Upgrade activities as tolerated     [x]  Continue plan of care  []  Update interventions per flow sheet       []  Discharge due to:_  []  Other:_      Lynn Mckay, PT 11/9/2017  10:56 AM    Future Appointments  Date Time Provider Cristo [address]   11/14/2017 9:30 AM Sandy Cristina, PTA [id number] [facility]   11/16/2017 10:00 AM Adele Hearing, PT [id number] [facility]   11/16/2017 11:00 AM Thao Sanchez, PT [id number] [facility]   11/20/2017 10:00 AM Rao Foster, PT [id number] [facility]   11/22/2017 10:00 AM Adele Weston, PT EYCLIMP [facility]   11/28/2017 10:30 AM Rao Foster, PT [id number] [facility]   11/30/2017 10:00 AM Adele Weston, PT [id number] [facility]   2/21/2018 10:15 AM Amrik Hsieh  [address]

## 2017-11-14 ENCOUNTER — HOSPITAL ENCOUNTER (OUTPATIENT)
Dept: PHYSICAL THERAPY | Age: 37
Discharge: HOME | End: 2017-11-14
Payer: COMMERCIAL

## 2017-11-14 PROCEDURE — 97110 THERAPEUTIC EXERCISES: CPT

## 2017-11-14 NOTE — PROGRESS NOTES
PT DAILY TREATMENT NOTE     Patient Name: Olena Disla  Date:2017  : 1980  [x]  Patient  Verified  Payor: Keon Mercer / Plan: [address] [place] [employer]MO / Product Type: HMO /    In time:935  Out time:1017  Total Treatment Time (min): 42  Visit #: 3 of 8    Treatment Area: Left shoulder pain [M25.512]    SUBJECTIVE  Pain Level (0-10 scale): 5/10  Any medication changes, allergies to medications, adverse drug reactions, diagnosis change, or new procedure performed?: [x] No    [] Yes (see summary sheet for update)  Subjective functional status/changes:   [] No changes reported  Pt stated that he still has pain in his anterior shoulder    OBJECTIVE    Modality rationale: decrease inflammation and decrease pain to improve the patients ability to increase ease with ADLs   Min Type Additional Details    [] Estim:  []Unatt       []IFC  []Premod                        []Other:  []w/ice   []w/heat  Position:  Location:    [] Estim: []Att    []TENS instruct  []NMES                    []Other:  []w/US   []w/ice   []w/heat  Position:  Location:    []  Traction: [] Cervical       []Lumbar                       [] Prone          []Supine                       []Intermittent   []Continuous Lbs:  [] before manual  [] after manual    []  Ultrasound: []Continuous   [] Pulsed                           []1MHz   []3MHz W/cm2:  Location:    []  Iontophoresis with dexamethasone         Location: [] Take home patch   [] In clinic   10 []  Ice     [x]  heat  []  Ice massage  []  Laser   []  Anodyne Position:seated  Location:L sh    []  Laser with stim  []  Other:  Position:  Location:    []  Vasopneumatic Device Pressure:       [] lo [] med [] hi   Temperature: [] lo [] med [] hi   [x] Skin assessment post-treatment:  [x]intact []redness- no adverse reaction    []redness  adverse reaction:     32 min Therapeutic Exercise:  [x] See flow sheet :   Rationale: increase ROM and increase strength to improve the patients ability to increase ease with ADLs    With   [x] TE   [] TA   [] neuro   [] other: Patient Education: [x] Review HEP    [] Progressed/Changed HEP based on:   [] positioning   [] body mechanics   [] transfers   [] heat/ice application    [] other:      Other Objective/Functional Measures:   Pt had slight increase in pain with TB IR  No difficulty or increased pain with wall pushups +     Pain Level (0-10 scale) post treatment: 3-4/10    ASSESSMENT/Changes in Function:   Pt is making slow progress toward goals. Pt cont with decreased range of motion and strength in L sh. Patient will continue to benefit from skilled PT services to modify and progress therapeutic interventions, address functional mobility deficits, address ROM deficits and address strength deficits to attain remaining goals. [x]  See Plan of Care  []  See progress note/recertification  []  See Discharge Summary         Progress towards goals / Updated goals:   1. Pt will be compliant with a HEP to improve the function for over head reaching. MET. 11/19/17  Long Term Goals: To be accomplished in 4 weeks:                         1. Pt will increase FOTO score by 8 pts to improve function.                         2. Pt will increase left shoulder strength to 4+/5 or greater. to ease with lifting activities.                        7. Pt will increase left shoulder ROM to WNL in all planes w/o pain to return to PLOF.     PLAN  []  Upgrade activities as tolerated     [x]  Continue plan of care  []  Update interventions per flow sheet       []  Discharge due to:_  []  Other:_      Janelle Medicine, [facility] 11/14/2017  9:53 AM    Future Appointments  Date Time Provider Cristo Buenrostro   11/16/2017 10:00 AM Viktor Burris, PT [id number] [facility]   11/16/2017 11:00 AM Ross Mckeon PT [id number] [facility]   11/20/2017 10:00 AM Siddhartha Kolb, PT [id number] [facility]   11/22/2017 10:00 AM Viktor Burris, PT [id number] [facility]   11/28/2017 10:30 AM Siddhartha Kolb, PT [id number] [facility]   11/30/2017 10:00 AM Janie Noonan, PT [id number] [facility]   2/21/2018 10:15 AM Bree Abreu  [address]

## 2017-11-16 ENCOUNTER — HOSPITAL ENCOUNTER (OUTPATIENT)
Dept: PHYSICAL THERAPY | Age: 37
Discharge: HOME | End: 2017-11-16
Payer: COMMERCIAL

## 2017-11-16 PROCEDURE — 97110 THERAPEUTIC EXERCISES: CPT

## 2017-11-16 PROCEDURE — 97162 PT EVAL MOD COMPLEX 30 MIN: CPT

## 2017-11-16 PROCEDURE — 97140 MANUAL THERAPY 1/> REGIONS: CPT

## 2017-11-16 PROCEDURE — 97530 THERAPEUTIC ACTIVITIES: CPT

## 2017-11-16 NOTE — PROGRESS NOTES
PT DAILY TREATMENT NOTE - [facility]     Patient Name: Anne Rico  Date:2017  : 1980  [x]  Patient  Verified  Payor: Silvio To / Plan: [employer] / Product Type: HMO /    In time:10.05  Out time:10:45   Total Treatment Time (min): 40'  Visit #: 4 of 8    Treatment Area: Left shoulder pain [M25.512]    SUBJECTIVE  Pain Level (0-10 scale): 5/10  Any medication changes, allergies to medications, adverse drug reactions, diagnosis change, or new procedure performed?: [x] No    [] Yes (see summary sheet for update)  Subjective functional status/changes:   [] No changes reported  Pt states he hasn't noticed any change in status since beginning PT  Pt states doing some of the exercises in HEP but certain movement cause pain. Pt reports Elbow flexion and shoulder adduction Isometric cause pain. Pt reports no pain with wall wipes or shoulder extension   Pt expressed interest in dry-needling  Pt reports US wasn't effected during previous treatment  Pt reports increases symptoms during forward rotation on UBE. OBJECTIVE    Modality rationale: decrease pain to improve the patients ability to symptom management.    Min Type Additional Details    [] Estim:  []Unatt       []IFC  []Premod                        []Other:  []w/ice   []w/heat  Position:  Location:    [] Estim: []Att    []TENS instruct  []NMES                    []Other:  []w/US   []w/ice   []w/heat  Position:  Location:    []  Traction: [] Cervical       []Lumbar                       [] Prone          []Supine                       []Intermittent   []Continuous Lbs:  [] before manual  [] after manual    []  Ultrasound: []Continuous   [] Pulsed                           []1MHz   []3MHz W/cm2:  Location:    []  Iontophoresis with dexamethasone         Location: [] Take home patch   [] In clinic   10' []  Ice     [x]  heat  []  Ice massage  []  Laser   []  Anodyne Position: seated   Location: L shoulder     []  Laser with stim  [] Other:  Position:  Location:    []  Vasopneumatic Device Pressure:       [] lo [] med [] hi   Temperature: [] lo [] med [] hi   [x] Skin assessment post-treatment:  [x]intact []redness- no adverse reaction    []redness  adverse reaction:              10' min Therapeutic Exercise:  [x] See flow sheet :   Rationale: increase ROM and increase strength to improve the patients ability to perform ADL's     20' min Manual Therapy:  TPR infraspinatus and pec major, Grade 4 GH mobs inferior and posterior. Rationale: decrease pain, increase ROM and increase tissue extensibility to perform ADL's with increased proficiency. With   [x] TE   [] TA   [] neuro   [x] other: Patient Education: [x] Review HEP    [] Progressed/Changed HEP based on:   [] positioning   [] body mechanics   [] transfers   [] heat/ice application    [] other:      Other Objective/Functional Measures:   Pt required tactile cueing to prevent scapular elevation during rowing/extentions  Pt demonstrated increased AROM and decresed pain following manual therapy. Pt educated to decrease intensity of isometric contraction for HEP to work within pain-free range. Pain Level (0-10 scale) post treatment: 4/10     ASSESSMENT/Changes in Function: Pt demonstrates complexity as evidenced by increased symptoms with HEP isometrics. Pt presents with trigger points in infraspinatus and pec major,   demonstrates pototiental as evidenced by increased AROM and decreased pain following manual therapy. Patient will continue to benefit from skilled PT services to modify and progress therapeutic interventions, address functional mobility deficits, address ROM deficits and analyze and address soft tissue restrictions to attain remaining goals. [x]  See Plan of Care  []  See progress note/recertification  []  See Discharge Summary         Progress towards goals / Updated goals:  Progress towards goals / Updated goals:                 1.  Pt will be compliant with a HEP to improve the function for over head reaching. MET. 11/19/17  Long Term Goals: To be accomplished in 4 weeks:                         1. Pt will increase FOTO score by 8 pts to improve function.                         2. Pt will increase left shoulder strength to 4+/5 or greater. to ease with lifting activities.                          8. Pt will increase left shoulder ROM to WNL in all planes w/o pain to return to PLOF.          PLAN  []  Upgrade activities as tolerated     [x]  Continue plan of care  []  Update interventions per flow sheet       []  Discharge due to:_  []  Other:_      Elner Pickup 11/16/2017  10:08 AM    Future Appointments  Date Time Provider Cristo Chitra   11/16/2017 11:00 AM Terry Steele, PT [id number] [facility]   11/20/2017 10:00 AM Karolina Tejeda, PT [id number] [facility]   11/22/2017 10:00 AM Sumi Sousa, PT [id number] [facility]   11/28/2017 10:30 AM Karolina Tejeda, PT [id number] [facility]   11/30/2017 10:00 AM Sumi Sousa, PT [id number] [facility]   2/21/2018 10:15 AM Pablo Rico  [address]

## 2017-11-16 NOTE — PROGRESS NOTES
[facility]  [place] [facility] [address]  [phone] fax    Plan of Care/ Statement of Necessity for Physical Therapy Services    Patient name: Edelmira Lange Start of Care: 2017   Referral source: Eric Garner MD : 1980    Medical Diagnosis: Achilles tendinitis, right leg [M76.61]   Onset Date:R knee couple years ago, R ankle within the past month    Treatment Diagnosis: R Knee Pain, R ankle pain. 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 Assessment/ key information: Pt is a 41 yo M who presents with posterior R knee pain for a couple years as well as anterior ankle pain within the past month. He saw a podiatrist who gave him orthotics to account for his leg length discrepancy as well his decreased arch height. His ankle has been feeling better since trial of orthotics. Knee and ankle pain are aggravated with prolonged standing/ambulation by the end of the day. Intensity of pain depends on the day. Pain is relieved with rest.  Xrays of R knee and R ankle were unremarkable. He is mildly TTP over R medial hamstring tendons, fibular head, dorsal calcaneous/cuboid, and distal tibialis anterior/fibularis musculature. Pt presents with decreased hamstring, gastroc, and soleus mobility, with hamstring 90/90 limited B (R 38 dgrs, L 42 dgrs). Hypermobility of lateral metatarsals evident on R as well as significant collapse of medial longitudinal arch. Irritation of dorsiflexors/everters consistent with overuse injury from repetitive eccentric control utilized to compensate for arch collapse.   Pt will benefit from skilled PT to address strength, flexibility, and kinetic chain alignment for reduced pain with WB tasks and return to PLOF. Evaluation Complexity History MEDIUM  Complexity : 1-2 comorbidities / personal factors will impact the outcome/ POC ; Examination HIGH Complexity : 4+ Standardized tests and measures addressing body structure, function, activity limitation and / or participation in recreation  ;Presentation MEDIUM Complexity : Evolving with changing characteristics  ; Clinical Decision Making MEDIUM Complexity : FOTO score of 26-74  Overall Complexity Rating: MEDIUM  Problem List: pain affecting function, decrease ROM, decrease strength, impaired gait/ balance, decrease ADL/ functional abilitiies, decrease activity tolerance and decrease flexibility/ joint mobility   Treatment Plan may include any combination of the following: Therapeutic exercise, Therapeutic activities, Neuromuscular re-education, Physical agent/modality, Gait/balance training, Manual therapy, Patient education, Self Care training, Functional mobility training, Home safety training and Stair training  Patient / Family readiness to learn indicated by: asking questions, trying to perform skills and interest  Persons(s) to be included in education: patient (P)  Barriers to Learning/Limitations: None  Patient Goal (s): strength, mobility, pain-free  Patient Self Reported Health Status: fair  Rehabilitation Potential: good    Short Term Goals: To be accomplished in 1 weeks:  1. Pt will be compliant with initial HEP to improve therapy outcomes   Long Term Goals: To be accomplished in 6 weeks:  1. Pt will improve FOTO by 16 points in order to demonstrate functional improvement  2. Pt will improve R hamstring 90/90 stretch to 30 dgrs in order to improve ease of prolonged ambulation   3. Pt will improve B hip abd/ext strength to 4-/5 in order to improve kinetic chain alignment for reduced knee/ankle pain and improved WB tolerance   4.  Pt will report 50% improvement in sx in order to improve QOL     Frequency / Duration: Patient to be seen 1-2 times per week for 6 weeks. Patient/ CarPatient/ Caregiver education and instruction: Diagnosis, prognosis, exercises   [x]  Plan of care has been reviewed with NAKIA Villaseñor PT 11/16/2017 11:03 AM    ________________________________________________________________________    I certify that the above Therapy Services are being furnished while the patient is under my care. I agree with the treatment plan and certify that this therapy is necessary.     Physician's Signature:____________________  Date:____________Time: _________    Please sign and return to [facility]  [address] [facility]  [phone] fax

## 2017-11-16 NOTE — PROGRESS NOTES
PT DAILY TREATMENT NOTE/FOOT AND ANKLE EVAL 3-16    Patient Name: Arnel Henry  Date:2017  : 1980  [x]  Patient  Verified  Payor: Debbie Albarran / Plan: [employer] / Product Type: HMO /    In time: 11:06  Out time: 12:00  Total Treatment Time (min): 54  Visit #: 1 of     Treatment Area: Achilles tendinitis, right leg [M76.61]    SUBJECTIVE  Pain Level (0-10 scale): 2.8/10 ankle, 4/10 knee   []constant []intermittent []improving []worsening []no change since onset    Any medication changes, allergies to medications, adverse drug reactions, diagnosis change, or new procedure performed?: [x] No    [] Yes (see summary sheet for update)  Subjective functional status/changes:      Pt is a 43 yo M who presents with posterior R knee pain for a couple years as well as anterior ankle pain within the past month. He saw a podiatrist who gave him orthotics to account for his leg length discrepancy as well his decreased arch height. His ankle has been feeling better since trial of orthotics.        Barriers: []pain []financial []time []transportation []other  Motivation: high   Substance use: []Alcohol []Tobacco []other:   FABQ Score: []low [x]elevate - based on FOTO  Cognition: A & O x 4    Other:    OBJECTIVE/EXAMINATION      28 min [x]Eval                  []Re-Eval       10 min Therapeutic Exercise:  [x] See flow sheet : See HEP    Rationale: increase ROM and increase strength to improve the patients ability to improve ease of prolonged ambulation with work tasks    12 min Therapeutic Activity:  []  See flow sheet :   Rationale: Educated patient regarding findings of evaluation, proper kinetic chain alignment, importance of cushioned/supportive shoes, ice use R foot 10-15 minutes 1-3x per day, new therex technique and purpose, purpose of therapy, and therapy plan in order to ensure pt understanding/compliance with therapy       With   [] TE   [] TA   [] neuro   [] other: Patient Education: [x] Review HEP    [] Progressed/Changed HEP based on:   [] positioning   [] body mechanics   [] transfers   [] heat/ice application    [] other:      Other Objective/Functional Measures:     /79 taken manually prior to therapy     Physical Therapy Evaluation  - Foot and Ankle    Gait: [] Normal    [x] Abnormal    [] Antalgic    [] NWB    Device:  Describe: over-pronation stance phase R > L     ROM/Strength  [] Unable to assess at this time      AROM        PROM            Strength (1-5)   Left Right Left Right Left  Right   Dorsiflexion 15 12 20 16 5 5   Plantarflexion 50 52 52 56     Inversion 30 38 31 40 5 4+   Eversion 2 12 10  4+ 4+   Great Toe Ext         Great Toe Flex             MMT   Hip abd R 3+/5, L 4/5  Hip ext 3+/5 B  Glute max  L 3/5, R 3+/5   Knee extension: R 4/5, L 4+/5   Knee flexion: R 4+/5, L 4/5        Flexibility: [] Unable to assess at this time  Gastroc:    (L) Tightness [x] WNL   [] Min   [] Mod   [] Severe    (R) Tightness [] WNL   [x] Min   [] Mod   [] Severe  Soleus:    (L) Tightness [] WNL   [x] Min   [] Mod   [] Severe    (R) Tightness [] WNL   [x] Min   [] Mod   [] Severe      Palpation:   Location:  Patient's Pain Response: [x] Min   [] Mod   [] Severe  Location: TTP medial hamstring tendons, fibular head on R      Patient's Pain Response: [x] Min   [] Mod   [] Severe   Location: dorsal calcaneous/cuboid     Optional Tests:    Sub-talor alignment: [] Neutral     [x] Pronation      [] Supination R > L    Forefoot alignment:  [] Neutral     [] Varus            [x] Valgus R > L     Swelling:No formally assessed, minor swelling evident over dorsal aspect of cuboid on R     Other tests/ comments:    Hamstring 90/90: R 38 dgrs, L 42 dgrs     No increased pain with therapy        Pain Level (0-10 scale) post treatment: 2/10 ankle and knee     ASSESSMENT/Changes in Function: See POC    Patient will continue to benefit from skilled PT services to modify and progress therapeutic interventions, address functional mobility deficits, address ROM deficits, address strength deficits, analyze and address soft tissue restrictions, analyze and cue movement patterns, assess and modify postural abnormalities and instruct in home and community integration to attain remaining goals.      [x]  See Plan of Care  []  See progress note/recertification  []  See Discharge Summary         Progress towards goals / Updated goals:  See POC    PLAN  [x]  Upgrade activities as tolerated     []  Continue plan of care  [x]  Update interventions per flow sheet       []  Discharge due to:_  []  Other:_      Ahsan Pascual, PT 11/16/2017  11:12 AM

## 2017-11-16 NOTE — PROGRESS NOTES
Request for use of Dry Needling/Intramuscular Manual Therapy  Patient: Faraz Damon     Referral Source: Patricio Fox MD  Diagnosis: Left shoulder pain [M25.512]      : 1980  Date of initial visit:  17   Attended visits: 4  Missed Visits: 0    Based on findings from the physical therapy examination and evaluation, the evaluating therapist believes the patient, Faraz Damon  would benefit from including Dry Needling as part of the plan of care. Dry needling is a treatment technique utilized in conjunction with other PT interventions to inactivate myofascial trigger points and the pain and dysfunction they cause. Dry Needling is an advanced procedure that requires additional training including greater than 54 hours of intensive course work. Physical Therapists at [address] [place] [address] are trained and/or certified through [employer] for their education. PROCEDURE:   Solid filament sterile needle (typically 0.3mm/30 gauge) inserted into a trigger point   Repeated movements inactivate the trigger points, taking 30-60 seconds per site   Typically consists of 1 dry needling session per week and a possible second treatment including muscle re-education, flexibility, strengthening and other manual techniques to facilitate the benefits of dry needling     BENEFITS:   Inactivation of trigger points   Decreased pain   Increased muscle length   Improved movement patterns   Restoration of function POTENTIAL RISKS:   Post-needling soreness   Infection   Bruising/bleeding   Penetration of a nerve   Pneumothorax   All treating PTs have been thoroughly educated in avoiding adverse reactions    If you agree with this recommendation, please sign this form and fax it to us at [phone].   If you have questions or concerns regarding dry needling or any other treatment we may be providing, please contact us at [phone]    Thank you for allowing us to assist in the care of your patient. Verito Mckenzie, PT    11/16/2017 10:55 AM     NOTE TO PHYSICIAN:  PLEASE COMPLETE THE ORDERS BELOW AND   FAX TO [facility]: [phone]  If you are unable to process this request in 24 hours please contact our office:   [phone]    I have read the above request and AGREE to the recommendation of including dry needling as part of the plan of care.     Physicians signature: _________________________Date: _________Time:________

## 2017-11-20 ENCOUNTER — HOSPITAL ENCOUNTER (OUTPATIENT)
Dept: PHYSICAL THERAPY | Age: 37
Discharge: HOME | End: 2017-11-20
Payer: COMMERCIAL

## 2017-11-20 PROCEDURE — 97140 MANUAL THERAPY 1/> REGIONS: CPT

## 2017-11-20 PROCEDURE — 97110 THERAPEUTIC EXERCISES: CPT

## 2017-11-20 NOTE — PROGRESS NOTES
PT DAILY TREATMENT NOTE - [facility]     Patient Name: Arnel Henry  Date:2017  : 1980  [x]  Patient  Verified  Payor: Debbie Albarran / Plan: [employer] / Product Type: HMO /    In time:1006  Out time:1046  Total Treatment Time (min): 40  Visit #: 5 of 8    Treatment Area: Left shoulder pain [M25.512]    SUBJECTIVE  Pain Level (0-10 scale): 3/10  Any medication changes, allergies to medications, adverse drug reactions, diagnosis change, or new procedure performed?: [x] No    [] Yes (see summary sheet for update)  Subjective functional status/changes:   [] No changes reported  Still have some soreness but not as bad, less pain today.     OBJECTIVE    Modality rationale: decrease pain to improve the patients ability to ease with ADL's   Min Type Additional Details    [] Estim:  []Unatt       []IFC  []Premod                        []Other:  []w/ice   []w/heat  Position:  Location:    [] Estim: []Att    []TENS instruct  []NMES                    []Other:  []w/US   []w/ice   []w/heat  Position:  Location:    []  Traction: [] Cervical       []Lumbar                       [] Prone          []Supine                       []Intermittent   []Continuous Lbs:  [] before manual  [] after manual    []  Ultrasound: []Continuous   [] Pulsed                           []1MHz   []3MHz W/cm2:  Location:    []  Iontophoresis with dexamethasone         Location: [] Take home patch   [] In clinic   10 []  Ice     [x]  heat  []  Ice massage  []  Laser   []  Anodyne Position: seated  Location:left shoulder    []  Laser with stim  []  Other:  Position:  Location:    []  Vasopneumatic Device Pressure:       [] lo [] med [] hi   Temperature: [] lo [] med [] hi   [] Skin assessment post-treatment:  []intact []redness- no adverse reaction    []redness  adverse reaction:       20 min Therapeutic Exercise:  [] See flow sheet :   Rationale: increase ROM and increase strength to improve the patients ability to ease with ADL's    10 min Manual Therapy:  DTM to left UT. Rationale: decrease pain, increase ROM, increase tissue extensibility and decrease trigger points to ease with ADL's. With   [] TE   [] TA   [] neuro   [] other: Patient Education: [x] Review HEP    [] Progressed/Changed HEP based on:   [] positioning   [] body mechanics   [] transfers   [] heat/ice application    [] other:      Other Objective/Functional Measures: Left UT and levator scapula trigger point with tenderneess/.    Decreased shoulder endurance and fatigued easily during ball stabs on the wall. Left shoulder AROM is WNL. Pain Level (0-10 scale) post treatment: 2/10    ASSESSMENT/Changes in Function: Pt reports he carries his stress on this shoulders and upper back. He has been doing work on his new house. Pt will call his MD to get permission for dry needling. Patient will continue to benefit from skilled PT services to modify and progress therapeutic interventions, address functional mobility deficits, address ROM deficits, address strength deficits, analyze and address soft tissue restrictions, analyze and cue movement patterns, analyze and modify body mechanics/ergonomics and assess and modify postural abnormalities to attain remaining goals. [x]  See Plan of Care  []  See progress note/recertification  []  See Discharge Summary           Progress towards goals / Updated goals:                 1. Pt will be compliant with a HEP to improve the function for over head reaching. MET. 11/19/17  Long Term Goals: To be accomplished in 4 weeks:                         1. Pt will increase FOTO score by 8 pts to improve function.                         2. Pt will increase left shoulder strength to 4+/5 or greater. to ease with lifting activities.                          3. Pt will increase left shoulder ROM to WNL in all planes w/o pain to return to PLOF.  MET. 11/20/17        PLAN  [x]  Upgrade activities as tolerated     [x]  Continue plan of care  [] Update interventions per flow sheet       []  Discharge due to:_  []  Other:_      Dunia Wheatley, PT 11/20/2017  2:07 PM    Future Appointments  Date Time Provider Cristo Buenrostro   11/22/2017 10:00 AM Melvin Mcfarlnae, PT [id number] [facility]   11/28/2017 10:00 AM Shyann Cobb, PT [id number] [facility]   11/28/2017 10:30 AM Dason Verduzco, PTA DUOICIA [facility]   11/30/2017 10:00 AM Melvin Mcfarlane, PT [id number] [facility]   12/4/2017 11:30 AM Melvin Mcfarlane, PT [id number] [facility]   12/4/2017 12:00 PM Melvin Mcfarlane, PT [id number] [facility]   12/6/2017 11:00 AM Dunia Wheatley, PT [id number] [facility]   12/11/2017 12:00 PM Melvin Mcfarlane, PT [id number] [facility]   12/18/2017 11:00 AM Caryl Desai, PT [id number] [facility]   2/21/2018 10:15 AM Doris Harris  [address]

## 2017-11-22 ENCOUNTER — HOSPITAL ENCOUNTER (OUTPATIENT)
Dept: PHYSICAL THERAPY | Age: 37
Discharge: HOME | End: 2017-11-22
Payer: COMMERCIAL

## 2017-11-22 PROCEDURE — 97110 THERAPEUTIC EXERCISES: CPT

## 2017-11-22 PROCEDURE — 97140 MANUAL THERAPY 1/> REGIONS: CPT

## 2017-11-22 NOTE — PROGRESS NOTES
PT DAILY TREATMENT NOTE - [facility]     Patient Name: Christopher Simons  Date:2017  : 1980  [x]  Patient  Verified  Payor: Hussain Medico / Plan: [employer] / Product Type: HMO /    In time:10:06  Out time:10:45  Total Treatment Time (min): 39   Visit #: 6 of 8     Treatment Area: Left shoulder pain [M25.512]    SUBJECTIVE  Pain Level (0-10 scale): 2/10  Any medication changes, allergies to medications, adverse drug reactions, diagnosis change, or new procedure performed?: [x] No    [] Yes (see summary sheet for update)  Subjective functional status/changes:   [] No changes reported  Pt reports difficulty with dish washing and reaching overhead and work. Pt reports pain is not as bad this morning this during UBE compared to last week. Pt reports anterior chest pain duhring cross body stretch    Pt reports sleeping on his involved side. Pt reports symptoms in anterior shoulder during IR isometric  Pt reports not feeling stretch in standing cross body stretch (posterior capsule stretch.)   Pt reports IR pain presented after doorway stretch. OBJECTIVE    29' min Therapeutic Exercise:  [x] See flow sheet :   Rationale: increase ROM, increase strength and improve coordination to improve the patients ability to preform ADL's and work duties     10' min Manual Therapy:  Posterior and inferior grade 1-4 mobs with distraction. Rationale: decrease pain and increase ROM to perform functional task such as reaching overhead. With   [x] TE   [] TA   [] neuro   [] other: Patient Education: [x] Review HEP    [] Progressed/Changed HEP based on:   [] positioning   [] body mechanics   [] transfers   [] heat/ice application    [] other:      Other Objective/Functional Measures:  Pt required tactile cue at elbow during internal rotation to reinforce [address] depression to prevent impingement, pt reported decreased symptom post cueing.    Pt required tactile cueing to prevent trunk rotation during Serratus punches and apple pickers. Pt demonstrated improved posture post scap squeezes  Pt articulated muscle soreness opposed to joint pain during exercises  Pt was educated in modified cross body stretch in side lying to stabilize scapula to prevent compensation. Pt educated to work within pain-free range and decrease intensity of IR isometric accordingly  Pain Level (0-10 scale) post treatment: 2/10   Pt present with forward rounded shoulders     ASSESSMENT/Changes in Function: Pt demonstrates improvement as evidenced by decreased symptoms during exercise following cueing to facilitate [address] depression during shoulder elevation. Pt continues to present with increased anterior shoulder pain during shoulder IR (pain decreased following following). Pt may have strained subscapularis during excessive doorway stretch. Pt will benefit with continuance of scapular retractation exercises to correct posture as well as cueing and exercises to increase GH depression to prevent impingement during shoulder elevation. Patient will continue to benefit from skilled PT services to modify and progress therapeutic interventions, address functional mobility deficits, address ROM deficits, address strength deficits and analyze and modify body mechanics/ergonomics to attain remaining goals. [x]  See Plan of Care  []  See progress note/recertification  []  See Discharge Summary         Progress towards goals / Updated goals:   1. Pt will be compliant with a HEP to improve the function for over head reaching. MET. 11/19/17  Long Term Goals: To be accomplished in 4 weeks:                         1. Pt will increase FOTO score by 8 pts to improve function.                         2. Pt will increase left shoulder strength to 4+/5 or greater. to ease with lifting activities.                          7. Pt will increase left shoulder ROM to WNL in all planes w/o pain to return to PLOF.  MET. 11/20/17    PLAN  []  Upgrade activities as tolerated     [x]  Continue plan of care  []  Update interventions per flow sheet       []  Discharge due to:_  []  Other:_      Santiago Dominguez 11/22/2017  9:27 AM    Future Appointments  Date Time Provider Cristo Buenrostro   11/22/2017 10:00 AM Medhat Jones, PT [email or web address] [facility]   11/28/2017 10:00 AM Shyann Tolentino, PT [email or web address] [facility]   11/28/2017 10:30 AM Laura Berger, PTA [id number] [facility]   11/30/2017 10:00 AM Medhat Jones, PT [id number] [facility]   12/4/2017 11:30 AM Medhat Jones, PT [email or web address] [facility]   12/4/2017 12:00 PM Medhat Jones, PT [id number] [facility]   12/6/2017 11:00 AM aGgan Fischer, PT [id number] [facility]   12/11/2017 12:00 PM Medhat Jones, PT [id number] [facility]   12/18/2017 11:00 AM Niya Benitez, PT [id number] [facility]   2/21/2018 10:15 AM Baldomero Richard  [address]

## 2017-11-28 ENCOUNTER — HOSPITAL ENCOUNTER (OUTPATIENT)
Dept: PHYSICAL THERAPY | Age: 37
Discharge: HOME | End: 2017-11-28
Payer: COMMERCIAL

## 2017-11-28 PROCEDURE — 97110 THERAPEUTIC EXERCISES: CPT

## 2017-11-28 NOTE — PROGRESS NOTES
PT DAILY TREATMENT NOTE     Patient Name: Jacky Luz  Date:2017  : 1980  [x]  Patient  Verified  Payor: Kody Mikeopshire / Plan: [address] [place], [place] [address] HMO / Product Type: HMO /    In time:10:30  Out time:11:16  Total Treatment Time (min): 55  Visit #: 2 of     Treatment Area: Pain in right knee [M25.561]  Right ankle pain [M25.571]    SUBJECTIVE  Pain Level (0-10 scale): knee 4/10; ankle 2/10  Any medication changes, allergies to medications, adverse drug reactions, diagnosis change, or new procedure performed?: [x] No    [] Yes (see summary sheet for update)  Subjective functional status/changes:   [] No changes reported  Pt reports his ankle is doing better since getting the orthotics. He notes the knee pain he generally feels is behind the knee. He states the bone spur is in the middle behind his knee as well and that he goes back to the MD in 6 months to see if it has changed. OBJECTIVE    46 min Therapeutic Exercise:  [x] See flow sheet :   Rationale: increase ROM and increase strength to improve the patients ability to improve ease of ambulation          With   [] TE   [] TA   [] neuro   [] other: Patient Education: [x] Review HEP    [] Progressed/Changed HEP based on:   [] positioning   [] body mechanics   [] transfers   [] heat/ice application    [] other:      Other Objective/Functional Measures:   Educated on  for evertors and hamstrings due to multiple TPs  Severely limited HS flexibility and L piriformis tightness  Reviewed SL bridge for home; educated he could cross his leg over rather than keep extended if it causes hip discomfort  Good form with squats after initial cueing  Educated to not lock R knee in standing in extension     Pain Level (0-10 scale) post treatment: 2/10 tired    ASSESSMENT/Changes in Function: Pt with good initiation of exercise program per POC. He has tightness of the hamstrings, evertors, and piriformis contributing to pain in the knee and ankle.  He hyperextends his R knee in standing likely habitual compensation from LLD prior to receiving orthotics. Will continue working on stabilizing exercises to decrease pain for ease of prolonged standing and ambulating with decreased pain. Patient will continue to benefit from skilled PT services to modify and progress therapeutic interventions, address functional mobility deficits, address ROM deficits, address strength deficits, analyze and address soft tissue restrictions, analyze and cue movement patterns, analyze and modify body mechanics/ergonomics, assess and modify postural abnormalities, address imbalance/dizziness and instruct in home and community integration to attain remaining goals. Progress towards goals / Updated goals:  Short Term Goals: To be accomplished in 1 weeks:  1. Pt will be compliant with initial HEP to improve therapy outcomes MET (11/28/17)  Long Term Goals: To be accomplished in 6 weeks:  1. Pt will improve FOTO by 16 points in order to demonstrate functional improvement  2. Pt will improve R hamstring 90/90 stretch to 30 dgrs in order to improve ease of prolonged ambulation   3. Pt will improve B hip abd/ext strength to 4-/5 in order to improve kinetic chain alignment for reduced knee/ankle pain and improved WB tolerance   4.  Pt will report 50% improvement in sx in order to improve QOL    PLAN  [x]  Upgrade activities as tolerated     [x]  Continue plan of care  []  Update interventions per flow sheet       []  Discharge due to:_  []  Other:_      Glenda March PTA 11/28/2017  11:16 AM    Future Appointments  Date Time Provider Cristo Buenrostro   11/30/2017 10:00 AM Delgado Matamoros, PT [email or web address] [facility]   12/4/2017 11:30 AM Shyann Rodriguez, PT [email or web address] [facility]   12/4/2017 12:00 PM Glenda March PTA [email or web address] [facility]   12/6/2017 11:00 AM Gill Christine, PT [id number] [facility]   12/11/2017 12:00 PM Delgado Matamoros, PT [email or web address] [facility]   12/18/2017 11:00 AM Riana Loya, PT DARRIUS [facility]   2/21/2018 10:15 AM Sierra Carlos  [address]

## 2017-11-28 NOTE — PROGRESS NOTES
PT DAILY TREATMENT NOTE     Patient Name: Abelardo Constantino  Date:2017  : 1980  [x]  Patient  Verified  Payor: Glen Loyd / Plan: [employer] / Product Type: HMO /    In time:1011  Out time:1035  Total Treatment Time (min): 24  Visit #: 7 of 8    Treatment Area: Left shoulder pain [M25.512]    SUBJECTIVE  Pain Level (0-10 scale): 4/10  Any medication changes, allergies to medications, adverse drug reactions, diagnosis change, or new procedure performed?: [x] No    [] Yes (see summary sheet for update)  Subjective functional status/changes:   [] No changes reported  Pt wants to continue with therapy. Reports 30% improvement. OBJECTIVE    24 min Therapeutic Exercise:  [] See flow sheet :   Rationale: increase ROM to improve the patients ability to ease with ADL's. With   [] TE   [] TA   [] neuro   [] other: Patient Education: [x] Review HEP    [] Progressed/Changed HEP based on:   [] positioning   [] body mechanics   [] transfers   [] heat/ice application    [] other:      Other Objective/Functional Measures: Left shoulder MMT 4+/5   Pain to anterior shoulder during resisted/active IR/adduction. Added the Blade 30 sec  ROM=WNL  TB IR/ER at 45 deg with less pain reported. Tactile cueing during rows to depress scapula and squeeze. Pain Level (0-10 scale) post treatment: 2/10      ASSESSMENT/Changes in Function: Progressing well in therapy . Pt reports still a bit challenging holding his shoulder overhead and reaching across his body. Demonstrates good serratus strength. Patient will continue to benefit from skilled PT services to modify and progress therapeutic interventions, address functional mobility deficits, address ROM deficits, address strength deficits, analyze and address soft tissue restrictions, analyze and cue movement patterns, analyze and modify body mechanics/ergonomics and assess and modify postural abnormalities to attain remaining goals.      [x]  See Plan of Care  []  See progress note/recertification  []  See Discharge Summary         Progress towards goals / Updated goals:   1. Pt will be compliant with a HEP to improve the function for over head reaching. MET. 11/19/17  Long Term Goals: To be accomplished in 4 weeks:                         1. Pt will increase FOTO score by 8 pts to improve function.                         2. Pt will increase left shoulder strength to 4+/5 or greater. to ease with lifting activities. Progressing.  11/28/17                         3. Pt will increase left shoulder ROM to WNL in all planes w/o pain to return to PLOF.  MET. 11/20/17      PLAN  []  Upgrade activities as tolerated     [x]  Continue plan of care  []  Update interventions per flow sheet       []  Discharge due to:_  []  Other:_      Karlos Velazquez, PT 11/28/2017  10:22 AM    Future Appointments  Date Time Provider Cristo Buenrostro   11/28/2017 10:30 AM Sheilda Castleman, PTA [email or web address] [facility]   11/30/2017 10:00 AM Tong Viveros, PT [id number] [facility]   12/4/2017 11:30 AM Tong Viveros, PT [id number] [facility]   12/4/2017 12:00 PM Sheilda Castleman, PTA MMCPTPB [facility]   12/6/2017 11:00 AM Karlos Velazquez, PT [id number] [facility]   12/11/2017 12:00 PM Tong Viveros, PT MMCPTPB [facility]   12/18/2017 11:00 AM Ahsan Pascual, PT [email or web address] [facility]   2/21/2018 10:15 AM Ella Ruiz  [address]

## 2017-11-29 NOTE — PROGRESS NOTES
[facility] Odilia Chacko  [address] [facility]  [phone] fax    Physical Therapy Progress Note    Patient name: Katie Schultz Start of Care: 2017   Referral source: Diana Dockery MD : 1980                          Medical Diagnosis: Left shoulder pain [M25.512] Onset Date:2 months ago                          Treatment Diagnosis: Left Shoulder Pain   Prior Hospitalization: see medical history Provider#: [id number]   Medications: Verified on Patient summary List    Comorbidities: Scoliosis,    Prior Level of Function: Full use of Left Shoulder. Pt is self Employed. Visits from Start of Care: 7    Missed Visits: 0    Established Goals:         Excellent           Good         Limited           None  [x] Increased ROM   []  [x]  []  []  [x] Increased Strength  []  [x]  []  []  [x] Increased Mobility  []  [x]  []  []   [x] Decreased Pain   []  [x]  []  []  [] Decreased Swelling  []  []  []  []    Key Functional Changes: Pt making slow steady progress in therapy. He reports approx 30% improvement so far. His shoulder strength is good but overhead endurance is poor. He reports anterior shoulder pain during resisted isometric IR and active shoulder adduction. MMT=4+/5. Pt reports fluctuating pain levels from 2-5/10. He has possible impingement and will continue to benefit from continued therapy to further improve his left shoulder function. Pt is compliant with his HEP and attendance. Updated Goals: to be achieved in 3 weeks:    1. Pt will increase FOTO score by 8 pts to improve function.                2. Pt will increase left shoulder strength to 5/5  to ease with lifting activities. 3. Pt will report >60% improvement to return to PLOF and  Activities including golfing.     ASSESSMENT/RECOMMENDATIONS:  [x]Continue therapy per initial plan/protocol at a frequency of  2 x per week for 3 weeks  []Continue therapy with the following recommended changes:_____________________      _____________________________________________________________________  []Discontinue therapy progressing towards or have reached established goals  []Discontinue therapy due to lack of appreciable progress towards goals  []Discontinue therapy due to lack of attendance or compliance  []Await Physician's recommendations/decisions regarding therapy  []Other:________________________________________________________________    Thank you for this referral.   Venancio Nageotte, PT 11/28/2017 10:38 PM    NOTE TO PHYSICIAN:  [address]   FAX TO [facility]: [phone]  If you are unable to process this request in 24 hours please contact our office: [phone]    ? I have read the above report and request that my patient continue as recommended. ? I have read the above report and request that my patient continue therapy with the following changes/special instructions:____________________________________  ? I have read the above report and request that my patient be discharged from therapy.     Physicians signature: ______________________________Date: ______Time:______

## 2017-11-30 ENCOUNTER — HOSPITAL ENCOUNTER (OUTPATIENT)
Dept: PHYSICAL THERAPY | Age: 37
End: 2017-11-30
Payer: COMMERCIAL

## 2017-12-04 ENCOUNTER — HOSPITAL ENCOUNTER (OUTPATIENT)
Dept: PHYSICAL THERAPY | Age: 37
Discharge: HOME | End: 2017-12-04
Payer: COMMERCIAL

## 2017-12-04 ENCOUNTER — APPOINTMENT (OUTPATIENT)
Dept: PHYSICAL THERAPY | Age: 37
End: 2017-12-04
Payer: COMMERCIAL

## 2017-12-04 PROCEDURE — 97110 THERAPEUTIC EXERCISES: CPT

## 2017-12-04 NOTE — PROGRESS NOTES
PT DAILY TREATMENT NOTE     Patient Name: Demetri Nolan  Date:2017  : 1980  [x]  Patient  Verified  Payor: Latrice Stevens / Plan: [employer] / Product Type: HMO /    In time:1130  Out time:1210  Total Treatment Time (min): 40  Visit #: 8 of 8    Treatment Area: Left shoulder pain [M25.512]    SUBJECTIVE  Pain Level (0-10 scale): 1-2/10  Any medication changes, allergies to medications, adverse drug reactions, diagnosis change, or new procedure performed?: [x] No    [] Yes (see summary sheet for update)  Subjective functional status/changes:   [] No changes reported  Pt reports he had middle finger tingling a few days ago.     OBJECTIVE    Modality rationale: decrease pain to improve the patients ability to ease with ADL's   Min Type Additional Details    [] Estim:  []Unatt       []IFC  []Premod                        []Other:  []w/ice   []w/heat  Position:  Location:    [] Estim: []Att    []TENS instruct  []NMES                    []Other:  []w/US   []w/ice   []w/heat  Position:  Location:    []  Traction: [] Cervical       []Lumbar                       [] Prone          []Supine                       []Intermittent   []Continuous Lbs:  [] before manual  [] after manual    []  Ultrasound: []Continuous   [] Pulsed                           []1MHz   []3MHz W/cm2:  Location:    []  Iontophoresis with dexamethasone         Location: [] Take home patch   [] In clinic   10 []  Ice     [x]  heat  []  Ice massage  []  Laser   []  Anodyne Position:seated  Location:left shoulder    []  Laser with stim  []  Other:  Position:  Location:    []  Vasopneumatic Device Pressure:       [] lo [] med [] hi   Temperature: [] lo [] med [] hi   [] Skin assessment post-treatment:  []intact []redness- no adverse reaction        30 min Therapeutic Exercise:  [] See flow sheet :   Rationale: increase ROM and increase strength to improve the patients ability to ease with ADL's          With   [] TE   [] TA   [] neuro [] other: Patient Education: [x] Review HEP    [] Progressed/Changed HEP based on:   [] positioning   [] body mechanics   [] transfers   [] heat/ice application    [] other:      Other Objective/Functional Measures: Pt reports chronic snapping tendon on his right elbow during bed pushups. UT continues to be painful and tight on the left. Pain Level (0-10 scale) post treatment: 2/10    ASSESSMENT/Changes in Function: Approved for 6 more visits. Patient will continue to benefit from skilled PT services to modify and progress therapeutic interventions, address functional mobility deficits, address ROM deficits, address strength deficits, analyze and address soft tissue restrictions, analyze and cue movement patterns, analyze and modify body mechanics/ergonomics and assess and modify postural abnormalities to attain remaining goals. []  See Plan of Care  [x]  See progress note  []  See Discharge Summary         Progress towards goals / Updated goals:                1. Pt will increase FOTO score by 8 pts to improve function.                2. Pt will increase left shoulder strength to 5/5  to ease with lifting activities. 3. Pt will report >60% improvement to return to PLOF and  Activities including golfing.   PLAN  [x]  Upgrade activities as tolerated     [x]  Continue plan of care  []  Update interventions per flow sheet       []  Discharge due to:_  []  Other:_      Orville Le, PT 12/4/2017  11:49 AM    Future Appointments  Date Time Provider Crisot Buenrostro   12/6/2017 11:00 AM Orville Le, PT [email or web address] [facility]   12/11/2017 12:00 PM Verito Mckenzie, PT [id number] [facility]   12/18/2017 11:00 AM Mónica Perez, PTA [id number] [facility]   2/21/2018 10:15 AM Rogerio Jara  [address]

## 2017-12-06 ENCOUNTER — HOSPITAL ENCOUNTER (OUTPATIENT)
Dept: PHYSICAL THERAPY | Age: 37
Discharge: HOME | End: 2017-12-06
Payer: COMMERCIAL

## 2017-12-06 PROCEDURE — 97140 MANUAL THERAPY 1/> REGIONS: CPT

## 2017-12-06 PROCEDURE — 97110 THERAPEUTIC EXERCISES: CPT

## 2017-12-06 NOTE — PROGRESS NOTES
PT DAILY TREATMENT NOTE - [facility]     Patient Name: Linus Blizzard  Date:2017  : 1980  [x]  Patient  Verified  Payor: Daphne Jasmine / Plan: [employer] / Product Type: HMO /    In time:1108  Out time:1151  Total Treatment Time (min): 43  Visit #: 9 of 12    Treatment Area: Left shoulder pain [M25.512]    SUBJECTIVE  Pain Level (0-10 scale): 2/10  Any medication changes, allergies to medications, adverse drug reactions, diagnosis change, or new procedure performed?: [x] No    [] Yes (see summary sheet for update)  Subjective functional status/changes:   [] No changes reported  Pt  Late. Pt reports he is getting better. His pain is less.     OBJECTIVE    Modality rationale: decrease pain to improve the patients ability to ease with   ADL's   Min Type Additional Details    [] Estim:  []Unatt       []IFC  []Premod                        []Other:  []w/ice   []w/heat  Position:  Location:    [] Estim: []Att    []TENS instruct  []NMES                    []Other:  []w/US   []w/ice   []w/heat  Position:  Location:    []  Traction: [] Cervical       []Lumbar                       [] Prone          []Supine                       []Intermittent   []Continuous Lbs:  [] before manual  [] after manual    []  Ultrasound: []Continuous   [] Pulsed                           []1MHz   []3MHz W/cm2:  Location:    []  Iontophoresis with dexamethasone         Location: [] Take home patch   [] In clinic   10 []  Ice     [x]  heat  []  Ice massage  []  Laser   []  Anodyne Position: seated  Location:    []  Laser with stim  []  Other:  Position:  Location:    []  Vasopneumatic Device Pressure:       [] lo [] med [] hi   Temperature: [] lo [] med [] hi   [] Skin assessment post-treatment:  []intact []redness- no adverse reaction    []redness  adverse reaction:       25 min Therapeutic Exercise:  [] See flow sheet :   Rationale: increase ROM and increase strength to improve the patients ability to ease with ADL's    8 min Manual Therapy:  UT massage and TPR   Rationale: decrease pain, increase ROM and decrease trigger points to ease with ADL's. With   [] TE   [] TA   [] neuro   [] other: Patient Education: [x] Review HEP    [] Progressed/Changed HEP based on:   [] positioning   [] body mechanics   [] transfers   [] heat/ice application    [] other:      Other Objective/Functional Measures: Initiated [place] (see note). No reports of increased pain. Popping in [address] during IR at [address]. Pain Level (0-10 scale) post treatment: 2/10    ASSESSMENT/Changes in Function: Progressing well. Pt able to advance to Lionel machine with minimal discomfort in his shoulder. Patient will continue to benefit from skilled PT services to modify and progress therapeutic interventions, address functional mobility deficits, address ROM deficits, address strength deficits, analyze and address soft tissue restrictions, analyze and cue movement patterns, analyze and modify body mechanics/ergonomics and assess and modify postural abnormalities to attain remaining goals. []  See Plan of Care  [x]  See progress note/recertification  []  See Discharge Summary         Progress towards goals / Updated goals:  Short Term Goals: To be accomplished in 1 weeks:    1.  Pt will increase FOTO score by 8 pts to improve function.   2. Pt will increase left shoulder strength to 5/5  to ease with lifting activities.   3. Pt will report >60% improvement to return to PLOF and  Activities including     PLAN  [x]  Upgrade activities as tolerated     [x]  Continue plan of care  []  Update interventions per flow sheet       []  Discharge due to:_  []  Other:_      Gagan Fischer PT 12/6/2017  9:16 AM    Future Appointments  Date Time Provider Cristo Buenrostro   12/6/2017 11:00 AM Gagan Fischer PT [id number] [facility]   12/6/2017 12:00 PM Laura Berger PTA [id number] [facility]   12/11/2017 11:30 AM NAKIA Stevens [facility]   12/11/2017 12:00 PM Sin Singh Blaise Gutierrezam [facility]   12/13/2017 11:00 AM Kimberly Thornton, PT [id number] [facility]   12/18/2017 10:30 AM Darian Landin, PTA [email or web address] [facility]   12/18/2017 11:00 AM Darian Landin, PTA [id number] [facility]   12/20/2017 10:30 AM Darian Landin, PTA OZHEHWF [facility]   12/27/2017 11:00 AM Kimberly Thornton, PT [id number] [facility]   12/29/2017 11:00 AM Kimberly Thornton, PT [id number] [facility]   2/21/2018 10:15 AM David Mosqueda  [address]

## 2017-12-11 ENCOUNTER — HOSPITAL ENCOUNTER (OUTPATIENT)
Dept: PHYSICAL THERAPY | Age: 37
Discharge: HOME | End: 2017-12-11
Payer: COMMERCIAL

## 2017-12-11 PROCEDURE — 97110 THERAPEUTIC EXERCISES: CPT

## 2017-12-11 NOTE — PROGRESS NOTES
PT DAILY TREATMENT NOTE - [facility]     Patient Name: Narinder Guevara  Date:2017  : 1980  [x]  Patient  Verified  Payor: Wily Bermeo / Plan: Osmar Watts HMO / Product Type: HMO /    In time:12:06  Out time:12:31  Total Treatment Time (min): 25  Total Timed Codes (min): 25  Visit #: 4 of     Treatment Area: Pain in right knee [M25.561]  Pain in right ankle and joints of right foot [M25.571]    SUBJECTIVE  Pain Level (0-10 scale):  knee 3/10 ankle 1/10    Any medication changes, allergies to medications, adverse drug reactions, diagnosis change, or new procedure performed?: [x] No    [] Yes (see summary sheet for update)  Subjective functional status/changes:   [] No changes reported  Pt reports ankle pain during eccentric HR. Pt reports soreness in inside of thighs. Pt reports heeling hot and blood sugar may be low. Pt reports feeling fine after drinking juice. Pt reports difficulty with stair mariano to weakness    OBJECTIVE    25 min Therapeutic Exercise:  [x] See flow sheet :   Rationale: increase ROM, increase strength and improve coordination to improve the patients ability to ambulate, sit to stand, and negotiate stairs. With   [x] TE   [] TA   [] neuro   [] other: Patient Education: [x] Review HEP    [] Progressed/Changed HEP based on:   [] positioning   [] body mechanics   [] transfers   [] heat/ice application    [] other:      Other Objective/Functional Measures:   Pt required verbal cueing and demonstration to correct hip ER during standing Hip abd. Pt required use of mirror during execution of squat to correct slight genu varus, as well as verbal cueing to main vertical tibial alignment. Pt was advised to correct lateral lean during eccentric HR to minimize lateral ankle pain. Pain dissipated following correction.   Pt declined cryo of thermal   Pt was given juice after treatment to correct hypoglycemia, pt reported feeling fine     Pain Level (0-10 scale) post treatment: knee 3/10 ankle 1/10    ASSESSMENT/Changes in   Function: Pt is making progress in strength as evidenced by successful execution of eccentric HR and increased repetitions with all exercises. Pt continues to show areas of improvements regarding hamstring flexibility. Will continue to strengthen LE to inability stair negotiation and stretch hamstring to address flexibility deficit. Patient will continue to benefit from skilled PT services to modify and progress therapeutic interventions, address functional mobility deficits, address ROM deficits and address strength deficits to attain remaining goals. [x]  See Plan of Care  []  See progress note/recertification  []  See Discharge Summary         Progress towards goals / Updated goals:  1. Pt will be compliant with initial HEP to improve therapy outcomes MET (11/28/17)  Long Term Goals: To be accomplished in 6 weeks:  1. Pt will improve FOTO by 16 points in order to demonstrate functional improvement  2. Pt will improve R hamstring 90/90 stretch to 30 dgrs in order to improve ease of prolonged ambulation   3. Pt will improve B hip abd/ext strength to 4-/5 in order to improve kinetic chain alignment for reduced knee/ankle pain and improved WB tolerance   4.  Pt will report 50% improvement in sx in order to improve QOL       PLAN  []  Upgrade activities as tolerated     [x]  Continue plan of care  []  Update interventions per flow sheet       []  Discharge due to:_  []  Other:_      Krishan Adames 12/11/2017  12:00 PM    Future Appointments  Date Time Provider Cristo Buenrostro   12/13/2017 11:00 AM Shyann Garcia, PT [id number] [facility]   12/18/2017 10:30 AM Jeremy Dash PTA [id number] [facility]   12/18/2017 11:00 AM Jeremy Dash PTA [id number] [facility]   12/20/2017 10:30 AM Jeremy Dash PTA [id number] [facility]   12/27/2017 11:00 AM Kimberly Guerrier, PT LEANA [facility]   12/29/2017 11:00 AM Kimberly Guerrier, PT SHAISTA [facility]   2/21/2018 10:15 AM Breanna Handy  [address]

## 2017-12-13 ENCOUNTER — HOSPITAL ENCOUNTER (OUTPATIENT)
Dept: PHYSICAL THERAPY | Age: 37
Discharge: HOME | End: 2017-12-13
Payer: COMMERCIAL

## 2017-12-13 NOTE — PROGRESS NOTES
PT DAILY TREATMENT NOTE     Patient Name: Maryann Carvalho  Date:2017  : 1980  [x]  Patient  Verified  Payor: Cindy Bach / Plan: [employer] / Product Type: HMO /    In time:***  Out time:***  Total Treatment Time (min): ***  Visit #: *** of ***    Treatment Area: Left shoulder pain [M25.512]    SUBJECTIVE  Pain Level (0-10 scale): ***  Any medication changes, allergies to medications, adverse drug reactions, diagnosis change, or new procedure performed?: [x] No    [] Yes (see summary sheet for update)  Subjective functional status/changes:   [] No changes reported  ***    OBJECTIVE    Modality rationale: {BSHSI INMOTION MODALITIES:57473} to improve the patients ability to ***   Min Type Additional Details    [] Estim:  []Unatt       []IFC  []Premod                        []Other:  []w/ice   []w/heat  Position:  Location:    [] Estim: []Att    []TENS instruct  []NMES                    []Other:  []w/US   []w/ice   []w/heat  Position:  Location:    []  Traction: [] Cervical       []Lumbar                       [] Prone          []Supine                       []Intermittent   []Continuous Lbs:  [] before manual  [] after manual    []  Ultrasound: []Continuous   [] Pulsed                           []1MHz   []3MHz W/cm2:  Location:    []  Iontophoresis with dexamethasone         Location: [] Take home patch   [] In clinic    []  Ice     []  heat  []  Ice massage  []  Laser   []  Anodyne Position:  Location:    []  Laser with stim  []  Other:  Position:  Location:    []  Vasopneumatic Device Pressure:       [] lo [] med [] hi   Temperature: [] lo [] med [] hi   [] Skin assessment post-treatment:  []intact []redness- no adverse reaction    []redness  adverse reaction:     *** min []Eval                  []Re-Eval       *** min Therapeutic Exercise:  [] See flow sheet :   Rationale: {BSHSI IMMOTION THER EX:85079} to improve the patients ability to ***    *** min Therapeutic Activity:  []  See flow sheet :   Rationale: {BSHSI IMMOTION THER EX:70723}  to improve the patients ability to ***     *** min Neuromuscular Re-education:  []  See flow sheet :   Rationale: {BSHSI IMMOTION THER EX:25041}  to improve the patients ability to ***    *** min Manual Therapy:  ***   Rationale: {BSI IMMOTION MANUAL THERAPY:87225} to ***    *** min Gait Training:  ___ feet with ___ device on level surfaces with ___ level of assist   Rationale: With   [] TE   [] TA   [] neuro   [] other: Patient Education: [x] Review HEP    [] Progressed/Changed HEP based on:   [] positioning   [] body mechanics   [] transfers   [] heat/ice application    [] other:      Other Objective/Functional Measures: ***     Pain Level (0-10 scale) post treatment: ***    ASSESSMENT/Changes in Function: ***    Patient will continue to benefit from skilled PT services to {[facility] INMOTION ASSESSMENT STATEMENTS:20159} to attain remaining goals.      []  See Plan of Care  []  See progress note/recertification  []  See Discharge Summary         Progress towards goals / Updated goals:  ***    PLAN  []  Upgrade activities as tolerated     []  Continue plan of care  []  Update interventions per flow sheet       []  Discharge due to:_  []  Other:_      Baltazar Delay, PT 12/13/2017  9:11 AM    Future Appointments  Date Time Provider Cristo Buenrostro   12/13/2017 11:00 AM Shyann Rankin, PT [id number] [facility]   12/18/2017 10:30 AM Cheko Byrd, PTA [id number] [facility]   12/18/2017 11:00 AM Cheko Byrd, PTA [id number] [facility]   12/20/2017 10:30 AM Cheko Byrd, PTA [id number] [facility]   12/27/2017 11:00 AM Baltazar Delay, PT [id number] [facility]   12/29/2017 11:00 AM Baltazar Delay, PT [id number] [facility]   2/21/2018 10:15 AM Kvng Mosher  [address]

## 2017-12-18 ENCOUNTER — HOSPITAL ENCOUNTER (OUTPATIENT)
Dept: PHYSICAL THERAPY | Age: 37
Discharge: HOME | End: 2017-12-18
Payer: COMMERCIAL

## 2017-12-18 PROCEDURE — 97110 THERAPEUTIC EXERCISES: CPT

## 2017-12-18 PROCEDURE — 97140 MANUAL THERAPY 1/> REGIONS: CPT

## 2017-12-18 NOTE — PROGRESS NOTES
PT DAILY TREATMENT NOTE     Patient Name: Amado Spray  Date:2017  : 1980  [x]  Patient  Verified  Payor: Bekah Chong / Plan: [employer]q Lazaro HMO / Product Type: HMO /    In time: 10:33   Out time:11:00  Total Treatment Time (min): 27  Visit #: 5 of     Treatment Area: Pain in right knee [M25.561]  Pain in right ankle and joints of right foot [M25.571]    SUBJECTIVE  Pain Level (0-10 scale): 2/10  Any medication changes, allergies to medications, adverse drug reactions, diagnosis change, or new procedure performed?: [x] No    [] Yes (see summary sheet for update)  Subjective functional status/changes:   [] No changes reported  Pt reports more soreness today from doing a lot of walking in [place] over the weekend. He states he walked over 10 miles and wore new shoes. He was also getting L hip pain that didn't feel like muscle soreness. OBJECTIVE    19 min Therapeutic Exercise:  [x] See flow sheet :   Rationale: increase ROM and increase strength to improve the patients ability to improve ease of ambulation with less knee/ankle pain          8 minutes of manual therapy to correct L upslip with Leg pull MET x 2, MET for L PI and shotgun technique to improve alignment for decreased pain with ambulation    With   [] TE   [] TA   [] neuro   [] other: Patient Education: [x] Review HEP    [] Progressed/Changed HEP based on:   [] positioning   [] body mechanics   [] transfers   [] heat/ice application    [] other:      Other Objective/Functional Measures:    FOTO: 64  Significant L upslip that improved with MET and pt had decreased pain post correction  Tightness of calves and quads from prolonged ambulation over the weekend; worked on gentle stretches today for decreased soreness  Educated on use of heat, hot tub, or hot bath for muscle relaxation     Pain Level (0-10 scale) post treatment: 1/10    ASSESSMENT/Changes in Function: Pt making slow and steady progress towards goals in therapy with minor flare up of hip pain after prolonged standing/walking over the weekend at [facility]. Pt continues to hyperextend B knees in standing due to habitual compensation for weakness and LLD which was corrected with orthotics and a lift. Will continue working on R LE strength for decreased pain and ease of walking. Patient will continue to benefit from skilled PT services to modify and progress therapeutic interventions, address functional mobility deficits, address ROM deficits, address strength deficits, analyze and address soft tissue restrictions, analyze and cue movement patterns, analyze and modify body mechanics/ergonomics, assess and modify postural abnormalities, address imbalance/dizziness and instruct in home and community integration to attain remaining goals. Progress towards goals / Updated goals:  1. Pt will be compliant with initial HEP to improve therapy outcomes MET (11/28/17)  Long Term Goals: To be accomplished in 6 weeks:  1. Pt will improve FOTO by 16 points in order to demonstrate functional improvement Progressing 10 point improvement (12/18/17)  2. Pt will improve R hamstring 90/90 stretch to 30 dgrs in order to improve ease of prolonged ambulation   3. Pt will improve B hip abd/ext strength to 4-/5 in order to improve kinetic chain alignment for reduced knee/ankle pain and improved WB tolerance   4.  Pt will report 50% improvement in sx in order to improve QOL     PLAN  [x]  Upgrade activities as tolerated     [x]  Continue plan of care  []  Update interventions per flow sheet       []  Discharge due to:_  []  Other:_      Cristi Dumont PTA 12/18/2017  10:09 AM    Future Appointments  Date Time Provider Cristo Buenrostro   12/18/2017 10:30 AM Cristi Dumont PTA [id number] [facility]   12/18/2017 11:00 AM Cristi Dumont PTA [id number] [facility]   12/20/2017 10:30 AM Cristi Dumont PTA [id number] [facility]   12/27/2017 11:00 AM SONI Rodriguez [facility]   12/29/2017 11:00 AM Pramod Law PT [id number] [id number]   2/21/2018 10:15 AM Breanna Handy  [address]

## 2017-12-18 NOTE — PROGRESS NOTES
PT DAILY TREATMENT NOTE     Patient Name: Kris Conklin  Date:2017  : 1980  [x]  Patient  Verified  Payor: Danielle Kwan / Plan: [employer] / Product Type: HMO /    In time:11:00  Out time:11:25  Total Treatment Time (min): 25  Visit #: 3 of 6    Treatment Area: Left shoulder pain [M25.512]    SUBJECTIVE  Pain Level (0-10 scale): 2/10  Any medication changes, allergies to medications, adverse drug reactions, diagnosis change, or new procedure performed?: [x] No    [] Yes (see summary sheet for update)  Subjective functional status/changes:   [] No changes reported  Pt reports he hasn't been in too much pain in his shoulder lately and feels like it is getting better. He still has a hard time lifting heavier than 20 pounds with pain in the front of his shoulder. He reports always shrugging out of habit and having bad posture. OBJECTIVE    25 min Therapeutic Exercise:  [x] See flow sheet :   Rationale: increase ROM and increase strength to improve the patients ability to improve OH shoulder endurance to complete ADLs without neck pain          With   [] TE   [] TA   [] neuro   [] other: Patient Education: [x] Review HEP    [] Progressed/Changed HEP based on:   [] positioning   [] body mechanics   [] transfers   [] heat/ice application    [] other:      Other Objective/Functional Measures:   Worked heavy emphasis on posterior shoulder strength for improved posture awareness  Fatigued with zoom ball and soccer on wall endurance activities  Challenged with wall walks with ER emphasis    Pain Level (0-10 scale) post treatment: 2/10    ASSESSMENT/Changes in Function: Pt is making steady progress towards goals with decline in L shoulder pain but still has difficulty with lifting >20# without increased pain. Pt continues to struggle with habitual forward shoulder/shrugged posture.  Will continue working on posterior kinetic chain strength of the shoulders for improved posture and decreased impingement especially when lifting. Patient will continue to benefit from skilled PT services to modify and progress therapeutic interventions, address functional mobility deficits, address ROM deficits, address strength deficits, analyze and address soft tissue restrictions, analyze and cue movement patterns, analyze and modify body mechanics/ergonomics, assess and modify postural abnormalities, address imbalance/dizziness and instruct in home and community integration to attain remaining goals. Progress towards goals / Updated goals:  Short Term Goals: To be accomplished in 1 weeks:   1. Pt will increase FOTO score by 8 pts to improve function.  Met 11 point improvement (12/18/17)   2. Pt will increase left shoulder strength to 5/5  to ease with lifting activities.   3. Pt will report >60% improvement to return to PLOF and  Activities including     PLAN  [x]  Upgrade activities as tolerated     [x]  Continue plan of care  []  Update interventions per flow sheet       []  Discharge due to:_  []  Other:_      Tj Cummings PTA 12/18/2017  10:08 AM    Future Appointments  Date Time Provider Cristo Buenrostro   12/18/2017 10:30 AM Tj Cummings PTA [id number] [facility]   12/18/2017 11:00 AM Tj Cummings PTA EGHRUCA [facility]   12/20/2017 10:30 AM Tj Cummings PTA [id number] [facility]   12/27/2017 11:00 AM Belinda Estrada, PT [id number] [facility]   12/29/2017 11:00 AM Belinda Estrada, PT [id number] [facility]   2/21/2018 10:15 AM Todd Roberts  [address]

## 2017-12-20 ENCOUNTER — HOSPITAL ENCOUNTER (OUTPATIENT)
Dept: PHYSICAL THERAPY | Age: 37
Discharge: HOME | End: 2017-12-20
Payer: COMMERCIAL

## 2017-12-20 PROCEDURE — 97110 THERAPEUTIC EXERCISES: CPT

## 2017-12-20 NOTE — PROGRESS NOTES
PT DAILY TREATMENT NOTE     Patient Name: Maury Macedo  Date:2017  : 1980  [x]  Patient  Verified  Payor: Sona Hall / Plan: [employer] / Product Type: HMO /    In time:1108  Out time:1136  Total Treatment Time (min): 28  Visit #:4  of 6    Treatment Area: Left shoulder pain [M25.512]    SUBJECTIVE  Pain Level (0-10 scale): 2/10  Any medication changes, allergies to medications, adverse drug reactions, diagnosis change, or new procedure performed?: [x] No    [] Yes (see summary sheet for update)  Subjective functional status/changes:   [] No changes reported  Pt stated that he is doing about the same    OBJECTIVE    28 min Therapeutic Exercise:  [x] See flow sheet :   Rationale: increase ROM and increase strength to improve the patients ability to increase ease with ADLs    With   [x] TE   [] TA   [] neuro   [] other: Patient Education: [x] Review HEP    [] Progressed/Changed HEP based on:   [] positioning   [] body mechanics   [] transfers   [] heat/ice application    [] other:      Other Objective/Functional Measures:   Had no complaint of increased pain during session  Overhead soccer ball bounces and rhythmic stabs with SB were challenging     Pain Level (0-10 scale) post treatment: 1/10    ASSESSMENT/Changes in Function:   Pt cont to progress well toward goals. Strength in L shoulder is improving. Pt reported an increase in ease with ADLs    Patient will continue to benefit from skilled PT services to modify and progress therapeutic interventions, address functional mobility deficits, address ROM deficits and address strength deficits to attain remaining goals. []  See Plan of Care  []  See progress note/recertification  []  See Discharge Summary         Progress towards goals / Updated goals:  1. Pt will be compliant with initial HEP to improve therapy outcomes   MET (17)  Long Term Goals: To be accomplished in 6 weeks:  1.  Pt will improve FOTO by 16 points in order to demonstrate functional improvement Progressing 10 point improvement (12/18/17)  2. Pt will improve R hamstring 90/90 stretch to 30 dgrs in order to improve ease of prolonged ambulation   3. Pt will improve B hip abd/ext strength to 4-/5 in order to improve kinetic chain alignment for reduced knee/ankle pain and improved WB tolerance   4.  Pt will report 50% improvement in sx in order to improve QOL    PLAN  []  Upgrade activities as tolerated     [x]  Continue plan of care  []  Update interventions per flow sheet       []  Discharge due to:_  []  Other:_      Tita Lr PTA 12/20/2017  11:29 AM    Future Appointments  Date Time Provider Cristo Buenrostro   12/27/2017 11:00 AM Elsa Zavala, PT [id number] [facility]   12/29/2017 11:00 AM Elsa Zavala PT [id number] [facility]   2/21/2018 10:15 AM Eliot Duran  [address]

## 2017-12-27 ENCOUNTER — HOSPITAL ENCOUNTER (OUTPATIENT)
Dept: PHYSICAL THERAPY | Age: 37
Discharge: HOME | End: 2017-12-27
Payer: COMMERCIAL

## 2017-12-27 PROCEDURE — 97110 THERAPEUTIC EXERCISES: CPT

## 2017-12-27 PROCEDURE — 97140 MANUAL THERAPY 1/> REGIONS: CPT

## 2017-12-27 NOTE — PROGRESS NOTES
PT DAILY TREATMENT NOTE     Patient Name: Narinder Guevara  Date:2017  : 1980  [x]  Patient  Verified  Payor: Wily Bermeo / Plan: [facility]  CAPITATED PT / Product Type: Commerical /    In time: 11:36  Out time: 12:20  Total Treatment Time (min): 44  Visit #: 6 of     Treatment Area: Pain in right knee [M25.561]  Pain in right ankle and joints of right foot [M25.571]    SUBJECTIVE  Pain Level (0-10 scale): 4/10 knee; 2/10 ankle  Any medication changes, allergies to medications, adverse drug reactions, diagnosis change, or new procedure performed?: [x] No    [] Yes (see summary sheet for update)  Subjective functional status/changes:   [] No changes reported  Pt reports the side of his R ankle is bothering him some today and the inner and outer side of the back of his R knee. He states the pain hasn't quite calmed down since all the walking yet. He took the shoes he bought back and is looking for better supportive shoes. OBJECTIVE    29 min Therapeutic Exercise:  [x] See flow sheet :   Rationale: increase ROM, increase strength, improve coordination, improve balance and increase proprioception to improve the patients ability to improve ease of ambulation     15 minutes of manual therapy to correct hip alignment with MET to fix L upslip; MET for L PI and shotgun technique; stick massage to evertors of the R LE for decreased pain with ambulation          With   [] TE   [] TA   [] neuro   [] other: Patient Education: [x] Review HEP    [] Progressed/Changed HEP based on:   [] positioning   [] body mechanics   [] transfers   [] heat/ice application    [] other:      Other Objective/Functional Measures:    Audible cavitation with shotgun technique with decreased pain after manual  Added dynamic hamstring stretch due to limited flexibility  Added 1/4 inch heel lift to L shoe to correct LLD; pt currently had 1/8  Inch in  No increased pain during session  TTP anterolateral R ankle; may be bruised from new shoes worn over the last week that improperly fit    Pain Level (0-10 scale) post treatment: 2/10 ankle and knee    ASSESSMENT/Changes in Function: Pt is making slow, steady progress towards goals with overall decrease in pain. He continues to present with pelvic obliquity due to LLD but was corrected with heel lift. Pt has some tenderness along the evertors likely due to pes planus when not wearing shoes. Will continue working on HS flexibility and R LE strength to improve ease of ambulation with decreased pain. Patient will continue to benefit from skilled PT services to modify and progress therapeutic interventions, address functional mobility deficits, address ROM deficits, address strength deficits, analyze and address soft tissue restrictions, analyze and cue movement patterns, analyze and modify body mechanics/ergonomics, assess and modify postural abnormalities, address imbalance/dizziness and instruct in home and community integration to attain remaining goals. Progress towards goals / Updated goals:  1. Pt will be compliant with initial HEP to improve therapy outcomes MET (11/28/17)  Long Term Goals: To be accomplished in 6 weeks:  1. Pt will improve FOTO by 16 points in order to demonstrate functional improvement Progressing 10 point improvement (12/18/17)  2. Pt will improve R hamstring 90/90 stretch to 30 dgrs in order to improve ease of prolonged ambulation   3. Pt will improve B hip abd/ext strength to 4-/5 in order to improve kinetic chain alignment for reduced knee/ankle pain and improved WB tolerance   4.  Pt will report 50% improvement in sx in order to improve QOL    PLAN  [x]  Upgrade activities as tolerated     [x]  Continue plan of care  []  Update interventions per flow sheet       []  Discharge due to:_  []  Other:_      Paras Austin PTA 12/27/2017  11:36 AM    Future Appointments  Date Time Provider Cristo Buenrostro   12/29/2017 11:00 AM Michael Felton, PT [id number]   2/21/2018 10:15 AM Galdino Garcia  [address]

## 2017-12-28 NOTE — PROGRESS NOTES
PT DAILY TREATMENT NOTE     Patient Name: Abelardo Constantino  Date:2017  : 1980  [x]  Patient  Verified  Payor: Glen Loyd / Plan: [facility] [employer]  CAPITATED PT / Product Type: Commerical /    In time:1107  Out time:1135  Total Treatment Time (min): 28  Visit #: 5 of 6    Treatment Area: Left shoulder pain [M25.512]    SUBJECTIVE  Pain Level (0-10 scale): 1/10  Any medication changes, allergies to medications, adverse drug reactions, diagnosis change, or new procedure performed?: [x] No    [] Yes (see summary sheet for update)  Subjective functional status/changes:   [] No changes reported  Pt reports he is doing much better but still wants to try dry needling. Pt considered for D/C. OBJECTIVE    28 min Therapeutic Exercise:  [] See flow sheet :   Rationale: increase ROM and increase strength to improve the patients ability to ease with ADL's          With   [] TE   [] TA   [] neuro   [] other: Patient Education: [x] Review HEP    [] Progressed/Changed HEP based on:   [] positioning   [] body mechanics   [] transfers   [] heat/ice application    [] other:      Other Objective/Functional Measures: Tolerating all ex's and its progression well. Continues to have trigger points along left UT but less tender. Shoulders elevated due to possible UT tightness and postural considerations. Pain Level (0-10 scale) post treatment: 1/10    ASSESSMENT/Changes in Function: Progressing well. Dry needling form was signed approx 1month ago. Pt continues to be interested in trial with less visits now due to insurance allowance. Patient will continue to benefit from skilled PT services to modify and progress therapeutic interventions, address functional mobility deficits, address ROM deficits, analyze and address soft tissue restrictions, analyze and cue movement patterns and assess and modify postural abnormalities to attain remaining goals.      []  See Plan of Care  []  See progress note/recertification  [] See Discharge Summary         Progress towards goals / Updated goals:  1. Pt will increase FOTO score by 8 pts to improve function.  Met 11 point improvement (12/18/17)   2. Pt will increase left shoulder strength to 5/5  to ease with lifting activities.   3. Pt will report >60% improvement to return to PLOF and  Activities including      PLAN  []  Upgrade activities as tolerated     [x]  Continue plan of care  []  Update interventions per flow sheet       []  Discharge due to:_  []  Other:_      Sergo Gallegos PT 12/27/2017  10:19 PM    Future Appointments  Date Time Provider Cristo Buenrostro   12/29/2017 11:30 AM Kendell Weaver, PT [id number]   2/21/2018 10:15 AM Carly De Leon  [address]

## 2017-12-29 ENCOUNTER — HOSPITAL ENCOUNTER (OUTPATIENT)
Dept: PHYSICAL THERAPY | Age: 37
Discharge: HOME | End: 2017-12-29
Payer: COMMERCIAL

## 2017-12-29 PROCEDURE — 97110 THERAPEUTIC EXERCISES: CPT

## 2017-12-29 NOTE — PROGRESS NOTES
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 Is progressing slowly towards goals. He demonstrates some improvement in hip strength MMT hip abd R: 4+/5 L: 4+/5 ext: R: 4/5 L: 4/5. FOTO score also improved by 10 points. However he continues to have significant decrease in R hamstring flexibility with 90/90 SLR test at 35 degrees away from 0. He also continues to have moderate pain in posterior knee. He demonstrates compliance with his HEP but was educated on increasing frequency of stretching at home. Skilled PT is medically necessary in order to improve flexibility and strength for decreased pain and return to PLOF. Updated Goals: to be achieved in 5 visits   1. Pt will improve FOTO by 16 points in order to demonstrate functional improvement  2. Pt will improve R hamstring 90/90 stretch to 30 dgrs in order to improve ease of prolonged ambulation   3.  Pt will report 50% improvement in sx in order to improve QOL     ASSESSMENT/RECOMMENDATIONS:  [x]Continue therapy per initial plan/protocol at a frequency of  1-2 x per week for 5 visits  []Continue therapy with the following recommended changes:_____________________      _____________________________________________________________________  []Discontinue therapy progressing towards or have reached established goals  []Discontinue therapy due to lack of appreciable progress towards goals  []Discontinue therapy due to lack of attendance or compliance  []Await Physician's recommendations/decisions regarding therapy  []Other:________________________________________________________________    Thank you for this referral.   Rakesh Mccarthy, PT 12/29/2017 2:51 PM    NOTE TO PHYSICIAN:  PLEASE COMPLETE THE ORDERS BELOW AND   FAX TO [facility]: [phone]  If you are unable to process this request in 24 hours please contact our office: [phone] I have read the above report and request that my patient continue as recommended. ? I have read the above report and request that my patient continue therapy with the following changes/special instructions:____________________________________  ? I have read the above report and request that my patient be discharged from therapy.     Physicians signature: ______________________________Date: ______Time:______

## 2018-01-04 ENCOUNTER — APPOINTMENT (OUTPATIENT)
Dept: PHYSICAL THERAPY | Age: 38
End: 2018-01-04
Payer: COMMERCIAL

## 2018-01-17 ENCOUNTER — HOSPITAL ENCOUNTER (OUTPATIENT)
Dept: PHYSICAL THERAPY | Age: 38
Discharge: HOME | End: 2018-01-17
Payer: COMMERCIAL

## 2018-01-17 PROCEDURE — 97110 THERAPEUTIC EXERCISES: CPT

## 2018-01-17 PROCEDURE — 97140 MANUAL THERAPY 1/> REGIONS: CPT

## 2018-01-17 NOTE — PROGRESS NOTES
PT DAILY TREATMENT NOTE - [facility]     Patient Name: Tama Bloch  Date:2018  : 1980  [x]  Patient  Verified  Payor: Jolly Roland / Plan: [facility] [employer]  [facility] PT / Product Type: Commerical /    In time: 3:05  Out time: 3:40  Total Treatment Time (min): 35  Total Timed Codes (min): 25     Visit #: 6 of 6    Treatment Area: Left shoulder pain [M25.512]    SUBJECTIVE  Pain Level (0-10 scale): 1/10  Any medication changes, allergies to medications, adverse drug reactions, diagnosis change, or new procedure performed?: [x] No    [] Yes (see summary sheet for update)  Subjective functional status/changes:   [] No changes reported  Pt. Reports he has been doing pretty good but continues to have occasional pain. OBJECTIVE    10 min Therapeutic Exercise:  [x] See flow sheet :   Rationale: increase ROM and increase strength to improve the patients ability to increase ease of ADLs    15 min Manual Therapy:  See attached note, STM to infraspinatus    Rationale: decrease pain and increase ROM to increase ease of ADLs    Dry Needling Procedure Note    Procedure: An intramuscular manual therapy (dry needling) and a neuro-muscular re-education treatment was done to deactivate myofascial trigger points with a 30 gauge filament needle under aseptic technique. Indications:  [x] Myofascial pain and dysfunction [] Muscled spasms  [x] Myalgia/myositis   [] Muscle cramps  [] Muscle imbalances  [] Temporomandibular Dysfunction  [] Other:    Chart reviewed for the following:  Darline AMOS PT, have reviewed the medical history, summary list and precautions/contraindications for Tama Bloch.   TIME OUT performed immediately prior to start of procedure:  Darline AMOS PT, have performed the following reviews on Tama Bloch prior to the start of the session:      [x] Verified patient identification by name and date of birth    [x] Agreement on all muscles being treated was verified [x] Purpose of dry needling, side effects, possible complications, risks and benefits were explained to the patient   [x] Procedure site(s) verified  [x] Patient was positioned for comfort and draped for privacy  [x] Informed Consent was signed (initial visit) and verified verbally (subsequent visits)  [x] Patient was instructed on the need to report the use of blood thinners and/or immunosuppressant medications  [x] How to respond to possible adverse effects of treatment  [x] Self treatment of post needling soreness: ice, heat (moist heat, heat wraps) and stretching  [x] Opportunity was given to ask any questions, all questions were answered            Time:  3:15-3:25  Date of procedure: 1/17/2018  Treatment: The following muscles were treated today with intramuscular dry needling  [] Left [] Right Masster  [] Left [] Right Temporalis  [] Left [] Right Zygomaticus Major / Minor  [] Left [] Right Lateral Pterygoid  [] Left [] Right Medial Pterygoid  [] Left [] Right Digastric Post / Anterior Belly  [] Left [] Right Sternocleidomastoid  [] Left [] Right Scalene Anterior / Medial / Posterior  [] Left [] Right Extra Laryngeal Muscles  [] Left [] Right Upper Trapezius  [] Left [] Right Middle Trapezius  [] Left [] Right Lower Trapezius  [] Left [] Right Oblique Capitis Inferior  [] Left [] Right Splenius Capitis / Cervicis  [] Left [] Right Semispinalis: Capitis / Cervicis  [] Left [] Right Multifidi / Rotatores Cervicis / Thoracic  [] Left [] Right Longissimus Thoracis / Illiocostalis  [] Left [] Right Levator Scapulae  [x] Left [] Right Supraspinatus / Infraspinatus  [] Left [] Right Teres Major / Minor  [] Left [] Right Rhomboids / Serratus posterior superior  [] Left [] Right Pectoralis Major / Minor  [] Left [] Right Serratus Anterior  [] Left [] Right Latissimus Dorsi  [] Left [] Right Subscapularis  [] Left [] Right Coracobrachialis  [] Left [] Right Biceps Brachii  [] Left [] Right Deltoid: Anterior / Medial / Posterior  [] Left [] Right Brachialis  [] Left [] Right Triceps  [] Left [] Right Brachioradialis  [] Left [] Right Extensor Carpi Radialis Brevis / Extensor Carpi Radialis Longus    [] Left [] Right  Extensor digitorum  [] Left [] Right Supinator / Pronator Teres  [] Left [] Right Flexor Carpi Radialis/ Flexor Carpi Ulnaris   [] Left [] Right  Flexor Digitorum Superficialis/ Flexor Digitorum Profundus  [] Left [] Right Flexor Pollicis Longus / Flexor Pollicis Brevis / Palmaris Longus  [] Left [] Right Abductor Pollicis Longus / Abductor Pollicis Brevis  [] Left [] Right Opponens Pollicis / Adductor Pollicis  [] Left [] Right Dorsal / Palmar Interossei / Lumbricalis  [] Left [] Right Abductor Digiti Minimi / Opponens Digiti Minimi    Patient's response to today's treatment:  [] Latent Twitch Response     [x] Muscle relaxation [] Pain Relief  [] Post needling soreness    [] without complications  [] Increased Range of Motion   [] Decreased headaches    [] Decreased Tinnitus  [] Other:     Performed by: Fabrizio See PT            With   [x] TE   [] TA   [] neuro   [] other: Patient Education: [x] Review HEP    [] Progressed/Changed HEP based on:   [] positioning   [] body mechanics   [] transfers   [] heat/ice application    [] other:      Other Objective/Functional Measures:   L shoulder MMT flex: 5/5 abd: 5/5 ER: 5/5 IR: 5/5  % improvement in symptoms: 80%   Pt.  Was educated on dry needling     Pain Level (0-10 scale) post treatment: 1/10    ASSESSMENT/Changes in Function:      See progress note    Progress towards goals / Updated goals:  See progress note    PLAN  []  Upgrade activities as tolerated     [x]  Continue plan of care  []  Update interventions per flow sheet       []  Discharge due to:_  []  Other:_      Fabrizio See, PT 1/17/2018  2:57 PM    Future Appointments  Date Time Provider Cristo Buenrostro   1/17/2018 3:00 PM Fabrizio See PT [id number]   2/21/2018 10:15 AM Yulia Enamorado Gina Marc  [address]

## 2018-01-17 NOTE — PROGRESS NOTES
[facility] [place]er  [address] [facility]  [phone] fax    Physical Therapy Progress Note  Patient name: Garett Thacker Start of Care:  17   Referral source: Janice Serrano MD : 1980   Medical/Treatment Diagnosis: Left shoulder pain [M25.512] Onset Date: 2 months ago     Prior Hospitalization: see medical history Provider#: [id number]   Medications: Verified on Patient Summary List    Comorbidities:  scoliosis  Prior Level of Function: full use of L shoulder AROM, self employed  Visits from Start of Care: 14    Missed Visits: 1    Established Goals:         Excellent           Good         Limited           None  [x] Increased ROM   []  [x]  []  []  [x] Increased Strength  []  [x]  []  []  [x] Increased Mobility  []  [x]  []  []   [x] Decreased Pain   []  [x]  []  []  [] Decreased Swelling  []  []  []  []    Key Functional Changes: pt. Is progressing well with physical therapy. He is having less pain overall but it continues to fluctuate at times. He reports an 80% improvement in his symptoms since [facility]. He demonstrated improved L shoulder strength flex:  abd:  ER:  IR:  however he continues to have excessive L UT use during shoulder elevation. Skilled PT is medically necessary in order to continue to improve scapula-humeral rhythm and ensure understanding of updated HEP in order to continue to improve following D/C    Updated Goals: to be achieved in 4 visits  1. Pt. Will improve FOTO score by 16 points in order to demonstrate a significant improvement in function. 2. Patient will demonstrate understanding of updated HEP in order to continue to improve following D/C.      ASSESSMENT/RECOMMENDATIONS:  [x]Continue therapy per initial plan/protocol at a frequency of  2 x per week for 5 visits  []Continue therapy with the following recommended changes:_____________________ _____________________________________________________________________  []Discontinue therapy progressing towards or have reached established goals  []Discontinue therapy due to lack of appreciable progress towards goals  []Discontinue therapy due to lack of attendance or compliance  []Await Physician's recommendations/decisions regarding therapy  []Other:________________________________________________________________    Thank you for this referral.   Tim Cowan, PT 1/17/2018 4:20 PM    NOTE TO PHYSICIAN:  En[address]ina [address]ado 172   [facility]: [phone]  If you are unable to process this request in 24 hours please contact our office: [phone]    ? I have read the above report and request that my patient continue as recommended. ? I have read the above report and request that my patient continue therapy with the following changes/special instructions:____________________________________  ? I have read the above report and request that my patient be discharged from therapy.     Physicians signature: ______________________________Date: ______Time:______

## 2018-01-17 NOTE — PROGRESS NOTES
PT DAILY TREATMENT NOTE     Patient Name: Alysha Scott  Date:2018  : 1980  [x]  Patient  Verified  Payor: Suhas Campbell / Plan[facility] Product Type: [facility] /    In time: 2:30  Out time: 3:02  Total Treatment Time (min): 32  Visit #: 8 of     Treatment Area: Pain in right knee [M25.561]  Pain in right ankle and joints of right foot [M25.571]    SUBJECTIVE  Pain Level (0-10 scale): 2/10  Any medication changes, allergies to medications, adverse drug reactions, diagnosis change, or new procedure performed?: [x] No    [] Yes (see summary sheet for update)  Subjective functional status/changes:   [] No changes reported  Pt reported feeling gradually better    OBJECTIVE     24 min Therapeutic Exercise:  [] See flow sheet :   Rationale: increase ROM and increase strength to improve the patients ability to increase ease of ADLs    8 Min Manual R upslip and R AI correction, shotgun to improve pain level and tolerance for amb/ADLs          With   [] TE   [] TA   [] neuro   [] other: Patient Education: [x] Review HEP    [] Progressed/Changed HEP based on:   [] positioning   [] body mechanics   [] transfers   [] heat/ice application    [] other:      Other Objective/Functional Measures:    Unable to tolerate increased reps with LAQ   Mod fatigue with all therex   Compensated with trunk ext during glute 3 ways, mod pain and fatigued   R AI and upslip, resolved with MET x1   90/90: cont to have max tightness, > 30 degrees knee flex B   FOTO; 73    Pain Level (0-10 scale) post treatment: 1/10    ASSESSMENT/Changes in Function: pt returned after 2 week gap with PT. Present with 2/10 pain, fair tolerance to therex and mal-alignment of pelvic.  Will cont with POC as discussed last session for pain free functional mobility    Patient will continue to benefit from skilled PT services to modify and progress therapeutic interventions, address functional mobility deficits, address ROM deficits, analyze and address soft tissue restrictions, analyze and cue movement patterns and assess and modify postural abnormalities to attain remaining goals.      []  See Plan of Care  [x]  See progress note/recertification  []  See Discharge Summary      Updated Goals: to be achieved in 5 visits              1. Pt will improve FOTO by 16 points in order to demonstrate functional improvement met 73 1-17-18   2. Pt will improve R hamstring 90/90 stretch to 30 dgrs in order to improve ease of prolonged ambulation  Not met 1-17-18   3. Pt will report 50% improvement in sx in order to improve QOL.  Met 1-     PLAN  [x]  Upgrade activities as tolerated     [x]  Continue plan of care  []  Update interventions per flow sheet       []  Discharge due to:_  []  Other:_       Britton Loya PT 1/17/2018  12:58 PM    Future Appointments  Date Time Provider Cristo Buenrostro   1/17/2018 2:30 PM Moshe Alston [id number]   1/17/2018 3:00 PM Adi Tinajero PT [id number]   2/21/2018 10:15 AM Jamie Jennings  [address]

## 2018-01-31 ENCOUNTER — HOSPITAL ENCOUNTER (OUTPATIENT)
Dept: PHYSICAL THERAPY | Age: 38
Discharge: HOME | End: 2018-01-31
Payer: COMMERCIAL

## 2018-01-31 PROCEDURE — 97110 THERAPEUTIC EXERCISES: CPT

## 2018-01-31 NOTE — PROGRESS NOTES
PT DAILY TREATMENT NOTE     Patient Name: Jorge Gallegos  Date:2018  : 1980  [x]  Patient  Verified  Payor: Kp Tellez / Plan: [facility]  [facility] Product Type: Commer[facility] /    In time: 3:39  Out time:4:02  Total Treatment Time (min): 23  Visit #: 9 of     Treatment Area: Pain in right knee [M25.561]  Pain in right ankle and joints of right foot [M25.571]    SUBJECTIVE  Pain Level (0-10 scale): 4/10  Any medication changes, allergies to medications, adverse drug reactions, diagnosis change, or new procedure performed?: [x] No    [] Yes (see summary sheet for update)  Subjective functional status/changes:   [] No changes reported  \"I'm hurting all over the place, I was sick last week. \"    OBJECTIVE    23 min Therapeutic Exercise:  [] See flow sheet :   Rationale: increase ROM and increase strength to improve the patients ability to increase ease of ADLs         With   [] TE   [] TA   [] neuro   [] other: Patient Education: [x] Review HEP    [] Progressed/Changed HEP based on:   [] positioning   [] body mechanics   [] transfers   [] heat/ice application    [] other:      Other Objective/Functional Measures:    Min challenged with glute 3 wyas using Lionel   WNL with pelvic alginment     HS 90/90: 30 degrees    Pain Level (0-10 scale) post treatment: 4/10    ASSESSMENT/Changes in Function: Pt has 2 week gap in PT, present with increased pain, partially due to recent cold. Pt performed therex with good form. Cont to be limited with HS tightness but reported overall more than 50% improvement. Overall, pt pleased with his progression and confident with cont strengthening and stretching at home. Suggested 1-2 more visits to update HEP then pt will be on hold with PT for 2 weeks, pt will call and update on d/c or not at the end of 2 weeks.     Patient will continue to benefit from skilled PT services to modify and progress therapeutic interventions, address functional mobility deficits, address ROM deficits, analyze and address soft tissue restrictions, analyze and cue movement patterns and assess and modify postural abnormalities to attain remaining goals.      []  See Plan of Care  [x]  See progress note/recertification  []  See Discharge Summary      Updated Goals: to be achieved in 5 visits                                   1. Pt will improve FOTO by 16 points in order to demonstrate functional improvement met 73 1-17-18                        2. Pt will improve R hamstring 90/90 stretch to 30 dgrs in order to improve ease of prolonged ambulation  Not met 1-17-18 not met                         3. Pt will report 50% improvement in sx in order to improve QOL.  Met 1-      PLAN  [x]  Upgrade activities as tolerated     [x]  Continue plan of care  []  Update interventions per flow sheet       []  Discharge due to:_  []  Other:_ Sheila Bustamante, PT 1/31/2018  9:52 AM    Future Appointments  Date Time Provider Cristo Buenrostro   1/31/2018 3:30 PM Moshe Stout [facility]   1/31/2018 4:00 PM Moshe Stout [facility]   3/1/2018 8:45 AM Gopal Blake  [address]

## 2018-01-31 NOTE — PROGRESS NOTES
PT DAILY TREATMENT NOTE     Patient Name: Tama Bloch  Date:2018  : 1980  [x]  Patient  Verified  Payor: Jolly Roland / Plan: [facility]  [facility] / Product Type: Commerical /    In time:4:03  Out time: 4:45  Total Treatment Time (min): 42  Visit #: 1 of 5    Treatment Area: Left shoulder pain [M25.512]    SUBJECTIVE  Pain Level (0-10 scale): 2/10  Any medication changes, allergies to medications, adverse drug reactions, diagnosis change, or new procedure performed?: [x] No    [] Yes (see summary sheet for update)  Subjective functional status/changes:   [] No changes reported  Pt reported having significant pain and even hot flashes at night after Dry Needling.  Otherwise, over all he's still doing ok and pleased to be d/c next visit    OBJECTIVE    Modality rationale: decrease pain and increase tissue extensibility to improve the patients ability to tolerate ADLs   Min Type Additional Details    [] Estim:  []Unatt       []IFC  []Premod                        []Other:  []w/ice   []w/heat  Position:  Location:    [] Estim: []Att    []TENS instruct  []NMES                    []Other:  []w/US   []w/ice   []w/heat  Position:  Location:    []  Traction: [] Cervical       []Lumbar                       [] Prone          []Supine                       []Intermittent   []Continuous Lbs:  [] before manual  [] after manual    []  Ultrasound: []Continuous   [] Pulsed                           []1MHz   []3MHz W/cm2:  Location:    []  Iontophoresis with dexamethasone         Location: [] Take home patch   [] In clinic   10 []  Ice     [x]  heat  []  Ice massage  []  Laser   []  Anodyne Position: seated  Location: L shoulder    []  Laser with stim  []  Other:  Position:  Location:    []  Vasopneumatic Device Pressure:       [] lo [] med [] hi   Temperature: [] lo [] med [] hi   [x] Skin assessment post-treatment:  [x]intact []redness- no adverse reaction    []redness  adverse reaction:     32 min Therapeutic Exercise:  [x] See flow sheet :   Rationale: increase ROM and increase strength to improve the patients ability to increase ease of ADLs    With   [] TE   [] TA   [] neuro   [] other: Patient Education: [x] Review HEP    [] Progressed/Changed HEP based on:   [] positioning   [] body mechanics   [] transfers   [] heat/ice application    [] other:      Other Objective/Functional Measures: Tolerated 18 reps with TB abd but 20x for all the other directions    Performed all therex with no difficulty, only mod fatigue at the end of PT session     Pain Level (0-10 scale) post treatment: 0/10    ASSESSMENT/Changes in Function: pt cont to making good progress with improving strength of R shoulder. He pleased with being D/C within 3 visits. Patient will continue to benefit from skilled PT services to modify and progress therapeutic interventions, address functional mobility deficits, address ROM deficits, address strength deficits, analyze and address soft tissue restrictions, analyze and cue movement patterns, analyze and modify body mechanics/ergonomics, assess and modify postural abnormalities and instruct in home and community integration to attain remaining goals. []  See Plan of Care  [x]  See progress note/recertification  []  See Discharge Summary         Progress towards goals / Updated goals:  Updated Goals: to be achieved in 4 visits   1. Pt. Will improve FOTO score by 16 points in order to demonstrate a significant improvement in function. 2. Patient will demonstrate understanding of updated HEP in order to continue to improve following D/C.  Performed all therex appropriately with no pain and very min cues 1-31-18    PLAN  [x]  Upgrade activities as tolerated     [x]  Continue plan of care  []  Update interventions per flow sheet       [x]  Discharge within 3 visits due to:_ progressed/met goals  []  Other:_      Haseeb Murillo, PT 1/31/2018  9:54 AM    Future Appointments  Date Time Provider Cristo Buenrostro   1/31/2018 3:30 PM Thienphuc Dyke Dandy [facility] [address]   1/31/2018 4:00 PM Thienphuc Dyke Dandy [facility] [address]   3/1/2018 8:45 AM Ayanna Dubose  [address]

## 2018-02-05 ENCOUNTER — HOSPITAL ENCOUNTER (OUTPATIENT)
Dept: PHYSICAL THERAPY | Age: 38
Discharge: HOME | End: 2018-02-05
Payer: COMMERCIAL

## 2018-02-05 PROCEDURE — 97110 THERAPEUTIC EXERCISES: CPT

## 2018-02-05 NOTE — PROGRESS NOTES
PT DAILY TREATMENT NOTE     Patient Name: Roddy Garcia  Date:2018  : 1980  [x]  Patient  Verified  Payor: Tara Brooks / Plan: [facility]  [facility] PT / Product Type: Commerical /    In time:11:30  Out time:12:00  Total Treatment Time (min): 30  Visit #: 2 of 5    Treatment Area: Pain in right knee [M25.561]  Pain in right ankle and joints of right foot [M25.571]    SUBJECTIVE  Pain Level (0-10 scale): 2-3/10  Any medication changes, allergies to medications, adverse drug reactions, diagnosis change, or new procedure performed?: [x] No    [] Yes (see summary sheet for update)  Subjective functional status/changes:   [] No changes reported  I think I am going to do fine with the HEP. OBJECTIVE        25 min Therapeutic Exercise:  [x] See flow sheet :   Rationale: increase ROM, increase strength, improve coordination, improve balance and increase proprioception to improve the patients ability to return to normal activities with decreased c/o pain. With   [] TE   [] TA   [] neuro   [x] other: Patient Education: [x] Review HEP    [] Progressed/Changed HEP based on:   [] positioning   [] body mechanics   [] transfers   [] heat/ice application    [x] other:      Other Objective/Functional Measures:  Pt with increased ROM     Pain Level (0-10 scale) post treatment: 2/10    ASSESSMENT/Changes in Function:     Patient will continue to benefit from skilled PT services to modify and progress therapeutic interventions, address functional mobility deficits, address ROM deficits, address strength deficits, analyze and address soft tissue restrictions, analyze and cue movement patterns, analyze and modify body mechanics/ergonomics and assess and modify postural abnormalities to attain remaining goals.      [x]  See Plan of Care  []  See progress note/recertification  []  See Discharge Summary         Progress towards goals / Updated goals:  Updated Goals: to be achieved in 4 visits 1. Pt. Will improve FOTO score by 16 points in order to demonstrate a significant improvement in function. 2. Patient will demonstrate understanding of updated HEP in order to continue to improve following D/C.  Performed all therex appropriately with no pain and very min cues 1-31-18    PLAN  [x]  Upgrade activities as tolerated     [x]  Continue plan of care  []  Update interventions per flow sheet       []  Discharge due to:_  []  Other:_      Fadi Mattson 2/5/2018  11:53 AM    Future Appointments  Date Time Provider Cristo Buenrostro   2/5/2018 12:00 PM [facility] [address] [facility]   3/1/2018 8:45 AM Madelaine Coello  [address]

## 2018-02-05 NOTE — PROGRESS NOTES
PT DAILY TREATMENT NOTE     Patient Name: Myrna Faith  Date:2018  : 1980  [x]  Patient  Verified  Payor: Sandra Gonzalez / Plan: [facility]  [facility] Type: [facility] /    In time:12:00  Out time:12:30  Total Treatment Time (min): 30  Visit #: 2 of 5    Treatment Area: Left shoulder pain [M25.512]    SUBJECTIVE  Pain Level (0-10 scale): 0/10  Any medication changes, allergies to medications, adverse drug reactions, diagnosis change, or new procedure performed?: [x] No    [] Yes (see summary sheet for update)  Subjective functional status/changes:   [] No changes reported  I think the shoulder is better. OBJECTIVE     30 min Therapeutic Exercise:  [x] See flow sheet :   Rationale: increase ROM, increase strength, improve coordination, improve balance and increase proprioception to improve the patients ability to return to hobbies and full time employment. With   [] TE   [] TA   [] neuro   [] other: Patient Education: [x] Review HEP    [] Progressed/Changed HEP based on:   [] positioning   [] body mechanics   [] transfers   [] heat/ice application    [] other:      Other Objective/Functional Measures: Pt will hold further shoulder therapy to see if he is able to return to his full time employment. Pain Level (0-10 scale) post treatment: 1/10    ASSESSMENT/Changes in Function:     Patient will continue to benefit from skilled PT services to address functional mobility deficits, address ROM deficits and address strength deficits to attain remaining goals. [x]  See Plan of Care  []  See progress note/recertification  []  See Discharge Summary         Progress towards goals / Updated goals:    1. Pt. Will improve FOTO score by 16 points in order to demonstrate a significant improvement in function.                         2. Patient will demonstrate understanding of updated HEP in order to continue to improve following D/C.  Performed all therex appropriately with no pain and very min cues 1-31-18       PLAN  [x]  Upgrade activities as tolerated     [x]  Continue plan of care  []  Update interventions per flow sheet       []  Discharge due to:_  []  Other:_      Ze Curtis 2/5/2018  1:56 PM    Future Appointments  Date Time Provider Cristo Buenrostro   3/1/2018 8:45 AM Lu Etienne  [address]

## 2018-02-05 NOTE — PROGRESS NOTES
PT DAILY TREATMENT NOTE     Patient Name: David Gregory  Date:2018  : 1980  [x]  Patient  Verified  Payor: Chloé Kelly / Plan: [facility] [employer]  [facility]: [facility] /    In time:11:30  Out time:12:00  Total Treatment Time (min): 30  Visit #: 2 of 5    Treatment Area: Pain in right knee [M25.561]  Pain in right ankle and joints of right foot [M25.571]    SUBJECTIVE  Pain Level (0-10 scale): 2-3/10  Any medication changes, allergies to medications, adverse drug reactions, diagnosis change, or new procedure performed?: [x] No    [] Yes (see summary sheet for update)  Subjective functional status/changes:   [] No changes reported  I think I am going to do fine with the HEP. OBJECTIVE        25 min Therapeutic Exercise:  [x] See flow sheet :   Rationale: increase ROM, increase strength, improve coordination, improve balance and increase proprioception to improve the patients ability to return to normal activities with decreased c/o pain. With   [] TE   [] TA   [] neuro   [x] other: Patient Education: [x] Review HEP    [] Progressed/Changed HEP based on:   [] positioning   [] body mechanics   [] transfers   [] heat/ice application    [x] other:      Other Objective/Functional Measures:  Pt with increased ROM     Pain Level (0-10 scale) post treatment: 2/10    ASSESSMENT/Changes in Function:     Patient will continue to benefit from skilled PT services to modify and progress therapeutic interventions, address functional mobility deficits, address ROM deficits, address strength deficits, analyze and address soft tissue restrictions, analyze and cue movement patterns, analyze and modify body mechanics/ergonomics and assess and modify postural abnormalities to attain remaining goals.      [x]  See Plan of Care  []  See progress note/recertification  []  See Discharge Summary         Progress towards goals / Updated goals:  Updated Goals: to be achieved in 4 visits 1. Pt. Will improve FOTO score by 16 points in order to demonstrate a significant improvement in function. 2. Patient will demonstrate understanding of updated HEP in order to continue to improve following D/C.  Performed all therex appropriately with no pain and very min cues 1-31-18    PLAN  [x]  Upgrade activities as tolerated     [x]  Continue plan of care  []  Update interventions per flow sheet       []  Discharge due to:_  []  Other:_      Stephen Crouch 2/5/2018  11:53 AM    Future Appointments  Date Time Provider Cristo Buenrostro   3/1/2018 8:45 AM Hiram Walker  [address]

## 2018-02-21 ENCOUNTER — HOSPITAL ENCOUNTER (OUTPATIENT)
Dept: GENERAL RADIOLOGY | Age: 38
Discharge: HOME | End: 2018-02-21
Payer: COMMERCIAL

## 2018-02-21 DIAGNOSIS — R05.9 COUGH: ICD-10-CM

## 2018-02-21 PROCEDURE — 71046 X-RAY EXAM CHEST 2 VIEWS: CPT

## 2018-02-22 ENCOUNTER — HOSPITAL ENCOUNTER (OUTPATIENT)
Dept: CT IMAGING | Age: 38
Discharge: HOME | End: 2018-02-22
Attending: INTERNAL MEDICINE
Payer: COMMERCIAL

## 2018-02-22 DIAGNOSIS — R10.30 ABDOMINAL PAIN, LOWER: ICD-10-CM

## 2018-02-22 DIAGNOSIS — K58.9 IRRITABLE BOWEL SYNDROME: ICD-10-CM

## 2018-02-22 LAB — CREAT UR-MCNC: 1 MG/DL (ref 0.6–1.3)

## 2018-02-22 PROCEDURE — 74160 CT ABDOMEN W/CONTRAST: CPT

## 2018-02-22 PROCEDURE — 74011636320 HC RX REV CODE- 636/320: Performed by: INTERNAL MEDICINE

## 2018-02-22 PROCEDURE — 82565 ASSAY OF CREATININE: CPT

## 2018-02-22 RX ADMIN — IOPAMIDOL 100 ML: 612 INJECTION, SOLUTION INTRAVENOUS at 08:49

## 2018-03-06 ENCOUNTER — OFFICE VISIT (OUTPATIENT)
Dept: ORTHOPEDIC SURGERY | Age: 38
End: 2018-03-06

## 2018-03-06 VITALS
OXYGEN SATURATION: 100 % | BODY MASS INDEX: 25.33 KG/M2 | HEART RATE: 84 BPM | RESPIRATION RATE: 18 BRPM | SYSTOLIC BLOOD PRESSURE: 106 MMHG | DIASTOLIC BLOOD PRESSURE: 67 MMHG | HEIGHT: 65 IN | WEIGHT: 152 LBS | TEMPERATURE: 95.9 F

## 2018-03-06 DIAGNOSIS — M79.18 MYOFASCIAL PAIN: Primary | ICD-10-CM

## 2018-03-06 NOTE — MR AVS SNAPSHOT
[address] [place] [address] [facility] [address] [facility] [address] 
[phone] Patient: Ivana Barrera MRN: [id number] TIW:[id number] Visit Information Date & Time Provider Department Dept. Phone Encounter #  
 3/6/2018  2:45 PM Dev Reyes MD [facility] [phone] Follow-up Instructions Return if symptoms worsen or fail to improve. Upcoming Health Maintenance Date Due Pneumococcal 19-64 Medium Risk (1 of 1 - PPSV23) 5/3/1997 DTaP/Tdap/Td series (1 - Tdap) 5/3/1999 Influenza Age 5 to Adult 8/1/2017 Allergies as of 3/6/2018  Review Complete On: 2/22/2018 By: Ella Peralta Severity Noted Reaction Type Reactions Opioids - Morphine Analogues  08/27/2015    Myalgia Other Medication  03/03/2016    Other (comments) Pt reports allergy to steroids, with psychological side effects Pollen Extracts  08/07/2012    Unable to Obtain Zithromax [Azithromycin]    Itching Current Immunizations  Never Reviewed No immunizations on file. Not reviewed this visit You Were Diagnosed With   
  
 Codes Comments Myofascial pain    -  Primary ICD-10-CM: M79.1 ICD-9-CM: 729.1 Vitals BP Pulse Temp Resp Height(growth percentile) Weight(growth percentile) 106/67 84 95.9 °F (35.5 °C) (Oral) 18 5' 5\" (1.651 m) 152 lb (68.9 kg) SpO2 BMI Smoking Status 100% 25.29 kg/m2 Current Every Day Smoker BMI and BSA Data Body Mass Index Body Surface Area  
 25.29 kg/m 2 1.78 m 2 Preferred Pharmacy Pharmacy Name Phone [id number] [address] [phone] Your Updated Medication List  
  
   
This list is accurate as of 3/6/18  3:57 PM.  Always use your most recent med list.  
  
  
  
  
 ALPRAZolam 0.5 mg tablet Commonly known as:  Madisyn Marking Take 0.5 mg by mouth two (2) times a day. LEXAPRO 10 mg tablet Generic drug:  escitalopram oxalate Take 15 mg by mouth daily. MULTIVITAMIN PO Take 1 Tab by mouth daily. TYLENOL EXTRA STRENGTH 500 mg tablet Generic drug:  acetaminophen Take 500 mg by mouth every six (6) hours as needed. VITAMIN C 500 mg tablet Generic drug:  ascorbic acid (vitamin C) Take 500 mg by mouth daily. VITAMIN D3 2,000 unit Tab Generic drug:  cholecalciferol (vitamin D3) Take 1 Tab by mouth daily. ZEGERID 40-1.1 mg-gram capsule Generic drug:  omeprazole-sodium bicarbonate Take 2 Caps by mouth daily. Follow-up Instructions Return if symptoms worsen or fail to improve. Patient Instructions Low Back Pain: Exercises Your Care Instructions Here are some examples of typical rehabilitation exercises for your condition. Start each exercise slowly. Ease off the exercise if you start to have pain. Your doctor or physical therapist will tell you when you can start these exercises and which ones will work best for you. How to do the exercises Press-up 1. Lie on your stomach, supporting your body with your forearms. 2. Press your elbows down into the floor to raise your upper back. As you do this, relax your stomach muscles and allow your back to arch without using your back muscles. As your press up, do not let your hips or pelvis come off the floor. 3. Hold for 15 to 30 seconds, then relax. 4. Repeat 2 to 4 times. Alternate arm and leg (bird dog) exercise Do this exercise slowly. Try to keep your body straight at all times, and do not let one hip drop lower than the other. 1. Start on the floor, on your hands and knees. 2. Tighten your belly muscles. 3. Raise one leg off the floor, and hold it straight out behind you. Be careful not to let your hip drop down, because that will twist your trunk. 4. Hold for about 6 seconds, then lower your leg and switch to the other leg. 5. Repeat 8 to 12 times on each leg. 6. Over time, work up to holding for 10 to 30 seconds each time. 7. If you feel stable and secure with your leg raised, try raising the opposite arm straight out in front of you at the same time. Knee-to-chest exercise 1. Lie on your back with your knees bent and your feet flat on the floor. 2. Bring one knee to your chest, keeping the other foot flat on the floor (or keeping the other leg straight, whichever feels better on your lower back). 3. Keep your lower back pressed to the floor. Hold for at least 15 to 30 seconds. 4. Relax, and lower the knee to the starting position. 5. Repeat with the other leg. Repeat 2 to 4 times with each leg. 6. To get more stretch, put your other leg flat on the floor while pulling your knee to your chest. 
Curl-ups 1. Lie on the floor on your back with your knees bent at a 90-degree angle. Your feet should be flat on the floor, about 12 inches from your buttocks. 2. Cross your arms over your chest. If this bothers your neck, try putting your hands behind your neck (not your head), with your elbows spread apart. 3. Slowly tighten your belly muscles and raise your shoulder blades off the floor. 4. Keep your head in line with your body, and do not press your chin to your chest. 
5. Hold this position for 1 or 2 seconds, then slowly lower yourself back down to the floor. 6. Repeat 8 to 12 times. Pelvic tilt exercise 1. Lie on your back with your knees bent. 2. \"Brace\" your stomach. This means to tighten your muscles by pulling in and imagining your belly button moving toward your spine. You should feel like your back is pressing to the floor and your hips and pelvis are rocking back. 3. Hold for about 6 seconds while you breathe smoothly. 4. Repeat 8 to 12 times. Heel dig bridging 1.  Lie on your back with both knees bent and your ankles bent so that only your heels are digging into the floor. Your knees should be bent about 90 degrees. 2. Then push your heels into the floor, squeeze your buttocks, and lift your hips off the floor until your shoulders, hips, and knees are all in a straight line. 3. Hold for about 6 seconds as you continue to breathe normally, and then slowly lower your hips back down to the floor and rest for up to 10 seconds. 4. Do 8 to 12 repetitions. Hamstring stretch in doorway 1. Lie on your back in a doorway, with one leg through the open door. 2. Slide your leg up the wall to straighten your knee. You should feel a gentle stretch down the back of your leg. 3. Hold the stretch for at least 15 to 30 seconds. Do not arch your back, point your toes, or bend either knee. Keep one heel touching the floor and the other heel touching the wall. 4. Repeat with your other leg. 5. Do 2 to 4 times for each leg. Hip flexor stretch 1. Kneel on the floor with one knee bent and one leg behind you. Place your forward knee over your foot. Keep your other knee touching the floor. 2. Slowly push your hips forward until you feel a stretch in the upper thigh of your rear leg. 3. Hold the stretch for at least 15 to 30 seconds. Repeat with your other leg. 4. Do 2 to 4 times on each side. Wall sit 1. Stand with your back 10 to 12 inches away from a wall. 2. Lean into the wall until your back is flat against it. 3. Slowly slide down until your knees are slightly bent, pressing your lower back into the wall. 4. Hold for about 6 seconds, then slide back up the wall. 5. Repeat 8 to 12 times. Follow-up care is a key part of your treatment and safety. Be sure to make and go to all appointments, and call your doctor if you are having problems. It's also a good idea to know your test results and keep a list of the medicines you take. Where can you learn more? Go to http://chiki-zhao.info/. Enter K065 in the search box to learn more about \"Low Back Pain: Exercises. \" Current as of: March 21, 2017 Content Version: 11.4 © 7397-2273 [employer]. Care instructions adapted under license by [employer] (which disclaims liability or warranty for this information). If you have questions about a medical condition or this instruction, always ask your healthcare professional. [address] any warranty or liability for your use of this information. Acute Low Back Pain: Exercises Your Care Instructions Here are some examples of typical rehabilitation exercises for your condition. Start each exercise slowly. Ease off the exercise if you start to have pain. Your doctor or physical therapist will tell you when you can start these exercises and which ones will work best for you. When you are not being active, find a comfortable position for rest. Some people are comfortable on the floor or a medium-firm bed with a small pillow under their head and another under their knees. Some people prefer to lie on their side with a pillow between their knees. Don't stay in one position for too long. Take short walks (10 to 20 minutes) every 2 to 3 hours. Avoid slopes, hills, and stairs until you feel better. Walk only distances you can manage without pain, especially leg pain. How to do the exercises Back stretches 5. Get down on your hands and knees on the floor. 6. Relax your head and allow it to droop. Round your back up toward the ceiling until you feel a nice stretch in your upper, middle, and lower back. Hold this stretch for as long as it feels comfortable, or about 15 to 30 seconds. 7. Return to the starting position with a flat back while you are on your hands and knees. 8. Let your back sway by pressing your stomach toward the floor. Lift your buttocks toward the ceiling. 9. Hold this position for 15 to 30 seconds. 10. Repeat 2 to 4 times. Follow-up care is a key part of your treatment and safety. Be sure to make and go to all appointments, and call your doctor if you are having problems. It's also a good idea to know your test results and keep a list of the medicines you take. Where can you learn more? Go to http://chiki-zhao.info/. Enter T875 in the search box to learn more about \"Acute Low Back Pain: Exercises. \" Current as of: March 21, 2017 Content Version: 11.4 © 2711-1685 [employer]. Care instructions adapted under license by [employer] (which disclaims liability or warranty for this information). If you have questions about a medical condition or this instruction, always ask your healthcare professional. [address] any warranty or liability for your use of this information. Therapeutic Ball: Back Exercises Your Care Instructions Here are some examples of typical exercises for your condition. Start each exercise slowly. Ease off the exercise if you start to have pain. Your doctor or physical therapist will tell you when you can start these exercises and which ones will work best for you. To prepare, make sure that your ball is the right size for you. When inflated and firm, it should allow you to sit with your hips and knees bent at about a 90-degree angle (like the letter L). How to do the exercises Seated position on ball 11. Use this exercise to get used to moving on the ball and to find your best sitting position. 12. Sit comfortably on the ball with your feet about hip-width apart. If you feel unsteady, rest your hands on the ball near your hips. 13. As you do this exercise, try to keep your shoulders and upper body relaxed and still. 14. Using your stomach and back muscles to move your pelvis, roll the ball forward. This will round your back. 15. Still using your stomach and back muscles, roll the ball back. You will arch your back. 16. Repeat this rounding-arching motion a few times. 17. Stop in between the two positions, where your back is not rounded or arched. This is called your neutral position. Pelvic rotation 8. Sit tall on the ball. 9. Slowly rotate your hips in a Cantwell pattern. Keep the movement focused at your hips. 10. Repeat, but Cantwell in the other direction. 11. Repeat 8 to 12 times. Postural sitting 7. Use this position to find a stable, relaxed posture on the ball. You can use this position as your starting point for other ball exercises. If you feel unsteady on the ball, start on a chair first. 
8. Sit on a ball or chair, with your feet planted straight in front of you. 9. Imagine that a string at the top of your head is pulling you straight up. Think of yourself as 2 inches taller than you are. 10. Slightly tuck your chin. 11. Keep your shoulders back and relaxed. Knee extension 7. Sit tall on the ball with your feet planted in front of you, hip-width apart. As you do this exercise, avoid slumping your shoulders and arching your back. 8. Rest your hands on the ball near your hip or a steady object next to you. (If you feel very stable on the ball, rest your hands in your lap or at your side.) 9. Slowly straighten one leg at the knee. Slowly lower it back down. Repeat with the other leg. 10. Repeat this exercise 8 to 12 times. Roll-ups 5. Lie on your back with your knees bent, feet resting on the floor. 6. Lay the ball on your thighs. Rest your hands up high on the ball. 7. Raising your head and shoulder blades, roll the ball up your thighs. Exhale as you roll up. 8. If this is hard on your neck, gently support your lower head and upper neck with one hand. Don't use that hand to pull your head up. 9. Repeat 8 to 12 times. Ball curls 5. Lie on your back with your ankles resting on the ball, knees straight. 6. Use your legs to roll the exercise ball toward you.  Allow your knees to bend and move closer to your chest. 
7. Pause briefly, and then roll the ball to the starting position. Try to keep the ball rolling straight. You will feel the muscles in your lower belly working. 8. Repeat 8 to 12 times. Bridge with ball under legs 6. Lie on your back with your legs up, calves resting on the ball. For more challenge, rest your heels on the ball. 7. Look up at the ceiling, and keep your chin relaxed. You can place a small pillow under your head or neck for comfort. 8. With your arms by your side, press your hands onto the floor for stability. 9. Tighten your belly muscles by pulling in your belly button toward your spine. 10. Push your heels down toward the floor, squeeze your buttocks, and lift your hips off the floor until your shoulders, hips, and knees are all in a straight line. 11. Try to keep the ball steady. Hold for about 6 seconds as you continue to breathe normally. 12. Slowly lower your hips back down to the floor. 13. Repeat 8 to 12 times. Ball curls with bridge 5. Start flat on your back with your ankles resting on the ball. 6. Look up at the ceiling, and keep your chin relaxed. You can place a small pillow under your head or neck for comfort. 7. With your arms by your side, press your hands onto the floor for stability. 8. Tighten your belly muscles by pulling in your belly button toward your spine. 9. Push your heels down toward the floor, squeeze your buttocks, and lift your hips off the floor until your shoulders, hips, and knees are all in a straight line. 10. While holding the bridge position, roll the ball toward you with your heels. Keep your hips as level as you can. 11. Pause briefly, and then roll the ball back out. Try to keep the ball rolling straight. You will feel the muscles in your lower belly working as you straighten your legs. 12. Lower your hips, and return to your starting position. 13. Repeat 8 to 12 times. 14. When you can keep your body and the ball steady throughout this exercise, you're ready for more challenge. Try keeping your hips raised while rolling the ball out, holding the bridge, and rolling back, a few times in a row. Praying andrew 6. Kneel upright with the ball in front of you. 7. To start, clasp your hands together. Rest them on the ball in front of you. 8. As you do this exercise, keep your back and hips straight and tighten your belly and buttocks muscles. Keep your knees in place. 9. Press on the ball with your arms. Lean forward from the knees. This rolls the ball forward. You will bear most of your weight on your arms. 10. If your back starts to ache, you've gone too far. Pull back a bit. 11. Roll back to the start position. 12. Repeat 8 to 12 times. Walk-out plank on ball 1. Kneel over the ball. Place your hands on the floor in front of you. 2. Walk your hands forward until your legs are straight on the ball. This is the plank position. 3. When in plank position, hold your body straight and tighten your belly and buttocks muscles. Keep your chin slightly tucked. 4. Roll as far forward as you can without losing your balance or letting your hips drop. You may stop with the ball under your thighs, or even under your knees or shins. 5. Hold a few seconds, then walk your hands back and return to the start position. 6. Repeat 8 to 12 times. Push-up with thighs on ball 1. Kneel over the ball. Place your hands on the floor in front of you. 2. Walk your hands forward until your legs are straight on the ball. This is the plank position. 3. When in plank position, hold your body straight and tighten your belly and buttocks muscles. Keep your chin slightly tucked. 4. Roll as far forward as you can without losing your balance or letting your hips drop. You may stop with the ball under your thighs, or even under your knees or shins. 5. Bend your elbows. Slowly lower your body toward the ground as far as you can without losing your balance. 6. If your wrists hurt, try moving your hands a little farther apart so they're not right under your shoulders. 7. Slowly straighten your arms. 8. Do 8 to 12 of these push-ups. Wall squat with ball 1. Stand facing away from a wall. Place your feet about shoulder-width apart. 2. Place the ball between your middle back and the wall. Move your feet out in front of you so they are about a foot in front of your hips. 3. Keep your arms at your sides, or put your hands on your hips. 4. Slowly squat down as if you are going to sit in a chair, rolling your back over the ball as you squat. The ball should move with you but stay pressed into the wall. 5. Be sure that your knees do not go in front of your toes as you squat. 6. Hold for 6 seconds. 7. Slowly rise to your standing position. 8. Repeat 8 to 12 times. Child's pose with ball 1. Kneeling upright with your back straight, rest your hands on the ball in front of you. 2. Breathe out as you bend at the hips, and roll the ball forward. Lower your chest toward the ground, and drop your hips back toward your heels. 3. To stretch your upper back and shoulders, hold this position for 15 to 30 seconds. 4. Repeat 2 to 4 times. Follow-up care is a key part of your treatment and safety. Be sure to make and go to all appointments, and call your doctor if you are having problems. It's also a good idea to know your test results and keep a list of the medicines you take. Where can you learn more? Go to http://chiki-zhao.info/. Enter E073 in the search box to learn more about \"Therapeutic Ball: Back Exercises. \" Current as of: March 21, 2017 Content Version: 11.4 © 5004-3831 [employer], [employer].  Care instructions adapted under license by [employer] (which disclaims liability or warranty for this information). If you have questions about a medical condition or this instruction, always ask your healthcare professional. [address] any warranty or liability for your use of this information. Introducing [facility]! Dear Odette Mack: Thank you for requesting a [email or web address] account. Our records indicate that you already have an active [email or web address] account. You can access your account anytime at [email or web address] Orders Did you know that you can access your hospital and ER discharge instructions at any time in [email or web address]? You can also review all of your test results from your hospital stay or ER visit. Additional Information If you have questions, please visit the Frequently Asked Questions section of the [email or web address] website at [email or web address] Remember, [email or web address] is NOT to be used for urgent needs. For medical emergencies, dial 911. Now available from your iPhone and Android! Please provide this summary of care documentation to your next provider. Your primary care clinician is listed as SAROJ MCNEIL. If you have any questions after today's visit, please call [phone].

## 2018-03-06 NOTE — PROGRESS NOTES
[address]  [place], [address] [place] [address] Street  Phone: [phone]  Fax: [phone]        Goldy Son  : 1980  PCP: Anthony Horta MD  3/6/2018    PROGRESS NOTE      ASSESSMENT AND PLAN    Walt Rangel comes in to the office today for low back pain. He rates his pain as a 5/10 today. His new low back pain began a few weeks ago. On examination, he has tenderness to palpation and no directional preference. I suspect his pain is myofascial in nature. He is neurologically intact. He continues to have myofascial pain in his cervical region as well as headaches. I offered to refer to physical therapy, but he defers as he is limited on visits until May since he has been going for his knee and shoulder. I recommended he begin an HEP to include pilates. I also recommended he try sleeping on his back. Pt will f/u PRN. Diagnoses and all orders for this visit:    1. Myofascial pain       Follow-up Disposition: Not on File      HISTORY OF PRESENT ILLNESS  Walt Rangel is a 42 y.o. male. A&P / HPI from 2016:  Raisa Parra was in the office today for a f/u appointment. His pain has remained myofascial in nature. He has not found relief from PT in the past or from using an antiinflammatory cream. Pt was advised to continue using his theracane and doing his home PT exercises.       Since the cream did not provide relief and NSAIDs upset his stomach, he was prescribed Celebrex 200mg BID prn. The risks, benefits, and potential side effects of this medication were discussed.       Trigger point injections were offered but declined at this time. Pt said he would like to get a massage first.       He will f/u prn.       Updates from 16:  Pt presents for a prn f/u. He has had a cervical and brain MRI since his last visit. The cervical MRI does not show any obvious cause for his pain.  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      He comes in today with c/o cervical pain. His biggest complaint is a headache that seems consistent with occipital neuralgia, radiating from his left eye to a region below his left ear.          Updates from 07/28/17:  Pt presents for a new onset of numbness and tingling that radiate down the left upper extremity that ends in the last two finger.  He states that his pain in the cervical region has decreased since the last visit. He is also complaining of chronic pain in the left shoulder.      Updates from 08/23/17:  Pt presents with continued numbness and tingling in the left upper extremity. He was last referred to get a BUE EMG, which did not show significant signs of neuropathy myopathy or radiculopathy. Updates from 03/06/18:  Pt presents for new low back pain that began a few weeks ago. He recently bought a Tempurpedic mattress and suspects that may be the cause of his pain as his back began hurting around the time he bought the bed. He continues to have muscle pain and tension in his neck. PAST MEDICAL HISTORY   Past Medical History:   Diagnosis Date    Anxiety     Arm fatigue     Forearms    Balance problems     Cardiac Agatston CAC score 100-199 02/04/2015    Coronary calcium score 0.    Cardiac echocardiogram 10/29/2014    EF 55-60%. No RWMA. Normal diastolic fx. RVSP 30 mmHg.  Cardiac Holter monitor study 10/29/2014    Normal Holter study.     Chronic cough     chronically coughing up sputum    Depression     Dizziness     Dyspnea     Esophageal reflux     Headache     Hypercholesterolemia     Lumbar pain     Myofascial pain     Neck pain     Numbness and tingling     PAC (premature atrial contraction)     Palpitations     presumably benign    Panic disorder     Reflux     Thoracic back pain     Mid-thoracic    Upper extremity weakness     Vertigo, intermittent        Past Surgical History:   Procedure Laterality Date    APPENDECTOMY  11/23/2010    HX HEENT      corrective hearing for left ear     HX HEENT      plastic surgery on both ears    HX HEENT  10/08 & 09/09    Reformed jaw bone, bone graft    HX RENAL BIOPSY      HX TONSILLECTOMY     . MEDICATIONS    Current Outpatient Prescriptions   Medication Sig Dispense Refill    ALPRAZolam (XANAX) 0.5 mg tablet Take 0.5 mg by mouth two (2) times a day.  ascorbic acid (VITAMIN C) 500 mg tablet Take 500 mg by mouth daily.  cholecalciferol, vitamin D3, (VITAMIN D3) 2,000 unit Tab Take 1 Tab by mouth daily.  escitalopram (LEXAPRO) 10 mg tablet Take 10 mg by mouth daily.  omeprazole-sodium bicarbonate (ZEGERID) 40-1.1 mg-gram capsule Take 1 Cap by mouth daily.  acetaminophen (TYLENOL EXTRA STRENGTH) 500 mg tablet Take 500 mg by mouth every six (6) hours as needed.  MULTIVITAMIN PO Take 1 Tab by mouth daily. ALLERGIES  Allergies   Allergen Reactions    Opioids - Morphine Analogues Myalgia    Other Medication Other (comments)     Pt reports allergy to steroids, with psychological side effects    Pollen Extracts Unable to Obtain    Zithromax [Azithromycin] Itching          SOCIAL HISTORY    Social History     Social History    Marital status: SINGLE     Spouse name: N/A    Number of children: N/A    Years of education: N/A     Occupational History    real estate      Social History Main Topics    Smoking status: Current Every Day Smoker     Packs/day: 1.00     Years: 20.00     Types: Cigarettes    Smokeless tobacco: Never Used    Alcohol use No    Drug use: No    Sexual activity: Not on file     Other Topics Concern    Not on file     Social History Narrative       FAMILY HISTORY  Family History   Problem Relation Age of Onset    Hypertension Father     Liver Disease Father     Hypertension Mother          REVIEW OF SYSTEMS  Review of Systems   Musculoskeletal: Positive for back pain and neck pain.    Neurological: Positive for headaches. PHYSICAL EXAMINATION  There were no vitals taken for this visit. Pain Assessment  7/28/2017   Location of Pain -   Location Modifiers -   Severity of Pain 0   Quality of Pain -   Quality of Pain Comment -   Duration of Pain -   Frequency of Pain -   Date Pain First Started -   Aggravating Factors -   Aggravating Factors Comment -   Limiting Behavior -   Relieving Factors -   Relieving Factors Comment -   Result of Injury -           Constitutional:  Well developed, well nourished, in no acute distress. Psychiatric: Affect and mood are appropriate. Integumentary: No rashes or abrasions noted on exposed areas. SPINE/MUSCULOSKELETAL EXAM    Cervical spine:  Neck is midline.    Normal muscle tone.    No focal atrophy is noted.    ROM pain free.    Shoulder ROM intact.    Tenderness to palpation at left scalene's.    Negative Spurling's sign.    Negative Tinel's sign.    Negative Garcia's sign.    Flex forward posture.    No clonus.      Sensation in the bilateral arms grossly intact to light touch.        Lumbar spine:  No rash, ecchymosis, or gross obliquity.    No fasciculations.    No focal atrophy is noted.    No pain with hip ROM.    Range of motion is normal.    Tenderness to palpation. No tenderness to palpation at the sciatic notch.    SI joints non-tender.    Trochanters non tender.      Sensation in the bilateral legs grossly intact to light touch.      Updates from 12/02/16:  Negative Garcia's sign bilaterally. No clonus. Pain in left scalene muscles with turning head to the right.      Updates from 3/6/18:  Tenderness to palpation of lumbar region  No directional preference    MOTOR:      Biceps  Triceps Deltoids Wrist Ext Wrist Flex Hand Intrin   Right 5/5 5/5 5/5 5/5 5/5 5/5   Left 5/5 5/5 5/5 5/5 5/5 5/5             Hip Flex  Quads Hamstrings Ankle DF EHL Ankle PF   Right 5/5 5/5 5/5 5/5 5/5 5/5   Left 5/5 5/5 5/5 5/5 5/5 5/5     DTRs are 2+ biceps, triceps, brachioradialis, patella, and Achilles.      Negative Straight Leg raise.    Squat not tested.    No difficulty with tandem gait.    Normal heel walk.    Normal toe rise.       Ambulation without assistive device. FWB.       RADIOGRAPHS  BUE EMG taken on 08/04/2017 personally reviewed with patient:  NCS/EMG FINDINGS:      · Evaluation of the Left median motor, the Right median motor, the Left ulnar motor, the Right ulnar motor, the Left Median 2nd Digit sensory, the Right Median 2nd Digit sensory, the Left ulnar sensory, and the Right ulnar sensory nerves were unremarkable.          INTERPRETATION: This was a normal nerve conduction and EMG study showing no signs of neuropathy myopathy or radiculopathy in the nerves and muscles tested.        Cervical MRI from 9/15/2014 personally reviewed with patient:  1. Stable appearance of cervical spine examination. 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 Mild multilevel degenerative changes. No high-grade spinal or foraminal  Stenosis.     Cervical MRI images taken on 11/23/2016 personally reviewed with patient:  Mild reversal of cervical curvature, centered at C3-C4, stable. Partial fusion  at C6-C7, stable. No compression fracture or pathologic marrow signal. No  prevertebral soft tissue abnormalities. Spinous processes and posterior soft  tissues unremarkable. Craniocervical junction and spinal cord are also  unremarkable. Dominant left vertebral artery, stable.      C2-C3: No disc herniation, central or foraminal stenosis.     C3-C4: Mild posterior disc bulge. No central stenosis, cord contact or foraminal  stenosis.     C4-C5: Mild posterior disc bulge with no central or foraminal stenosis.     C5-C6: Mild posterior disc bulge. No central or foraminal stenosis.     C6-7 and C7-T1: No disc herniation, central or foraminal stenosis.      IMPRESSION  IMPRESSION: Stable mild degenerative disease at C3-C4 and C4-C5.  No significant  central stenosis, cord compression or foraminal stenosis.      25 minutes of face-to-face contact were spent with the patient during today's visit extensively discussing symptoms and treatment plan. All questions were answered. More than half of this visit today was spent on counseling.      Written by Alex Barger as dictated by Bhanu Mcgrath MD

## 2018-03-06 NOTE — PATIENT INSTRUCTIONS
Low Back Pain: Exercises  Your Care Instructions  Here are some examples of typical rehabilitation exercises for your condition. Start each exercise slowly. Ease off the exercise if you start to have pain. Your doctor or physical therapist will tell you when you can start these exercises and which ones will work best for you. How to do the exercises  Press-up    1. Lie on your stomach, supporting your body with your forearms. 2. Press your elbows down into the floor to raise your upper back. As you do this, relax your stomach muscles and allow your back to arch without using your back muscles. As your press up, do not let your hips or pelvis come off the floor. 3. Hold for 15 to 30 seconds, then relax. 4. Repeat 2 to 4 times. Alternate arm and leg (bird dog) exercise    Do this exercise slowly. Try to keep your body straight at all times, and do not let one hip drop lower than the other. 1. Start on the floor, on your hands and knees. 2. Tighten your belly muscles. 3. Raise one leg off the floor, and hold it straight out behind you. Be careful not to let your hip drop down, because that will twist your trunk. 4. Hold for about 6 seconds, then lower your leg and switch to the other leg. 5. Repeat 8 to 12 times on each leg. 6. Over time, work up to holding for 10 to 30 seconds each time. 7. If you feel stable and secure with your leg raised, try raising the opposite arm straight out in front of you at the same time. Knee-to-chest exercise    1. Lie on your back with your knees bent and your feet flat on the floor. 2. Bring one knee to your chest, keeping the other foot flat on the floor (or keeping the other leg straight, whichever feels better on your lower back). 3. Keep your lower back pressed to the floor. Hold for at least 15 to 30 seconds. 4. Relax, and lower the knee to the starting position. 5. Repeat with the other leg. Repeat 2 to 4 times with each leg.   6. To get more stretch, put your other leg flat on the floor while pulling your knee to your chest.  Curl-ups    1. Lie on the floor on your back with your knees bent at a 90-degree angle. Your feet should be flat on the floor, about 12 inches from your buttocks. 2. Cross your arms over your chest. If this bothers your neck, try putting your hands behind your neck (not your head), with your elbows spread apart. 3. Slowly tighten your belly muscles and raise your shoulder blades off the floor. 4. Keep your head in line with your body, and do not press your chin to your chest.  5. Hold this position for 1 or 2 seconds, then slowly lower yourself back down to the floor. 6. Repeat 8 to 12 times. Pelvic tilt exercise    1. Lie on your back with your knees bent. 2. \"Brace\" your stomach. This means to tighten your muscles by pulling in and imagining your belly button moving toward your spine. You should feel like your back is pressing to the floor and your hips and pelvis are rocking back. 3. Hold for about 6 seconds while you breathe smoothly. 4. Repeat 8 to 12 times. Heel dig bridging    1. Lie on your back with both knees bent and your ankles bent so that only your heels are digging into the floor. Your knees should be bent about 90 degrees. 2. Then push your heels into the floor, squeeze your buttocks, and lift your hips off the floor until your shoulders, hips, and knees are all in a straight line. 3. Hold for about 6 seconds as you continue to breathe normally, and then slowly lower your hips back down to the floor and rest for up to 10 seconds. 4. Do 8 to 12 repetitions. Hamstring stretch in doorway    1. Lie on your back in a doorway, with one leg through the open door. 2. Slide your leg up the wall to straighten your knee. You should feel a gentle stretch down the back of your leg. 3. Hold the stretch for at least 15 to 30 seconds. Do not arch your back, point your toes, or bend either knee.  Keep one heel touching the floor and the other heel touching the wall. 4. Repeat with your other leg. 5. Do 2 to 4 times for each leg. Hip flexor stretch    1. Kneel on the floor with one knee bent and one leg behind you. Place your forward knee over your foot. Keep your other knee touching the floor. 2. Slowly push your hips forward until you feel a stretch in the upper thigh of your rear leg. 3. Hold the stretch for at least 15 to 30 seconds. Repeat with your other leg. 4. Do 2 to 4 times on each side. Wall sit    1. Stand with your back 10 to 12 inches away from a wall. 2. Lean into the wall until your back is flat against it. 3. Slowly slide down until your knees are slightly bent, pressing your lower back into the wall. 4. Hold for about 6 seconds, then slide back up the wall. 5. Repeat 8 to 12 times. Follow-up care is a key part of your treatment and safety. Be sure to make and go to all appointments, and call your doctor if you are having problems. It's also a good idea to know your test results and keep a list of the medicines you take. Where can you learn more? Go to http://chikiAutonet Mobilezhao.info/. Enter Q259 in the search box to learn more about \"Low Back Pain: Exercises. \"  Current as of: March 21, 2017  Content Version: 11.4  © 7456-7225 Healthwise, Incorporated. Care instructions adapted under license by [employer] (which disclaims liability or warranty for this information). If you have questions about a medical condition or this instruction, always ask your healthcare professional. [place] [address] any warranty or liability for your use of this information. Acute Low Back Pain: Exercises  Your Care Instructions  Here are some examples of typical rehabilitation exercises for your condition. Start each exercise slowly. Ease off the exercise if you start to have pain.   Your doctor or physical therapist will tell you when you can start these exercises and which ones will work best for you. When you are not being active, find a comfortable position for rest. Some people are comfortable on the floor or a medium-firm bed with a small pillow under their head and another under their knees. Some people prefer to lie on their side with a pillow between their knees. Don't stay in one position for too long. Take short walks (10 to 20 minutes) every 2 to 3 hours. Avoid slopes, hills, and stairs until you feel better. Walk only distances you can manage without pain, especially leg pain. How to do the exercises  Back stretches    5. Get down on your hands and knees on the floor. 6. Relax your head and allow it to droop. Round your back up toward the ceiling until you feel a nice stretch in your upper, middle, and lower back. Hold this stretch for as long as it feels comfortable, or about 15 to 30 seconds. 7. Return to the starting position with a flat back while you are on your hands and knees. 8. Let your back sway by pressing your stomach toward the floor. Lift your buttocks toward the ceiling. 9. Hold this position for 15 to 30 seconds. 10. Repeat 2 to 4 times. Follow-up care is a key part of your treatment and safety. Be sure to make and go to all appointments, and call your doctor if you are having problems. It's also a good idea to know your test results and keep a list of the medicines you take. Where can you learn more? Go to http://chiki-zhao.info/. Enter M280 in the search box to learn more about \"Acute Low Back Pain: Exercises. \"  Current as of: March 21, 2017  Content Version: 11.4  © 2469-1969 Healthwise, Incorporated. Care instructions adapted under license by [employer] (which disclaims liability or warranty for this information). If you have questions about a medical condition or this instruction, always ask your healthcare professional. [address] any warranty or liability for your use of this information. Therapeutic Ball: Back Exercises  Your Care Instructions  Here are some examples of typical exercises for your condition. Start each exercise slowly. Ease off the exercise if you start to have pain. Your doctor or physical therapist will tell you when you can start these exercises and which ones will work best for you. To prepare, make sure that your ball is the right size for you. When inflated and firm, it should allow you to sit with your hips and knees bent at about a 90-degree angle (like the letter L). How to do the exercises  Seated position on ball    11. Use this exercise to get used to moving on the ball and to find your best sitting position. 12. Sit comfortably on the ball with your feet about hip-width apart. If you feel unsteady, rest your hands on the ball near your hips. 13. As you do this exercise, try to keep your shoulders and upper body relaxed and still. 14. Using your stomach and back muscles to move your pelvis, roll the ball forward. This will round your back. 15. Still using your stomach and back muscles, roll the ball back. You will arch your back. 16. Repeat this rounding-arching motion a few times. 17. Stop in between the two positions, where your back is not rounded or arched. This is called your neutral position. Pelvic rotation    8. Sit tall on the ball. 9. Slowly rotate your hips in a [place] pattern. Keep the movement focused at your hips. 10. Repeat, but [place] in the other direction. 11. Repeat 8 to 12 times. Postural sitting    7. Use this position to find a stable, relaxed posture on the ball. You can use this position as your starting point for other ball exercises. If you feel unsteady on the ball, start on a chair first.  8. Sit on a ball or chair, with your feet planted straight in front of you. 9. Imagine that a string at the top of your head is pulling you straight up. Think of yourself as 2 inches taller than you are. 10. Slightly tuck your chin.   11. Keep your shoulders back and relaxed. Knee extension    7. Sit tall on the ball with your feet planted in front of you, hip-width apart. As you do this exercise, avoid slumping your shoulders and arching your back. 8. Rest your hands on the ball near your hip or a steady object next to you. (If you feel very stable on the ball, rest your hands in your lap or at your side.)  9. Slowly straighten one leg at the knee. Slowly lower it back down. Repeat with the other leg. 10. Repeat this exercise 8 to 12 times. Roll-ups    5. Lie on your back with your knees bent, feet resting on the floor. 6. Lay the ball on your thighs. Rest your hands up high on the ball. 7. Raising your head and shoulder blades, roll the ball up your thighs. Exhale as you roll up. 8. If this is hard on your neck, gently support your lower head and upper neck with one hand. Don't use that hand to pull your head up. 9. Repeat 8 to 12 times. Ball curls    5. Lie on your back with your ankles resting on the ball, knees straight. 6. Use your legs to roll the exercise ball toward you. Allow your knees to bend and move closer to your chest.  7. Pause briefly, and then roll the ball to the starting position. Try to keep the ball rolling straight. You will feel the muscles in your lower belly working. 8. Repeat 8 to 12 times. Bridge with ball under legs    6. Lie on your back with your legs up, calves resting on the ball. For more challenge, rest your heels on the ball. 7. Look up at the ceiling, and keep your chin relaxed. You can place a small pillow under your head or neck for comfort. 8. With your arms by your side, press your hands onto the floor for stability. 9. Tighten your belly muscles by pulling in your belly button toward your spine. 10. Push your heels down toward the floor, squeeze your buttocks, and lift your hips off the floor until your shoulders, hips, and knees are all in a straight line. 11. Try to keep the ball steady.  Hold for about 6 seconds as you continue to breathe normally. 12. Slowly lower your hips back down to the floor. 13. Repeat 8 to 12 times. Ball curls with bridge    5. Start flat on your back with your ankles resting on the ball. 6. Look up at the ceiling, and keep your chin relaxed. You can place a small pillow under your head or neck for comfort. 7. With your arms by your side, press your hands onto the floor for stability. 8. Tighten your belly muscles by pulling in your belly button toward your spine. 9. Push your heels down toward the floor, squeeze your buttocks, and lift your hips off the floor until your shoulders, hips, and knees are all in a straight line. 10. While holding the bridge position, roll the ball toward you with your heels. Keep your hips as level as you can. 11. Pause briefly, and then roll the ball back out. Try to keep the ball rolling straight. You will feel the muscles in your lower belly working as you straighten your legs. 12. Lower your hips, and return to your starting position. 13. Repeat 8 to 12 times. 14. When you can keep your body and the ball steady throughout this exercise, you're ready for more challenge. Try keeping your hips raised while rolling the ball out, holding the bridge, and rolling back, a few times in a row. Praying andrew    6. Kneel upright with the ball in front of you. 7. To start, clasp your hands together. Rest them on the ball in front of you. 8. As you do this exercise, keep your back and hips straight and tighten your belly and buttocks muscles. Keep your knees in place. 9. Press on the ball with your arms. Lean forward from the knees. This rolls the ball forward. You will bear most of your weight on your arms. 10. If your back starts to ache, you've gone too far. Pull back a bit. 11. Roll back to the start position. 12. Repeat 8 to 12 times. Walk-out plank on ball    1. Kneel over the ball.  Place your hands on the floor in front of you.  2. Walk your hands forward until your legs are straight on the ball. This is the plank position. 3. When in plank position, hold your body straight and tighten your belly and buttocks muscles. Keep your chin slightly tucked. 4. Roll as far forward as you can without losing your balance or letting your hips drop. You may stop with the ball under your thighs, or even under your knees or shins. 5. Hold a few seconds, then walk your hands back and return to the start position. 6. Repeat 8 to 12 times. Push-up with thighs on ball    1. Kneel over the ball. Place your hands on the floor in front of you. 2. Walk your hands forward until your legs are straight on the ball. This is the plank position. 3. When in plank position, hold your body straight and tighten your belly and buttocks muscles. Keep your chin slightly tucked. 4. Roll as far forward as you can without losing your balance or letting your hips drop. You may stop with the ball under your thighs, or even under your knees or shins. 5. Bend your elbows. Slowly lower your body toward the ground as far as you can without losing your balance. 6. If your wrists hurt, try moving your hands a little farther apart so they're not right under your shoulders. 7. Slowly straighten your arms. 8. Do 8 to 12 of these push-ups. Wall squat with ball    1. Stand facing away from a wall. Place your feet about shoulder-width apart. 2. Place the ball between your middle back and the wall. Move your feet out in front of you so they are about a foot in front of your hips. 3. Keep your arms at your sides, or put your hands on your hips. 4. Slowly squat down as if you are going to sit in a chair, rolling your back over the ball as you squat. The ball should move with you but stay pressed into the wall. 5. Be sure that your knees do not go in front of your toes as you squat. 6. Hold for 6 seconds. 7. Slowly rise to your standing position.   8. Repeat 8 to 12 times.  Child's pose with ball    1. Kneeling upright with your back straight, rest your hands on the ball in front of you. 2. Breathe out as you bend at the hips, and roll the ball forward. Lower your chest toward the ground, and drop your hips back toward your heels. 3. To stretch your upper back and shoulders, hold this position for 15 to 30 seconds. 4. Repeat 2 to 4 times. Follow-up care is a key part of your treatment and safety. Be sure to make and go to all appointments, and call your doctor if you are having problems. It's also a good idea to know your test results and keep a list of the medicines you take. Where can you learn more? Go to http://chiki-zhao.info/. Enter D724 in the search box to learn more about \"Therapeutic Ball: Back Exercises. \"  Current as of: March 21, 2017  Content Version: 11.4  © 0871-7360 Healthwise, [employer]. Care instructions adapted under license by [employer] (which disclaims liability or warranty for this information). If you have questions about a medical condition or this instruction, always ask your healthcare professional. [place] [address] any warranty or liability for your use of this information.

## 2018-04-04 ENCOUNTER — OFFICE VISIT (OUTPATIENT)
Dept: CARDIOLOGY CLINIC | Age: 38
End: 2018-04-04

## 2018-04-04 VITALS
HEART RATE: 80 BPM | BODY MASS INDEX: 25.33 KG/M2 | SYSTOLIC BLOOD PRESSURE: 112 MMHG | HEIGHT: 65 IN | OXYGEN SATURATION: 98 % | DIASTOLIC BLOOD PRESSURE: 70 MMHG | WEIGHT: 152 LBS

## 2018-04-04 DIAGNOSIS — I49.1 PAC (PREMATURE ATRIAL CONTRACTION): ICD-10-CM

## 2018-04-04 DIAGNOSIS — E78.00 HYPERCHOLESTEROLEMIA: ICD-10-CM

## 2018-04-04 DIAGNOSIS — R00.2 PALPITATIONS: Primary | ICD-10-CM

## 2018-04-04 DIAGNOSIS — R55 SYNCOPE, VASOVAGAL: ICD-10-CM

## 2018-04-04 NOTE — MR AVS SNAPSHOT
[address] [place] [address] Karla Garrett [id number] 
[phone] Patient: Misty Barboza MRN: [id number] BLF:[id number] Visit Information Date & Time Provider Department Dept. Phone Encounter #  
 4/4/2018 12:40 PM Christine Rojo, [address] [facility] [address] [id number] Follow-up Instructions Return in about 6 months (around 10/4/2018), or if symptoms worsen or fail to improve. Follow-up and Disposition History Upcoming Health Maintenance Date Due Pneumococcal 19-64 Medium Risk (1 of 1 - PPSV23) 5/3/1997 DTaP/Tdap/Td series (1 - Tdap) 5/3/1999 Influenza Age 5 to Adult 8/1/2017 Allergies as of 4/4/2018  Review Complete On: 4/4/2018 By: Christine Rojo, DO Severity Noted Reaction Type Reactions Opioids - Morphine Analogues  08/27/2015    Myalgia Other Medication  03/03/2016    Other (comments) Pt reports allergy to steroids, with psychological side effects Pollen Extracts  08/07/2012    Unable to Obtain Zithromax [Azithromycin]    Itching Current Immunizations  Never Reviewed No immunizations on file. Not reviewed this visit You Were Diagnosed With   
  
 Codes Comments Palpitations    -  Primary ICD-10-CM: R00.2 ICD-9-CM: 785.1 PAC (premature atrial contraction)     ICD-10-CM: I49.1 ICD-9-CM: 427.61 Hypercholesterolemia     ICD-10-CM: E78.00 ICD-9-CM: 272.0 Syncope, vasovagal     ICD-10-CM: R55 
ICD-9-CM: 780. 2 Vitals BP Pulse Height(growth percentile) Weight(growth percentile) SpO2 BMI  
 112/70 80 5' 5\" (1.651 m) 152 lb (68.9 kg) 98% 25.29 kg/m2 Smoking Status Current Every Day Smoker Vitals History BMI and BSA Data Body Mass Index Body Surface Area  
 25.29 kg/m 2 1.78 m 2 Preferred Pharmacy Pharmacy Name Phone [id number]- [address] [phone] Your Updated Medication List  
  
   
This list is accurate as of 4/4/18  1:29 PM.  Always use your most recent med list.  
  
  
  
  
 ALPRAZolam 0.5 mg tablet Commonly known as:  [place] Take 0.5 mg by mouth two (2) times a day. LEXAPRO 10 mg tablet Generic drug:  escitalopram oxalate Take 15 mg by mouth daily. MULTIVITAMIN PO Take 1 Tab by mouth daily. TYLENOL EXTRA STRENGTH 500 mg tablet Generic drug:  acetaminophen Take 500 mg by mouth every six (6) hours as needed. VITAMIN C 500 mg tablet Generic drug:  ascorbic acid (vitamin C) Take 500 mg by mouth daily. VITAMIN D3 2,000 unit Tab Generic drug:  cholecalciferol (vitamin D3) Take 1 Tab by mouth daily. ZEGERID 40-1.1 mg-gram capsule Generic drug:  omeprazole-sodium bicarbonate Take 2 Caps by mouth daily. We Performed the Following AMB POC EKG ROUTINE W/ 12 LEADS, INTER & REP [49518 CPT(R)] Follow-up Instructions Return in about 6 months (around 10/4/2018), or if symptoms worsen or fail to improve. To-Do List   
 Around 04/04/2018 ECG:  ECG EVENT RECORD MONITORING SET-UP Introducing [facility]! Dear Liliya Calero: Thank you for requesting a Autoniq account. Our records indicate that you already have an active Autoniq account. You can access your account anytime at [email or web address] Did you know that you can access your hospital and ER discharge instructions at any time in Autoniq? You can also review all of your test results from your hospital stay or ER visit. Additional Information If you have questions, please visit the Frequently Asked Questions section of the Autoniq website at [email or web address] Remember, Autoniq is NOT to be used for urgent needs. For medical emergencies, dial 911. Now available from your iPhone and Android! Please provide this summary of care documentation to your next provider. Your primary care clinician is listed as SAROJ MCNEIL. If you have any questions after today's visit, please call [phone].

## 2018-04-04 NOTE — PROGRESS NOTES
Review of Systems   Constitutional: Positive for malaise/fatigue and weight loss. Negative for chills and fever. Cardiovascular: Positive for palpitations. Negative for chest pain and leg swelling. Gastrointestinal: Positive for abdominal pain, diarrhea, heartburn and nausea. Negative for blood in stool and melena. Musculoskeletal: Positive for myalgias. Negative for falls and joint pain. Neurological: Positive for dizziness.

## 2018-04-04 NOTE — PROGRESS NOTES
HPI: I saw Humberto Peck in my office today in cardiovascular evaluation regarding his problems with palpitations in the past and some dizziness issues. Mr. Kristina Mendoza is a pleasant 42year old white male with history of palpitations and some anxiety issues with Holters in the past, dating back to 2006, showing some PACs and reconfirmed on a Holter in November of 2014.      He also has history of dizziness problems, which back in November of 2012 prompted us to do a tilt table test and the stress portion of that test using sublingual nitroglycerin demonstrated a severe bradycardia with junctional rhythm and an 8 second pause secondary to very high vagal tone. We decided not to place a pacemaker and the patient has had no further syncopal episodes, but he still complaints of some vague lightheadedness from time to time. He unfortunately continues to smoke a pack of cigarettes per day. He has been somewhat concerned about the possibility of developing heart disease, so we did do a calcium score on him back in February of 2011, which was 0, suggesting he has a very low ten year risk of cardiovascular event. He comes into the office today and relates that he is been having a lot of palpitations over the last 8 days. He has been having what he calls a fluttering sensation  went with 1 attributes in a row, but this occurred probably 50 or 100 times a week ago Sunday and since that time he has been having them several times a day and he said 20-25 times today. It should be noted that this gentleman does have known gastroesophageal reflux disease and is considering surgery for his hiatal hernia since his reflux has been getting worse recently and apparently there is been some signs of aspiration, so I think this may have something to do with his rhythm issues as well. Encounter Diagnoses   Name Primary?     Palpitations Yes    PAC (premature atrial contraction)     Hypercholesterolemia     Syncope, vasovagal        Discussion: This gentleman appears to be doing only fair with increased palpitations recently which in the past have been PACs and I am going to go ahead and give him an Event monitor for 30 days to see if we cannot clarify his rhythm a little bit better. He has had some problems with cough and apparently there is evidence to suggest he may have some aspiration issues related to his hiatal hernia and gastroesophageal reflux disease and it is not uncommon for patients who have aspiration from reflux to have some palpitation issues and it may be that the worsening of his reflux issues have caused the worsening of his palpitations. His blood pressure is well-controlled and his EKG is otherwise stable, so I am simply going to get the event monitor and make further recommendations after reviewing that monitoring pending his course.       PCP:  Henry Navarrete MD      Past Medical History:   Diagnosis Date    Anxiety     Arm fatigue     Forearms    Balance problems     Cardiac Agatston CAC score 100-199 02/04/2015    Coronary calcium score 0.    Cardiac echocardiogram 10/29/2014    EF 55-60%. No RWMA. Normal diastolic fx. RVSP 30 mmHg.  Cardiac Holter monitor study 10/29/2014    Normal Holter study.     Chronic cough     chronically coughing up sputum    Depression     Dizziness     Dyspnea     Esophageal reflux     Headache     Hypercholesterolemia     Lumbar pain     Myofascial pain     Neck pain     Numbness and tingling     PAC (premature atrial contraction)     Palpitations     presumably benign    Panic disorder     Reflux     Thoracic back pain     Mid-thoracic    Upper extremity weakness     Vertigo, intermittent          Past Surgical History:   Procedure Laterality Date    APPENDECTOMY  11/23/2010    HX HEENT      corrective hearing for left ear     HX HEENT      plastic surgery on both ears    HX HEENT  10/08 & 09/09    Reformed jaw bone, bone graft    HX RENAL BIOPSY      HX TONSILLECTOMY           Current Outpatient Rx   Name  Route  Sig  Dispense  Refill    ALPRAZolam (XANAX) 0.5 mg tablet    Oral    Take 0.5 mg by mouth two (2) times a day.  mometasone (NASONEX) 50 mcg/actuation nasal spray    Both Nostrils    1 Spray by Both Nostrils route daily.  ascorbic acid (VITAMIN C) 500 mg tablet    Oral    Take 500 mg by mouth daily.  cholecalciferol, vitamin D3, (VITAMIN D3) 2,000 unit Tab    Oral    Take 1 Tab by mouth daily.  escitalopram (LEXAPRO) 10 mg tablet    Oral    Take 10 mg by mouth daily.  omeprazole-sodium bicarbonate (ZEGERID) 40-1.1 mg-gram capsule    Oral    Take 1 Cap by mouth daily.  acetaminophen (TYLENOL EXTRA STRENGTH) 500 mg tablet    Oral    Take 500 mg by mouth every six (6) hours as needed.  MULTIVITAMIN PO    Oral    Take 1 Tab by mouth daily. Allergies   Allergen Reactions    Opioids - Morphine Analogues Myalgia    Other Medication Other (comments)     Pt reports allergy to steroids, with psychological side effects    Pollen Extracts Unable to Obtain    Zithromax [Azithromycin] Itching       Review of Systems:  Constitutional: Positive for malaise/fatigue and weight loss. Negative for chills and fever. Cardiovascular: Positive for palpitations. Negative for chest pain and leg swelling. Gastrointestinal: Positive for abdominal pain, diarrhea, heartburn and nausea. Negative for blood in stool and melena. Musculoskeletal: Positive for myalgias. Negative for falls and joint pain. Neurological: Positive for dizziness. Physical Exam:  This is a well-developed, well-nourished, anxious 43year-old  male in no acute distress at the time of the examination.    Visit Vitals    /70    Pulse 80    Ht 5' 5\" (1.651 m)    Wt 68.9 kg (152 lb)    SpO2 98%    BMI 25.29 kg/m2       HEENT: Conjuctiva white, mucosa moist, no pallor or cyanosis. NECK: Supple without masses, tenderness or thyromegaly. There was no jugular venous distention. Carotid are full bilaterally without bruits. CHEST: Symmetrical with good excursion. LUNGS: Clear to auscultation in all fields. HEART: The apex is not displaced. There were no lifts, thrills or heaves. There is a normal S1 and S2 without appreciable murmurs rubs clicks or gallops auscultated. ABDOMEN: Soft without masses, tenderness or organomegaly. EXTREMITIES: Full peripheral pulses without peripheral edema. Review of Data: Please refer to past medical history for most recent cardiac testing. Results for orders placed or performed in visit on 04/04/18   AMB POC EKG ROUTINE W/ 12 LEADS, INTER & REP     Status: None    Narrative    Normal sinus rhythm rate 80. This tracing is within normal limits and similar to the EKG of April 24, 2017. Sandie Stewart D.O., F.A.C.C. [facility]  [facility]  [address] [place] [address]. [address] [place]    PLEASE NOTE:  This document has been produced using voice recognition software. Unrecognized errors in transcription may be present.

## 2018-04-04 NOTE — PROGRESS NOTES
1. Have you been to the ER, urgent care clinic since your last visit? Hospitalized since your last visit? No     2. Have you seen or consulted any other health care providers outside of the [address] [place] [address] since your last visit? Include any pap smears or colon screening.  No

## 2018-04-24 NOTE — PROGRESS NOTES
[facility]ta Rodriguez  [address] 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 Will improve FOTO score by 16 points in order to demonstrate a significant improvement in function. Status at last note/certification: met, increased 19 points 18  Status at discharge: met    Goal: Patient will demonstrate understanding of updated HEP in order to continue to improve following D/C. Performed all therex appropriately with no pain and very min cues 18  Status at last note/certification:  Status at discharge: met    ASSESSMENT/RECOMMENDATIONS: pt making good progress with min pain (c/o back pain more than ankle pain); no significant difficulty with all mobility. He is independent with HEP and will cont working on strengthening and stability for B LEs.      [x]Discontinue therapy: [x]Patient has reached or is progressing toward set goals      []Patient is non-compliant or has abdicated      []Due to lack of appreciable progress towards set goals    Zuleima Jacobs, PT 2018 11:34 AM

## 2018-04-25 DIAGNOSIS — R00.2 PALPITATIONS: Primary | ICD-10-CM

## 2018-07-02 ENCOUNTER — TELEPHONE (OUTPATIENT)
Dept: CARDIOLOGY CLINIC | Age: 38
End: 2018-07-02

## 2018-07-02 NOTE — TELEPHONE ENCOUNTER
----- Message from Saul Bermeo DO sent at 6/29/2018  2:02 PM EDT -----  Regarding: RE: Event Monitor  As per our conversation this patient's Event monitor was relatively benign. ES    ----- Message -----     From: Yulissa Burch LPN     Sent: 7/88/5403  12:09 PM       To: Saul Bermeo DO  Subject: Event Monitor                                    Patient states that he had a 30 day event monitor and he has not heard anything back. Patient is not having any additional problems or concerns. He just wanted to know if he should be doing anything different.

## 2018-07-02 NOTE — TELEPHONE ENCOUNTER
LMOM for patient to call back to make him aware.       Verbal order and read back per Jonathan Base, DO

## 2018-08-22 NOTE — PROGRESS NOTES
[facility] Gregory Damon  [address] [facility]  [phone] fax    Physical Therapy Discharge Summary    Patient name: Brian Langley Start of Care:  17   Referral source: Opal Palomares MD : 1980   Medical/Treatment Diagnosis: Left shoulder pain [M25.512] Onset Date: 2 months ago   Prior Hospitalization: see medical history Provider#: [id number]   Medications: Verified on Patient Summary List     Comorbidities:  scoliosis  Prior Level of Function: full use of L shoulder AROM, self employed      Visits from Start of Care: 16    Missed Visits: 2    Reporting Period : 2018 to 2018    Summary of Care:  Goal: Pt. Will improve FOTO score by 16 points in order to demonstrate a significant improvement in function  Status at last note/certification: progressing well, increased 11 points 17  Status at discharge: not met    Goal: Patient will demonstrate understanding of updated HEP in order to continue to improve following D/C. Status at last note/certification: Performed all therex appropriately with no pain and very min cues 18  Status at discharge: not met    ASSESSMENT/RECOMMENDATIONS: pt making good progress with improving pain and ease with ADLs. However, didn't show for 2 last visits for HEP update/progression.      [x]Discontinue therapy: [x]Patient has reached or is progressing toward set goals      []Patient is non-compliant or has abdicated      []Due to lack of appreciable progress towards set goals    Charlie Mckenna, PT 2018 8:25 AM

## 2018-08-30 ENCOUNTER — OFFICE VISIT (OUTPATIENT)
Dept: CARDIOLOGY CLINIC | Age: 38
End: 2018-08-30

## 2018-08-30 VITALS
OXYGEN SATURATION: 98 % | HEART RATE: 71 BPM | WEIGHT: 152 LBS | DIASTOLIC BLOOD PRESSURE: 62 MMHG | BODY MASS INDEX: 25.33 KG/M2 | SYSTOLIC BLOOD PRESSURE: 108 MMHG | HEIGHT: 65 IN

## 2018-08-30 DIAGNOSIS — E78.00 HYPERCHOLESTEROLEMIA: ICD-10-CM

## 2018-08-30 DIAGNOSIS — R00.2 PALPITATIONS: Primary | ICD-10-CM

## 2018-08-30 NOTE — PATIENT INSTRUCTIONS
Continue present medication regimen Follow-up with Dr. Carolina Maza for GI issues Follow-up with Dr. Cuca Garcia as scheduled and as needed Stopping Smoking: Care Instructions Your Care Instructions Cigarette smokers crave the nicotine in cigarettes. Giving it up is much harder than simply changing a habit. Your body has to stop craving the nicotine. It is hard to quit, but you can do it. There are many tools that people use to quit smoking. You may find that combining tools works best for you. There are several steps to quitting. First you get ready to quit. Then you get support to help you. After that, you learn new skills and behaviors to become a nonsmoker. For many people, a necessary step is getting and using medicine. Your doctor will help you set up the plan that best meets your needs. You may want to attend a smoking cessation program to help you quit smoking. When you choose a program, look for one that has proven success. Ask your doctor for ideas. You will greatly increase your chances of success if you take medicine as well as get counseling or join a cessation program. 
Some of the changes you feel when you first quit tobacco are uncomfortable. Your body will miss the nicotine at first, and you may feel short-tempered and grumpy. You may have trouble sleeping or concentrating. Medicine can help you deal with these symptoms. You may struggle with changing your smoking habits and rituals. The last step is the tricky one: Be prepared for the smoking urge to continue for a time. This is a lot to deal with, but keep at it. You will feel better. Follow-up care is a key part of your treatment and safety. Be sure to make and go to all appointments, and call your doctor if you are having problems. It's also a good idea to know your test results and keep a list of the medicines you take. How can you care for yourself at home? · Ask your family, friends, and coworkers for support.  You have a better chance of quitting if you have help and support. · Join a support group, such as Nicotine Anonymous, for people who are trying to quit smoking. · Consider signing up for a smoking cessation program, such as the American Lung Association's Freedom from Smoking program. 
· Get text messaging support. Go to the website at www.smokefree. gov to sign up for the [facility] program. 
· Set a quit date. Pick your date carefully so that it is not right in the middle of a big deadline or stressful time. Once you quit, do not even take a puff. Get rid of all ashtrays and lighters after your last cigarette. Clean your house and your clothes so that they do not smell of smoke. · Learn how to be a nonsmoker. Think about ways you can avoid those things that make you reach for a cigarette. ¨ Avoid situations that put you at greatest risk for smoking. For some people, it is hard to have a drink with friends without smoking. For others, they might skip a coffee break with coworkers who smoke. ¨ Change your daily routine. Take a different route to work or eat a meal in a different place. · Cut down on stress. Calm yourself or release tension by doing an activity you enjoy, such as reading a book, taking a hot bath, or gardening. · Talk to your doctor or pharmacist about nicotine replacement therapy, which replaces the nicotine in your body. You still get nicotine but you do not use tobacco. Nicotine replacement products help you slowly reduce the amount of nicotine you need. These products come in several forms, many of them available over-the-counter: ¨ Nicotine patches ¨ Nicotine gum and lozenges ¨ Nicotine inhaler · Ask your doctor about bupropion (Wellbutrin) or varenicline (Chantix), which are prescription medicines. They do not contain nicotine. They help you by reducing withdrawal symptoms, such as stress and anxiety. · Some people find hypnosis, acupuncture, and massage helpful for ending the smoking habit. · Eat a healthy diet and get regular exercise. Having healthy habits will help your body move past its craving for nicotine. · Be prepared to keep trying. Most people are not successful the first few times they try to quit. Do not get mad at yourself if you smoke again. Make a list of things you learned and think about when you want to try again, such as next week, next month, or next year. Where can you learn more? Go to http://chiki-zhao.info/. Enter N816 in the search box to learn more about \"Stopping Smoking: Care Instructions. \" Current as of: November 29, 2017 Content Version: 11.7 © 5043-1145 [employer]. Care instructions adapted under license by [employer] (which disclaims liability or warranty for this information). If you have questions about a medical condition or this instruction, always ask your healthcare professional. [place] [address] any warranty or liability for your use of this information. Learning About Benefits From Quitting Smoking How does quitting smoking make you healthier? If you're thinking about quitting smoking, you may have a few reasons to be smoke-free. Your health may be one of them. · When you quit smoking, you lower your risks for cancer, lung disease, heart attack, stroke, blood vessel disease, and blindness from macular degeneration. · When you're smoke-free, you get sick less often, and you heal faster. You are less likely to get colds, flu, bronchitis, and pneumonia. · As a nonsmoker, you may find that your mood is better and you are less stressed. When and how will you feel healthier? Quitting has real health benefits that start from day 1 of being smoke-free. And the longer you stay smoke-free, the healthier you get and the better you feel. The first hours · After just 20 minutes, your blood pressure and heart rate go down. That means there's less stress on your heart and blood vessels. · Within 12 hours, the level of carbon monoxide in your blood drops back to normal. That makes room for more oxygen. With more oxygen in your body, you may notice that you have more energy than when you smoked. After 2 weeks · Your lungs start to work better. · Your risk of heart attack starts to drop. After 1 month · When your lungs are clear, you cough less and breathe deeper, so it's easier to be active. · Your sense of taste and smell return. That means you can enjoy food more than you have since you started smoking. Over the years · After 1 year, your risk of heart disease is half what it would be if you kept smoking. · After 5 years, your risk of stroke starts to shrink. Within a few years after that, it's about the same as if you'd never smoked. · After 10 years, your risk of dying from lung cancer is cut by about half. And your risk for many other types of cancer is lower too. How would quitting help others in your life? When you quit smoking, you improve the health of everyone who now breathes in your smoke. · Their heart, lung, and cancer risks drop, much like yours. · They are sick less. For babies and small children, living smoke-free means they're less likely to have ear infections, pneumonia, and bronchitis. · If you're a woman who is or will be pregnant someday, quitting smoking means a healthier . · Children who are close to you are less likely to become adult smokers. Where can you learn more? Go to http://chiki-zhao.info/. Enter 052 [id number] 72 11 in the search box to learn more about \"Learning About Benefits From Quitting Smoking. \" Current as of: 2017 Content Version: 11.7 © 4452-1814 [employer]. Care instructions adapted under license by [employer] (which disclaims liability or warranty for this information).  If you have questions about a medical condition or this instruction, always ask your healthcare professional. [place] [address] any warranty or liability for your use of this information.

## 2018-08-30 NOTE — PROGRESS NOTES
Arnel Henry presents today for follow-up. He was last seen by Dr. David Negron in April 2018 and during that visit he complained of increased palpitations. A 30 day event monitor was ordered and he states that he wore it. According to notes in his chart, the results were reviewed by Dr. David Negron and findings were essentially benign and reported to him as \"sinus bradycardia and sinus tachycardia. \" He is a 49-year-old  male with history of palpitations, dizziness, and tobacco use. His last echocardiogram was done in October 2014 and it showed an ejection fraction of 55-60%, no wall motion abnormalities, and RVSP of 30 mmHg. In 2012 he underwent a tilt table study due to his complaints of dizziness and it showed severe bradycardia with an 8 second pause secondary to high vagal tone. At that time, the decision was made not to proceed with pacemaker implant. Since that time he has done well has had no syncopal episodes. He reports that about 10 days ago, he had frequent palpitations after returning from vacation. Denies chest tightness, heaviness, and admits to palpitations. He admits to chest pain under his left breast that is present when he is standing but resolves when sitting or lying down. He has not tried any OTC analgesia. Denies shortness of breath at rest, dyspnea on exertion, orthopnea and PND. Denies abdominal bloating and admits to some abdominal pain. He admits to problems with reflux. He reports that after he eats, he tends to cough and clear his throat often. Sometimes this is when he notices an increase in the frequency of his palpitations. Denies lightheadedness, dizziness, and syncope. Denies lower extremity edema and claudication. Denies nausea, vomiting, diarrhea, melena, hematochezia. Denies hematuria, urgency, frequency. Denies fever, chills. PMH: 
Past Medical History:  
Diagnosis Date  Anxiety  Arm fatigue Forearms  Balance problems  Cardiac Agatston CAC score 100-199 02/04/2015 Coronary calcium score 0.  
 Cardiac echocardiogram 10/29/2014 EF 55-60%. No RWMA. Normal diastolic fx. RVSP 30 mmHg.  Cardiac Holter monitor study 10/29/2014 Normal Holter study.  Chronic cough   
 chronically coughing up sputum  Depression  Dizziness  Dyspnea  Esophageal reflux  Headache  Hypercholesterolemia  Lumbar pain  Myofascial pain  Neck pain  Numbness and tingling  PAC (premature atrial contraction)  Palpitations   
 presumably benign  Panic disorder  Reflux  Thoracic back pain Mid-thoracic  Upper extremity weakness  Vertigo, intermittent PSH: 
Past Surgical History:  
Procedure Laterality Date  APPENDECTOMY  11/23/2010  HX HEENT    
 corrective hearing for left ear  HX HEENT    
 plastic surgery on both ears  HX HEENT  10/08 & 09/09 Reformed jaw bone, bone graft  HX RENAL BIOPSY  HX TONSILLECTOMY MEDS: 
Current Outpatient Prescriptions Medication Sig  ALPRAZolam (XANAX) 0.5 mg tablet Take 0.5 mg by mouth two (2) times a day.  ascorbic acid (VITAMIN C) 500 mg tablet Take 500 mg by mouth daily.  cholecalciferol, vitamin D3, (VITAMIN D3) 2,000 unit Tab Take 1 Tab by mouth daily.  escitalopram (LEXAPRO) 10 mg tablet Take 15 mg by mouth daily.  omeprazole-sodium bicarbonate (ZEGERID) 40-1.1 mg-gram capsule Take 2 Caps by mouth daily.  acetaminophen (TYLENOL EXTRA STRENGTH) 500 mg tablet Take 500 mg by mouth every six (6) hours as needed.  MULTIVITAMIN PO Take 1 Tab by mouth daily. No current facility-administered medications for this visit. Allergies and Sensitivities: 
Allergies Allergen Reactions  Opioids - Morphine Analogues Myalgia  Other Medication Other (comments) Pt reports allergy to steroids, with psychological side effects  Pollen Extracts Unable to Obtain  Zithromax [Azithromycin] Itching Family History: 
Family History Problem Relation Age of Onset  Hypertension Father  Liver Disease Father  Hypertension Mother Social History: He  reports that he has been smoking Cigarettes. He has a 20.00 pack-year smoking history. He has never used smokeless tobacco.  He  reports that he does not drink alcohol. Physical: 
Visit Vitals  /62 (BP 1 Location: Left arm, BP Patient Position: Sitting)  Pulse 71  
 Ht 5' 5\" (1.651 m)  Wt 68.9 kg (152 lb)  SpO2 98%  BMI 25.29 kg/m2 Exam: 
Neck:  Supple, no JVD, no carotid bruits CV:  Normal S1 and  S2, no murmurs, rubs, or gallops noted Lungs:  Clear to ausculation throughout, no wheezes or rales Abd:  Soft, non-tender, non-distended with good bowel sounds. No hepatosplenomegaly Extremities:  No edema Data: 
EKG:   Read by Debra Covington, DO - Sinus rhythm.  Low voltage in limb leads LABS: 
Lab Results Component Value Date/Time Sodium 140 11/23/2010 04:30 AM  
 Potassium 4.1 11/23/2010 04:30 AM  
 Chloride 105 11/23/2010 04:30 AM  
 CO2 27 11/23/2010 04:30 AM  
 Glucose 80 11/23/2010 04:30 AM  
 BUN 9 11/23/2010 04:30 AM  
 Creatinine 1.0 11/23/2010 04:30 AM  
 
No results found for: CHOL, CHOLX, CHLST, CHOLV, HDL, LDL, LDLC, DLDLP, TGLX, TRIGL, TRIGP, CHHD, CHHDX Lab Results Component Value Date/Time ALT (SGPT) 68 (H) 11/23/2010 04:30 AM  
 
 
 
Impression: 1. History of palpitations 2. Tobacco use 3. Dizziness Plan: 1. No change in medications 2. Follow-up with Dr. Jluis Munson for GI issues 3. Follow-up with Dr. Terry Brown as scheduled and as needed Mr. Sathya Gutierrez was seen today for a check-up.   He reports an episode of increased palpitations about 10 days ago after returning for vacation which he states was somewhat stressful as his mother became ill during the trip.  The palpitations lasted for some time but did not seek evaluation. They slowly resolved and now are just occurring occasionally as they have in the past.  He requested this appointment as he noticed some chest pain under his left breast over the past couple of days. It is present when he is standing but resolves when he is sitting or lying down. It appears to be musculoskeletal in nature. He believes that it began occurring after he did some exercises that helps with his neck pain which involves holding onto a door frame and stretching. He also has noticed that after he eats, he has issues with coughing and having to clear his throat more often. He believes that his GERD has worsened and that his hiatal hernia is a contributing factor. He reports that he last saw his gastroenterologist, Dr. Zoran Hall, about 9 months ago. He states that he received a call about having a colonoscopy done and will need to schedule this procedure so he will call and follow-up with Dr. Zoran Hall. He states that his hiatal hernia has not been evaluated by scans in about 3 years. He tells me that he is considering seeing a surgeon about surgical treatment options for the hiatal hernia. Breonna Marcelo MSN, FNP-BC Please note:  Portions of this chart were created with Dragon medical speech to text program.  Unrecognized errors may be present.

## 2018-08-30 NOTE — MR AVS SNAPSHOT
[address] [place] [address] [address] [place] [address] 
[phone] Patient: Pako Capps MRN: [id number] YCK:7/8/1966 Visit Information Date & Time Provider Department Dept. Phone Encounter #  
 8/30/2018  9:30 AM Ricardo Roe NP [facility] [address] [id number] Your Appointments 10/8/2018  2:40 PM  
Follow Up with Myrna Bonilla DO [facility] Appt Note: 6 mo  f/u  
 [address] [place] [id number] [phone] [address] [place] [address] [address] Upcoming Health Maintenance Date Due Pneumococcal 19-64 Medium Risk (1 of 1 - PPSV23) 5/3/1997 DTaP/Tdap/Td series (1 - Tdap) 5/3/1999 Influenza Age 5 to Adult 8/1/2018 Allergies as of 8/30/2018  Review Complete On: 4/4/2018 By: Myrna Bonilla DO Severity Noted Reaction Type Reactions Opioids - Morphine Analogues  08/27/2015    Myalgia Other Medication  03/03/2016    Other (comments) Pt reports allergy to steroids, with psychological side effects Pollen Extracts  08/07/2012    Unable to Obtain Zithromax [Azithromycin]    Itching Current Immunizations  Never Reviewed No immunizations on file. Not reviewed this visit You Were Diagnosed With   
  
 Codes Comments Palpitations    -  Primary ICD-10-CM: R00.2 ICD-9-CM: 785.1 Hypercholesterolemia     ICD-10-CM: E78.00 ICD-9-CM: 272.0 Vitals BP Pulse Height(growth percentile) Weight(growth percentile) SpO2 BMI  
 108/62 (BP 1 Location: Left arm, BP Patient Position: Sitting) 71 5' 5\" (1.651 m) 152 lb (68.9 kg) 98% 25.29 kg/m2 Smoking Status Current Every Day Smoker Vitals History BMI and BSA Data  Body Mass Index Body Surface Area  
 25.29 kg/m 2 1.78 m 2  
  
  
 Preferred Pharmacy Pharmacy Name Phone [facility]/PHARMACY #[id number] [phone] Your Updated Medication List  
  
   
This list is accurate as of 8/30/18 10:39 AM.  Always use your most recent med list.  
  
  
  
  
 ALPRAZolam 0.5 mg tablet Commonly known as:  [place] Take 0.5 mg by mouth two (2) times a day. LEXAPRO 10 mg tablet Generic drug:  escitalopram oxalate Take 15 mg by mouth daily. MULTIVITAMIN PO Take 1 Tab by mouth daily. TYLENOL EXTRA STRENGTH 500 mg tablet Generic drug:  acetaminophen Take 500 mg by mouth every six (6) hours as needed. VITAMIN C 500 mg tablet Generic drug:  ascorbic acid (vitamin C) Take 500 mg by mouth daily. VITAMIN D3 2,000 unit Tab Generic drug:  cholecalciferol (vitamin D3) Take 1 Tab by mouth daily. ZEGERID 40-1.1 mg-gram capsule Generic drug:  omeprazole-sodium bicarbonate Take 2 Caps by mouth daily. We Performed the Following AMB POC EKG ROUTINE W/ 12 LEADS, INTER & REP [31245 CPT(R)] Patient Instructions Continue present medication regimen Follow-up with Dr. Elissa Aguirre for GI issues Follow-up with Dr. Asia Dash as scheduled and as needed Stopping Smoking: Care Instructions Your Care Instructions Cigarette smokers crave the nicotine in cigarettes. Giving it up is much harder than simply changing a habit. Your body has to stop craving the nicotine. It is hard to quit, but you can do it. There are many tools that people use to quit smoking. You may find that combining tools works best for you. There are several steps to quitting. First you get ready to quit. Then you get support to help you. After that, you learn new skills and behaviors to become a nonsmoker. For many people, a necessary step is getting and using medicine. Your doctor will help you set up the plan that best meets your needs.  You may want to attend a smoking cessation program to help you quit smoking. When you choose a program, look for one that has proven success. Ask your doctor for ideas. You will greatly increase your chances of success if you take medicine as well as get counseling or join a cessation program. 
Some of the changes you feel when you first quit tobacco are uncomfortable. Your body will miss the nicotine at first, and you may feel short-tempered and grumpy. You may have trouble sleeping or concentrating. Medicine can help you deal with these symptoms. You may struggle with changing your smoking habits and rituals. The last step is the tricky one: Be prepared for the smoking urge to continue for a time. This is a lot to deal with, but keep at it. You will feel better. Follow-up care is a key part of your treatment and safety. Be sure to make and go to all appointments, and call your doctor if you are having problems. It's also a good idea to know your test results and keep a list of the medicines you take. How can you care for yourself at home? · Ask your family, friends, and coworkers for support. You have a better chance of quitting if you have help and support. · Join a support group, such as Nicotine Anonymous, for people who are trying to quit smoking. · Consider signing up for a smoking cessation program, such as the American Lung Association's Freedom from Smoking program. 
· Get text messaging support. Go to the website at www.smokefree. gov to sign up for the [facility] program. 
· Set a quit date. Pick your date carefully so that it is not right in the middle of a big deadline or stressful time. Once you quit, do not even take a puff. Get rid of all ashtrays and lighters after your last cigarette. Clean your house and your clothes so that they do not smell of smoke. · Learn how to be a nonsmoker. Think about ways you can avoid those things that make you reach for a cigarette. ¨ Avoid situations that put you at greatest risk for smoking. For some people, it is hard to have a drink with friends without smoking. For others, they might skip a coffee break with coworkers who smoke. ¨ Change your daily routine. Take a different route to work or eat a meal in a different place. · Cut down on stress. Calm yourself or release tension by doing an activity you enjoy, such as reading a book, taking a hot bath, or gardening. · Talk to your doctor or pharmacist about nicotine replacement therapy, which replaces the nicotine in your body. You still get nicotine but you do not use tobacco. Nicotine replacement products help you slowly reduce the amount of nicotine you need. These products come in several forms, many of them available over-the-counter: ¨ Nicotine patches ¨ Nicotine gum and lozenges ¨ Nicotine inhaler · Ask your doctor about bupropion (Wellbutrin) or varenicline (Chantix), which are prescription medicines. They do not contain nicotine. They help you by reducing withdrawal symptoms, such as stress and anxiety. · Some people find hypnosis, acupuncture, and massage helpful for ending the smoking habit. · Eat a healthy diet and get regular exercise. Having healthy habits will help your body move past its craving for nicotine. · Be prepared to keep trying. Most people are not successful the first few times they try to quit. Do not get mad at yourself if you smoke again. Make a list of things you learned and think about when you want to try again, such as next week, next month, or next year. Where can you learn more? Go to http://chiki-zhao.info/. Enter I969 in the search box to learn more about \"Stopping Smoking: Care Instructions. \" Current as of: November 29, 2017 Content Version: 11.7 © 3093-1204 [employer], Kast.  Care instructions adapted under license by [employer] (which disclaims liability or warranty for this information). If you have questions about a medical condition or this instruction, always ask your healthcare professional. [address] any warranty or liability for your use of this information. Learning About Benefits From Quitting Smoking How does quitting smoking make you healthier? If you're thinking about quitting smoking, you may have a few reasons to be smoke-free. Your health may be one of them. · When you quit smoking, you lower your risks for cancer, lung disease, heart attack, stroke, blood vessel disease, and blindness from macular degeneration. · When you're smoke-free, you get sick less often, and you heal faster. You are less likely to get colds, flu, bronchitis, and pneumonia. · As a nonsmoker, you may find that your mood is better and you are less stressed. When and how will you feel healthier? Quitting has real health benefits that start from day 1 of being smoke-free. And the longer you stay smoke-free, the healthier you get and the better you feel. The first hours · After just 20 minutes, your blood pressure and heart rate go down. That means there's less stress on your heart and blood vessels. · Within 12 hours, the level of carbon monoxide in your blood drops back to normal. That makes room for more oxygen. With more oxygen in your body, you may notice that you have more energy than when you smoked. After 2 weeks · Your lungs start to work better. · Your risk of heart attack starts to drop. After 1 month · When your lungs are clear, you cough less and breathe deeper, so it's easier to be active. · Your sense of taste and smell return. That means you can enjoy food more than you have since you started smoking. Over the years · After 1 year, your risk of heart disease is half what it would be if you kept smoking. · After 5 years, your risk of stroke starts to shrink.  Within a few years after that, it's about the same as if you'd never smoked. · After 10 years, your risk of dying from lung cancer is cut by about half. And your risk for many other types of cancer is lower too. How would quitting help others in your life? When you quit smoking, you improve the health of everyone who now breathes in your smoke. · Their heart, lung, and cancer risks drop, much like yours. · They are sick less. For babies and small children, living smoke-free means they're less likely to have ear infections, pneumonia, and bronchitis. · If you're a woman who is or will be pregnant someday, quitting smoking means a healthier . · Children who are close to you are less likely to become adult smokers. Where can you learn more? Go to http://chiki-zhao.info/. Enter 052 [id number] 72 11 in the search box to learn more about \"Learning About Benefits From Quitting Smoking. \" Current as of: 2017 Content Version: 11.7 © 1652-3862 digedu. Care instructions adapted under license by [employer] (which disclaims liability or warranty for this information). If you have questions about a medical condition or this instruction, always ask your healthcare professional. [place] [address] any warranty or liability for your use of this information. Introducing [facility] & HEALTH SERVICES! Dear Julián Puente: Thank you for requesting a BusyEvent account. Our records indicate that you already have an active BusyEvent account. You can access your account anytime at [email or web address] Did you know that you can access your hospital and ER discharge instructions at any time in BusyEvent? You can also review all of your test results from your hospital stay or ER visit. Additional Information If you have questions, please visit the Frequently Asked Questions section of the BusyEvent website at [email or web address] Remember, Crocodile Goldhart is NOT to be used for urgent needs. For medical emergencies, dial 911. Now available from your iPhone and Android! Please provide this summary of care documentation to your next provider. Your primary care clinician is listed as SAROJ MCNEIL. If you have any questions after today's visit, please call [phone].

## 2018-08-30 NOTE — PROGRESS NOTES
1. Have you been to the ER, urgent care clinic since your last visit? Hospitalized since your last visit? No 
 
2. Have you seen or consulted any other health care providers outside of the [address] [place] [address] since your last visit? Include any pap smears or colon screening.  No

## 2018-09-20 ENCOUNTER — OFFICE VISIT (OUTPATIENT)
Dept: ORTHOPEDIC SURGERY | Age: 38
End: 2018-09-20

## 2018-09-20 VITALS
HEART RATE: 78 BPM | BODY MASS INDEX: 25.86 KG/M2 | RESPIRATION RATE: 17 BRPM | WEIGHT: 155.2 LBS | SYSTOLIC BLOOD PRESSURE: 103 MMHG | OXYGEN SATURATION: 96 % | DIASTOLIC BLOOD PRESSURE: 64 MMHG | TEMPERATURE: 98.2 F | HEIGHT: 65 IN

## 2018-09-20 DIAGNOSIS — M54.12 CERVICAL RADICULOPATHY: Primary | ICD-10-CM

## 2018-09-20 DIAGNOSIS — M79.18 MYOFASCIAL PAIN: ICD-10-CM

## 2018-09-20 RX ORDER — ESCITALOPRAM OXALATE 10 MG/1
15 TABLET ORAL DAILY
Refills: 2 | COMMUNITY
Start: 2018-08-20 | End: 2019-04-19 | Stop reason: ALTCHOICE

## 2018-09-20 NOTE — MR AVS SNAPSHOT
[address] [place] [address] [address] [place] [address] 
[phone] Patient: Willian Fothergill MRN: [id number] DKQ:[birth year] Visit Information Date & Time Provider Department Dept. Phone Encounter #  
 9/20/2018  9:00 AM Devante Lyman MD [facility] [phone] [id number] Follow-up Instructions Return after MRI. Your Appointments 10/8/2018  2:40 PM  
Follow Up with Rolando Dash DO [facility] ([facility]) Appt Note: 6 mo  f/u  
 [address] [facility] [address] [facility] [address]  
[phone] [address] [place] [address] [address] Upcoming Health Maintenance Date Due Pneumococcal 19-64 Medium Risk (1 of 1 - PPSV23) 5/3/1997 DTaP/Tdap/Td series (1 - Tdap) 5/3/1999 Influenza Age 5 to Adult 8/1/2018 Allergies as of 9/20/2018  Review Complete On: 9/20/2018 By: Gema Tang LPN Severity Noted Reaction Type Reactions Opioids - Morphine Analogues  08/27/2015    Myalgia Other Medication  03/03/2016    Other (comments) Pt reports allergy to steroids, with psychological side effects Pollen Extracts  08/07/2012    Unable to Obtain Zithromax [Azithromycin]    Itching Current Immunizations  Never Reviewed No immunizations on file. Not reviewed this visit You Were Diagnosed With   
  
 Codes Comments Cervical radiculopathy    -  Primary ICD-10-CM: M54.12 
ICD-9-CM: 723.4 Myofascial pain     ICD-10-CM: M79.1 ICD-9-CM: 729.1 Vitals BP Pulse Temp Resp Height(growth percentile) Weight(growth percentile) 103/64 78 98.2 °F (36.8 °C) 17 5' 5\" (1.651 m) 155 lb 3.2 oz (70.4 kg) SpO2 BMI Smoking Status 96% 25.83 kg/m2 Current Every Day Smoker BMI and BSA Data  Body Mass Index Body Surface Area  
 25.83 kg/m 2 1.8 m 2  
  
  
 Preferred Pharmacy Pharmacy Name Phone [facility]/PHARMACY #[id number] [address] [phone] Your Updated Medication List  
  
   
This list is accurate as of 9/20/18 10:28 AM.  Always use your most recent med list.  
  
  
  
  
 ALPRAZolam 0.5 mg tablet Commonly known as:  Wendall [place]in Take 0.5 mg by mouth two (2) times a day. escitalopram oxalate 10 mg tablet Commonly known as:  Arty [place] Take 15 mg by mouth daily. MULTIVITAMIN PO Take 1 Tab by mouth daily. TYLENOL EXTRA STRENGTH 500 mg tablet Generic drug:  acetaminophen Take 500 mg by mouth every six (6) hours as needed. VITAMIN C 500 mg tablet Generic drug:  ascorbic acid (vitamin C) Take 500 mg by mouth daily. VITAMIN D3 2,000 unit Tab Generic drug:  cholecalciferol (vitamin D3) Take 1 Tab by mouth daily. ZEGERID 40-1.1 mg-gram capsule Generic drug:  omeprazole-sodium bicarbonate Take 2 Caps by mouth daily. Follow-up Instructions Return after MRI. To-Do List   
 09/27/2018 Imaging:  MRI CERV SPINE WO CONT Introducing [facility] & HEALTH SERVICES! Dear Kenna Gosselin: Thank you for requesting a Placemeter account. Our records indicate that you already have an active Placemeter account. You can access your account anytime at [email or web address] Did you know that you can access your hospital and ER discharge instructions at any time in Placemeter? You can also review all of your test results from your hospital stay or ER visit. Additional Information If you have questions, please visit the Frequently Asked Questions section of the Placemeter website at [email or web address] Remember, Placemeter is NOT to be used for urgent needs. For medical emergencies, dial 911. Now available from your iPhone and Android! Please provide this summary of care documentation to your next provider. Your primary care clinician is listed as SAROJ MCNEIL. If you have any questions after today's visit, please call [phone].

## 2018-09-20 NOTE — PROGRESS NOTES
[address] 
[address], [address] [place], [facility] [address] Phone: [phone] Fax: [phone] Isaac Forde : 1980 PCP: Melisa Marie MD 
2018 PROGRESS NOTE ASSESSMENT AND PLAN Adilson Bowie comes in to the office today for PRN f/u. He now c/o weakness and a heavy feeling in his RUE in a C7/8 distribution vs ulnar nerve. He reports that he he also fell down his brother's steps and injured his right ankle. He states that about 5 days later, he experienced an exacerbation of his neck and upper back pain. He reports that since the fall, he has had increased tension in his neck/upper back and occasional dizziness and a new RUE paraethesia. He has completed two rounds of PT in the last year (from 17 to 18). He rates his pain as a 6/10 today. On examination, he had a positive Spurling's sign on the R and weakness with raising his right arm. His pain is likely due to cervical radiculopathy and myofascial pain, probably related to the fall. I offered a Prednisone dose pack, but he defers as he did not tolerate it well in the past and had to go to the ER. I referred for a cervical MRI for further evaluation. Depending on the results, we may consider an EMG. Pt will f/u after MRI or sooner as needed. Diagnoses and all orders for this visit: 1. Cervical radiculopathy -     MRI CERV SPINE WO CONT; Future 2. Myofascial pain -     MRI CERV SPINE WO CONT; Future Follow-up Disposition: Not on File HISTORY OF PRESENT ILLNESS Adilson Bowie is a 34 y.o. male. A&P / HPI from 2016: 
Loree Escobar was in the office today for a f/u appointment. His pain has remained myofascial in nature. He has not found relief from PT in the past or from using an antiinflammatory cream. Pt was advised to continue using his theracane and doing his home PT exercises.   
   
 Since the cream did not provide relief and NSAIDs upset his stomach, he was prescribed Celebrex 200mg BID prn. The risks, benefits, and potential side effects of this medication were discussed.  
   
Trigger point injections were offered but declined at this time. Pt said he would like to get a massage first.  
   
He will f/u prn.  
   
Updates from 12/02/16: 
Pt presents for a prn f/u. He has had a cervical and brain MRI since his last visit. The cervical MRI does not show any obvious cause for his pain. 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 
   
He comes in today with c/o cervical pain. His biggest complaint is a headache that seems consistent with occipital neuralgia, radiating from his left eye to a region below his left ear. 
   
   
Updates from 07/28/17: 
Pt presents for a new onset of numbness and tingling that radiate down the left upper extremity that ends in the last two finger.  He states that his pain in the cervical region has decreased since the last visit. He is also complaining of chronic pain in the left shoulder.  
   
Updates from 08/23/17: 
Pt presents with continued numbness and tingling in the left upper extremity. He was last referred to get a BUE EMG, which did not show significant signs of neuropathy myopathy or radiculopathy. 
  
Updates from 03/06/18: 
Pt presents for new low back pain that began a few weeks ago. He recently bought a Tempurpedic mattress and suspects that may be the cause of his pain as his back began hurting around the time he bought the bed.  
  
He continues to have muscle pain and tension in his neck. 
  
 
Updates from 09/20/18: 
Pt presents for PRN f/u. He now c/o a heavy sensation and weakness in his RUE in a C7/8 vs ulnar nerve distribution that tends to be worse in the mornings. He reports that he fell down his brother's stairs where he sprained his right ankle and exacerbated his neck and upper back pain and LUE paraesthesia. PAST MEDICAL HISTORY Past Medical History:  
Diagnosis Date  Anxiety  Arm fatigue Forearms  Balance problems  Cardiac Agatston CAC score 100-199 02/04/2015 Coronary calcium score 0.  
 Cardiac echocardiogram 10/29/2014 EF 55-60%. No RWMA. Normal diastolic fx. RVSP 30 mmHg.  Cardiac Holter monitor study 10/29/2014 Normal Holter study.  Chronic cough   
 chronically coughing up sputum  Depression  Dizziness  Dyspnea  Esophageal reflux  Headache  Hypercholesterolemia  Lumbar pain  Myofascial pain  Neck pain  Numbness and tingling  PAC (premature atrial contraction)  Palpitations   
 presumably benign  Panic disorder  Reflux  Thoracic back pain Mid-thoracic  Upper extremity weakness  Vertigo, intermittent Past Surgical History:  
Procedure Laterality Date  APPENDECTOMY  11/23/2010  HX HEENT    
 corrective hearing for left ear  HX HEENT    
 plastic surgery on both ears  HX HEENT  10/08 & 09/09 Reformed jaw bone, bone graft  HX RENAL BIOPSY  HX TONSILLECTOMY Lelo Acuña MEDICATIONS Current Outpatient Prescriptions Medication Sig Dispense Refill  ALPRAZolam (XANAX) 0.5 mg tablet Take 0.5 mg by mouth two (2) times a day.  ascorbic acid (VITAMIN C) 500 mg tablet Take 500 mg by mouth daily.  cholecalciferol, vitamin D3, (VITAMIN D3) 2,000 unit Tab Take 1 Tab by mouth daily.  escitalopram (LEXAPRO) 10 mg tablet Take 15 mg by mouth daily.  omeprazole-sodium bicarbonate (ZEGERID) 40-1.1 mg-gram capsule Take 2 Caps by mouth daily.  acetaminophen (TYLENOL EXTRA STRENGTH) 500 mg tablet Take 500 mg by mouth every six (6) hours as needed.  MULTIVITAMIN PO Take 1 Tab by mouth daily. ALLERGIES Allergies Allergen Reactions  Opioids - Morphine Analogues Myalgia  Other Medication Other (comments) Pt reports allergy to steroids, with psychological side effects  Pollen Extracts Unable to Obtain  Zithromax [Azithromycin] Itching SOCIAL HISTORY Social History Social History  Marital status: SINGLE Spouse name: N/A  
 Number of children: N/A  
 Years of education: N/A Occupational History  real estate Social History Main Topics  Smoking status: Current Every Day Smoker Packs/day: 1.00 Years: 20.00 Types: Cigarettes  Smokeless tobacco: Never Used  Alcohol use No  
 Drug use: No  
 Sexual activity: Not on file Other Topics Concern  Not on file Social History Narrative FAMILY HISTORY Family History Problem Relation Age of Onset  Hypertension Father  Liver Disease Father  Hypertension Mother REVIEW OF SYSTEMS Review of Systems Musculoskeletal: Positive for back pain and neck pain. BUE paraesthesia (R>L) PHYSICAL EXAMINATION There were no vitals taken for this visit. Pain Assessment  3/6/2018 Location of Pain Back;Neck Location Modifiers Left;Right Severity of Pain 5 Quality of Pain Aching Quality of Pain Comment - Duration of Pain Persistent Frequency of Pain Constant Date Pain First Started - Aggravating Factors - Aggravating Factors Comment - Limiting Behavior -  
Relieving Factors - Relieving Factors Comment - Result of Injury - Constitutional:  Well developed, well nourished, in no acute distress. Psychiatric: Affect and mood are appropriate. Integumentary: No rashes or abrasions noted on exposed areas. SPINE/MUSCULOSKELETAL EXAM 
 
Cervical spine: 
Neck is midline.   
Normal muscle tone.   
No focal atrophy is noted.   
ROM pain free.   
Shoulder ROM intact.   
Tenderness to palpation at left scalene's.   
Negative Spurling's sign.   
Negative Tinel's sign.   
Negative Garcia's sign.   
Flex forward posture.   
No clonus.  
   
 Sensation in the bilateral arms grossly intact to light touch.   
   
Lumbar spine: No rash, ecchymosis, or gross obliquity.   
No fasciculations.   
No focal atrophy is noted.   
No pain with hip ROM.   
Range of motion is normal.   
Tenderness to palpation. No tenderness to palpation at the sciatic notch.   
SI joints non-tender.   
Trochanters non tender. 
   
Sensation in the bilateral legs grossly intact to light touch. 
   
Updates from 12/02/16: 
Negative Garcia's sign bilaterally. No clonus. Pain in left scalene muscles with turning head to the right.  
  
Updates from 3/6/18: 
Tenderness to palpation of lumbar region No directional preference MOTOR:   
  Biceps  Triceps Deltoids Wrist Ext Wrist Flex Hand Intrin Right 5/5 5/5 5/5 5/5 5/5 5/5 Left 5/5 5/5 5/5 5/5 5/5 5/5 Hip Flex  Quads Hamstrings Ankle DF EHL Ankle PF Right 5/5 5/5 5/5 5/5 5/5 5/5 Left 5/5 5/5 5/5 5/5 5/5 5/5 DTRs are 2+ biceps, triceps, brachioradialis, patella, and Achilles. 
   
Negative Straight Leg raise.   
Squat not tested.   
No difficulty with tandem gait.   
Normal heel walk.   
Normal toe rise.  
   
Ambulation without assistive device. FWB.    
 
RADIOGRAPHS 
BUE EMG taken on 08/04/2017 personally reviewed with patient: 
NCS/EMG FINDINGS: 
   
· Evaluation of the Left median motor, the Right median motor, the Left ulnar motor, the Right ulnar motor, the Left Median 2nd Digit sensory, the Right Median 2nd Digit sensory, the Left ulnar sensory, and the Right ulnar sensory nerves were unremarkable. 
   
   
INTERPRETATION: This was a normal nerve conduction and EMG study showing no signs of neuropathy myopathy or radiculopathy in the nerves and muscles tested. 
   
   
Cervical MRI from 9/15/2014 personally reviewed with patient: 1. Stable appearance of cervical spine examination. Congenital fusion anomaly of 
C6-7. Mild multilevel degenerative changes. No high-grade spinal or foraminal 
Stenosis.    
Cervical MRI images taken on 11/23/2016 personally reviewed with patient: 
Mild reversal of cervical curvature, centered at C3-C4, stable. Partial fusion 
at C6-C7, stable. No compression fracture or pathologic marrow signal. No 
prevertebral soft tissue abnormalities. Spinous processes and posterior soft 
tissues unremarkable. Craniocervical junction and spinal cord are also 
unremarkable. Dominant left vertebral artery, stable. 
   
C2-C3: No disc herniation, central or foraminal stenosis.    
C3-C4: Mild posterior disc bulge. No central stenosis, cord contact or foraminal 
stenosis.    
C4-C5: Mild posterior disc bulge with no central or foraminal stenosis.    
C5-C6: Mild posterior disc bulge. No central or foraminal stenosis.    
C6-7 and C7-T1: No disc herniation, central or foraminal stenosis. 
   
IMPRESSION IMPRESSION: Stable mild degenerative disease at C3-C4 and C4-C5. No significant 
central stenosis, cord compression or foraminal stenosis.   
 
16 minutes of face-to-face contact were spent with the patient during today's visit extensively discussing symptoms and treatment plan. All questions were answered. More than half of this visit today was spent on counseling.   
 
Written by Javed Estrada as dictated by Myriam Wadsworth MD

## 2018-09-28 ENCOUNTER — HOSPITAL ENCOUNTER (OUTPATIENT)
Dept: MRI IMAGING | Age: 38
Discharge: HOME | End: 2018-09-28
Attending: PHYSICAL MEDICINE & REHABILITATION
Payer: COMMERCIAL

## 2018-09-28 DIAGNOSIS — M79.18 MYOFASCIAL PAIN: ICD-10-CM

## 2018-09-28 DIAGNOSIS — M54.12 CERVICAL RADICULOPATHY: ICD-10-CM

## 2018-09-28 PROCEDURE — 72141 MRI NECK SPINE W/O DYE: CPT

## 2018-09-30 ENCOUNTER — HOSPITAL ENCOUNTER (EMERGENCY)
Age: 38
Discharge: HOME | End: 2018-09-30
Attending: EMERGENCY MEDICINE
Payer: COMMERCIAL

## 2018-09-30 ENCOUNTER — APPOINTMENT (OUTPATIENT)
Dept: GENERAL RADIOLOGY | Age: 38
End: 2018-09-30
Attending: EMERGENCY MEDICINE
Payer: COMMERCIAL

## 2018-09-30 VITALS
SYSTOLIC BLOOD PRESSURE: 93 MMHG | DIASTOLIC BLOOD PRESSURE: 67 MMHG | TEMPERATURE: 98.4 F | OXYGEN SATURATION: 98 % | RESPIRATION RATE: 26 BRPM | HEART RATE: 64 BPM

## 2018-09-30 DIAGNOSIS — R00.2 PALPITATIONS: Primary | ICD-10-CM

## 2018-09-30 LAB
ALBUMIN SERPL-MCNC: 3.6 G/DL (ref 3.4–5)
ALBUMIN/GLOB SERPL: 1.2 {RATIO} (ref 0.8–1.7)
ALP SERPL-CCNC: 86 U/L (ref 45–117)
ALT SERPL-CCNC: 25 U/L (ref 16–61)
ANION GAP SERPL CALC-SCNC: 5 MMOL/L (ref 3–18)
AST SERPL-CCNC: 16 U/L (ref 15–37)
BASOPHILS # BLD: 0 K/UL (ref 0–0.1)
BASOPHILS NFR BLD: 0 % (ref 0–2)
BILIRUB SERPL-MCNC: 0.3 MG/DL (ref 0.2–1)
BUN SERPL-MCNC: 12 MG/DL (ref 7–18)
BUN/CREAT SERPL: 12 (ref 12–20)
CALCIUM SERPL-MCNC: 8.2 MG/DL (ref 8.5–10.1)
CHLORIDE SERPL-SCNC: 105 MMOL/L (ref 100–108)
CO2 SERPL-SCNC: 30 MMOL/L (ref 21–32)
CREAT SERPL-MCNC: 0.99 MG/DL (ref 0.6–1.3)
DIFFERENTIAL METHOD BLD: NORMAL
EOSINOPHIL # BLD: 0.1 K/UL (ref 0–0.4)
EOSINOPHIL NFR BLD: 1 % (ref 0–5)
ERYTHROCYTE [DISTWIDTH] IN BLOOD BY AUTOMATED COUNT: 12.7 % (ref 11.6–14.5)
GLOBULIN SER CALC-MCNC: 2.9 G/DL (ref 2–4)
GLUCOSE SERPL-MCNC: 132 MG/DL (ref 74–99)
HCT VFR BLD AUTO: 39.6 % (ref 36–48)
HGB BLD-MCNC: 14.2 G/DL (ref 13–16)
LYMPHOCYTES # BLD: 2.2 K/UL (ref 0.9–3.6)
LYMPHOCYTES NFR BLD: 31 % (ref 21–52)
MAGNESIUM SERPL-MCNC: 2.1 MG/DL (ref 1.6–2.6)
MCH RBC QN AUTO: 30 PG (ref 24–34)
MCHC RBC AUTO-ENTMCNC: 35.9 G/DL (ref 31–37)
MCV RBC AUTO: 83.5 FL (ref 74–97)
MONOCYTES # BLD: 0.5 K/UL (ref 0.05–1.2)
MONOCYTES NFR BLD: 7 % (ref 3–10)
NEUTS SEG # BLD: 4.4 K/UL (ref 1.8–8)
NEUTS SEG NFR BLD: 61 % (ref 40–73)
PLATELET # BLD AUTO: 181 K/UL (ref 135–420)
PMV BLD AUTO: 11 FL (ref 9.2–11.8)
POTASSIUM SERPL-SCNC: 3.9 MMOL/L (ref 3.5–5.5)
PROT SERPL-MCNC: 6.5 G/DL (ref 6.4–8.2)
RBC # BLD AUTO: 4.74 M/UL (ref 4.7–5.5)
SODIUM SERPL-SCNC: 140 MMOL/L (ref 136–145)
WBC # BLD AUTO: 7.2 K/UL (ref 4.6–13.2)

## 2018-09-30 PROCEDURE — 99285 EMERGENCY DEPT VISIT HI MDM: CPT

## 2018-09-30 PROCEDURE — 93005 ELECTROCARDIOGRAM TRACING: CPT

## 2018-09-30 PROCEDURE — 85025 COMPLETE CBC W/AUTO DIFF WBC: CPT | Performed by: EMERGENCY MEDICINE

## 2018-09-30 PROCEDURE — 71045 X-RAY EXAM CHEST 1 VIEW: CPT

## 2018-09-30 PROCEDURE — 80053 COMPREHEN METABOLIC PANEL: CPT | Performed by: EMERGENCY MEDICINE

## 2018-09-30 PROCEDURE — 83735 ASSAY OF MAGNESIUM: CPT | Performed by: EMERGENCY MEDICINE

## 2018-09-30 NOTE — ED PROVIDER NOTES
EMERGENCY DEPARTMENT HISTORY AND PHYSICAL EXAM 
 
5:10 PM 
 
 
Date: 9/30/2018 Patient Name: Rayo Burkett History of Presenting Illness Chief Complaint Patient presents with  Palpitations History Provided By: Patient Chief Complaint: Heart palpitations Duration:  Months Timing:  Intermittent and Worsening Location: N/A Quality: N/A Severity: N/A Modifying Factors: none reported Associated Symptoms: denies any other associated signs or symptoms 5:11 PM Rayo Burkett is a 34 y.o. male with h/o palpitations who presents to ED complaining of intermittent, worsening heart palpitations onset months ago. He reports heart palpitations occur 10 seconds long, 50-100x a day. Last palpitation occurred after coming home from a stressful trip. Pt is seen by Dr. Cheryl Arellano and Dr. Marti Hagen and has had a stress test and Holter monitor performed. Patient was told to be seen at ED if palpitations took place again. Denies CP and SOB. Denies any further complaints or symptoms at the moment. PCP: Jackqulyn Mohs, MD 
 
 
 
 
Current Outpatient Prescriptions Medication Sig Dispense Refill  escitalopram oxalate (LEXAPRO) 10 mg tablet Take 15 mg by mouth daily. 2  
 ALPRAZolam (XANAX) 0.5 mg tablet Take 0.5 mg by mouth two (2) times a day.  ascorbic acid (VITAMIN C) 500 mg tablet Take 500 mg by mouth daily.  cholecalciferol, vitamin D3, (VITAMIN D3) 2,000 unit Tab Take 1 Tab by mouth daily.  omeprazole-sodium bicarbonate (ZEGERID) 40-1.1 mg-gram capsule Take 2 Caps by mouth daily.  acetaminophen (TYLENOL EXTRA STRENGTH) 500 mg tablet Take 500 mg by mouth every six (6) hours as needed.  MULTIVITAMIN PO Take 1 Tab by mouth daily. Past History Past Medical History: 
Past Medical History:  
Diagnosis Date  Anxiety  Arm fatigue Forearms  Balance problems  Cardiac Agatston CAC score 100-199 02/04/2015 Coronary calcium score 0.  Cardiac echocardiogram 10/29/2014 EF 55-60%. No RWMA. Normal diastolic fx. RVSP 30 mmHg.  Cardiac Holter monitor study 10/29/2014 Normal Holter study.  Chronic cough   
 chronically coughing up sputum  Depression  Dizziness  Dyspnea  Esophageal reflux  Headache  Hypercholesterolemia  Lumbar pain  Myofascial pain  Neck pain  Numbness and tingling  PAC (premature atrial contraction)  Palpitations   
 presumably benign  Panic disorder  Reflux  Thoracic back pain Mid-thoracic  Upper extremity weakness  Vertigo, intermittent Past Surgical History: 
Past Surgical History:  
Procedure Laterality Date  APPENDECTOMY  11/23/2010  HX HEENT    
 corrective hearing for left ear  HX HEENT    
 plastic surgery on both ears  HX HEENT  10/08 & 09/09 Reformed jaw bone, bone graft  HX RENAL BIOPSY  HX TONSILLECTOMY Family History: 
Family History Problem Relation Age of Onset  Hypertension Father  Liver Disease Father  Hypertension Mother Social History: 
Social History Substance Use Topics  Smoking status: Current Every Day Smoker Packs/day: 1.00 Years: 20.00 Types: Cigarettes  Smokeless tobacco: Never Used  Alcohol use No  
 
 
Allergies: Allergies Allergen Reactions  Opioids - Morphine Analogues Myalgia  Other Medication Other (comments) Pt reports allergy to steroids, with psychological side effects  Pollen Extracts Unable to Obtain  Zithromax [Azithromycin] Itching Review of Systems Review of Systems Constitutional: Negative for fatigue and fever. Respiratory: Negative for shortness of breath. Cardiovascular: Negative for chest pain. Heart palpitation Gastrointestinal: Negative for diarrhea, nausea and vomiting. Musculoskeletal: Negative for back pain and neck pain. Neurological: Negative for dizziness, seizures, weakness, light-headedness and headaches. All other systems reviewed and are negative. Physical Exam  
 
Visit Vitals  BP 98/61  Pulse 67  Temp 98.4 °F (36.9 °C)  Resp 13  SpO2 98% Physical Exam  
Constitutional: He is oriented to person, place, and time. He appears well-developed. HENT:  
Head: Normocephalic and atraumatic. Eyes: EOM are normal. Pupils are equal, round, and reactive to light. Neck: Normal range of motion. Neck supple. Cardiovascular: Normal rate, regular rhythm and normal heart sounds. Exam reveals no friction rub. No murmur heard. Pulmonary/Chest: Effort normal and breath sounds normal. No respiratory distress. He has no wheezes. Abdominal: Soft. He exhibits no distension. There is no tenderness. There is no rebound and no guarding. Musculoskeletal: Normal range of motion. Neurological: He is alert and oriented to person, place, and time. Skin: Skin is warm and dry. Psychiatric: He has a normal mood and affect. His behavior is normal. Thought content normal.  
 
 
 
Diagnostic Study Results Labs - Recent Results (from the past 12 hour(s)) CBC WITH AUTOMATED DIFF Collection Time: 09/30/18  4:58 PM  
Result Value Ref Range WBC 7.2 4.6 - 13.2 K/uL  
 RBC 4.74 4.70 - 5.50 M/uL  
 HGB 14.2 13.0 - 16.0 g/dL HCT 39.6 36.0 - 48.0 % MCV 83.5 74.0 - 97.0 FL  
 MCH 30.0 24.0 - 34.0 PG  
 MCHC 35.9 31.0 - 37.0 g/dL  
 RDW 12.7 11.6 - 14.5 % PLATELET 186 997 - 017 K/uL MPV 11.0 9.2 - 11.8 FL  
 NEUTROPHILS 61 40 - 73 % LYMPHOCYTES 31 21 - 52 % MONOCYTES 7 3 - 10 % EOSINOPHILS 1 0 - 5 % BASOPHILS 0 0 - 2 %  
 ABS. NEUTROPHILS 4.4 1.8 - 8.0 K/UL  
 ABS. LYMPHOCYTES 2.2 0.9 - 3.6 K/UL  
 ABS. MONOCYTES 0.5 0.05 - 1.2 K/UL  
 ABS. EOSINOPHILS 0.1 0.0 - 0.4 K/UL  
 ABS. BASOPHILS 0.0 0.0 - 0.1 K/UL  
 DF AUTOMATED Medical Decision Making I am the first provider for this patient. I reviewed the vital signs, available nursing notes, past medical history, past surgical history, family history and social history. Vital Signs-Reviewed the patient's vital signs. ED Course: Progress Notes, Reevaluation, and Consults: 
 
 
Provider Notes (Medical Decision Making): EKG shows normal sinus at 69 with a normal axis and normal intervals there is no ST elevation or depression or hypertrophy Patient is a 40-year-old male with palpitations. Last a few seconds at a time and resolved. Abnormal tilt table test with a 8 second pause. He has had Holter monitors without any indication of what the rhythm was. Reportedly he was told that if his palpitations recur to come to the emergency department able to document 
 
cxr napd Labs wnl Pt saw monitor said pvcs with palp ; had sx with nurse in room; no change to rhythm. Diagnosis Clinical Impression: No diagnosis found. Follow-up Information None Patient's Medications Start Taking No medications on file Continue Taking ACETAMINOPHEN (TYLENOL EXTRA STRENGTH) 500 MG TABLET    Take 500 mg by mouth every six (6) hours as needed. ALPRAZOLAM (XANAX) 0.5 MG TABLET    Take 0.5 mg by mouth two (2) times a day. ASCORBIC ACID (VITAMIN C) 500 MG TABLET    Take 500 mg by mouth daily. CHOLECALCIFEROL, VITAMIN D3, (VITAMIN D3) 2,000 UNIT TAB    Take 1 Tab by mouth daily. ESCITALOPRAM OXALATE (LEXAPRO) 10 MG TABLET    Take 15 mg by mouth daily. MULTIVITAMIN PO    Take 1 Tab by mouth daily. OMEPRAZOLE-SODIUM BICARBONATE (ZEGERID) 40-1.1 MG-GRAM CAPSULE    Take 2 Caps by mouth daily. These Medications have changed No medications on file Stop Taking No medications on file  
 
_______________________________ Attestations: 
Scribe Attestation Ruddy Milan acting as a scribe for and in the presence of Reeves Dancer, MD     
 September 30, 2018 at 5:10 PM 
    
Provider Attestation:     
I personally performed the services described in the documentation, reviewed the documentation, as recorded by the scribe in my presence, and it accurately and completely records my words and actions. September 30, 2018 at 5:10 PM - Byron Fuentes MD   
_______________________________

## 2018-09-30 NOTE — ED TRIAGE NOTES
Pt arrived c/o heart palpitations, states he has been seen by Dr Sharene Cranker multiple times and has had a stress test and a holter monitor, was told by cardiology PA to come to ER if he feels this way again, hx of PACs, a&ox4, denies CP and SOB

## 2018-09-30 NOTE — DISCHARGE INSTRUCTIONS
Cardiac Arrhythmia: Care Instructions  Your Care Instructions    A cardiac arrhythmia is a change in the normal rhythm of the heart. Your heart may beat too fast or too slow or beat with an irregular or skipping rhythm. A change in the heart's rhythm may feel like a really strong heartbeat or a fluttering in your chest. A severe heart rhythm problem can keep the body from getting the blood it needs. This can result in shortness of breath, lightheadedness, and fainting. You may take medicine to treat your condition. Your doctor may recommend a pacemaker or recommend catheter ablation to destroy small parts of the heart that are causing a rhythm problem. Another possible treatment is an implantable cardioverter-defibrillator (ICD). An ICD is a device that gives the heart a shock to return the heart to a normal rhythm. Follow-up care is a key part of your treatment and safety. Be sure to make and go to all appointments, and call your doctor if you are having problems. It's also a good idea to know your test results and keep a list of the medicines you take. How can you care for yourself at home?  [facility]    · Be safe with medicines. Take your medicines exactly as prescribed. Call your doctor if you think you are having a problem with your medicine. You will get more details on the specific medicines your doctor prescribes.     · If you received a pacemaker or an ICD, you will get a fact sheet about it.     · Wear medical alert jewelry that says you have an abnormal heart rhythm. You can buy this at most drugstores.    Lifestyle changes    · Eat a heart-healthy diet.     · Stay at a healthy weight. Lose weight if you need to.     · Avoid nicotine, too much alcohol, and illegal drugs (meth, speed, and cocaine). Also, get enough sleep and do not overeat.     · Ask your doctor whether you can take over-the-counter medicines (such as decongestants).  These can make your heart beat fast.     · Talk to your doctor about any limits to activities, such as driving, or tasks where you use power tools or ladders. Activity    · Start light exercise if your doctor says you can. Even a small amount will help you get stronger, have more energy, and manage your stress.     · Get regular exercise. Try for 30 minutes on most days of the week. Ask your doctor what level of exercise is safe for you. If activity is not likely to cause health problems, you probably do not have limits on the type or level of activity that you can do. You may want to walk, swim, bike, or do other activities.     · When you exercise, watch for signs that your heart is working too hard. You are pushing too hard if you cannot talk while you exercise. If you become short of breath or dizzy or have chest pain, sit down and rest.     · Check your pulse daily. Place two fingers on the artery at the palm side of your wrist, in line with your thumb. If your heartbeat seems uneven, talk to your doctor. When should you call for help? Call 911 anytime you think you may need emergency care. For example, call if:    · You have symptoms of sudden heart failure. These may include:  ¨ Severe trouble breathing. ¨ A fast or irregular heartbeat. ¨ Coughing up pink, foamy mucus. ¨ You passed out.     · You have signs of a stroke. These include:  ¨ Sudden numbness, paralysis, or weakness in your face, arm, or leg, especially on only one side of your body. ¨ New problems with walking or balance. ¨ Sudden vision changes. ¨ Drooling or slurred speech. ¨ New problems speaking or understanding simple statements, or feeling confused. ¨ A sudden, severe headache that is different from past headaches.    Call your doctor now or seek immediate medical care if:    · You have new or changed symptoms of heart failure, such as:  ¨ New or increased shortness of breath. ¨ New or worse swelling in your legs, ankles, or feet.   ¨ Sudden weight gain, such as more than 2 to 3 pounds in a day or 5 pounds in a week. (Your doctor may suggest a different range of weight gain.)  ¨ Feeling dizzy or lightheaded or like you may faint. ¨ Feeling so tired or weak that you cannot do your usual activities. ¨ Not sleeping well. Shortness of breath wakes you at night. You need extra pillows to prop yourself up to breathe easier.    Watch closely for changes in your health, and be sure to contact your doctor if:    · You do not get better as expected. Where can you learn more? Go to http://chiki-zhao.info/. Enter G528 in the search box to learn more about \"Cardiac Arrhythmia: Care Instructions. \"  Current as of: December 6, 2017  Content Version: 11.7  © 8327-5387 [employer]. Care instructions adapted under license by [employer] (which disclaims liability or warranty for this information). If you have questions about a medical condition or this instruction, always ask your healthcare professional. [address] any warranty or liability for your use of this information.

## 2018-10-02 LAB
ATRIAL RATE: 69 BPM
CALCULATED P AXIS, ECG09: 50 DEGREES
CALCULATED R AXIS, ECG10: 68 DEGREES
CALCULATED T AXIS, ECG11: 34 DEGREES
DIAGNOSIS, 93000: NORMAL
P-R INTERVAL, ECG05: 164 MS
Q-T INTERVAL, ECG07: 392 MS
QRS DURATION, ECG06: 108 MS
QTC CALCULATION (BEZET), ECG08: 420 MS
VENTRICULAR RATE, ECG03: 69 BPM

## 2018-10-04 NOTE — PATIENT INSTRUCTIONS
Herniated Disc: Exercises  Your Care Instructions  Here are some examples of typical rehabilitation exercises for your condition. Start each exercise slowly. Ease off the exercise if you start to have pain. Your doctor or physical therapist will tell you when you can start these exercises and which ones will work best for you. How to do the exercises  Note: These exercises can help you move easier and feel better. But when you first start doing them, you may have more pain in your back. This is normal. But it is important to pay close attention to your pain during and after each exercise. · Keep doing these exercises if your pain stays the same or moves from your leg and buttock more toward the middle of your spine. Pain moving out of your leg and buttock is a good sign. · Stop doing these exercises if your pain gets worse in your leg and buttock. Stop if you start to have pain in your leg and buttock that you didn't have before. Be sure to do these exercises in the order they appear. Note how your pain changes before you move to the next one. If your pain is much worse right after exercise and stays worse the next day, do not do any of these exercises. 1. Rest on belly    1. Lie on your stomach, face down, with your head turned to the side. Place your arms beside your body. If this bothers your neck, place your hands, one on top of the other, underneath your forehead. This will help support your head and neck. 2. Try to relax your lower back muscles as much as you can. 3. Continue to lie on your stomach for 2 minutes. 4. If your pain spreads down your leg or increases down your leg, stop this exercise and do not do the next exercises. 2. Press-up    1. Lie on your stomach, face down. Keep your elbows tucked into your sides and under your shoulders. 2. Press your elbows down into the floor to raise your upper back. As you do this, relax your stomach muscles.  Allow your back to arch without using your back muscles. Let your low back relax completely as you arch up. 3. Hold this position for 2 minutes. 4. Repeat 2 to 4 times. 5. If your pain spreads down your leg or increases down your leg, stop this exercise and do not do the next exercises. 3. Full press-up    1. Lie on your stomach, face down. Keep your elbows tucked into your sides and under your shoulders. 2. Straighten your elbows, and push your upper body up as far as you can. Allow your lower back to sag. Keep your hips, pelvis, and legs relaxed. 3. Hold this position for 5 seconds, and then relax. 4. Repeat 10 times. Each time, try to raise your upper body a little higher and hold your arms a bit straighter. 5. If your pain spreads down your leg or gets worse down your leg, stop this exercise and do not move to the next exercise. 6. If you can't do this exercise, you may instead try the backward bend exercise that follows. 4. Backward bend    · Stand with your feet hip-width apart. Your toes should point forward. Do not lock your knees. · Place your hands in the small of your back. · Bend backward as far as you can, keeping your knees straight. Hold this position for 2 to 3 seconds. Then return to your starting position. · Repeat 2 to 4 times. Each time, try to bend backward a little farther, until you bend backward as far as you can. · If your pain spreads down your leg or increases down your leg, stop this exercise. Follow-up care is a key part of your treatment and safety. Be sure to make and go to all appointments, and call your doctor if you are having problems. It's also a good idea to know your test results and keep a list of the medicines you take. Where can you learn more? Go to http://hciki-zhao.info/. Enter [phone] in the search box to learn more about \"Herniated Disc: Exercises. \"  Current as of: May 23, 2016  Content Version: 11.2  © 6196-1729 [employer].  Care instructions adapted under license by [address] (which disclaims liability or warranty for this information). If you have questions about a medical condition or this instruction, always ask your healthcare professional. [place] [address] any warranty or liability for your use of this information.                   Exercises discussed today:  Patrizia monson Universal Safety Interventions

## 2018-10-08 ENCOUNTER — OFFICE VISIT (OUTPATIENT)
Dept: CARDIOLOGY CLINIC | Age: 38
End: 2018-10-08

## 2018-10-08 VITALS
WEIGHT: 155 LBS | DIASTOLIC BLOOD PRESSURE: 64 MMHG | OXYGEN SATURATION: 98 % | HEART RATE: 71 BPM | SYSTOLIC BLOOD PRESSURE: 118 MMHG | BODY MASS INDEX: 25.83 KG/M2 | HEIGHT: 65 IN

## 2018-10-08 DIAGNOSIS — R00.2 PALPITATIONS: Primary | ICD-10-CM

## 2018-10-08 DIAGNOSIS — E78.00 HYPERCHOLESTEROLEMIA: ICD-10-CM

## 2018-10-08 DIAGNOSIS — R55 SYNCOPE, VASOVAGAL: ICD-10-CM

## 2018-10-08 DIAGNOSIS — I49.1 PAC (PREMATURE ATRIAL CONTRACTION): ICD-10-CM

## 2018-10-08 NOTE — PROGRESS NOTES
HPI:  I saw Kathrine Severe in my office today in cardiovascular evaluation regarding his problems with palpitations in the past and some dizziness issues. Mr. Lucia Montenegro is a pleasant 35 year old white male with history of palpitations and some anxiety issues with Holters in the past, dating back to 2006, showing some PACs and reconfirmed on a Holter in November of 2014.  
   
He also has history of dizziness problems, which back in November of 2012 prompted us to do a tilt table test and the stress portion of that test using sublingual nitroglycerin demonstrated a severe bradycardia with junctional rhythm and an 8 second pause secondary to very high vagal tone. We decided not to place a pacemaker and the patient has had no further syncopal episodes, but he still complaints of some vague lightheadedness from time to time. He unfortunately continues to smoke a pack of cigarettes per day. He has been somewhat concerned about the possibility of developing heart disease, so we did do a calcium score on him back in February of 2011, which was 0, suggesting he has a very low ten year risk of cardiovascular event. He comes in today and relates that he was on his trip to [facility] with [place] family in early August and had some increased palpitations which bother him for  a couple of weeks after he got back. He relates that they resolved but have come back in the past 10 days. These are as described as skipped beats that can occur numerous times per day and have been bothering him to the point that he went to the emergency room recently although during that ER visit he only had a couple of PVCs apparently. It should be noted that he has had some vague chest pain which sounds nonanginal and does have some intermittent dizziness which is not associated with palpitations, although he denies actually feeling presyncopal. 
 
Encounter Diagnoses Name Primary?  Palpitations Yes  PAC (premature atrial contraction)  Syncope, vasovagal   
 Hypercholesterolemia Discussion: This gentleman has a lot of complaints of palpitations which we have always thought has been benign but his palpitations are worse and he also has dizziness and associated with palpitations with history of high vagal tone. We have done Holter monitors and event monitors in the past with only some minor abnormalities found, but he is having the palpitations enough that I think considering a loop recorder is something we should consider in this gentleman. We will get this completed with further recommendations pending the results of his loop recorder over time. PCP:  Selina Yip MD 
 
 
Past Medical History:  
Diagnosis Date  Anxiety  Arm fatigue Forearms  Balance problems  Cardiac Agatston CAC score 100-199 02/04/2015 Coronary calcium score 0.  
 Cardiac echocardiogram 10/29/2014 EF 55-60%. No RWMA. Normal diastolic fx. RVSP 30 mmHg.  Cardiac Holter monitor study 10/29/2014 Normal Holter study.  Chronic cough   
 chronically coughing up sputum  Depression  Dizziness  Dyspnea  Esophageal reflux  Headache  Hypercholesterolemia  Lumbar pain  Myofascial pain  Neck pain  Numbness and tingling  PAC (premature atrial contraction)  Palpitations   
 presumably benign  Panic disorder  Reflux  Thoracic back pain Mid-thoracic  Upper extremity weakness  Vertigo, intermittent Past Surgical History:  
Procedure Laterality Date  APPENDECTOMY  11/23/2010  HX HEENT    
 corrective hearing for left ear  HX HEENT    
 plastic surgery on both ears  HX HEENT  10/08 & 09/09 Reformed jaw bone, bone graft  HX RENAL BIOPSY  HX TONSILLECTOMY Current Outpatient Rx Name  Route  Sig  Dispense  Refill  ALPRAZolam (XANAX) 0.5 mg tablet   Oral 
 Take 0.5 mg by mouth two (2) times a day.  mometasone (NASONEX) 50 mcg/actuation nasal spray Both Nostrils 1 [place] by Both Nostrils route daily.  ascorbic acid (VITAMIN C) 500 mg tablet Oral 
  Take 500 mg by mouth daily.  cholecalciferol, vitamin D3, (VITAMIN D3) 2,000 unit Tab Oral 
  Take 1 Tab by mouth daily.  escitalopram (LEXAPRO) 10 mg tablet Oral 
  Take 10 mg by mouth daily.  omeprazole-sodium bicarbonate (ZEGERID) 40-1.1 mg-gram capsule Oral 
  Take 1 Cap by mouth daily.  acetaminophen (TYLENOL EXTRA STRENGTH) 500 mg tablet Oral 
  Take 500 mg by mouth every six (6) hours as needed.  MULTIVITAMIN PO 
  Oral 
  Take 1 Tab by mouth daily. Allergies Allergen Reactions  Opioids - Morphine Analogues Myalgia  Other Medication Other (comments) Pt reports allergy to steroids, with psychological side effects  Pollen Extracts Unable to Obtain  Zithromax [Azithromycin] Itching Review of Systems: 
Constitutional: Negative for chills, fever, malaise/fatigue and weight loss. Respiratory: Positive for cough. Negative for hemoptysis, shortness of breath and wheezing. Cardiovascular: Positive for chest pain and palpitations. Negative for orthopnea and leg swelling. Gastrointestinal: Positive for abdominal pain, constipation, heartburn and nausea. Negative for blood in stool, diarrhea, melena and vomiting. Musculoskeletal: Positive for myalgias. Negative for falls and joint pain. Neurological: Positive for dizziness. Physical Exam:  This is a well-developed, well-nourished, anxious 34year-old  male in no acute distress at the time of the examination. Visit Vitals  /64  Pulse 71  
 Ht 5' 5\" (1.651 m)  Wt 70.3 kg (155 lb)  SpO2 98%  BMI 25.79 kg/m2 HEENT: Conjuctiva white, mucosa moist, no pallor or cyanosis. NECK: Supple without masses, tenderness or thyromegaly. There was no jugular venous distention. Carotid are full bilaterally without bruits. CHEST: Symmetrical with good excursion. LUNGS: Clear to auscultation in all fields. HEART: The apex is not displaced. There were no lifts, thrills or heaves. There is a normal S1 and S2 without appreciable murmurs rubs clicks or gallops auscultated. ABDOMEN: Soft without masses, tenderness or organomegaly. EXTREMITIES: Full peripheral pulses without peripheral edema. Review of Data: Please refer to past medical history for most recent cardiac testing. Results for orders placed or performed in visit on 10/08/18 AMB POC EKG ROUTINE W/ 12 LEADS, INTER & REP     Status: None Narrative Normal sinus rhythm, rate 71. Low voltage in the limb leads. This EKG is otherwise within normal limits and similar to the EKG of September 30, 2018. Brandi Moore D.O., F.A.C.C. [facility] [place] [address] [phone] PLEASE NOTE:  This document has been produced using voice recognition software. Unrecognized errors in transcription may be present.

## 2018-10-08 NOTE — PROGRESS NOTES
Nadeem Turner presents today for Chief Complaint Patient presents with  Follow-up 6 month follow up Nadeem Turner preferred language for health care discussion is english/other. Is someone accompanying this pt? No 
 
Is the patient using any DME equipment during OV? No 
 
Depression Screening: No flowsheet data found. Learning Assessment: 
Learning Assessment 3/7/2016 PRIMARY LEARNER Patient PRIMARY LANGUAGE ENGLISH  
LEARNER PREFERENCE PRIMARY DEMONSTRATION  
  READING  
ANSWERED BY patient RELATIONSHIP SELF Pt currently taking Anticoagulant therapy? No 
 
Coordination of Care: 1. Have you been to the ER, urgent care clinic since your last visit? Hospitalized since your last visit? Yes 
 
2. Have you seen or consulted any other health care providers outside of the [facility] since your last visit? Include any pap smears or colon screening. No 
 
Medications verified verbally with PT.

## 2018-10-08 NOTE — LETTER
10/8/2018 4:01 PM 
 
 
 
Jean Lopez 
[id number] 
1980 Insurance:  [address] [place] [address],[address] [place] # _____________________ Proc Date: Wed. 10/24                Proc Time:  8:00am 
 
Performing MD : Dr. James Garner Hospital:  [facility]                                            PCP Dr. Jean Paul Navas 
 
Scheduled with:  Janell/EMail                                                        Date:10/8/2018 HP: 10/8    EKG: 10/8    Labs:______  CXR: _______  Orders: 10/17 Special Instructions:  _____________________________________________________ 
______________________________________________________________________ 
______________________________________________________________________ Date Faxed:   ______________   Pages Faxed: ___________________ The materials enclosed with this facsimile transmission are private and confidential and are the property of the sender. If you are not the intended recipient, be advised that any unauthorized use, disclosure, copying, distribution, or the taking of any action in reliance on the contents of this telecopied information is strictly prohibited. If you have received this in error, please immediately notify the sender via telephone to arrange for return of the forwarded documentation.

## 2018-10-08 NOTE — MR AVS SNAPSHOT
[address] [address] [address] [id number] [phone] Patient: Jean Lopez MRN: [id number] ZCW:[id number] Visit Information Date & Time Provider Department Dept. Phone Encounter #  
 10[facility]8/2018  2:40 [facility] [id number] Your Appointments 10/17/2018  1:00 PM  
PRE PROCEDURE with [facility] Appt Note: Loop Implant on 10/24 [place] [address] [address] [address] [phone] Figueroa Araceli  
  
    
 11/8/2018  3:00 PM  
DIAG TEST F/U with Caden Lubin MD  
[facility], [address] and [facility]) Appt Note: mri fu  
 [address] [facility] [id number] [phone]  
  
   
 [address] [facility] [address] Upcoming Health Maintenance Date Due Pneumococcal 19-64 Medium Risk (1 of 1 - PPSV23) 5/3/1997 DTaP/Tdap/Td series (1 - Tdap) 5/3/1999 Influenza Age 5 to Adult 8/1/2018 Allergies as of 10/8/2018  Review Complete On: 10/8/2018 By: Letty Connell DO Severity Noted Reaction Type Reactions Opioids - Morphine Analogues  08/27/2015    Myalgia Other Medication  03/03/2016    Other (comments) Pt reports allergy to steroids, with psychological side effects Pollen Extracts  08/07/2012    Unable to Obtain Zithromax [Azithromycin]    Itching Current Immunizations  Never Reviewed No immunizations on file. Not reviewed this visit You Were Diagnosed With   
  
 Codes Comments Palpitations    -  Primary ICD-10-CM: R00.2 ICD-9-CM: 785.1 PAC (premature atrial contraction)     ICD-10-CM: I49.1 ICD-9-CM: 427.61 Syncope, vasovagal     ICD-10-CM: R55 
ICD-9-CM: 780.2 Hypercholesterolemia     ICD-10-CM: E78.00 ICD-9-CM: 272.0 Vitals BP Pulse Height(growth percentile) Weight(growth percentile) SpO2 BMI  
 118/64 71 5' 5\" (1.651 m) 155 lb (70.3 kg) 98% 25.79 kg/m2 Smoking Status Current Every Day Smoker Vitals History BMI and BSA Data Body Mass Index Body Surface Area 25.79 kg/m 2 1.8 m 2 Preferred Pharmacy Pharmacy Name Phone [id number] [phone] Your Updated Medication List  
  
   
This list is accurate as of 10/8/18  3:59 PM.  Always use your most recent med list.  
  
  
  
  
 ALPRAZolam 0.5 mg tablet Commonly known as:  Byron Paganini Take 0.5 mg by mouth two (2) times a day. escitalopram oxalate 10 mg tablet Commonly known as:  Yasir Sheth Take 15 mg by mouth daily. MULTIVITAMIN PO Take 1 Tab by mouth daily. TYLENOL EXTRA STRENGTH 500 mg tablet Generic drug:  acetaminophen Take 500 mg by mouth every six (6) hours as needed. VITAMIN C 500 mg tablet Generic drug:  ascorbic acid (vitamin C) Take 500 mg by mouth daily. VITAMIN D3 2,000 unit Tab Generic drug:  cholecalciferol (vitamin D3) Take 1 Tab by mouth daily. ZEGERID 40-1.1 mg-gram capsule Generic drug:  omeprazole-sodium bicarbonate Take 2 Caps by mouth daily. We Performed the Following AMB POC EKG ROUTINE W/ 12 LEADS, INTER & REP [08346 CPT(R)] Introducing [facility]! Dear Naomie Phoenix: Thank you for requesting a [employer] account. Our records indicate that you already have an active [employer] account. You can access your account anytime at [email or web address] [email or web address] Did you know that you can access your hospital and ER discharge instructions at any time in [employer]? You can also review all of your test results from your hospital stay or ER visit. Additional Information If you have questions, please visit the Frequently Asked Questions section of the [employer] website at [email or web address] Remember, [employer] is NOT to be used for urgent needs. For medical emergencies, dial 911. Now available from your iPhone and Android! Please provide this summary of care documentation to your next provider. Your primary care clinician is listed as SAROJ MCNEIL. If you have any questions after today's visit, please call [phone].

## 2018-10-17 ENCOUNTER — OFFICE VISIT (OUTPATIENT)
Dept: CARDIOLOGY CLINIC | Age: 38
End: 2018-10-17

## 2018-10-17 ENCOUNTER — HOSPITAL ENCOUNTER (OUTPATIENT)
Dept: LAB | Age: 38
Discharge: HOME | End: 2018-10-17

## 2018-10-17 DIAGNOSIS — R55 SYNCOPE, UNSPECIFIED SYNCOPE TYPE: Primary | ICD-10-CM

## 2018-10-17 LAB
A-G RATIO,AGRAT: 2.1 RATIO (ref 1.1–2.6)
ABSOLUTE LYMPHOCYTE COUNT, 10803: 2.7 K/UL (ref 1–4.8)
ALBUMIN SERPL-MCNC: 4.8 G/DL (ref 3.5–5)
ALP SERPL-CCNC: 93 U/L (ref 25–115)
ALT SERPL-CCNC: 19 U/L (ref 5–40)
ANION GAP SERPL CALC-SCNC: 16 MMOL/L
AST SERPL W P-5'-P-CCNC: 16 U/L (ref 10–37)
BASOPHILS # BLD: 0 K/UL (ref 0–0.2)
BASOPHILS NFR BLD: 0 % (ref 0–2)
BILIRUB SERPL-MCNC: 0.4 MG/DL (ref 0.2–1.2)
BUN SERPL-MCNC: 11 MG/DL (ref 6–22)
CALCIUM SERPL-MCNC: 9.8 MG/DL (ref 8.4–10.4)
CHLORIDE SERPL-SCNC: 101 MMOL/L (ref 98–110)
CO2 SERPL-SCNC: 30 MMOL/L (ref 20–32)
CREAT SERPL-MCNC: 0.9 MG/DL (ref 0.5–1.2)
EOSINOPHIL # BLD: 0.1 K/UL (ref 0–0.5)
EOSINOPHIL NFR BLD: 1 % (ref 0–6)
ERYTHROCYTE [DISTWIDTH] IN BLOOD BY AUTOMATED COUNT: 13.2 % (ref 10–15.5)
GFRAA, 66117: >60
GFRNA, 66118: >60
GLOBULIN,GLOB: 2.3 G/DL (ref 2–4)
GLUCOSE SERPL-MCNC: 80 MG/DL (ref 70–99)
GRANULOCYTES,GRANS: 67 % (ref 40–75)
HCT VFR BLD AUTO: 45 % (ref 36.6–51.9)
HGB BLD-MCNC: 14.8 G/DL (ref 13.2–17.3)
INR PPP: 1 (ref 0.89–1.29)
LYMPHOCYTES, LYMLT: 28 % (ref 20–45)
MCH RBC QN AUTO: 30 PG (ref 26–34)
MCHC RBC AUTO-ENTMCNC: 33 G/DL (ref 31–36)
MCV RBC AUTO: 91 FL (ref 80–95)
MONOCYTES # BLD: 0.4 K/UL (ref 0.1–1)
MONOCYTES NFR BLD: 4 % (ref 3–12)
NEUTROPHILS # BLD AUTO: 6.5 K/UL (ref 1.8–7.7)
PLATELET # BLD AUTO: 202 K/UL (ref 140–440)
PMV BLD AUTO: 12 FL (ref 9–13)
POTASSIUM SERPL-SCNC: 4.7 MMOL/L (ref 3.5–5.5)
PROT SERPL-MCNC: 7.1 G/DL (ref 6.4–8.3)
PROTHROMBIN TIME: 10.9 SEC (ref 9–13)
RBC # BLD AUTO: 4.94 M/UL (ref 3.8–5.8)
SENTARA SPECIMEN COL,SENBCF: NORMAL
SODIUM SERPL-SCNC: 147 MMOL/L (ref 133–145)
WBC # BLD AUTO: 9.7 K/UL (ref 4–11)

## 2018-10-17 PROCEDURE — 99001 SPECIMEN HANDLING PT-LAB: CPT | Performed by: INTERNAL MEDICINE

## 2018-10-17 NOTE — PROGRESS NOTES
Patient given instructions with time allowed for all questions to be answered. He verbalized understanding.

## 2018-10-17 NOTE — PATIENT INSTRUCTIONS
[facility]\"" Patient  EP Instructions 1. You are scheduled to have a loop recorder implant on  October 24, 2018 , at 0800. Please check in at 0700. 
 
2. [facility] go to [facility] and park in the outpatient parking lot that is located around to the back of the hospital and enter through the [facility] building. Once you enter through the [facility] check in with the  there. The  will either give you directions or assist you in getting to the cath holding area. 3.  []       You are not to eat or drink anything after midnight the morning of the  
            procedure. 4. Please continue to take your medications with a small sip of water on the morning of the procedure. 5. If you are diabetic, do not take your insulin/sugar pill the morning of the procedure. 6. We encourage families to wait in the waiting room on the first floor while the procedure is being done. The Doctor will come out and talk with you as soon as the procedure is over. 7. There is the possibility that you may spend the night in the hospital, depending on the results of the procedure. This will be determined after the procedure is done. 8.   If you or your family have any questions, please call our office Monday-Friday 9:00am  
      -4:30 pm , at [phone], and ask to speak to one of the nurses.

## 2018-10-24 ENCOUNTER — HOSPITAL ENCOUNTER (OUTPATIENT)
Age: 38
Setting detail: OUTPATIENT SURGERY
Discharge: HOME | End: 2018-10-24
Attending: INTERNAL MEDICINE | Admitting: INTERNAL MEDICINE
Payer: COMMERCIAL

## 2018-10-24 VITALS
OXYGEN SATURATION: 100 % | BODY MASS INDEX: 25.79 KG/M2 | WEIGHT: 154.98 LBS | SYSTOLIC BLOOD PRESSURE: 92 MMHG | RESPIRATION RATE: 17 BRPM | TEMPERATURE: 98.2 F | DIASTOLIC BLOOD PRESSURE: 62 MMHG | HEART RATE: 56 BPM

## 2018-10-24 DIAGNOSIS — R42 DIZZINESS AND GIDDINESS: ICD-10-CM

## 2018-10-24 DIAGNOSIS — R55 SYNCOPE AND COLLAPSE: ICD-10-CM

## 2018-10-24 DIAGNOSIS — R00.2 AWARENESS OF HEART BEAT: ICD-10-CM

## 2018-10-24 PROCEDURE — 33282 HC IMP PT CARD EVENT RECORD: CPT | Performed by: INTERNAL MEDICINE

## 2018-10-24 PROCEDURE — C1764 EVENT RECORDER, CARDIAC: HCPCS | Performed by: INTERNAL MEDICINE

## 2018-10-24 PROCEDURE — 77030002933 HC SUT MCRYL J&J -A: Performed by: INTERNAL MEDICINE

## 2018-10-24 PROCEDURE — 74011250636 HC RX REV CODE- 250/636: Performed by: INTERNAL MEDICINE

## 2018-10-24 PROCEDURE — 74011000250 HC RX REV CODE- 250: Performed by: INTERNAL MEDICINE

## 2018-10-24 DEVICE — RECORDER CARD MON REVEAL LINQ MYCARELINK IMPL LINQSYS: Type: IMPLANTABLE DEVICE | Status: FUNCTIONAL

## 2018-10-24 RX ORDER — LIDOCAINE HYDROCHLORIDE AND EPINEPHRINE 10; 10 MG/ML; UG/ML
INJECTION, SOLUTION INFILTRATION; PERINEURAL AS NEEDED
Status: DISCONTINUED | OUTPATIENT
Start: 2018-10-24 | End: 2018-10-24 | Stop reason: HOSPADM

## 2018-10-24 RX ORDER — CEFAZOLIN SODIUM 1 G/3ML
INJECTION, POWDER, FOR SOLUTION INTRAMUSCULAR; INTRAVENOUS AS NEEDED
Status: DISCONTINUED | OUTPATIENT
Start: 2018-10-24 | End: 2018-10-24 | Stop reason: HOSPADM

## 2018-10-24 RX ORDER — CEFAZOLIN SODIUM 2 G/50ML
2 SOLUTION INTRAVENOUS ONCE
Status: DISCONTINUED | OUTPATIENT
Start: 2018-10-24 | End: 2018-10-24 | Stop reason: HOSPADM

## 2018-10-24 NOTE — H&P
Plan subcutaneous loop recorder as no identifiable events to explain symptoms by previous Holters/event monitors. Remote syncope and bradycardia during tilt table test up to 8 seconds. HPI:  I saw Tino Temikalmeng in my office today in cardiovascular evaluation regarding his problems with palpitations in the past and some dizziness issues. Mr. Bernadine Anand is a pleasant 35 year old white male with history of palpitations and some anxiety issues with Holters in the past, dating back to 2006, showing some PACs and reconfirmed on a Holter in November of 2014.  
   
He also has history of dizziness problems, which back in November of 2012 prompted us to do a tilt table test and the stress portion of that test using sublingual nitroglycerin demonstrated a severe bradycardia with junctional rhythm and an 8 second pause secondary to very high vagal tone. We decided not to place a pacemaker and the patient has had no further syncopal episodes, but he still complaints of some vague lightheadedness from time to time. He unfortunately continues to smoke a pack of cigarettes per day. He has been somewhat concerned about the possibility of developing heart disease, so we did do a calcium score on him back in February of 2011, which was 0, suggesting he has a very low ten year risk of cardiovascular event.  
  
He comes in today and relates that he was on his trip to [facility] with his family in early August and had some increased palpitations which bother him for  a couple of weeks after he got back. He relates that they resolved but have come back in the past 10 days. These are as described as skipped beats that can occur numerous times per day and have been bothering him to the point that he went to the emergency room recently although during that ER visit he only had a couple of PVCs apparently.   It should be noted that he has had some vague chest pain which sounds nonanginal and does have some intermittent dizziness which is not associated with palpitations, although he denies actually feeling presyncopal. 
  
    
Encounter Diagnoses Name Primary?  Palpitations Yes  PAC (premature atrial contraction)    
 Syncope, vasovagal    
 Hypercholesterolemia    
  
  
Discussion: This gentleman has a lot of complaints of palpitations which we have always thought has been benign but his palpitations are worse and he also has dizziness and associated with palpitations with history of high vagal tone. We have done Holter monitors and event monitors in the past with only some minor abnormalities found, but he is having the palpitations enough that I think considering a loop recorder is something we should consider in this gentleman. 
  
We will get this completed with further recommendations pending the results of his loop recorder over time. 
  
PCP:  Kentrell Muhammad MD 
  
  
    
Past Medical History:  
Diagnosis Date  Anxiety    
 Arm fatigue    
  Forearms  Balance problems    
 Cardiac Agatston CAC score 100-199 02/04/2015  
  Coronary calcium score 0.  
 Cardiac echocardiogram 10/29/2014  
  EF 55-60%. No RWMA. Normal diastolic fx. RVSP 30 mmHg.  Cardiac Holter monitor study 10/29/2014  
  Normal Holter study.  Chronic cough    
  chronically coughing up sputum  Depression    
 Dizziness    
 Dyspnea    
 Esophageal reflux    
 Headache    
 Hypercholesterolemia    
 Lumbar pain    
 Myofascial pain    
 Neck pain    
 Numbness and tingling    
 PAC (premature atrial contraction)    
 Palpitations    
  presumably benign  Panic disorder    
 Reflux    
 Thoracic back pain    
  Mid-thoracic  Upper extremity weakness    
 Vertigo, intermittent    
  
  
  
     
Past Surgical History:  
Procedure Laterality Date  APPENDECTOMY   11/23/2010  HX HEENT      
  corrective hearing for left ear  HX HEENT      
   plastic surgery on both ears  HX HEENT   10/08 & 09/09  
  Reformed jaw bone, bone graft  HX RENAL BIOPSY      
 HX TONSILLECTOMY      
  
  
  
           
Current Outpatient Rx Name   Route   Sig   Dispense   Refill  ALPRAZolam (XANAX) 0.5 mg tablet 
    Oral 
    Take 0.5 mg by mouth two (2) times a day. 
      
      
   
 mometasone (NASONEX) 50 mcg/actuation nasal spray 
    Both Nostrils 
    1 Spray by Both Nostrils route daily. 
      
      
   
 ascorbic acid (VITAMIN C) 500 mg tablet 
    Oral 
    Take 500 mg by mouth daily. 
      
      
   
 cholecalciferol, vitamin D3, (VITAMIN D3) 2,000 unit Tab 
    Oral 
    Take 1 Tab by mouth daily. 
      
      
   
 escitalopram (LEXAPRO) 10 mg tablet 
    Oral 
    Take 10 mg by mouth daily.   
      
      
   
 omeprazole-sodium bicarbonate (ZEGERID) 40-1.1 mg-gram capsule 
    Oral 
    Take 1 Cap by mouth daily. 
      
      
   
 acetaminophen (TYLENOL EXTRA STRENGTH) 500 mg tablet 
    Oral 
    Take 500 mg by mouth every six (6) hours as needed. 
      
      
   
 MULTIVITAMIN PO 
    Oral 
    Take 1 Tab by mouth daily. 
      
      
   
  
  
  
     
Allergies Allergen Reactions  Opioids - Morphine Analogues Myalgia  Other Medication Other (comments)  
    Pt reports allergy to steroids, with psychological side effects  Pollen Extracts Unable to Obtain  Zithromax [Azithromycin] Itching  
  
  
Review of Systems: 
Constitutional: Negative for chills, fever, malaise/fatigue and weight loss. Respiratory: Positive for cough. Negative for hemoptysis, shortness of breath and wheezing. Cardiovascular: Positive for chest pain and palpitations. Negative for orthopnea and leg swelling. Gastrointestinal: Positive for abdominal pain, constipation, heartburn and nausea. Negative for blood in stool, diarrhea, melena and vomiting. Musculoskeletal: Positive for myalgias. Negative for falls and joint pain. Neurological: Positive for dizziness.  
  
  
Physical Exam:  This is a well-developed, well-nourished, anxious 34year-old  male in no acute distress at the time of the examination. Visit Vitals  /64  Pulse 71  
 Ht 5' 5\" (1.651 m)  Wt 70.3 kg (155 lb)  SpO2 98%  BMI 25.79 kg/m2  
  
  
HEENT: Conjuctiva white, mucosa moist, no pallor or cyanosis. NECK: Supple without masses, tenderness or thyromegaly. There was no jugular venous distention. Carotid are full bilaterally without bruits. CHEST: Symmetrical with good excursion. LUNGS: Clear to auscultation in all fields. HEART: The apex is not displaced. There were no lifts, thrills or heaves. There is a normal S1 and S2 without appreciable murmurs rubs clicks or gallops auscultated. ABDOMEN: Soft without masses, tenderness or organomegaly. EXTREMITIES: Full peripheral pulses without peripheral edema. 
  
Review of Data: Please refer to past medical history for most recent cardiac testing. 
  
   
Results for orders placed or performed in visit on 10/08/18 AMB POC EKG ROUTINE W/ 12 LEADS, INTER & REP     Status: None  
  Narrative  
  Normal sinus rhythm, rate 71. Low voltage in the limb leads. This EKG is otherwise within normal limits and similar to the EKG of September 30, 2018.  
  
  
  
Herson Jimenez D.O., F.A.C.C. [facility] [address] [place] [address] and [facility] [place] [address] [address] [place] [address] 
  
Stu Lopezo [phone] 
  
PLEASE NOTE:  This document has been produced using voice recognition software. Unrecognized errors in transcription may be present. 
  
  
  
  
  
  
 
  
  
Electronically signed by Leonore Boast, DO at 10/08/18 0162

## 2018-10-24 NOTE — Clinical Note
TRANSFER - IN REPORT:  
 
Verbal report received from: Darling Caro RN. Report consisted of patient's Situation, Background, Assessment and  
Recommendations(SBAR). Opportunity for questions and clarification was provided. Assessment completed upon patient's arrival to unit and care assumed.

## 2018-10-24 NOTE — PROGRESS NOTES
Patient had episode of hypotension/vagal after procedure. I do not expect lidocaine allergy. Feeling better now after IVF.

## 2018-10-24 NOTE — DISCHARGE INSTRUCTIONS
Cardiac Event Monitoring: About This Test  What is it? Cardiac event monitoring is a test that records the electrical activity of your heart. The test is done with a heart monitor device that you may wear or keep with you for up to a month. This test may be done to find out why you're having symptoms. Or it may be done to look for heartbeats that are too fast, too slow, or irregular. These are cardiac events. The monitor will give your doctor information similar to an electrocardiogram (EKG or ECG). An EKG translates the heart's electrical activity into line tracings on paper. There are different types of monitors. Your doctor will choose the type that works best for you and is most likely to help diagnose your heart problem. These monitors are safe to use. No electricity is sent through your body. So there is no chance of getting an electric shock. Why is this test done? This test is used to look for irregular heartbeats. It can help your doctor find out what is causing chest pain, fainting, lightheadedness, or other symptoms of heart problems. It also can help the doctor check to see if treatment for an irregular heartbeat is working. Many people have irregular heartbeats from time to time. Because these kinds of heartbeats can come and go, it may be hard to record one while you are in the doctor's office. Monitoring your heart for a longer time and during your normal activities makes it easier to capture your cardiac events. What happens before the test?  If you are getting a monitor with electrode pads on your chest:  · Several areas on your chest may be shaved and cleaned. Then a small amount of gel will be put on those areas. The electrode pads will then be attached to the skin of your chest. Thin wires will connect the electrodes to the monitor. · You will get instructions for how and when to change the electrodes at home. Some types of monitors don't use electrode pads.  Some types are worn on your wrist like a watch. Others are stuck to your chest with a sticky patch. Or you may have a monitor that you carry with you. Your doctor will explain which type of monitor you have and how to use it. What happens during the test?  · Your monitor may start recording on its own when it detects an abnormal heartbeat. Or you may need to do something to start the recording when you have symptoms. You might use a handheld device to start the monitor. Or you may need to press a button on the monitor itself. Your doctor will explain which type you have and what you need to do. · You may keep a diary of what you were doing when you had symptoms such as chest pain or dizziness. Your doctor will show you how to do this. · You may need to send the information from your monitor to your doctor through a phone line or online. Some monitors send it automatically. You will get instructions from your doctor. Your information will stay private and secure whether you send it or it is sent automatically. · You may be able to do most of the things you usually do. But it's important to follow your doctor's instructions for bathing, exercise, and other daily activities. How long does the test take? You may use the monitor for up to a month or longer. It depends on how long it takes to record irregular heartbeat episodes. It also depends on how long your doctor wants to keep monitoring your heart. What happens after the test?  · Juan M Polanco return the monitor to your doctor's office or hospital.  · You'll meet with your doctor to talk about the information recorded during your test.  · Your doctor will check your diary of symptoms. He or she will compare the timing of your activities and symptoms with the recorded heart pattern. · Depending on your test results, your doctor may talk with you about other tests or treatment options. Follow-up care is a key part of your treatment and safety.  Be sure to make and go to all appointments, and call your doctor if you are having problems. It's also a good idea to keep a list of the medicines you take. Ask your doctor when you can expect to have your test results. Where can you learn more? Go to http://chiki-zhao.info/. Enter G975 in the search box to learn more about \"Cardiac Event Monitoring: About This Test.\"  Current as of: December 6, 2017  Content Version: 11.8  © 5195-4809 [employer]wise, Incorporated. Care instructions adapted under license by [employer] (which disclaims liability or warranty for this information). If you have questions about a medical condition or this instruction, always ask your healthcare professional. [address] any warranty or liability for your use of this information.

## 2018-10-24 NOTE — PROGRESS NOTES
0862: 
Cath holding summary 
  
Patient escorted to cath holding from waiting area ambulatory, alert and oriented x 4. Pt states he is anxious and may need medication to help. Changed into gown and placed on monitor. NPO since MN. Lab results, med rec and H&P reviewed on chart. PIV x 1 inserted without difficulty. Family to bedside. 0845: Pt procedure completed. Pt hypotensive, NS fluid running. Provider aware. Pt on cardiac monitor, blood pressure monitor, and continous pulse ox. Dressing on left chest clean, dry, and intact. Pt states he has had similar experience when getting a cortizone shot, and was told it was a vagal response due to the needle. 0915: Pt reports feeling better with IV fluids. Pt informed nurse will observe him for approx one hour to monitor BP.  
 
0945: Vitals remain stable. Dressing on left chest clean, dry, and intact. Pt denies pain at this time. PO fluids encouraged. Pt denies dizziness. 1019: Pt requesting to leave. Pt vitals returned to baseline. Denies dizziness, chest pain, and SOB. IV removed. I have reviewed discharge instructions with the patient and mother. The patient and mother verbalized understanding. Dressing on left chest clean, dry, and intact upon discharge. Pt was provided with loop recorder instructions from rep.

## 2018-10-24 NOTE — PROGRESS NOTES
We should probably see him back in the next month or 2 to see how he is doing with his loop recorder if he is had any arrhythmia issues.  ES

## 2018-10-24 NOTE — Clinical Note
Patient having reaction to Lido with EPI per patient. He said lidocaine makes him light headed. His blood pressure dropped from 94 to 77 sysolic. Fluids open.

## 2018-10-24 NOTE — Clinical Note
TRANSFER - OUT REPORT:  
 
Verbal report given to: Rashard Mcleod RN. Report consisted of patient's Situation, Background, Assessment and  
Recommendations(SBAR). Opportunity for questions and clarification was provided.    
 
Patient remains in bed 19

## 2018-11-08 ENCOUNTER — OFFICE VISIT (OUTPATIENT)
Dept: ORTHOPEDIC SURGERY | Age: 38
End: 2018-11-08

## 2018-11-08 VITALS
HEART RATE: 77 BPM | BODY MASS INDEX: 26.09 KG/M2 | SYSTOLIC BLOOD PRESSURE: 90 MMHG | HEIGHT: 65 IN | OXYGEN SATURATION: 95 % | TEMPERATURE: 97.6 F | RESPIRATION RATE: 17 BRPM | WEIGHT: 156.6 LBS | DIASTOLIC BLOOD PRESSURE: 59 MMHG

## 2018-11-08 DIAGNOSIS — M54.2 CERVICALGIA: ICD-10-CM

## 2018-11-08 DIAGNOSIS — M79.18 MYOFASCIAL PAIN: ICD-10-CM

## 2018-11-08 DIAGNOSIS — R29.898 BILATERAL ARM WEAKNESS: ICD-10-CM

## 2018-11-08 DIAGNOSIS — M79.18 CERVICAL MYOFASCIAL PAIN SYNDROME: Primary | ICD-10-CM

## 2018-11-08 NOTE — PATIENT INSTRUCTIONS
Neck: Exercises Your Care Instructions Here are some examples of typical rehabilitation exercises for your condition. Start each exercise slowly. Ease off the exercise if you start to have pain. Your doctor or physical therapist will tell you when you can start these exercises and which ones will work best for you. How to do the exercises Neck stretch 1. This stretch works best if you keep your shoulder down as you lean away from it. To help you remember to do this, start by relaxing your shoulders and lightly holding on to your thighs or your chair. 2. Tilt your head toward your shoulder and hold for 15 to 30 seconds. Let the weight of your head stretch your muscles. 3. If you would like a little added stretch, use your hand to gently and steadily pull your head toward your shoulder. For example, keeping your right shoulder down, lean your head to the left. 4. Repeat 2 to 4 times toward each shoulder. Diagonal neck stretch 1. Turn your head slightly toward the direction you will be stretching, and tilt your head diagonally toward your chest and hold for 15 to 30 seconds. 2. If you would like a little added stretch, use your hand to gently and steadily pull your head forward on the diagonal. 
3. Repeat 2 to 4 times toward each side. Dorsal glide stretch 1. Sit or stand tall and look straight ahead. 2. Slowly tuck your chin as you glide your head backward over your body 3. Hold for a count of 6, and then relax for up to 10 seconds. 4. Repeat 8 to 12 times. Chest and shoulder stretch 1. Sit or stand tall and glide your head backward as in the dorsal glide stretch. 2. Raise both arms so that your hands are next to your ears. 3. Take a deep breath, and as you breathe out, lower your elbows down and behind your back. You will feel your shoulder blades slide down and together, and at the same time you will feel a stretch across your chest and the front of your shoulders. 4. Hold for about 6 seconds, and then relax for up to 10 seconds. 5. Repeat 8 to 12 times. Strengthening: Hands on head 1. Move your head backward, forward, and side to side against gentle pressure from your hands, holding each position for about 6 seconds. 2. Repeat 8 to 12 times. Follow-up care is a key part of your treatment and safety. Be sure to make and go to all appointments, and call your doctor if you are having problems. It's also a good idea to know your test results and keep a list of the medicines you take. Where can you learn more? Go to http://chiki-zhao.info/. Enter P975 in the search box to learn more about \"Neck: Exercises. \" Current as of: November 29, 2017 Content Version: 11.8 © 0362-2420 [employer]. Care instructions adapted under license by [employer] (which disclaims liability or warranty for this information). If you have questions about a medical condition or this instruction, always ask your healthcare professional. [place] [address] any warranty or liability for your use of this information. Neck Strain or Sprain: Rehab Exercises Your Care Instructions Here are some examples of typical rehabilitation exercises for your condition. Start each exercise slowly. Ease off the exercise if you start to have pain. Your doctor or physical therapist will tell you when you can start these exercises and which ones will work best for you. How to do the exercises Neck rotation 5. Sit in a firm chair, or stand up straight. 6. Keeping your chin level, turn your head to the right, and hold for 15 to 30 seconds. 7. Turn your head to the left and hold for 15 to 30 seconds. 8. Repeat 2 to 4 times to each side. Neck stretches 4. Look straight ahead, and tip your right ear to your right shoulder. Do not let your left shoulder rise up as you tip your head to the right. 5. Hold for 15 to 30 seconds. 6. Tilt your head to the left. Do not let your right shoulder rise up as you tip your head to the left. 7. Hold for 15 to 30 seconds. 8. Repeat 2 to 4 times to each side. Forward neck flexion 5. Sit in a firm chair, or stand up straight. 6. Bend your head forward. 7. Hold for 15 to 30 seconds. 8. Repeat 2 to 4 times. Lateral (side) bend strengthening 6. With your right hand, place your first two fingers on your right temple. 7. Start to bend your head to the side while using gentle pressure from your fingers to keep your head from bending. 8. Hold for about 6 seconds. 9. Repeat 8 to 12 times. 10. Switch hands and repeat the same exercise on your left side. Forward bend strengthening 3. Place your first two fingers of either hand on your forehead. 4. Start to bend your head forward while using gentle pressure from your fingers to keep your head from bending. 5. Hold for about 6 seconds. 6. Repeat 8 to 12 times. Neutral position strengthening 1. Using one hand, place your fingertips on the back of your head at the top of your neck. 2. Start to bend your head backward while using gentle pressure from your fingers to keep your head from bending. 3. Hold for about 6 seconds. 4. Repeat 8 to 12 times. Chin tuck 1. Lie on the floor with a rolled-up towel under your neck. Your head should be touching the floor. 2. Slowly bring your chin toward your chest. 
3. Hold for a count of 6, and then relax for up to 10 seconds. 4. Repeat 8 to 12 times. Follow-up care is a key part of your treatment and safety. Be sure to make and go to all appointments, and call your doctor if you are having problems. It's also a good idea to know your test results and keep a list of the medicines you take. Where can you learn more? Go to http://chiki-zhao.info/. Enter M679 in the search box to learn more about \"Neck Strain or Sprain: Rehab Exercises. \" 
 Current as of: November 29, 2017 Content Version: 11.8 © 7677-5924 Healthwise, Incorporated. Care instructions adapted under license by [employer] (which disclaims liability or warranty for this information). If you have questions about a medical condition or this instruction, always ask your healthcare professional. [address] any warranty or liability for your use of this information.

## 2018-11-08 NOTE — PROGRESS NOTES
[address] 
[address], [address] [place], [address] [place] [address] Street Phone: [phone] Fax: [phone] Milana Part : 1980 PCP: Niru Perry MD 
2018 PROGRESS NOTE ASSESSMENT AND PLAN Sabrina Pereira comes in to the office today for cervical MRI f/u. He reports over the last month, his RUE heaviness has improved. He now c/o a \"jerking\" in his RLE when he is lying down about 10 times during the night. He states he has not begun yoga yet. He continues to have some right ankle pain following his fall 2 months ago. He rates his pain as a 4/10 today. 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 His pain is likely myofascial in nature while his RLE symptoms are myoclonic jerks. We discussed the option of an EMG, but he declines. I thoroughly advised on maintaining an HEP and that yoga, pilates, or general weight training would be beneficial. 
 
Pt will f/u PRN. Diagnoses and all orders for this visit: 1. Cervical myofascial pain syndrome 2. Myofascial pain Follow-up Disposition: Not on File HISTORY OF PRESENT ILLNESS Sabrina Pereira is a 34 y.o. male. A&P / HPI from 2016: 
Rochelle Stoll was in the office today for a f/u appointment. His pain has remained myofascial in nature. He has not found relief from PT in the past or from using an antiinflammatory cream. Pt was advised to continue using his theracane and doing his home PT exercises.  
   
Since the cream did not provide relief and NSAIDs upset his stomach, he was prescribed Celebrex 200mg BID prn. The risks, benefits, and potential side effects of this medication were discussed.  
   
Trigger point injections were offered but declined at this time.  Pt said he would like to get a massage first.  
   
He will f/u prn.  
   
Updates from 16: 
 Pt presents for a prn f/u. He has had a cervical and brain MRI since his last visit. The cervical MRI does not show any obvious cause for his pain. 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 
   
He comes in today with c/o cervical pain. His biggest complaint is a headache that seems consistent with occipital neuralgia, radiating from his left eye to a region below his left ear. 
   
   
Updates from 07/28/17: 
Pt presents for a new onset of numbness and tingling that radiate down the left upper extremity that ends in the last two finger.  He states that his pain in the cervical region has decreased since the last visit. He is also complaining of chronic pain in the left shoulder.  
   
Updates from 08/23/17: 
Pt presents with continued numbness and tingling in the left upper extremity. He was last referred to get a BUE EMG, which did not show significant signs of neuropathy myopathy or radiculopathy. 
  
Updates from 03/06/18: 
Pt presents for new low back pain that began a few weeks ago. He recently bought a Tempurpedic mattress and suspects that may be the cause of his pain as his back began hurting around the time he bought the bed.  
  
He continues to have muscle pain and tension in his neck. 
  
  
Updates from 09/20/18: 
Pt presents for PRN f/u. He now c/o a heavy sensation and weakness in his RUE in a C7/8 vs ulnar nerve distribution that tends to be worse in the mornings. He reports that he fell down his brother's stairs where he sprained his right ankle and exacerbated his neck and upper back pain and RUE paraesthesia. Updates from 11/08/18: 
Pt presents for cervical MRI f/u. He reports over the last month, his RUE heaviness has improved. He now c/o a \"jerking\" in his RLE when he is lying down about 10 times during the night.  He states that his stress level and sleep have been normal. He reports that he had a loop recorder for evaluation of heart palpitations and a colonoscopy. He continues to have some right ankle pain following his fall 2 months ago. PAST MEDICAL HISTORY Past Medical History:  
Diagnosis Date  Anxiety  Arm fatigue Forearms  Balance problems  Cardiac Agatston CAC score 100-199 02/04/2015 Coronary calcium score 0.  
 Cardiac echocardiogram 10/29/2014 EF 55-60%. No RWMA. Normal diastolic fx. RVSP 30 mmHg.  Cardiac Holter monitor study 10/29/2014 Normal Holter study.  Chronic cough   
 chronically coughing up sputum  Depression  Dizziness  Dyspnea  Esophageal reflux  Headache  Hypercholesterolemia  Lumbar pain  Myofascial pain  Neck pain  Numbness and tingling  PAC (premature atrial contraction)  Palpitations   
 presumably benign  Panic disorder  Reflux  Thoracic back pain Mid-thoracic  Upper extremity weakness  Vertigo, intermittent Past Surgical History:  
Procedure Laterality Date  APPENDECTOMY  11/23/2010  HX HEENT    
 corrective hearing for left ear  HX HEENT    
 plastic surgery on both ears  HX HEENT  10/08 & 09/09 Reformed jaw bone, bone graft  HX RENAL BIOPSY  HX TONSILLECTOMY Joselin Hollow MEDICATIONS Current Outpatient Medications Medication Sig Dispense Refill  escitalopram oxalate (LEXAPRO) 10 mg tablet Take 15 mg by mouth daily. 2  
 ALPRAZolam (XANAX) 0.5 mg tablet Take 0.5 mg by mouth two (2) times a day.  ascorbic acid (VITAMIN C) 500 mg tablet Take 500 mg by mouth daily.  cholecalciferol, vitamin D3, (VITAMIN D3) 2,000 unit Tab Take 1 Tab by mouth daily.  omeprazole-sodium bicarbonate (ZEGERID) 40-1.1 mg-gram capsule Take 2 Caps by mouth daily.  acetaminophen (TYLENOL EXTRA STRENGTH) 500 mg tablet Take 500 mg by mouth every six (6) hours as needed.  MULTIVITAMIN PO Take 1 Tab by mouth daily. ALLERGIES Allergies Allergen Reactions  Opioids - Morphine Analogues Myalgia  Other Medication Other (comments) Pt reports allergy to steroids, with psychological side effects  Pollen Extracts Unable to Obtain  Zithromax [Azithromycin] Itching SOCIAL HISTORY Social History Socioeconomic History  Marital status: SINGLE Spouse name: Not on file  Number of children: Not on file  Years of education: Not on file  Highest education level: Not on file Social Needs  Financial resource strain: Not on file  Food insecurity - worry: Not on file  Food insecurity - inability: Not on file  Transportation needs - medical: Not on file  Transportation needs - non-medical: Not on file Occupational History  Occupation: real estate Tobacco Use  Smoking status: Current Every Day Smoker Packs/day: 1.00 Years: 20.00 Pack years: 20.00 Types: Cigarettes  Smokeless tobacco: Never Used Substance and Sexual Activity  Alcohol use: No  
  Alcohol/week: 0.0 oz  Drug use: No  
 Sexual activity: Not on file Other Topics Concern  Not on file Social History Narrative  Not on file FAMILY HISTORY Family History Problem Relation Age of Onset  Hypertension Father  Liver Disease Father  Hypertension Mother REVIEW OF SYSTEMS Review of Systems Musculoskeletal: Positive for back pain and neck pain. BUE paraesthesia (R>L) PHYSICAL EXAMINATION There were no vitals taken for this visit. Pain Assessment  3/6/2018 Location of Pain Back;Neck Location Modifiers Left;Right Severity of Pain 5 Quality of Pain Aching Quality of Pain Comment - Duration of Pain Persistent Frequency of Pain Constant Date Pain First Started - Aggravating Factors - Aggravating Factors Comment - Limiting Behavior -  
Relieving Factors - Relieving Factors Comment - Result of Injury -  
 
 
 
 
 Constitutional:  Well developed, well nourished, in no acute distress. Psychiatric: Affect and mood are appropriate. Integumentary: No rashes or abrasions noted on exposed areas. SPINE/MUSCULOSKELETAL EXAM 
 
Cervical spine: 
Neck is midline.   
Normal muscle tone.   
No focal atrophy is noted.   
ROM pain free.   
Shoulder ROM intact.   
Tenderness to palpation at left scalene's.   
Negative Spurling's sign.   
Negative Tinel's sign.   
Negative Garcia's sign.   
Flex forward posture.   
No clonus. 
   
Sensation in the bilateral arms grossly intact to light touch.   
   
Lumbar spine: No rash, ecchymosis, or gross obliquity.   
No fasciculations.   
No focal atrophy is noted.   
No pain with hip ROM.   
Range of motion is normal.   
Tenderness to palpation. No tenderness to palpation at the sciatic notch.   
SI joints non-tender.   
Trochanters non tender. 
   
Sensation in the bilateral legs grossly intact to light touch. 
   
Updates from 12/02/16: 
Negative Garcia's sign bilaterally. No clonus. Pain in left scalene muscles with turning head to the right.  
  
Updates from 3/6/18: 
Tenderness to palpation of lumbar region No directional preference MOTOR:   
  Biceps  Triceps Deltoids Wrist Ext Wrist Flex Hand Intrin Right 5/5 5/5 5/5 5/5 5/5 5/5 Left 5/5 5/5 5/5 5/5 5/5 5/5 Hip Flex  Quads Hamstrings Ankle DF EHL Ankle PF Right 5/5 5/5 5/5 5/5 5/5 5/5 Left 5/5 5/5 5/5 5/5 5/5 5/5 DTRs are 2+ biceps, triceps, brachioradialis, patella, and Achilles. 
   
Negative Straight Leg raise.   
Squat not tested.   
No difficulty with tandem gait.   
Normal heel walk.   
Normal toe rise.  
   
Ambulation without assistive device. FWB.    
 
RADIOGRAPHS Cervical MRI images taken on 9/28/18 personally reviewed with patient: 
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 
fusion of the vertebral bodies, fusion of the spinous processes and left facet. Normal cervical alignment and vertebral body heights maintained. No pathologic marrow signal.The cervical cord is normal in signal and caliber. No cerebellar tonsillar ectopia. Paraspinal soft tissues are unremarkable. 
  
C2-3: Normal looking disc. Central canal and neural foramen are widely patent. 
  
C3-4: Minimal disc bulge. Central canal and neural foramen are widely patent. 
  
C4-5: Minimal disc bulge. Central canal and neural foramen are widely patent. 
  
C5-6: Minimal disc bulge. Central canal and neural foramen are widely patent. 
  
C6-7: Partial congenital bony fusion. Central canal and neural foramen are 
widely patent. 
  
C7-T1: Normal looking disc. Central canal and neural foramen are widely patent. 
  
IMPRESSION Impression: 
  
Minimal disc bulging at C3-4, C4-5 and C5-6 without evidence of focal disc 
pathology. No central canal or foraminal stenosis. 
  
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 BUE EMG taken on 08/04/2017 personally reviewed with patient: 
NCS/EMG FINDINGS: 
   
· Evaluation of the Left median motor, the Right median motor, the Left ulnar motor, the Right ulnar motor, the Left Median 2nd Digit sensory, the Right Median 2nd Digit sensory, the Left ulnar sensory, and the Right ulnar sensory nerves were unremarkable. 
   
   
INTERPRETATION: This was a normal nerve conduction and EMG study showing no signs of neuropathy myopathy or radiculopathy in the nerves and muscles tested. 
   
   
Cervical MRI from 9/15/2014 personally reviewed with patient: 1. Stable appearance of cervical spine examination. Congenital fusion anomaly of 
C6-7. Mild multilevel degenerative changes. No high-grade spinal or foraminal 
Stenosis.    
Cervical MRI images taken on 11/23/2016 personally reviewed with patient: 
Mild reversal of cervical curvature, centered at C3-C4, stable. Partial fusion 
at C6-C7, stable.  No compression fracture or pathologic marrow signal. No 
 prevertebral soft tissue abnormalities. Spinous processes and posterior soft 
tissues unremarkable. Craniocervical junction and spinal cord are also 
unremarkable. Dominant left vertebral artery, stable. 
   
C2-C3: No disc herniation, central or foraminal stenosis.    
C3-C4: Mild posterior disc bulge. No central stenosis, cord contact or foraminal 
stenosis.    
C4-C5: Mild posterior disc bulge with no central or foraminal stenosis.    
C5-C6: Mild posterior disc bulge. No central or foraminal stenosis.    
C6-7 and C7-T1: No disc herniation, central or foraminal stenosis. 
   
IMPRESSION IMPRESSION: Stable mild degenerative disease at C3-C4 and C4-C5. No significant 
central stenosis, cord compression or foraminal stenosis.  
 
20 minutes of face-to-face contact were spent with the patient during today's visit extensively discussing symptoms and treatment plan. All questions were answered. More than half of this visit today was spent on counseling.   
 
Written by Alondra Velasquez as dictated by Adin Padilla MD

## 2018-11-12 ENCOUNTER — CLINICAL SUPPORT (OUTPATIENT)
Dept: CARDIOLOGY CLINIC | Age: 38
End: 2018-11-12

## 2018-11-12 DIAGNOSIS — Z95.9 CARDIAC DEVICE IN SITU: ICD-10-CM

## 2018-11-12 DIAGNOSIS — I49.1 PAC (PREMATURE ATRIAL CONTRACTION): ICD-10-CM

## 2018-11-12 DIAGNOSIS — R55 SYNCOPE, VASOVAGAL: Primary | ICD-10-CM

## 2018-11-27 NOTE — PROGRESS NOTES
I have personally seen and evaluated the device findings. Interrogation reviewed and I agree with assessment. An Galan

## 2018-12-06 ENCOUNTER — OFFICE VISIT (OUTPATIENT)
Dept: CARDIOLOGY CLINIC | Age: 38
End: 2018-12-06

## 2018-12-06 VITALS
BODY MASS INDEX: 26.33 KG/M2 | HEIGHT: 65 IN | DIASTOLIC BLOOD PRESSURE: 66 MMHG | WEIGHT: 158 LBS | HEART RATE: 71 BPM | SYSTOLIC BLOOD PRESSURE: 98 MMHG | OXYGEN SATURATION: 97 %

## 2018-12-06 DIAGNOSIS — I49.1 PAC (PREMATURE ATRIAL CONTRACTION): ICD-10-CM

## 2018-12-06 DIAGNOSIS — R00.2 PALPITATIONS: Primary | ICD-10-CM

## 2018-12-06 NOTE — PROGRESS NOTES
History of Present Illness:  A 34 y.o. male referred by Dr. Milagros Lanza. I placed a subcutaneous loop recorder 10/24/18 for increasing palpitations. He does have a history of orthostatic hypotension, vasovagal. He recently went on a vacation, came back and was having palpitations different from before. He is very sensitive to every skipped beat and he has had 30-40 triggers, but one episode did show tachycardia. No syncope, chest pain, PND, orthopnea, edema. Impression/Plan:  
Recent SVT 32 beats in duration, symptomatic with the patient which he triggered the device. Subcutaneous loop recorder 10/24/18. History of previous orthostatic hypotension, vasovagal with junctional rhythm and 8-second pause with a tilt table test in 2012, relatively stable. History of PACs. I discussed that I cannot clearly identify the type of rhythm at this time. He clearly has some symptoms that do not correlate with the rhythm but others that do. The most discrete episode is concerning for a possible atrial tachycardia. He has no preexcitation on his EKG. Risks, benefits and alternatives were discussed for possible EP study but at this time we both agreed to monitor to see if he has more events and then have him see Dr. Milagros Lanza in 3 months. If they continue, he will have an EP study at that time. All questions answered. Primary reason for visit today is not loop recorder followup but rather identification of SVT on monitor to discuss treatment options. Past Medical History:  
Diagnosis Date  Anxiety  Arm fatigue Forearms  Balance problems  Cardiac Agatston CAC score 100-199 02/04/2015 Coronary calcium score 0.  
 Cardiac echocardiogram 10/29/2014 EF 55-60%. No RWMA. Normal diastolic fx. RVSP 30 mmHg.  Cardiac Holter monitor study 10/29/2014 Normal Holter study.  Chronic cough   
 chronically coughing up sputum  Depression  Dizziness  Dyspnea  Esophageal reflux  Headache  Hypercholesterolemia  Lumbar pain  Myofascial pain  Neck pain  Numbness and tingling  PAC (premature atrial contraction)  Palpitations   
 presumably benign  Panic disorder  Reflux  Thoracic back pain Mid-thoracic  Upper extremity weakness  Vertigo, intermittent Current Outpatient Medications Medication Sig Dispense Refill  escitalopram oxalate (LEXAPRO) 10 mg tablet Take 15 mg by mouth daily. 2  
 ALPRAZolam (XANAX) 0.5 mg tablet Take 0.5 mg by mouth two (2) times a day.  ascorbic acid (VITAMIN C) 500 mg tablet Take 500 mg by mouth daily.  cholecalciferol, vitamin D3, (VITAMIN D3) 2,000 unit Tab Take 1 Tab by mouth daily.  omeprazole-sodium bicarbonate (ZEGERID) 40-1.1 mg-gram capsule Take 2 Caps by mouth daily.  acetaminophen (TYLENOL EXTRA STRENGTH) 500 mg tablet Take 500 mg by mouth every six (6) hours as needed.  MULTIVITAMIN PO Take 1 Tab by mouth daily. Social History 
 reports that he has been smoking cigarettes. He has a 20.00 pack-year smoking history. he has never used smokeless tobacco. 
 reports that he does not drink alcohol. Family History 
family history includes Hypertension in his father and mother; Liver Disease in his father. Review of Systems Except as stated above include: 
Constitutional: Negative for fever, chills and malaise/fatigue. HEENT: No congestion or recent URI. Gastrointestinal: No nausea, vomiting, abdominal pain, bloody stools. Pulmonary:  Negative except as stated above. Cardiac:  Negative except as stated above. Musculoskeletal: Negative except as stated above. Neurological:  No localized symptoms. Skin:  Negative except as stated above. Psych:  Negative except as stated above. Endocrine:  Negative except as stated above. PHYSICAL EXAM 
BP Readings from Last 3 Encounters:  
12/06/18 98/66  
11/08/18 90/59  
10/24/18 92/62 Pulse Readings from Last 3 Encounters:  
12/06/18 71  
11/08/18 77  
10/24/18 (!) 56 Wt Readings from Last 3 Encounters:  
12/06/18 71.7 kg (158 lb) 11/08/18 71 kg (156 lb 9.6 oz) 10/24/18 70.3 kg (154 lb 15.7 oz) General:   Well developed, well groomed. Head/Neck:   No jugular venous distention No carotid bruits. No evidence of xanthelasma. Lungs:   No respiratory distress. Clear bilaterally. Heart:    Regular rate and rhythm. Normal S1/S2. Palpation of heart with normal point of maximum impulse. No significant murmurs, rubs or gallops. Abdomen:   Soft and nontender. No palpable abdominal mass or bruits. Extremities:   Intact peripheral pulses. No significant edema. Neurological:   Alert and oriented to person, place, time. No focal neurological deficit visually. Skin:   No obvious rash Blood Pressure Metric: Julia Galeano has been given the following recommendations today due to his elevated BP reading: low

## 2019-02-13 ENCOUNTER — OFFICE VISIT (OUTPATIENT)
Dept: CARDIOLOGY CLINIC | Age: 39
End: 2019-02-13

## 2019-02-13 DIAGNOSIS — R00.2 PALPITATIONS: ICD-10-CM

## 2019-02-13 DIAGNOSIS — Z95.9 CARDIAC DEVICE IN SITU: ICD-10-CM

## 2019-02-13 DIAGNOSIS — I49.1 PAC (PREMATURE ATRIAL CONTRACTION): Primary | ICD-10-CM

## 2019-02-16 NOTE — PROGRESS NOTES
I have personally seen and evaluated the device findings. Interrogation reviewed and I agree with assessment. Katie Bermeo

## 2019-03-06 ENCOUNTER — OFFICE VISIT (OUTPATIENT)
Dept: CARDIOLOGY CLINIC | Age: 39
End: 2019-03-06

## 2019-03-06 VITALS
SYSTOLIC BLOOD PRESSURE: 124 MMHG | HEART RATE: 69 BPM | DIASTOLIC BLOOD PRESSURE: 66 MMHG | HEIGHT: 65 IN | BODY MASS INDEX: 25.83 KG/M2 | WEIGHT: 155 LBS | OXYGEN SATURATION: 98 %

## 2019-03-06 DIAGNOSIS — R55 SYNCOPE, VASOVAGAL: ICD-10-CM

## 2019-03-06 DIAGNOSIS — R00.2 PALPITATIONS: Primary | ICD-10-CM

## 2019-03-06 DIAGNOSIS — I49.1 PAC (PREMATURE ATRIAL CONTRACTION): ICD-10-CM

## 2019-03-06 NOTE — PROGRESS NOTES
HPI:   I saw Alexis Munroe in my office today in cardiovascular evaluation regarding his problems with palpitations in the past and some dizziness issues. Mr. Mikal Goldsmith is a pleasant 35 year old white male with history of palpitations and some anxiety issues with Holters in the past, dating back to 2006, showing some PACs and reconfirmed on a Holter in November of 2014.       He also has history of dizziness problems, which back in November of 2012 prompted us to do a tilt table test and the stress portion of that test using sublingual nitroglycerin demonstrated a severe bradycardia with junctional rhythm and an 8 second pause secondary to very high vagal tone. We decided not to place a pacemaker and the patient has had no further syncopal episodes, but he still complaints of some vague lightheadedness from time to time. He unfortunately continues to smoke a pack of cigarettes per day. He has been somewhat concerned about the possibility of developing heart disease, so we did do a calcium score on him back in February of 2011, which was 0, suggesting he has a very low ten year risk of cardiovascular event.      Due to his frequent palpitations and we did ultimately place a loop recorder in October 2018. Since that time the patient has complained of significant palpitations and the subsequent strips have the most part demonstrated what appeared to be sinus tachycardia. There have been having some episodes with appears to be right bundle branch block aberration supraventricular tachycardia with some ventricular ectopy cannot be excluded. The patient's palpitations seem to primarily be related to sinus tachycardia and this gentleman has not been placed on beta-blockers due to his history of pauses. He relates that he has some palpitations nearly every day but some days he has them very frequently numerous times a day these are usually described as a fluttering in his chest followed by a racing.   Really denies any other cardiovascular complaints except for little shortness of breath at times which is poorly described which can occasionally be associated with palpitations. Encounter Diagnoses   Name Primary?  Palpitations Yes    PAC (premature atrial contraction)     Syncope, vasovagal        Discussion: This patient is very difficult to sort out and manage. He is quite anxious about his palpitations and seems to have them fairly frequently at times and yet there has been no marked abnormalities on the rhythm strips other than sinus tachycardia although there clearly has been some episodes of wide QRS tachycardia and I think the best option for this patient may well be to consider an electrophysiologic study to more clearly delineate exactly what rhythm issues may be present to see whether or not there are any abnormalities that could potentially be ablated since he seems to be fairly symptomatic at times. I will plan to have the patient seen by Dr. Estefani Mojica to review his strips and discuss the pros and cons of possible electrophysiologic study. If no atrial tachyarrhythmias that is a believable is clearly delineated in this patient may ultimately need a pacemaker to allow us to use blocking drugs to  treat his tachycardia episodes in view of his past history of long causes vasovagal syncope. PCP:  Mamadou Harmon MD      Past Medical History:   Diagnosis Date    Anxiety     Arm fatigue     Forearms    Balance problems     Cardiac Agatston CAC score 100-199 02/04/2015    Coronary calcium score 0.    Cardiac echocardiogram 10/29/2014    EF 55-60%. No RWMA. Normal diastolic fx. RVSP 30 mmHg.  Cardiac Holter monitor study 10/29/2014    Normal Holter study.     Chronic cough     chronically coughing up sputum    Depression     Dizziness     Dyspnea     Esophageal reflux     Headache     Hypercholesterolemia     Lumbar pain     Myofascial pain     Neck pain     Numbness and tingling  PAC (premature atrial contraction)     Palpitations     presumably benign    Panic disorder     Reflux     Thoracic back pain     Mid-thoracic    Upper extremity weakness     Vertigo, intermittent          Past Surgical History:   Procedure Laterality Date    APPENDECTOMY  11/23/2010    HX HEENT      corrective hearing for left ear     HX HEENT      plastic surgery on both ears    HX HEENT  10/08 & 09/09    Reformed jaw bone, bone graft    HX RENAL BIOPSY      HX TONSILLECTOMY      [place] IMPLANTATION PT-ACTIVATED CARDIAC EVENT RECORDER N/A 10/24/2018    Loop Recorder Insert performed by Niya Richardson MD at [facility] CATH LAB       Current Outpatient Medications   Medication Sig    escitalopram oxalate (LEXAPRO) 10 mg tablet Take 15 mg by mouth daily.  ALPRAZolam (XANAX) 0.5 mg tablet Take 0.5 mg by mouth two (2) times a day.  ascorbic acid (VITAMIN C) 500 mg tablet Take 500 mg by mouth daily.  cholecalciferol, vitamin D3, (VITAMIN D3) 2,000 unit Tab Take 1 Tab by mouth daily.  omeprazole-sodium bicarbonate (ZEGERID) 40-1.1 mg-gram capsule Take 2 Caps by mouth daily.  acetaminophen (TYLENOL EXTRA STRENGTH) 500 mg tablet Take 500 mg by mouth every six (6) hours as needed.  MULTIVITAMIN PO Take 1 Tab by mouth daily. No current facility-administered medications for this visit. Allergies   Allergen Reactions    Opioids - Morphine Analogues Myalgia    Other Medication Other (comments)     Pt reports allergy to steroids, with psychological side effects    Pollen Extracts Unable to Obtain    Zithromax [Azithromycin] Itching       Review of Systems:  Constitutional: Negative. Respiratory: Positive for cough, shortness of breath and wheezing. Negative for hemoptysis. Cardiovascular: Positive for palpitations. Negative for chest pain, orthopnea and leg swelling. Gastrointestinal: Positive for abdominal pain, heartburn, nausea and vomiting.  Negative for blood in stool, constipation, diarrhea and melena. Musculoskeletal: Positive for myalgias. Neurological: Positive for dizziness. Physical Exam:  This is a well-developed, well-nourished, anxious 34year-old  male in no acute distress at the time of the examination. Visit Vitals  /66   Pulse 69   Ht 5' 5\" (1.651 m)   Wt 70.3 kg (155 lb)   SpO2 98%   BMI 25.79 kg/m²       HEENT: Conjuctiva white, mucosa moist, no pallor or cyanosis. NECK: Supple without masses, tenderness or thyromegaly. There was no jugular venous distention. Carotid are full bilaterally without bruits. CHEST: Symmetrical with good excursion. LUNGS: Clear to auscultation in all fields. HEART: The apex is not displaced. There were no lifts, thrills or heaves. There is a normal S1 and S2 without appreciable murmurs rubs clicks or gallops auscultated. ABDOMEN: Soft without masses, tenderness or organomegaly. EXTREMITIES: Full peripheral pulses without peripheral edema. Review of Data: Please refer to past medical history for most recent cardiac testing. Results for orders placed or performed in visit on 03/06/19   AMB POC EKG ROUTINE W/ 12 LEADS, INTER & REP     Status: None    Narrative    Normal sinus rhythm rate 69. This EKG is within normal limits and similar to the EKG of December 6, 2018         Zabrina Hummel D.O., F.A.C.C. [facility]  [facility] [place]  [place] [address]. [address]    PLEASE NOTE:  This document has been produced using voice recognition software. Unrecognized errors in transcription may be present. Review of Systems   Constitutional: Negative. Respiratory: Positive for cough, shortness of breath and wheezing. Negative for hemoptysis. Cardiovascular: Positive for palpitations. Negative for chest pain, orthopnea and leg swelling.    Gastrointestinal: Positive for abdominal pain, heartburn, nausea and vomiting. Negative for blood in stool, constipation, diarrhea and melena. Musculoskeletal: Positive for myalgias.    Neurological: Positive for dizziness.   '

## 2019-03-06 NOTE — PROGRESS NOTES
Katie Alyson presents today for   Chief Complaint   Patient presents with    Follow-up     6 month     Palpitations     fluttering on/off    Dizziness     everyday       Katie Shields preferred language for health care discussion is english/other. Is someone accompanying this pt? No     Is the patient using any DME equipment during OV? No     Depression Screening:  No flowsheet data found. Learning Assessment:  Learning Assessment 3/7/2016   PRIMARY LEARNER Patient   PRIMARY LANGUAGE ENGLISH   LEARNER PREFERENCE PRIMARY DEMONSTRATION     READING   ANSWERED BY patient   RELATIONSHIP SELF       Abuse Screening:  No flowsheet data found. Fall Risk  No flowsheet data found. Pt currently taking Antiplatelet therapy? No     Coordination of Care:  1. Have you been to the ER, urgent care clinic since your last visit? Hospitalized since your last visit? NO     2. Have you seen or consulted any other health care providers outside of the [address] [place] [address] since your last visit? Include any pap smears or colon screening.  NO

## 2019-03-07 ENCOUNTER — OFFICE VISIT (OUTPATIENT)
Dept: ORTHOPEDIC SURGERY | Age: 39
End: 2019-03-07

## 2019-03-07 VITALS
BODY MASS INDEX: 25.62 KG/M2 | DIASTOLIC BLOOD PRESSURE: 68 MMHG | HEIGHT: 65 IN | RESPIRATION RATE: 16 BRPM | TEMPERATURE: 98.6 F | OXYGEN SATURATION: 97 % | HEART RATE: 82 BPM | SYSTOLIC BLOOD PRESSURE: 100 MMHG | WEIGHT: 153.8 LBS

## 2019-03-07 DIAGNOSIS — M79.18 CERVICAL MYOFASCIAL PAIN SYNDROME: ICD-10-CM

## 2019-03-07 DIAGNOSIS — M54.2 NECK PAIN: Primary | ICD-10-CM

## 2019-03-07 DIAGNOSIS — M54.2 CERVICALGIA: ICD-10-CM

## 2019-03-07 DIAGNOSIS — M79.18 MYOFASCIAL PAIN: ICD-10-CM

## 2019-03-07 NOTE — PROGRESS NOTES
[address]  [place]  Phone: [phone]  Fax: [phone]        Edwige Chacon  : 1980  PCP: Patito Jon MD  3/7/2019    PROGRESS NOTE      ASSESSMENT AND PLAN    Misty Barboza comes in to the office today for PRN f/u. He notes that he has had a burning pain in his neck and upper back (L>R). He states that he has not been maintaining his HEP consistently. He notes that he holds tension in his shoulders. He bought an Upright Go device that alerts him when he has poor posture, but he has not been using it lately. He has begun exercising again over the last 2 days, and has seen improvement of his headaches that are in the trapezius referral pattern. He continues to use his TheraCane. He recently began a DDP yoga program. He rates his pain as a 7/10 today. On examination, he had tenderness to palpation of his C7 spinous process and trapezii. His pain likely remains myofascial in nature. He can call if he needs to come in for a trigger point injection (without steroid). I advised he refrain from smoking. Pt will f/u PRN or sooner as needed. Diagnoses and all orders for this visit:    1. Neck pain    2. Myofascial pain    3. Cervical myofascial pain syndrome    4. Cervicalgia       Follow-up Disposition:  Return if symptoms worsen or fail to improve. HISTORY OF PRESENT ILLNESS  Misty Barboza is a 34 y.o. male. A&P / HPI from 2016:  Jimbo Stallings was in the office today for a f/u appointment. His pain has remained myofascial in nature. He has not found relief from PT in the past or from using an antiinflammatory cream. Pt was advised to continue using his theracane and doing his home PT exercises.       Since the cream did not provide relief and NSAIDs upset his stomach, he was prescribed Celebrex 200mg BID prn.  The risks, benefits, and potential side effects of this medication were discussed.       Trigger point injections were offered but declined at this time. Pt said he would like to get a massage first.       He will f/u prn.       Updates from 12/02/16:  Pt presents for a prn f/u. He has had a cervical and brain MRI since his last visit. The cervical MRI does not show any obvious cause for his pain. 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      He comes in today with c/o cervical pain. His biggest complaint is a headache that seems consistent with occipital neuralgia, radiating from his left eye to a region below his left ear.          Updates from 07/28/17:  Pt presents for a new onset of numbness and tingling that radiate down the left upper extremity that ends in the last two finger.  He states that his pain in the cervical region has decreased since the last visit. He is also complaining of chronic pain in the left shoulder.       Updates from 08/23/17:  Pt presents with continued numbness and tingling in the left upper extremity. He was last referred to get a BUE EMG, which did not show significant signs of neuropathy myopathy or radiculopathy.     Updates from 03/06/18:  Pt presents for new low back pain that began a few weeks ago. He recently bought a Tempurpedic mattress and suspects that may be the cause of his pain as his back began hurting around the time he bought the bed.      He continues to have muscle pain and tension in his neck.        Updates from 09/20/18:  Pt presents for PRN f/u. He now c/o a heavy sensation and weakness in his RUE in a C7/8 vs ulnar nerve distribution that tends to be worse in the mornings. He reports that he fell down his brother's stairs where he sprained his right ankle and exacerbated his neck and upper back pain and RUE paraesthesia.     Updates from 11/08/18:  Pt presents for cervical MRI f/u. He reports over the last month, his RUE heaviness has improved.  He now c/o a \"jerking\" in his RLE when he is lying down about 10 times during the night. He states that his stress level and sleep have been normal. He reports that he had a loop recorder, for evaluation of heart palpitations, and a colonoscopy. He continues to have some right ankle pain following his fall 2 months ago. 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 Updates from 03/07/19:  Pt presents for PRN f/u. He notes that he has had a burning pain in his neck and upper back (L>R). He states that he has not been maintaining his HEP consistently and his pain returned. He states that he often keeps tension in his shoulders. He bought an Upright Go device that alerts him when he has poor posture, but he only used it for a week after he purchased it. He has begun exercising again over the last 2 days, and has seen improvement of his headaches that are in the trapezius referral pattern. He continues to use his TheraCane. He recently began a DDP yoga program. He sees a neurologist for headaches. He states that during some of the neck exercises, he occasionally feels a head spinning sensation. He states that he assumes it is more likely inner ear related, but one of his biggest fears is having a stroke, so he remains very guarded during his exercises. He also has h/o left inner ear surgery when he was a child. He sleeps on his left side, but has been trying to sleep on his back to help alleviate his neck pain. He has h/o anxiety. PAST MEDICAL HISTORY   Past Medical History:   Diagnosis Date    Anxiety     Arm fatigue     Forearms    Balance problems     Cardiac Agatston CAC score 100-199 02/04/2015    Coronary calcium score 0.    Cardiac echocardiogram 10/29/2014    EF 55-60%. No RWMA. Normal diastolic fx. RVSP 30 mmHg.  Cardiac Holter monitor study 10/29/2014    Normal Holter study.     Chronic cough     chronically coughing up sputum    Depression     Dizziness     Dyspnea     Esophageal reflux     Headache     Hypercholesterolemia     Lumbar pain     Myofascial pain     Neck pain     Numbness and tingling     PAC (premature atrial contraction)     Palpitations     presumably benign    Panic disorder     Reflux     Thoracic back pain     Mid-thoracic    Upper extremity weakness     Vertigo, intermittent        Past Surgical History:   Procedure Laterality Date    APPENDECTOMY  11/23/2010    HX HEENT      corrective hearing for left ear     HX HEENT      plastic surgery on both ears    HX HEENT  10/08 & 09/09    Reformed jaw bone, bone graft    HX RENAL BIOPSY      HX TONSILLECTOMY      [place] IMPLANTATION PT-ACTIVATED CARDIAC EVENT RECORDER N/A 10/24/2018    Loop Recorder Insert performed by Deven Sharma MD at [address] [place] [address] CATH LAB   . MEDICATIONS    Current Outpatient Medications   Medication Sig Dispense Refill    escitalopram oxalate (LEXAPRO) 10 mg tablet Take 15 mg by mouth daily. 2    ALPRAZolam (XANAX) 0.5 mg tablet Take 0.5 mg by mouth two (2) times a day.  ascorbic acid (VITAMIN C) 500 mg tablet Take 500 mg by mouth daily.  cholecalciferol, vitamin D3, (VITAMIN D3) 2,000 unit Tab Take 1 Tab by mouth daily.  omeprazole-sodium bicarbonate (ZEGERID) 40-1.1 mg-gram capsule Take 2 Caps by mouth daily.  acetaminophen (TYLENOL EXTRA STRENGTH) 500 mg tablet Take 500 mg by mouth every six (6) hours as needed.  MULTIVITAMIN PO Take 1 Tab by mouth daily.           ALLERGIES  Allergies   Allergen Reactions    Opioids - Morphine Analogues Myalgia    Other Medication Other (comments)     Pt reports allergy to steroids, with psychological side effects    Pollen Extracts Unable to Obtain    Zithromax [Azithromycin] Itching          SOCIAL HISTORY    Social History     Socioeconomic History    Marital status: SINGLE     Spouse name: Not on file    Number of children: Not on file    Years of education: Not on file    Highest education level: Not on file   Occupational History    Occupation: real estate   Tobacco Use    Smoking status: Current Every Day Smoker     Packs/day: 1.00     Years: 20.00     Pack years: 20.00     Types: Cigarettes    Smokeless tobacco: Never Used   Substance and Sexual Activity    Alcohol use: No     Alcohol/week: 0.0 oz    Drug use: No       FAMILY HISTORY  Family History   Problem Relation Age of Onset    Hypertension Father     Liver Disease Father     Hypertension Mother          REVIEW OF SYSTEMS  Review of Systems   Musculoskeletal: Positive for back pain and neck pain. BUE paraesthesia (R>L)           PHYSICAL EXAMINATION  There were no vitals taken for this visit. Pain Assessment  3/7/2019   Location of Pain Neck   Location Modifiers -   Severity of Pain 7   Quality of Pain Aching;Dull;Burning   Quality of Pain Comment -   Duration of Pain Persistent   Frequency of Pain Constant   Date Pain First Started -   Aggravating Factors Other (Comment)   Aggravating Factors Comment turning head to the right. Limiting Behavior Yes   Relieving Factors Heat;NSAID;Rest   Relieving Factors Comment -   Result of Injury -           Constitutional:  Well developed, well nourished, in no acute distress. Psychiatric: Affect and mood are appropriate. Integumentary: No rashes or abrasions noted on exposed areas. SPINE/MUSCULOSKELETAL EXAM    Cervical spine:  Neck is midline.    Normal muscle tone.    No focal atrophy is noted.    ROM pain free.    Shoulder ROM intact.    Tenderness to palpation at left scalene's.    Negative Spurling's sign.    Negative Tinel's sign.    Negative Garcai's sign.    Flex forward posture.    No clonus.      Sensation in the bilateral arms grossly intact to light touch.        Lumbar spine:  No rash, ecchymosis, or gross obliquity.    No fasciculations.    No focal atrophy is noted.    No pain with hip ROM.    Range of motion is normal.    Tenderness to palpation.   No tenderness to palpation at the sciatic notch.    SI joints non-tender.    Trochanters non tender.      Sensation in the bilateral legs grossly intact to light touch.      Updates from 12/02/16:  Negative Garcia's sign bilaterally. No clonus. Pain in left scalene muscles with turning head to the right.      Updates from 3/6/18:  Tenderness to palpation of lumbar region  No directional preference    Updates from 3/7/19:  Tenderness to palpation of trapezii (L>R)  Tenderness to palpation of C7 spinous process    MOTOR:      Biceps  Triceps Deltoids Wrist Ext Wrist Flex Hand Intrin   Right 5/5 5/5 5/5 5/5 5/5 5/5   Left 5/5 5/5 5/5 5/5 5/5 5/5             Hip Flex  Quads Hamstrings Ankle DF EHL Ankle PF   Right 5/5 5/5 5/5 5/5 5/5 5/5   Left 5/5 5/5 5/5 5/5 5/5 5/5     DTRs are 2+ biceps, triceps, brachioradialis, patella, and Achilles.      Negative Straight Leg raise.    Squat not tested.    No difficulty with tandem gait.    Normal heel walk.    Normal toe rise.       Ambulation without assistive device. FWB.       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 Normal cervical alignment and vertebral body heights maintained. No pathologic marrow signal.The cervical cord is normal in signal and caliber. No cerebellar tonsillar ectopia. Paraspinal soft tissues are unremarkable.     C2-3: Normal looking disc. Central canal and neural foramen are widely patent.     C3-4: Minimal disc bulge. Central canal and neural foramen are widely patent.     C4-5: Minimal disc bulge. Central canal and neural foramen are widely patent.     C5-6: Minimal disc bulge. Central canal and neural foramen are widely patent.     C6-7: Partial congenital bony fusion. Central canal and neural foramen are  widely patent.     C7-T1: Normal looking disc.  Central canal and neural foramen are widely patent.     IMPRESSION  Impression:     Minimal disc bulging at C3-4, C4-5 and C5-6 without evidence of focal disc  pathology. No central canal or foraminal stenosis.     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     BUE EMG taken on 08/04/2017 personally reviewed with patient:  NCS/EMG FINDINGS:      · Evaluation of the Left median motor, the Right median motor, the Left ulnar motor, the Right ulnar motor, the Left Median 2nd Digit sensory, the Right Median 2nd Digit sensory, the Left ulnar sensory, and the Right ulnar sensory nerves were unremarkable.          INTERPRETATION: This was a normal nerve conduction and EMG study showing no signs of neuropathy myopathy or radiculopathy in the nerves and muscles tested.          Cervical MRI from 9/15/2014 personally reviewed with patient:  1. Stable appearance of cervical spine examination. 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 Mild multilevel degenerative changes. No high-grade spinal or foraminal  Stenosis.     Cervical MRI images taken on 11/23/2016 personally reviewed with patient:  Mild reversal of cervical curvature, centered at C3-C4, stable. Partial fusion  at C6-C7, stable. No compression fracture or pathologic marrow signal. No  prevertebral soft tissue abnormalities. Spinous processes and posterior soft  tissues unremarkable. Craniocervical junction and spinal cord are also  unremarkable. Dominant left vertebral artery, stable.      C2-C3: No disc herniation, central or foraminal stenosis.     C3-C4: Mild posterior disc bulge. No central stenosis, cord contact or foraminal  stenosis.     C4-C5: Mild posterior disc bulge with no central or foraminal stenosis.     C5-C6: Mild posterior disc bulge. No central or foraminal stenosis.     C6-7 and C7-T1: No disc herniation, central or foraminal stenosis.      IMPRESSION  IMPRESSION: Stable mild degenerative disease at C3-C4 and C4-C5.  No significant  central stenosis, cord compression or foraminal stenosis.     20 minutes of face-to-face contact were spent with the patient during today's visit extensively discussing symptoms and treatment plan. All questions were answered. More than half of this visit today was spent on counseling.      Written by Eva Coto as dictated by Albertina Flanagan MD

## 2019-03-07 NOTE — PATIENT INSTRUCTIONS
[employer] - \"Physical Therapy Video\" - look up neck exercises           Neck: Exercises  Your Care Instructions  Here are some examples of typical rehabilitation exercises for your condition. Start each exercise slowly. Ease off the exercise if you start to have pain. Your doctor or physical therapist will tell you when you can start these exercises and which ones will work best for you. How to do the exercises  Neck stretch    1. This stretch works best if you keep your shoulder down as you lean away from it. To help you remember to do this, start by relaxing your shoulders and lightly holding on to your thighs or your chair. 2. Tilt your head toward your shoulder and hold for 15 to 30 seconds. Let the weight of your head stretch your muscles. 3. If you would like a little added stretch, use your hand to gently and steadily pull your head toward your shoulder. For example, keeping your right shoulder down, lean your head to the left. 4. Repeat 2 to 4 times toward each shoulder. Diagonal neck stretch    1. Turn your head slightly toward the direction you will be stretching, and tilt your head diagonally toward your chest and hold for 15 to 30 seconds. 2. If you would like a little added stretch, use your hand to gently and steadily pull your head forward on the diagonal.  3. Repeat 2 to 4 times toward each side. Dorsal glide stretch    1. Sit or stand tall and look straight ahead. 2. Slowly tuck your chin as you glide your head backward over your body  3. Hold for a count of 6, and then relax for up to 10 seconds. 4. Repeat 8 to 12 times. Chest and shoulder stretch    1. Sit or stand tall and glide your head backward as in the dorsal glide stretch. 2. Raise both arms so that your hands are next to your ears. 3. Take a deep breath, and as you breathe out, lower your elbows down and behind your back.  You will feel your shoulder blades slide down and together, and at the same time you will feel a stretch across your chest and the front of your shoulders. 4. Hold for about 6 seconds, and then relax for up to 10 seconds. 5. Repeat 8 to 12 times. Strengthening: Hands on head    1. Move your head backward, forward, and side to side against gentle pressure from your hands, holding each position for about 6 seconds. 2. Repeat 8 to 12 times. Follow-up care is a key part of your treatment and safety. Be sure to make and go to all appointments, and call your doctor if you are having problems. It's also a good idea to know your test results and keep a list of the medicines you take. Where can you learn more? Go to http://chiki-zhao.info/. Enter P975 in the search box to learn more about \"Neck: Exercises. \"  Current as of: September 20, 2018  Content Version: 11.9  © 9430-6187 [employer]. Care instructions adapted under license by [employer] (which disclaims liability or warranty for this information). If you have questions about a medical condition or this instruction, always ask your healthcare professional. [address] any warranty or liability for your use of this information. Shoulder Blade: Exercises  Your Care Instructions  Here are some examples of typical exercises for your condition. Start each exercise slowly. Ease off the exercise if you start to have pain. Your doctor or physical therapist will tell you when you can start these exercises and which ones will work best for you. How to do the exercises  Shoulder roll    1. Stand tall with your chin slightly tucked. Imagine that a string at the top of your head is pulling you straight up. 2. Keep your arms relaxed. All motion will be in your shoulders. 3. Shrug your shoulders up toward your ears, then up and back. [place] your shoulders down and back, like you're sliding your hands down into your back pants pockets.   4. Repeat the circles at least 2 to 4 times.  5. This exercise is also helpful anytime you want to relax. Lower neck and upper back stretch    1. With your arms about shoulder height, clasp your hands in front of you. 2. Drop your chin toward your chest.  3. Reach straight forward so you are rounding your upper back. Think about pulling your shoulder blades apart. Cristi Rocha feel a stretch across your upper back and shoulders. Hold for at least 6 seconds. 4. Repeat 2 to 4 times. Triceps stretch    1. Reach your arm straight up. 2. Keeping your elbow in place, bend your arm and reach your hand down behind your back. 3. With your other hand, apply gentle pressure to the bent elbow. Cristi Rocha feel a stretch at the back of your upper arm and shoulder. Hold about 6 seconds. 4. Repeat 2 to 4 times with each arm. Shoulder stretch    1. Relax your shoulders. 2. Raise one arm to shoulder height, and reach it across your chest.  3. Pull the arm slightly toward you with your other arm. This will help you get a gentle stretch. Hold for about 6 seconds. 4. Repeat 2 to 4 times. Shoulder blade squeeze    1. Sit or stand up tall with your arms at your sides. 2. Keep your shoulders relaxed and down, not shrugged. 3. Squeeze your shoulder blades together. Hold for 6 seconds, then relax. 4. Repeat 8 to 12 times. Straight-arm shoulder blade squeeze    1. Sit or stand tall. Relax your shoulders. 2. With palms down, hold your elastic tubing or band straight out in front of you. 3. Start with slight tension in the tubing or band, with your hands about shoulder-width apart. 4. Slowly pull straight out to the sides, squeezing your shoulder blades together. Keep your arms straight and at shoulder height. Slowly release. 5. Repeat 8 to 12 times. Rowing    1. [place] your elastic tubing or band at about waist height. Take one end in each hand. 2. Sit or stand with your feet hip-width apart. 3. Hold your arms straight in front of you.  Adjust your distance to create slight tension in the tubing or band. 4. Slightly tuck your chin. Relax your shoulders. 5. Without shrugging your shoulders, pull straight back. Your elbows will pass alongside your waist.    Pull-downs    1. [place] your elastic tubing or band in the top of a closed door. Take one end in each hand. 2. Either sit or stand, depending on what is more comfortable. If you feel unsteady, sit on a chair. 3. Start with your arms up and comfortably apart, elbows straight. There should be a slight tension in the tubing or band. 4. Slightly tuck your chin, and look straight ahead. 5. Keeping your back straight, slowly pull down and back, bending your elbows. 6. Stop where your hands are level with your chin, in a \"goalpost\" position. 7. Repeat 8 to 12 times. Chest T stretch    1. Lie on your back. Raise your knees so they are bent. Plant your feet on the floor, hip-width apart. 2. Tuck your chin, and relax your shoulders. 3. Reach your arms straight out to the sides. If you don't feel a mild stretch in your shoulders and across your chest, use a foam roll or a tightly rolled blanket under your spine, from your tailbone to your head. 4. Relax in this position for at least 15 to 30 seconds while you breathe normally. Repeat 2 to 4 times. 5. As you get used to this stretch, keep adding a little more time until you are able relax in this position for 2 or 3 minutes. When you can relax for at least 2 minutes, you only need to do the exercise 1 time per session. Chest goalpost stretch    1. Lie on your back. Raise your knees so they are bent. Plant your feet on the floor, hip-width apart. 2. Tuck your chin, and relax your shoulders. 3. Reach your arms straight out to the sides. 4. Bend your arms at the elbows, with your hands pointed toward the top of your head. Your arms should make an L on either side of your head. Your palms should be facing up.   5. If you don't feel a mild stretch in your shoulders and across your chest, use a foam roll or tightly rolled blanket under your spine, from your tailbone to your head. 6. Relax in this position for at least 15 to 30 seconds while you breathe normally. Repeat 2 to 4 times. 7. Each day you do this exercise, add a little more time until you can relax in this position for 2 or 3 minutes. When you can relax for at least 2 minutes, you only need to do the exercise 1 time per session. Follow-up care is a key part of your treatment and safety. Be sure to make and go to all appointments, and call your doctor if you are having problems. It's also a good idea to know your test results and keep a list of the medicines you take. Where can you learn more? Go to http://chiki-zhao.info/. Enter [phone] in the search box to learn more about \"Shoulder Blade: Exercises. \"  Current as of: September 20, 2018  Content Version: 11.9  © 3792-3496 [employer]. Care instructions adapted under license by [employer] (which disclaims liability or warranty for this information). If you have questions about a medical condition or this instruction, always ask your healthcare professional. [place] [address] any warranty or liability for your use of this information. Rhomboid Muscle Strain: Rehab Exercises  Your Care Instructions  Here are some examples of typical rehabilitation exercises for your condition. Start each exercise slowly. Ease off the exercise if you start to have pain. Your doctor or physical therapist will tell you when you can start these exercises and which ones will work best for you. How to do the exercises  Lower neck and upper back (rhomboid) stretch    1. Stretch your arms out in front of your body. Clasp one hand on top of your other hand. 2. Gently reach out so that you feel your shoulder blades stretching away from each other. 3. Gently bend your head forward.   4. Hold for 15 to 30 seconds. 5. Repeat 2 to 4 times. Resisted rows    1. Put the band around a solid object, such as a bedpost, at about waist level. Stand facing where you have placed the band. Hold equal lengths of the band in each hand. 2. Start with your arms held out in front of you. 3. Pull the bands back, and move your shoulder blades together. As you finish, your elbows should be at your side and bent at 90 degrees (like the angle of the letter \"L\"). 4. Return to the starting position. 5. Repeat 8 to 12 times. Neck stretches    1. Look straight ahead, and tip your right ear to your right shoulder. Do not let your left shoulder rise as you tip your head to the right. 2. Hold for 15 to 30 seconds. 3. Tilt your head to the left. Do not let your right shoulder rise as you tip your head to the left. 4. Hold for 15 to 30 seconds. 5. Repeat 2 to 4 times to each side. Neck rotation    1. Sit in a firm chair, or stand up straight. 2. Keeping your chin level, turn your head to the right, and hold for 15 to 30 seconds. 3. Turn your head to the left, and hold for 15 to 30 seconds. 4. Repeat 2 to 4 times to each side. Follow-up care is a key part of your treatment and safety. Be sure to make and go to all appointments, and call your doctor if you are having problems. It's also a good idea to know your test results and keep a list of the medicines you take. Where can you learn more? Go to http://chiki-zhao.info/. Enter [phone] in the search box to learn more about \"Rhomboid Muscle Strain: Rehab Exercises. \"  Current as of: September 20, 2018  Content Version: 11.9  © 0437-5099 [employer]. Care instructions adapted under license by [employer] (which disclaims liability or warranty for this information).  If you have questions about a medical condition or this instruction, always ask your healthcare professional. Al[employer] Delia Sears disclaims any warranty or liability for your use of this information. Healthy Upper Back: Exercises  Your Care Instructions  Here are some examples of exercises for your upper back. Start each exercise slowly. Ease off the exercise if you start to have pain. Your doctor or physical therapist will tell you when you can start these exercises and which ones will work best for you. How to do the exercises  Lower neck and upper back stretch    5. Stretch your arms out in front of your body. Clasp one hand on top of your other hand. 6. Gently reach out so that you feel your shoulder blades stretching away from each other. 7. Gently bend your head forward. 8. Hold for 15 to 30 seconds. 9. Repeat 2 to 4 times. Midback stretch    4. Kneel on the floor, and sit back on your ankles. 5. Lean forward, place your hands on the floor, and stretch your arms out in front of you. Rest your head between your arms. 6. Gently push your chest toward the floor, reaching as far in front of you as possible. 7. Hold for 15 to 30 seconds. 8. Repeat 2 to 4 times. Shoulder rolls    5. Sit comfortably with your feet shoulder-width apart. You can also do this exercise while standing. 6. Roll your shoulders up, then back, and then down in a smooth, circular motion. 7. Repeat 2 to 4 times. Wall push-up    6. Stand against a wall with your feet about 12 to 24 inches back from the wall. If you feel any pain when you do this exercise, stand closer to the wall. 7. Place your hands on the wall slightly wider apart than your shoulders, and lean forward. 8. Gently lean your body toward the wall. Then push back to your starting position. Keep the motion smooth and controlled. 9. Repeat 8 to 12 times. Resisted shoulder blade squeeze    3. Sit or stand, holding the band in both hands in front of you. Keep your elbows close to your sides, bent at a 90-degree angle. Your palms should face up.   4. Squeeze your shoulder blades together, and move your arms to the outside, stretching the band. Be sure to keep your elbows at your sides while you do this. 5. Relax. 6. Repeat 8 to 12 times. Resisted rows    1. Put the band around a solid object, such as a bedpost, at about waist level. Hold one end of the band in each hand. 2. With your elbows at your sides and bent to 90 degrees, pull the band back to move your shoulder blades toward each other. Return to the starting position. 3. Repeat 8 to 12 times. Follow-up care is a key part of your treatment and safety. Be sure to make and go to all appointments, and call your doctor if you are having problems. It's also a good idea to know your test results and keep a list of the medicines you take. Where can you learn more? Go to http://chiki-zhao.info/. Enter J410 in the search box to learn more about \"Healthy Upper Back: Exercises. \"  Current as of: September 20, 2018  Content Version: 11.9  © 5119-2833 [employer]. Care instructions adapted under license by [employer] (which disclaims liability or warranty for this information). If you have questions about a medical condition or this instruction, always ask your healthcare professional. [address] any warranty or liability for your use of this information.

## 2019-03-18 ENCOUNTER — TELEPHONE (OUTPATIENT)
Dept: ORTHOPEDIC SURGERY | Age: 39
End: 2019-03-18

## 2019-03-18 NOTE — TELEPHONE ENCOUNTER
Patient had massage and stretching a [facility] last Tues 03/12 and he started having more back pain on Thurs 03/14. He has tingling in arms and legs and weakness. First available appt with any provider is 04/16. Patient would like to talk with Dr. Cali Galeano or nurse about what he can do to help with his symptoms. Please call him today at [phone]

## 2019-03-18 NOTE — TELEPHONE ENCOUNTER
Spoke with patient he stated that this is something new, he's worried about the massage causing a disc to bulge. Patient was informed that the likelihood of this happening is slim. He stated he has experience tingling to right, possibly both legs for 6 days now. Spoke with MD who stated the massage just exaggerated/worsen, no injury possible from massage. The symptoms displayed aren't uncommon after hard manual manipulation. Patient should continue stretching and allow things to calm down.

## 2019-03-18 NOTE — TELEPHONE ENCOUNTER
Please discuss with Dr. Rd Sutton. Per his note, His pain likely remains myofascial in nature. He can call if he needs to come in for a trigger point injection (without steroid).

## 2019-04-04 ENCOUNTER — TELEPHONE (OUTPATIENT)
Dept: CARDIOLOGY CLINIC | Age: 39
End: 2019-04-04

## 2019-04-04 NOTE — TELEPHONE ENCOUNTER
Spoke with patient regarding symptoms reported on loop event report. Patient states he had two episodes of palpitations, states he has had some dizziness which is not related to reported symptoms. Denies chest pain and SOB. Is scheduled for follow up on 4/11/2019.

## 2019-04-19 ENCOUNTER — OFFICE VISIT (OUTPATIENT)
Dept: ORTHOPEDIC SURGERY | Age: 39
End: 2019-04-19

## 2019-04-19 VITALS
SYSTOLIC BLOOD PRESSURE: 108 MMHG | HEIGHT: 65 IN | WEIGHT: 152.8 LBS | BODY MASS INDEX: 25.46 KG/M2 | RESPIRATION RATE: 20 BRPM | DIASTOLIC BLOOD PRESSURE: 66 MMHG | HEART RATE: 73 BPM

## 2019-04-19 DIAGNOSIS — M54.2 CERVICAL PAIN: ICD-10-CM

## 2019-04-19 DIAGNOSIS — G89.29 CHRONIC BILATERAL LOW BACK PAIN WITHOUT SCIATICA: Primary | ICD-10-CM

## 2019-04-19 DIAGNOSIS — M79.18 MYOFASCIAL PAIN: ICD-10-CM

## 2019-04-19 DIAGNOSIS — M54.50 CHRONIC BILATERAL LOW BACK PAIN WITHOUT SCIATICA: Primary | ICD-10-CM

## 2019-04-19 RX ORDER — METHOCARBAMOL 500 MG/1
500 TABLET, FILM COATED ORAL
Qty: 45 TAB | Refills: 0 | Status: SHIPPED | OUTPATIENT
Start: 2019-04-19 | End: 2020-11-09

## 2019-04-19 RX ORDER — ESCITALOPRAM OXALATE 20 MG/1
TABLET ORAL
Refills: 1 | COMMUNITY
Start: 2019-04-10 | End: 2022-04-20

## 2019-04-19 NOTE — PATIENT INSTRUCTIONS
Neck: Exercises Your Care Instructions Here are some examples of typical rehabilitation exercises for your condition. Start each exercise slowly. Ease off the exercise if you start to have pain. Your doctor or physical therapist will tell you when you can start these exercises and which ones will work best for you. How to do the exercises Neck stretch 1. This stretch works best if you keep your shoulder down as you lean away from it. To help you remember to do this, start by relaxing your shoulders and lightly holding on to your thighs or your chair. 2. Tilt your head toward your shoulder and hold for 15 to 30 seconds. Let the weight of your head stretch your muscles. 3. If you would like a little added stretch, use your hand to gently and steadily pull your head toward your shoulder. For example, keeping your right shoulder down, lean your head to the left. 4. Repeat 2 to 4 times toward each shoulder. Diagonal neck stretch 1. Turn your head slightly toward the direction you will be stretching, and tilt your head diagonally toward your chest and hold for 15 to 30 seconds. 2. If you would like a little added stretch, use your hand to gently and steadily pull your head forward on the diagonal. 
3. Repeat 2 to 4 times toward each side. Dorsal glide stretch 1. Sit or stand tall and look straight ahead. 2. Slowly tuck your chin as you glide your head backward over your body 3. Hold for a count of 6, and then relax for up to 10 seconds. 4. Repeat 8 to 12 times. Chest and shoulder stretch 1. Sit or stand tall and glide your head backward as in the dorsal glide stretch. 2. Raise both arms so that your hands are next to your ears. 3. Take a deep breath, and as you breathe out, lower your elbows down and behind your back. You will feel your shoulder blades slide down and together, and at the same time you will feel a stretch across your chest and the front of your shoulders. 4. Hold for about 6 seconds, and then relax for up to 10 seconds. 5. Repeat 8 to 12 times. Strengthening: Hands on head 1. Move your head backward, forward, and side to side against gentle pressure from your hands, holding each position for about 6 seconds. 2. Repeat 8 to 12 times. Follow-up care is a key part of your treatment and safety. Be sure to make and go to all appointments, and call your doctor if you are having problems. It's also a good idea to know your test results and keep a list of the medicines you take. Where can you learn more? Go to http://chiki-zhao.info/. Enter P975 in the search box to learn more about \"Neck: Exercises. \" Current as of: September 20, 2018 Content Version: 11.9 © 5669-9359 [employer]. Care instructions adapted under license by [employer] (which disclaims liability or warranty for this information). If you have questions about a medical condition or this instruction, always ask your healthcare professional. [place] [address] any warranty or liability for your use of this information. Low Back Pain: Exercises Your Care Instructions Here are some examples of typical rehabilitation exercises for your condition. Start each exercise slowly. Ease off the exercise if you start to have pain. Your doctor or physical therapist will tell you when you can start these exercises and which ones will work best for you. How to do the exercises Press-up 1. Lie on your stomach, supporting your body with your forearms. 2. Press your elbows down into the floor to raise your upper back. As you do this, relax your stomach muscles and allow your back to arch without using your back muscles. As your press up, do not let your hips or pelvis come off the floor. 3. Hold for 15 to 30 seconds, then relax. 4. Repeat 2 to 4 times. Alternate arm and leg (bird dog) exercise 1. Start on the floor, on your hands and knees. 2. Tighten your belly muscles. 3. Raise one leg off the floor, and hold it straight out behind you. Be careful not to let your hip drop down, because that will twist your trunk. 4. Hold for about 6 seconds, then lower your leg and switch to the other leg. 5. Repeat 8 to 12 times on each leg. 6. Over time, work up to holding for 10 to 30 seconds each time. 7. If you feel stable and secure with your leg raised, try raising the opposite arm straight out in front of you at the same time. Knee-to-chest exercise 1. Lie on your back with your knees bent and your feet flat on the floor. 2. Bring one knee to your chest, keeping the other foot flat on the floor (or keeping the other leg straight, whichever feels better on your lower back). 3. Keep your lower back pressed to the floor. Hold for at least 15 to 30 seconds. 4. Relax, and lower the knee to the starting position. 5. Repeat with the other leg. Repeat 2 to 4 times with each leg. 6. To get more stretch, put your other leg flat on the floor while pulling your knee to your chest. 
 
Curl-ups 1. Lie on the floor on your back with your knees bent at a 90-degree angle. Your feet should be flat on the floor, about 12 inches from your buttocks. 2. Cross your arms over your chest. If this bothers your neck, try putting your hands behind your neck (not your head), with your elbows spread apart. 3. Slowly tighten your belly muscles and raise your shoulder blades off the floor. 4. Keep your head in line with your body, and do not press your chin to your chest. 
5. Hold this position for 1 or 2 seconds, then slowly lower yourself back down to the floor. 6. Repeat 8 to 12 times. Pelvic tilt exercise 1. Lie on your back with your knees bent. 2. \"Brace\" your stomach. This means to tighten your muscles by pulling in and imagining your belly button moving toward your spine.  You should feel like your back is pressing to the floor and your hips and pelvis are rocking back. 3. Hold for about 6 seconds while you breathe smoothly. 4. Repeat 8 to 12 times. Heel dig bridging 1. Lie on your back with both knees bent and your ankles bent so that only your heels are digging into the floor. Your knees should be bent about 90 degrees. 2. Then push your heels into the floor, squeeze your buttocks, and lift your hips off the floor until your shoulders, hips, and knees are all in a straight line. 3. Hold for about 6 seconds as you continue to breathe normally, and then slowly lower your hips back down to the floor and rest for up to 10 seconds. 4. Do 8 to 12 repetitions. Hamstring stretch in doorway 1. Lie on your back in a doorway, with one leg through the open door. 2. Slide your leg up the wall to straighten your knee. You should feel a gentle stretch down the back of your leg. 3. Hold the stretch for at least 15 to 30 seconds. Do not arch your back, point your toes, or bend either knee. Keep one heel touching the floor and the other heel touching the wall. 4. Repeat with your other leg. 5. Do 2 to 4 times for each leg. Hip flexor stretch 1. Kneel on the floor with one knee bent and one leg behind you. Place your forward knee over your foot. Keep your other knee touching the floor. 2. Slowly push your hips forward until you feel a stretch in the upper thigh of your rear leg. 3. Hold the stretch for at least 15 to 30 seconds. Repeat with your other leg. 4. Do 2 to 4 times on each side. Wall sit 1. Stand with your back 10 to 12 inches away from a wall. 2. Lean into the wall until your back is flat against it. 3. Slowly slide down until your knees are slightly bent, pressing your lower back into the wall. 4. Hold for about 6 seconds, then slide back up the wall. 5. Repeat 8 to 12 times. Follow-up care is a key part of your treatment and safety.  Be sure to make and go to all appointments, and call your doctor if you are having problems. It's also a good idea to know your test results and keep a list of the medicines you take. Where can you learn more? Go to http://chiki-zhao.info/. Enter V055 in the search box to learn more about \"Low Back Pain: Exercises. \" Current as of: September 20, 2018 Content Version: 11.9 © 8754-5082 [employer]. Care instructions adapted under license by [employer] (which disclaims liability or warranty for this information). If you have questions about a medical condition or this instruction, always ask your healthcare professional. [address] any warranty or liability for your use of this information.

## 2019-04-19 NOTE — PROGRESS NOTES
[address] 
[address], [address] Phone: [phone] Fax: [phone] [facility] : 1980 PCP: Guerita Humphreys MD 
 
PROGRESS NOTE HISTORY OF PRESENT ILLNESS: 
Chief Complaint Patient presents with  Neck Pain  
  fu  
 
Kady Jones is a 34 y.o.  male with history of neck and upper back pain. He was last seen with Dr. Xavier Crawley who felt is pain was myofascial in nature. He has had TPI in the past with benefit. Today, he states a little over a month ago. He states the therapist was stretching before the massage. He states once he laid back down his entire back \"seized up\" but the therapist was able to get it loose again. Since then, the pain has improved. The pain was \"on his spine\" not on the muscle. He gets a heavy feeling in his arms and legs. He states his neck is worse. He has not had any recent steroids or medications for this. He can not take steroids. He would like to get back into PT. Denies bladder/bowel dysfunction, saddle paresthesia, weakness, gait disturbance, or other neurological deficit. Pt at this time desires to continue with current care/proceed with medication evaluation/proceed with PT. ASSESSMENT 
34 y.o. male with cervical and lumbar pain. Diagnoses and all orders for this visit: 
 
1. Chronic bilateral low back pain without sciatica 
-     methocarbamol (ROBAXIN) 500 mg tablet; Take 1 Tab by mouth two (2) times daily as needed (spasm). -     REFERRAL TO PHYSICAL THERAPY 2. Cervical pain 
-     methocarbamol (ROBAXIN) 500 mg tablet; Take 1 Tab by mouth two (2) times daily as needed (spasm). -     REFERRAL TO PHYSICAL THERAPY 3. Myofascial pain 
-     methocarbamol (ROBAXIN) 500 mg tablet; Take 1 Tab by mouth two (2) times daily as needed (spasm). -     REFERRAL TO PHYSICAL THERAPY IMPRESSION/PLAN 
 
 1) Pt was given information on cervical and lumbar exercises. Recommend pool exercise and yoga. 2) referral to PT for Cervical and lumbar myofacial pain. 3) trial of Robaxin. He has not tried this yet. 4) Mr. Anita Ferrara has a reminder for a \"due or due soon\" health maintenance. I have asked that he contact his primary care provider, Karen Tello MD, for follow-up on this health maintenance. 5) We have informed patient to notify us for immediate appointment if he has any worsening neurogical symptoms or if an emergency situation presents, then call [phone] 
6) Pt will follow-up in 8 weeks for PT. Risks and benefits of ongoing therapy have been reviewed with the patient.  is appropriate. PAST MEDICAL HISTORY Past Medical History:  
Diagnosis Date  Anxiety  Arm fatigue Forearms  Balance problems  Cardiac Agatston CAC score 100-199 02/04/2015 Coronary calcium score 0.  
 Cardiac echocardiogram 10/29/2014 EF 55-60%. No RWMA. Normal diastolic fx. RVSP 30 mmHg.  Cardiac Holter monitor study 10/29/2014 Normal Holter study.  Chronic cough   
 chronically coughing up sputum  Depression  Dizziness  Dyspnea  Esophageal reflux  Headache  Hypercholesterolemia  Lumbar pain  Myofascial pain  Neck pain  Numbness and tingling  PAC (premature atrial contraction)  Palpitations   
 presumably benign  Panic disorder  Reflux  Thoracic back pain Mid-thoracic  Upper extremity weakness  Vertigo, intermittent MEDICATIONS Current Outpatient Medications Medication Sig Dispense Refill  escitalopram oxalate (LEXAPRO) 20 mg tablet TAKE 1 TABLET BY MOUTH EVERY DAY  1  
 methocarbamol (ROBAXIN) 500 mg tablet Take 1 Tab by mouth two (2) times daily as needed (spasm). 45 Tab 0  ALPRAZolam (XANAX) 0.5 mg tablet Take 0.5 mg by mouth two (2) times a day.  ascorbic acid (VITAMIN C) 500 mg tablet Take 500 mg by mouth daily.  cholecalciferol, vitamin D3, (VITAMIN D3) 2,000 unit Tab Take 1 Tab by mouth daily.  omeprazole-sodium bicarbonate (ZEGERID) 40-1.1 mg-gram capsule Take 2 Caps by mouth daily.  acetaminophen (TYLENOL EXTRA STRENGTH) 500 mg tablet Take 500 mg by mouth every six (6) hours as needed.  MULTIVITAMIN PO Take 1 Tab by mouth daily. ALLERGIES Allergies Allergen Reactions  Opioids - Morphine Analogues Myalgia  Other Medication Other (comments) Pt reports allergy to steroids, with psychological side effects  Pollen Extracts Unable to Obtain  Zithromax [Azithromycin] Itching SOCIAL HISTORY Social History Socioeconomic History  Marital status: SINGLE Spouse name: Not on file  Number of children: Not on file  Years of education: Not on file  Highest education level: Not on file Occupational History  Occupation: real estate Social Needs  Financial resource strain: Not on file  Food insecurity:  
  Worry: Not on file Inability: Not on file  Transportation needs:  
  Medical: Not on file Non-medical: Not on file Tobacco Use  Smoking status: Current Every Day Smoker Packs/day: 1.00 Years: 20.00 Pack years: 20.00 Types: Cigarettes  Smokeless tobacco: Never Used Substance and Sexual Activity  Alcohol use: No  
  Alcohol/week: 0.0 oz  Drug use: No  
 Sexual activity: Not on file Lifestyle  Physical activity:  
  Days per week: Not on file Minutes per session: Not on file  Stress: Not on file Relationships  Social connections:  
  Talks on phone: Not on file Gets together: Not on file Attends Gnosticism service: Not on file Active member of club or organization: Not on file Attends meetings of clubs or organizations: Not on file Relationship status: Not on file  Intimate partner violence: Fear of current or ex partner: Not on file Emotionally abused: Not on file Physically abused: Not on file Forced sexual activity: Not on file Other Topics Concern  Not on file Social History Narrative  Not on file SUBJECTIVE Pain Scale: 3/10 Pain Assessment  4/19/2019 Location of Pain Neck Location Modifiers - Severity of Pain 3 Quality of Pain Aching;Dull Quality of Pain Comment - Duration of Pain - Frequency of Pain Constant Date Pain First Started - Aggravating Factors Bending;Stairs;Stretching;Straightening;Exercise;Squatting;Kneeling;Standing;Walking Aggravating Factors Comment - Limiting Behavior -  
Relieving Factors Nothing Relieving Factors Comment - Result of Injury - Accompanied by self. REVIEW OF SYSTEMS 
ROS Constitutional: Negative for fever, chills, or weight change. Respiratory: Negative for cough or shortness of breath. Cardiovascular: Negative for chest pain or palpitations. Gastrointestinal: Negative for acid reflux, change in bowel habits, or constipation. Genitourinary: Negative for incontinence, dysuria and flank pain. Musculoskeletal: Positive for lumbar and cerivcal pain. Skin: Negative for rash. Neurological: Negative for headaches, dizziness, or numbness. Endo/Heme/Allergies: Negative . Psychiatric/Behavioral: Negative. PHYSICAL EXAMINATION Visit Vitals /66 (BP 1 Location: Left arm, BP Patient Position: Sitting) Pulse 73 Resp 20 Ht 5' 5\" (1.651 m) Wt 152 lb 12.8 oz (69.3 kg) BMI 25.43 kg/m² Constitutional: Well developed,  well nourished,  awake, alert, and in no acute distress. Neurological:  Sensation to light touch is intact. Psychiatric: Affect and mood are appropriate. Integumentary: No rashes or abrasions noted on exposed areas,  warm, dry and intact. Cardiovascular/Peripheral Vascular:  No peripheral edema is noted. Lymphatic:  No evidence of lymphedema. No cervical lymphadenopathy. SPINE/MUSCULOSKELETAL EXAM 
 
Cervical spine: 
Neck is midline. Normal muscle tone. No focal atrophy is noted. Shoulder ROM intact. No Tenderness to palpation . Negative Spurling's sign. Negative Tinel's sign. Negative Garcia's sign. Lumbar spine: No rash, ecchymosis, or gross obliquity. No fasciculations. No focal atrophy is noted. Range of motion is intact. Tenderness to palpation to lumbar spine. SI joints non-tender. Trochanters non tender. Musculoskeletal:  No pain with extension, axial loading, or forward flexion. No pain with internal or external rotation of his hips. MOTOR Biceps  Triceps Deltoids Wrist Ext Wrist Flex Hand Intrin Right +4/5 +4/5 +4/5 +4/5 +4/5 +4/5 Left +4/5 +4/5 +4/5 +4/5 +4/5 +4/5 Hip Flex  Quads Hamstrings Ankle DF EHL Ankle PF Right +4/5 +4/5 +4/5 +4/5 +4/5 +4/5 Left +4/5 +4/5 +4/5 +4/5 +4/5 +4/5 Straight Leg raise - bilaterally. normal gait and station Ambulation without assistive device. full weight bearing, non-antalgic gait.  
 
Nahum Willson NP

## 2019-04-24 ENCOUNTER — TELEPHONE (OUTPATIENT)
Dept: ORTHOPEDIC SURGERY | Age: 39
End: 2019-04-24

## 2019-04-24 NOTE — TELEPHONE ENCOUNTER
Patient was informed of what the NP stated and also MD expressed if he is having chest pain he needs to go to the ER. Patient was notified. He verbalized an understanding and stated with will just go to PT, do the conservative tx and then follow up afterwards. Canceling pt appt on 4/26

## 2019-04-24 NOTE — TELEPHONE ENCOUNTER
Patient called for [facility] or NP Buttery. Patient said he saw Jesseruslan Chelle on 04/19/2019. Patient said he has developed some new symptoms. That now he is getting pain shooting down from his back to his legs, arms and Chest.  
 
 
Patient is asking to be set up for an MRI. Patient did schedule an appointment to see BARBIE Davila on 4/26/19. Patient tel. [phone].

## 2019-04-24 NOTE — TELEPHONE ENCOUNTER
Let pt know that he can come in to be evaluated on the 26th as scheduled, but most insurances require 6 weeks of conservative therapy including physical therapy before they will approve a MRI unless they are having a neurological deficit

## 2019-04-29 ENCOUNTER — HOSPITAL ENCOUNTER (OUTPATIENT)
Dept: PHYSICAL THERAPY | Age: 39
Discharge: HOME | End: 2019-04-29
Payer: COMMERCIAL

## 2019-04-29 PROCEDURE — 97110 THERAPEUTIC EXERCISES: CPT

## 2019-04-29 PROCEDURE — 97162 PT EVAL MOD COMPLEX 30 MIN: CPT

## 2019-04-29 NOTE — PROGRESS NOTES
PT DAILY TREATMENT NOTE/LUMBAR EVAL 10-18 Patient Name: Christopher Simons Date:2019 : 1980 [x]  Patient  Verified Payor: Hussain Medico / Plan: Emigdio Youngblood HMO / Product Type: HMO /   
In time: 2:00  Out time: 2:45 Total Treatment Time (min): 45 Visit #: 1 of 12 Treatment Area: Low back pain [M54.5] Other chronic pain [G89.29] Cervicalgia [M54.2] Myalgia, other site [M79.18] SUBJECTIVE Pain Level (0-10 scale):  4-5/10 []constant []intermittent []improving []worsening []no change since onset Any medication changes, allergies to medications, adverse drug reactions, diagnosis change, or new procedure performed?: [x] No    [] Yes (see summary sheet for update) Subjective functional status/changes: Mechanism of Injury:  6 weeks ago. Was having headaches and shoulder tightness and got a massage. Was doing stretches first. In butterfly position was doing fwd lean stretch and then did a massage. When leaned back onto back had back spasms. Reports legs were shaking after. Reports had pain in back at spine and tingling down right leg. Got a little better then stayed the same. Reports he is still doing his neck exercise routines. Had some pain with theraband rows. Has been having pain in upper traps and burning at base of neck. Headaches have also been occurring. Standing increases symptoms. Sitting upright also increases symptoms. Reports he has heaviness in right leg. Living Situation: computer, walking, driving. Pain picking up limbs Pt Goals: to build strength and endurance. OBJECTIVE/EXAMINATION 8 min Therapeutic Exercise:  [x] See flow sheet : HEP Rationale: increase ROM and increase strength to improve the patients ability to increase ease of ADLs With 
 [x] TE 
 [] TA 
 [] neuro 
 [] other: Patient Education: [x] Review HEP [] Progressed/Changed HEP based on:  
[] positioning   [] body mechanics   [] transfers   [] heat/ice application   
[] other: Physical Therapy Evaluation - Lumbar Spine ([facility]) OBJECTIVE Active Movements: [] N/A   [] Too acute   [] Other: ROM % AROM % PROM Comments:pain, area Forward flexion 40-60 29 cm Extension 20-30 100 SB right 20-30 75 SB left 20-30 75 Rotation right 5-10 75 Rotation left 5-10 75 Cervical AROM flex: 47 ext: 28 SB right: 35 left: 35 rotation right: 68 left: 72 Good B shoulder mobility Dural Mobility: SLR Sitting: [] R    [] L    [] +    [] -  @ (degrees):  
        Supine: [x] R    [x] L    [] +    [x] -  @ (degrees):  
Slump Test: [x] R    [] L    [x] +    [] -  @ (degrees): Prone Knee Bend: [] R    [] L    [] +    [] -  
 
Palpation [] Min  [x] Mod  [] Severe    Location: lumbar and cervical paraspinals [] Min  [] Mod  [] Severe    Location: 
[] Min  [] Mod  [] Severe    Location: 
 
Strength 
 L(0-5) R (0-5) N/T Hip Flexion (L1,2) 4 4 [] Knee Extension (L3,4) 4 4 [] Ankle Dorsiflexion (L4) 4+ 4+ [] Great Toe Extension (L5)   [] Ankle Plantarflexion (S1) 4+ 4+ [] Knee Flexion (S1,2) 4- 4- [] Upper Abdominals   [] Lower Abdominals   [] Paraspinals   [] Back Rotators 4- 4 [] Gluteus Yoshi 4- 4- [] Other   [] Middle trap right: 4-/5 left: 4-/5 Other tests/comments: 
Difficulty with quadruped LE lifts Negative prone instability test 
Pain with PA to mid thoracic spine Pain Level (0-10 scale) post treatment:  4-5/10 ASSESSMENT/Changes in Function:  
  
[x]  See Plan of Care 
[]  See progress note/recertification 
[]  See Discharge Summary Progress towards goals / Updated goals: 
See POC PLAN 
[]  Upgrade activities as tolerated     [x]  Continue plan of care 
[]  Update interventions per flow sheet      
[]  Discharge due to:_ 
[]  Other:_ Melvin Mcfarlane, PT 4/29/2019  2:03 PM

## 2019-04-29 NOTE — PROGRESS NOTES
[facility] [address] [facility] 
[phone] fax Plan of Care/ Statement of Necessity for Physical Therapy Services Patient name: Adilson Bowie Start of Care: 2019 Referral source: Deepthi Kolb NP : 1980 Medical Diagnosis: Low back pain [M54.5] Other chronic pain [G89.29] Cervicalgia [M54.2] Myalgia, other site [M79.18] Payor: Unknown Ades / Plan: [employer]hel[employer] HMO / Product Type: HMO /  Onset Date: 6 weeks ago Treatment Diagnosis:  Low back pain, neck pain Prior Hospitalization: see medical history Provider#: [id number] Medications: Verified on Patient summary List  
 Comorbidities: tobacco use, scoliosis, panic disorder, heart palpitations Prior Level of Function: Ind with ambulation, working The Plan of Care and following information is based on the information from the initial evaluation. Assessment/ key information:  Pt. Is a 34year old male c/o neck and back pain that began 6 weeks ago. Symptoms began when stretching before massage, he was placed in butterfly position with forward bend for the stretch and then had increased pain when going back up. He was trying to do his HEP form previous PT but was having increased pain with this. He also reports having worsening headaches and increased tightness in neck muscles since this time. He also reports a heaviness feeling in right LE. He presents with decreased lumbar mobility with most pain into B side bend. He also presents with decreased cervical rotation mobility. Positive slump test and negative SLR test. No myotome involvement was noted but pt. Has decreased B hip and hamstring strength. He also demonstrates decreased core stability. Skilled PT is medically necessary in order to improve LE strength and core stability for decreased pain and improved quality of life.   
 
Evaluation Complexity History HIGH Complexity :3+ comorbidities / personal factors will impact the outcome/ POC ; Examination MEDIUM Complexity : 3 Standardized tests and measures addressing body structure, function, activity limitation and / or participation in recreation  ;Presentation MEDIUM Complexity : Evolving with changing characteristics  ; Clinical Decision Making MEDIUM Complexity : FOTO score of 26-74 Overall Complexity Rating: MEDIUM Problem List: pain affecting function, decrease ROM, decrease strength, impaired gait/ balance, decrease ADL/ functional abilitiies, decrease activity tolerance, decrease flexibility/ joint mobility and decrease transfer abilities Treatment Plan may include any combination of the following: Therapeutic exercise, Therapeutic activities, Neuromuscular re-education, Physical agent/modality, Gait/balance training, Manual therapy, Patient education, Self Care training, Functional mobility training and Home safety training Patient / Family readiness to learn indicated by: asking questions Persons(s) to be included in education: patient (P) Barriers to Learning/Limitations: None Patient Goal (s): to have less pain Patient Self Reported Health Status: good Rehabilitation Potential: good Short Term Goals: To be accomplished in 1 weeks: 1. Patient will demonstrate compliance with HEP in order to improve core stability. Long Term Goals: To be accomplished in 6 weeks: 1. Patient will improve FOTO score by 16 points in order to demonstrate a significant improvement in function. 2. Patient will improve B hip extension MMT to 4/5 in order to increase ease of ambulation. 3. Patient will perform quadruped opposite UE/LE lift with good form in order to demonstrate improving core stability. 4. Patient will improve lumbar flexion AROM to 15cm from finger tip to floor in order to increase ease of working. Frequency / Duration: Patient to be seen 2 times per week for 6 weeks. Patient/ CarPatient/ Caregiver education and instruction: Diagnosis, prognosis, exercises 
 [x]  Plan of care has been reviewed with NAKIA Szymanski, PT 4/29/2019 2:54 PM 
 
________________________________________________________________________ I certify that the above Therapy Services are being furnished while the patient is under my care. I agree with the treatment plan and certify that this therapy is necessary. [de-identified] Signature:____________Date:_________TIME:________ 
 
[employer], Date and Time must be completed for valid certification ** Please sign and return to [place]  [address] [facility] 
[phone] fax

## 2019-05-02 ENCOUNTER — HOSPITAL ENCOUNTER (OUTPATIENT)
Dept: PHYSICAL THERAPY | Age: 39
Discharge: HOME | End: 2019-05-02
Payer: COMMERCIAL

## 2019-05-02 ENCOUNTER — OFFICE VISIT (OUTPATIENT)
Dept: CARDIOLOGY CLINIC | Age: 39
End: 2019-05-02

## 2019-05-02 VITALS
DIASTOLIC BLOOD PRESSURE: 70 MMHG | WEIGHT: 152 LBS | OXYGEN SATURATION: 95 % | HEART RATE: 88 BPM | BODY MASS INDEX: 25.33 KG/M2 | HEIGHT: 65 IN | SYSTOLIC BLOOD PRESSURE: 112 MMHG

## 2019-05-02 DIAGNOSIS — Z01.812 PRE-PROCEDURE LAB EXAM: ICD-10-CM

## 2019-05-02 DIAGNOSIS — Z95.9 CARDIAC DEVICE IN SITU: ICD-10-CM

## 2019-05-02 DIAGNOSIS — I47.1 SVT (SUPRAVENTRICULAR TACHYCARDIA) (HCC): Primary | ICD-10-CM

## 2019-05-02 DIAGNOSIS — R42 DIZZINESS AND GIDDINESS: ICD-10-CM

## 2019-05-02 DIAGNOSIS — R00.2 PALPITATIONS: ICD-10-CM

## 2019-05-02 DIAGNOSIS — R55 SYNCOPE, VASOVAGAL: ICD-10-CM

## 2019-05-02 PROCEDURE — 97110 THERAPEUTIC EXERCISES: CPT

## 2019-05-02 NOTE — PROGRESS NOTES
Faraz Damon presents today for Chief Complaint Patient presents with  Follow-up Palpitations/Possible EP Study  Palpitations  
  fluttering comes and goes Faraz Damon preferred language for health care discussion is english/other. Is someone accompanying this pt? No  
 
Is the patient using any DME equipment during OV? No  
 
Depression Screening: No flowsheet data found. Learning Assessment: 
Learning Assessment 3/7/2016 PRIMARY LEARNER Patient PRIMARY LANGUAGE ENGLISH  
LEARNER PREFERENCE PRIMARY DEMONSTRATION  
  READING  
ANSWERED BY patient RELATIONSHIP SELF Abuse Screening: No flowsheet data found. Fall Risk No flowsheet data found. Pt currently taking Antiplatelet therapy? No  
 
Coordination of Care: 1. Have you been to the ER, urgent care clinic since your last visit? Hospitalized since your last visit? No  
 
2. Have you seen or consulted any other health care providers outside of the [address] [place] [address] since your last visit? Include any pap smears or colon screening.  No

## 2019-05-02 NOTE — PATIENT INSTRUCTIONS
[facility]"" Patient  EP Instructions 1. You are scheduled to have a EP study on May 28th 2019  , at 8am    Please check in at 7am 
 
2. [facility] to [facility] and park in the outpatient parking lot that is located around to the back of the hospital and enter through the [facility] building. Once you enter through the [facility] check in with the  there. The  will either give you directions or assist you in getting to the cath holding area. 3.  You are not to eat or drink anything after midnight the night before your procedure. 4. Please continue to take your medications with a small sip of water on the morning of the procedure with the following exceptions:  
 
5. If you are diabetic, do not take your insulin/sugar pill the morning of the procedure. 6. We encourage families to wait in the waiting room on the first floor while the procedure is being done. The Doctor will come out and talk with you as soon as the procedure is over. 7. There is the possibility that you may spend the night in the hospital, depending on the results of the procedure. This will be determined after the procedure is done. 8.   If you or your family have any questions, please call our office Monday-Friday 9:00am  
      -4:30 pm , at [phone], and ask to speak to one of the nurses.

## 2019-05-02 NOTE — PROGRESS NOTES
HPI: A 36-year old male here for follow up. He had a subcutaneous loop recorder placed October 2018 due to dizziness and palpitations. He also has a history of some anxiety issues. He has had a history of ectopic beats in the past as well as an episode of significant pause with a vagal reaction at 8 seconds. He continues to complain of palpitations since the loop recorder. He has had at least a few events with tachycardia ranging from 130-170 bpm and although it appears to be sinus tachycardia, another mechanism cannot be completely excluded. I have been hesitant to give him an AV blocking agent due to his previous 8 second pause with vasovagal episode. He is here to discuss options. No recent syncope, some atypical chest pain at times. He continues to smoke. Impression/Plan: 1. Palpitations with sinus tachycardia vs another mechanism cannot be excluded. 2. Remote syncope. 3. History of vasovagal with 8 second pause. 4. Subcutaneous loop recorder October 2018. 5. Anxiety issues. 6. Tobacco use. I discussed risks, benefits and alternatives to electrophysiology study, diagnostic, at this point to exclude any underlying pathologic arrhythmias. If this is a sinus tachycardia we may be stuck with using AV blocking agents gingerly and \"monitoring closely\" for any signs of bradycardia. Total care time spent in reviewing the case, multiple EMR databases, physician notes, procedure notes, examining the patient, and documentation, is 40 minutes. 
   
Discussed in details with patient. All questions were answered to their full satisfaction. Risk, benefit and alternatives were discussed. More than 50% time spent in counseling and coordination of care. Past Medical History:  
Diagnosis Date  Anxiety  Arm fatigue Forearms  Balance problems  Cardiac Agatston CAC score 100-199 02/04/2015 Coronary calcium score 0.  
 Cardiac echocardiogram 10/29/2014 EF 55-60%. No RWMA. Normal diastolic fx. RVSP 30 mmHg.  Cardiac Holter monitor study 10/29/2014 Normal Holter study.  Chronic cough   
 chronically coughing up sputum  Depression  Dizziness  Dyspnea  Esophageal reflux  Headache  Hypercholesterolemia  Lumbar pain  Myofascial pain  Neck pain  Numbness and tingling  PAC (premature atrial contraction)  Palpitations   
 presumably benign  Panic disorder  Reflux  Thoracic back pain Mid-thoracic  Upper extremity weakness  Vertigo, intermittent Current Outpatient Medications Medication Sig Dispense Refill  escitalopram oxalate (LEXAPRO) 20 mg tablet TAKE 1 TABLET BY MOUTH EVERY DAY  1  
 ALPRAZolam (XANAX) 0.5 mg tablet Take 0.5 mg by mouth two (2) times a day.  ascorbic acid (VITAMIN C) 500 mg tablet Take 1,000 mg by mouth daily.  cholecalciferol, vitamin D3, (VITAMIN D3) 2,000 unit Tab Take 1 Tab by mouth daily.  omeprazole-sodium bicarbonate (ZEGERID) 40-1.1 mg-gram capsule Take 2 Caps by mouth daily.  acetaminophen (TYLENOL EXTRA STRENGTH) 500 mg tablet Take 500 mg by mouth every six (6) hours as needed.  MULTIVITAMIN PO Take 1 Tab by mouth daily.  methocarbamol (ROBAXIN) 500 mg tablet Take 1 Tab by mouth two (2) times daily as needed (spasm). 45 Tab 0 Social History 
 reports that he has been smoking cigarettes. He has a 20.00 pack-year smoking history. He has never used smokeless tobacco. 
 reports that he does not drink alcohol. Family History 
family history includes Hypertension in his father and mother; Liver Disease in his father. Review of Systems Except as stated above include: 
Constitutional: Negative for fever, chills and malaise/fatigue. HEENT: No congestion or recent URI. Gastrointestinal: No nausea, vomiting, abdominal pain, bloody stools. Pulmonary:  Negative except as stated above. Cardiac:  Negative except as stated above. Musculoskeletal: Negative except as stated above. Neurological:  No localized symptoms. Skin:  Negative except as stated above. Psych:  Negative except as stated above. Endocrine:  Negative except as stated above. PHYSICAL EXAM 
BP Readings from Last 3 Encounters:  
05/02/19 112/70  
04/19/19 108/66  
03/07/19 100/68 Pulse Readings from Last 3 Encounters:  
05/02/19 88  
04/19/19 73  
03/07/19 82 Wt Readings from Last 3 Encounters:  
05/02/19 68.9 kg (152 lb) 04/19/19 69.3 kg (152 lb 12.8 oz) 03/07/19 69.8 kg (153 lb 12.8 oz) General:   Well developed, well groomed. Head/Neck:   No jugular venous distention No carotid bruits. No evidence of xanthelasma. Lungs:   No respiratory distress. Clear bilaterally. Heart:    Regular rate and rhythm. Normal S1/S2. Palpation of heart with normal point of maximum impulse. No significant murmurs, rubs or gallops. Abdomen:   Soft and nontender. No palpable abdominal mass or bruits. Extremities:   Intact peripheral pulses. No significant edema. Neurological:   Alert and oriented to person, place, time. No focal neurological deficit visually. Skin:   No obvious rash Blood Pressure Metric: 
Monitor recommended and adjustments stated if needed.

## 2019-05-02 NOTE — PROGRESS NOTES
PT DAILY TREATMENT NOTE Patient Name: Kris Conklin Date:2019 : 1980 [x]  Patient  Verified Payor: Danielle Kwan / Plan: Leticia Naranjo HMO / Product Type: HMO /   
In time: 3:00  Out time: 3:30 Total Treatment Time (min): 30 Visit #: 2 of 12 Treatment Area: Low back pain [M54.5] Other chronic pain [G89.29] Cervicalgia [M54.2] Myalgia, other site [M79.18] SUBJECTIVE Pain Level (0-10 scale):  4/10 []constant []intermittent []improving []worsening []no change since onset Any medication changes, allergies to medications, adverse drug reactions, diagnosis change, or new procedure performed?: [x] No    [] Yes (see summary sheet for update) Subjective functional status/changes:    
Standing definitely seems to make it worse, but really any position hurts. OBJECTIVE/EXAMINATION 
 
30 min Therapeutic Exercise:  [x] See flow sheet :  
Rationale: increase ROM and increase strength to improve the patients ability to increase ease of ADLs With 
 [x] TE 
 [] TA 
 [] neuro 
 [] other: Patient Education: [x] Review HEP [] Progressed/Changed HEP based on:  
[] positioning   [] body mechanics   [] transfers   [] heat/ice application   
[] other:   
 
Other tests/comments:  
- Good effort by patient - Slight increase in pain with prone TE, will discuss with PT and modify next session Pain Level (0-10 scale) post treatment: 5/10 ASSESSMENT/Changes in Function:  
Initiated therex today per flow sheet, requiring vc and demo 100% of the time for proper form and technique. Fair tolerance with TE, slight increase in pain with prone activities. [x]  See Plan of Care 
[]  See progress note/recertification 
[]  See Discharge Summary Progress towards goals / Updated goals:  
Short Term Goals: To be accomplished in 1 weeks: 1. Patient will demonstrate compliance with HEP in order to improve core stability. Current: Pt reports compliance.  19 
  
 Long Term Goals: To be accomplished in 6 weeks: 1. Patient will improve FOTO score by 16 points in order to demonstrate a significant improvement in function. 2. Patient will improve B hip extension MMT to 4/5 in order to increase ease of ambulation. 3. Patient will perform quadruped opposite UE/LE lift with good form in order to demonstrate improving core stability. 4. Patient will improve lumbar flexion AROM to 15cm from finger tip to floor in order to increase ease of working.   
 
PLAN 
[]  Upgrade activities as tolerated     [x]  Continue plan of care 
[]  Update interventions per flow sheet      
[]  Discharge due to:_ 
[]  Other:_   
 
AURA Esposito 5/2/2019  3:30 PM

## 2019-05-06 ENCOUNTER — HOSPITAL ENCOUNTER (OUTPATIENT)
Dept: PHYSICAL THERAPY | Age: 39
Discharge: HOME | End: 2019-05-06
Payer: COMMERCIAL

## 2019-05-06 PROCEDURE — 97110 THERAPEUTIC EXERCISES: CPT

## 2019-05-06 NOTE — PROGRESS NOTES
PT DAILY TREATMENT NOTE 10-18 Patient Name: Nahed Goodwin Date:2019 : 1980 [x]  Patient  Verified Payor: Darby Jimenez / Plan: [address] [place], [place] [address] HMO / Product Type: HMO /   
In time: 10:44  Out time: 11:22 Total Treatment Time (min): 38 Visit #: 3 of 12 Treatment Area: Low back pain [M54.5] Other chronic pain [G89.29] Cervicalgia [M54.2] Myalgia, other site [M79.18] SUBJECTIVE Pain Level (0-10 scale): 5-6/10 Any medication changes, allergies to medications, adverse drug reactions, diagnosis change, or new procedure performed?: [x] No    [] Yes (see summary sheet for update) Subjective functional status/changes:   [] No changes reported Pt. Reports he is feeling sore today. He reports exercises last time were hard but they went ok. He backed in to someone this morning and that's why he is late. OBJECTIVE 38 min Therapeutic Exercise:  [x] See flow sheet :  
Rationale: increase ROM and increase strength to improve the patients ability to increase ease of ADLs With 
 [x] TE 
 [] TA 
 [] neuro 
 [] other: Patient Education: [x] Review HEP [] Progressed/Changed HEP based on:  
[] positioning   [] body mechanics   [] transfers   [] heat/ice application   
[] other:   
 
Other Objective/Functional Measures:  
Pt. Was challenged with oov marching on right Pt. Has excessive UT use during prone T He required cues to perform lower trap facilitations correctly Pain Level (0-10 scale) post treatment: 5-6/10 ASSESSMENT/Changes in Function:  Pt. Is making limited progress towards goals. He continues to be compliant with his HEP. He continues to demonstrate decreased cervical strength and core stability. He also continues to have significant tightness along thoracic and cervical paraspinals. He was educated on continuing with his HEP during his trip to [facility].   
 
Patient will continue to benefit from skilled PT services to modify and progress therapeutic interventions, address functional mobility deficits, address ROM deficits, address strength deficits, analyze and address soft tissue restrictions, analyze and cue movement patterns, analyze and modify body mechanics/ergonomics and assess and modify postural abnormalities to attain remaining goals. Progress towards goals / Updated goals: 
Short Term Goals: To be accomplished in 1 weeks: 1. Patient will demonstrate compliance with HEP in order to improve core stability.  
Current: Pt reports compliance. 5/2/19 
  
Long Term Goals: To be accomplished in 6 weeks: 1. Patient will improve FOTO score by 16 points in order to demonstrate a significant improvement in function. 2. Patient will improve B hip extension MMT to 4/5 in order to increase ease of ambulation. 3. Patient will perform quadruped opposite UE/LE lift with good form in order to demonstrate improving core stability. Progressing: improving form with quadruped LE lift (5/6/19) 4. Patient will improve lumbar flexion AROM to 15cm from finger tip to floor in order to increase ease of working.  PLAN 
[]  Upgrade activities as tolerated     [x]  Continue plan of care 
[]  Update interventions per flow sheet      
[]  Discharge due to:_ 
[]  Other:_ Viktor Burris, PT 5/6/2019  7:54 AM 
 
Future Appointments Date Time Provider Cristo Buenrostro 5/6/2019 10:30 AM Bernadine Mota, PT [id number]  
6/20/2019 11:15 AM Atul Brar  [address]   
9/9/2019  1:20 PM Adarsh Solomon DO [address] [place] [address]

## 2019-05-22 ENCOUNTER — HOSPITAL ENCOUNTER (OUTPATIENT)
Dept: PHYSICAL THERAPY | Age: 39
Discharge: HOME | End: 2019-05-22
Payer: COMMERCIAL

## 2019-05-22 PROCEDURE — 97140 MANUAL THERAPY 1/> REGIONS: CPT

## 2019-05-22 PROCEDURE — 97110 THERAPEUTIC EXERCISES: CPT

## 2019-05-22 NOTE — PROGRESS NOTES
PT DAILY TREATMENT NOTE 10-18    Patient Name: Arnel Henry  Date:2019  : 1980  [x]  Patient  Verified  Payor: Debbie Albarran / Plan: [employer] H[employer] / Product Type: HMO /    In time:10:32   Out time: 11:20  Total Treatment Time (min): 48  Visit #: 4 of 12    Treatment Area: Low back pain [M54.5]  Other chronic pain [G89.29]  Cervicalgia [M54.2]  Myalgia, other site [M79.18]    SUBJECTIVE  Pain Level (0-10 scale): -5/10   Any medication changes, allergies to medications, adverse drug reactions, diagnosis change, or new procedure performed?: [x] No    [] Yes (see summary sheet for update)  Subjective functional status/changes:   [] No changes reported  Pt reports more pain in his mid back after his last session. He has some pain from the plane to [place] but overall enjoyed his trip. He has pain in the left low back, right mid back and left side of his neck. He gets headaches from looking down but didn't while on his trip because he did a lot of walking.      OBJECTIVE    28 min Therapeutic Exercise:  [x] See flow sheet :   Rationale: increase ROM and increase strength to improve the patients ability to increase ease of ADLs    20 minutes of manual therapy to correct left upslip; right AI; shotgun technique; MET for ERSR t/s; TPR to left scalenes and left 1st rib mobs            With   [x] TE   [] TA   [] neuro   [] other: Patient Education: [x] Review HEP    [] Progressed/Changed HEP based on:   [] positioning   [] body mechanics   [] transfers   [] heat/ice application    [] other:      Other Objective/Functional Measures:   Hypertonic right t/s paraspinals  Hypertonic left scalenes; pt with dizzy symptoms trying to perform scalene stretch; educated to inform MD of this tomorrow  Elevated left first rib  Audible pop with shotgun technique  Educated on t/s mobility and stretching  Trial of KT to inhibit the right t/s    Pain Level (0-10 scale) post treatment: 3-4/10    ASSESSMENT/Changes in Function:  Pt with multiple rotations throughout his spine likely initially from LLD with left shorter than right causing scoliosis. He has poor posture and decreased t/s mobility. He has tightness in the left side of his neck scalenes and elevated first rib. Will work to correct alignment and improve mobility then focus on strengthening to maintain for ease of working. Progress towards goals / Updated goals:  Short Term Goals: To be accomplished in 1 weeks:  1. Patient will demonstrate compliance with HEP in order to improve core stability.   Current: Pt reports compliance. 5/2/19   Long Term Goals: To be accomplished in 6 weeks:  1. Patient will improve FOTO score by 16 points in order to demonstrate a significant improvement in function. 2. Patient will improve B hip extension MMT to 4/5 in order to increase ease of ambulation. 3. Patient will perform quadruped opposite UE/LE lift with good form in order to demonstrate improving core stability. Progressing: improving form with quadruped LE lift (5/6/19)  4.  Patient will improve lumbar flexion AROM to 15cm from finger tip to floor in order to increase ease of working.     PLAN  [x]  Upgrade activities as tolerated     [x]  Continue plan of care  []  Update interventions per flow sheet       []  Discharge due to:_  []  Other:_      Hyacinth Mcburney, PTA 5/22/2019  7:54 AM    Future Appointments   Date Time Provider Cristo Buenrostro   5/22/2019 10:30 AM Matt Holguin PTA [facility]   5/29/2019  4:30 PM Matt Holguin PTA [facility]   5/31/2019  9:30 AM Matt Holguin PTA [facility]   6/20/2019 11:15 AM Jenny Tucker  [address]   9/9/2019  1:20 PM Isak Judge,  [facility]

## 2019-05-29 ENCOUNTER — HOSPITAL ENCOUNTER (OUTPATIENT)
Dept: PHYSICAL THERAPY | Age: 39
Discharge: HOME | End: 2019-05-29
Payer: COMMERCIAL

## 2019-05-29 ENCOUNTER — TELEPHONE (OUTPATIENT)
Dept: CARDIOLOGY CLINIC | Age: 39
End: 2019-05-29

## 2019-05-29 PROCEDURE — 97110 THERAPEUTIC EXERCISES: CPT

## 2019-05-29 PROCEDURE — 97140 MANUAL THERAPY 1/> REGIONS: CPT

## 2019-05-29 NOTE — TELEPHONE ENCOUNTER
----- Message from Adela Loya sent at 5/28/2019 11:15 AM EDT ----- Patient was scheduled for EP Study today with [facility] and wants to reschedule it but first he wants someone to call him. He has some concerning questions and not sure that he wants to have it done.

## 2019-05-29 NOTE — PROGRESS NOTES
PT DAILY TREATMENT NOTE 10-18    Patient Name: Narinder Guevara  Date:2019  : 1980  [x]  Patient  Verified  Payor: Wily Bermeo / Plan: [employer] / Product Type: HMO /    In time: 4:30  Out time: 5:20  Total Treatment Time (min): 50  Visit #: 5 of 12    Treatment Area: Low back pain [M54.5]  Other chronic pain [G89.29]  Cervicalgia [M54.2]  Myalgia, other site [M79.18]    SUBJECTIVE  Pain Level (0-10 scale): 4/10 l/s and 5/10 c/s  Any medication changes, allergies to medications, adverse drug reactions, diagnosis change, or new procedure performed?: [x] No    [] Yes (see summary sheet for update)  Subjective functional status/changes:   [] No changes reported  Pt reports his hips and back have been feeling better. He feels like the tape helped some but certain movements make his middle right back feel like it is cramping. He still has the pain in the left side of his neck.      OBJECTIVE    30 min Therapeutic Exercise:  [x] See flow sheet :   Rationale: increase ROM and increase strength to improve the patients ability to increase ease of ADLs     20 minutes of manual therapy to correct left upslip; right AI; shotgun technique; MET for ERSR t/s; TPR to left scalenes and left 1st rib mobs; MET for left first rib            With   [x] TE   [] TA   [] neuro   [] other: Patient Education: [x] Review HEP    [] Progressed/Changed HEP based on:   [] positioning   [] body mechanics   [] transfers   [] heat/ice application    [] other:      Other Objective/Functional Measures:   Cavitation with 1st rib mob and improvement in pain post correction  Printed pt self correction for 1st rib  Printed MET for pelvic correction  Educated on left t/s rotations and foam rolling for hypertonic right paraspinals  Educated on performing correction prior to exercises  Increased left lift to 1/4 inch to adjust for left LLD  Educated on bilateral quadratus lumborum stretch  Swayback posture      Pain Level (0-10 scale) post treatment: 3/10    ASSESSMENT/Changes in Function: Pt making slow, steady progress towards goals with reduction in pain. He continues to have pelvic obliquity contributing to l/s rotation. He also has hypertonicity of the right t/s paraspinals and an elevated left first rib with hypertonic scalenes. Will continue to restore alignment and strengthen to maintain range to reduce pain and ease of working. Progress towards goals / Updated goals:  Short Term Goals: To be accomplished in 1 weeks:  1. Patient will demonstrate compliance with HEP in order to improve core stability.   Current: Pt reports compliance. 5/2/19   Long Term Goals: To be accomplished in 6 weeks:  1. Patient will improve FOTO score by 16 points in order to demonstrate a significant improvement in function. 2. Patient will improve B hip extension MMT to 4/5 in order to increase ease of ambulation. 3. Patient will perform quadruped opposite UE/LE lift with good form in order to demonstrate improving core stability. Progressing: improving form with quadruped LE lift (5/6/19)  4.  Patient will improve lumbar flexion AROM to 15cm from finger tip to floor in order to increase ease of working.     PLAN  [x]  Upgrade activities as tolerated     [x]  Continue plan of care  []  Update interventions per flow sheet       []  Discharge due to:_  []  Other:_      Tj Cummings, NAKIA 5/29/2019  7:54 AM    Future Appointments   Date Time Provider Cristo Buenrostro   5/29/2019  4:30 PM Nisreen Kapoor [facility]   5/31/2019  9:30 AM [place] LUCIA [facility]   6/20/2019 11:15 AM Pramod Carver  [address]   9/9/2019  1:20 PM Irl Dry,  [facility]

## 2019-05-31 ENCOUNTER — HOSPITAL ENCOUNTER (OUTPATIENT)
Dept: PHYSICAL THERAPY | Age: 39
Discharge: HOME | End: 2019-05-31
Payer: COMMERCIAL

## 2019-05-31 PROCEDURE — 97140 MANUAL THERAPY 1/> REGIONS: CPT

## 2019-05-31 PROCEDURE — 97110 THERAPEUTIC EXERCISES: CPT

## 2019-05-31 NOTE — PROGRESS NOTES
PT DAILY TREATMENT NOTE 10-18    Patient Name: Nahed Goodwin  Date:2019  : 1980  [x]  Patient  Verified  Payor: Darby Jimenez / Plan: [employer] / Product Type: HMO /    In time: 12:30  Out time: 1:00  Total Treatment Time (min): 30  Visit #: 6 of 12    Treatment Area: Low back pain [M54.5]  Other chronic pain [G89.29]  Cervicalgia [M54.2]  Myalgia, other site [M79.18]    SUBJECTIVE  Pain Level (0-10 scale): 4/10 l/s and c/s  Any medication changes, allergies to medications, adverse drug reactions, diagnosis change, or new procedure performed?: [x] No    [] Yes (see summary sheet for update)  Subjective functional status/changes:   [] No changes reported  Pt states he worked on doing his corrections at home. He noticed he didn't get as much neck pain after last session. He feels some tightness in his chest. He notes anxiety causes his neck to stay tense. When he plays golf he used to be a right swing.      OBJECTIVE    20 min Therapeutic Exercise:  [x] See flow sheet :   Rationale: increase ROM and increase strength to improve the patients ability to increase ease of ADLs     10 minutes of manual therapy to correct right AI; shotgun technique; MET for NSR T7-T12; MET for left first rib to improve alignment for strengthening            With   [x] TE   [] TA   [] neuro   [] other: Patient Education: [x] Review HEP    [] Progressed/Changed HEP based on:   [] positioning   [] body mechanics   [] transfers   [] heat/ice application    [] other:      Other Objective/Functional Measures:   Small pop with shotgun technique  No upslip this session  Continued hypertonicity of the right t/s; worked on strengthening left t/s for restore balance and worked on correct scapular setting to reduce UT compensation  Education throughout to keep UT relaxed  Added PPT due to swayback posture  Cues with KB walking to reduce rib flare  Fatigue with TB Ws     Pain Level (0-10 scale) post treatment: 3/10    ASSESSMENT/Changes in Function: Pt continues to have c/s tightness due to anxiety. He has improvement in alignment without upslip due to progression of heel lift. He continues to have right hypertonicity of the t/s paraspinals. Will work to restore normal muscle balance and correct sway back posture to decrease back, middle back and neck pain for ease of working. Progress towards goals / Updated goals:  Short Term Goals: To be accomplished in 1 weeks:  1. Patient will demonstrate compliance with HEP in order to improve core stability.   Current: Pt reports compliance. 5/2/19   Long Term Goals: To be accomplished in 6 weeks:  1. Patient will improve FOTO score by 16 points in order to demonstrate a significant improvement in function. 2. Patient will improve B hip extension MMT to 4/5 in order to increase ease of ambulation. 3. Patient will perform quadruped opposite UE/LE lift with good form in order to demonstrate improving core stability. Progressing: improving form with quadruped LE lift (5/6/19)  4.  Patient will improve lumbar flexion AROM to 15cm from finger tip to floor in order to increase ease of working.     PLAN  [x]  Upgrade activities as tolerated     [x]  Continue plan of care  []  Update interventions per flow sheet       []  Discharge due to:_  []  Other:_      Darian Landin PTA 5/31/2019  7:54 AM    Future Appointments   Date Time Provider Cristo Buenrostro   6/4/2019  9:00 AM Hope Villatoro PTA [facility]   6/6/2019  9:30 AM Hope Villatoro PTA [facility]   6/20/2019 11:15 AM Ashley Pendleton  [address]   9/9/2019  1:20 PM Racheal Roe,  [facility]

## 2019-06-04 ENCOUNTER — HOSPITAL ENCOUNTER (OUTPATIENT)
Dept: PHYSICAL THERAPY | Age: 39
Discharge: HOME | End: 2019-06-04
Payer: COMMERCIAL

## 2019-06-04 PROCEDURE — 97110 THERAPEUTIC EXERCISES: CPT

## 2019-06-04 PROCEDURE — 97140 MANUAL THERAPY 1/> REGIONS: CPT

## 2019-06-04 NOTE — PROGRESS NOTES
PT DAILY TREATMENT NOTE 10-18    Patient Name: Betty Calvin  Date:2019  : 1980  [x]  Patient  Verified  Payor: Wanda Comment / Plan: [employer] / Product Type: HMO /    In time: 9:05   Out time: 9:50  Total Treatment Time (min): 45  Visit #: 7 of 12    Treatment Area: Low back pain [M54.5]  Other chronic pain [G89.29]  Cervicalgia [M54.2]  Myalgia, other site [M79.18]    SUBJECTIVE  Pain Level (0-10 scale): 5/10  Any medication changes, allergies to medications, adverse drug reactions, diagnosis change, or new procedure performed?: [x] No    [] Yes (see summary sheet for update)  Subjective functional status/changes:   [] No changes reported  Pt reports he did a lot of yard work over the weekend and is sore on the left side of the neck/shoulder region. He states he has a hard time working on his posture and remembering to keep his core tight. OBJECTIVE    20 min Therapeutic Exercise:  [x] See flow sheet :   Rationale: increase ROM and increase strength to improve the patients ability to increase ease of ADLs    25  minutes of manual therapy to improve alignment prior to strengthening to restore normal muscle length tension relationship for ease of completing ADLs and improving posture for decreased pain   Leg lengthening for left upslip x 10   MET for right AI/left PI x 3   Shotgun technique (audible pop at pubic symphysis)   MET for right l/s rotation   MET for left/left sacral torsion   MET for NSR T4-T12   MET for ERSL C6-C7   MET for elevated 1st rib on the left   TPR left posterior scalenes  ATC KT to inhibit right t/s paraspinals and facilitate left t/s paraspinals             With   [x] TE   [] TA   [] neuro   [] other: Patient Education: [x] Review HEP    [] Progressed/Changed HEP based on:   [] positioning   [] body mechanics   [] transfers   [] heat/ice application    [] other:      Other Objective/Functional Measures:    FOTO: 53  Hip extension MMT: 4-/5  L/s flexion AROM: 27 cm  Will progress HEP next visit to address alignment issues in order to better progress in therapy  Sway back posture in standing  Challenged with rib flare to prevent  HS tightness bilaterally  Cues to prevent UT hiking with exercises  Early scap rise on the left  Hypertonicity of the right t/s paraspinals  Educated on tennis ball active release of the levator scap      Pain Level (0-10 scale) post treatment: 3/10    ASSESSMENT/Changes in Function:  Pt has only attended 7 sessions since [facility] due to going out of town to [facility] on vacation. He continues to have sway back posture contributing to most of his pain symptoms. He has increased B HS tightness, poor core strength, increased t/s kyphosis and scoliosis all contributing to uneven muscle tension relationships. He has a left LLD but is correcting now with a heel lift. He has early scapular rise on the left with compensation of levator, UT and scalenes. Will work to restore normal alignment while correcting muscle activaiton with neuromuscular re-education to reduce pain and improve ease of working including posture while on the computer and phone. Progress towards goals / Updated goals:  Short Term Goals: To be accomplished in 1 weeks:  1. Patient will demonstrate compliance with HEP in order to improve core stability.   Current: Pt reports compliance. 5/2/19   Long Term Goals: To be accomplished in 6 weeks:  1. Patient will improve FOTO score by 16 points in order to demonstrate a significant improvement in function. Not Change  2. Patient will improve B hip extension MMT to 4/5 in order to increase ease of ambulation. 4-/5  3. Patient will perform quadruped opposite UE/LE lift with good form in order to demonstrate improving core stability. Progressing: improving form with quadruped LE lift (5/6/19)  4.  Patient will improve lumbar flexion AROM to 15cm from finger tip to floor in order to increase ease of working. 27 cm (6/4/19)    PLAN  [x]  Upgrade activities as tolerated     [x]  Continue plan of care  []  Update interventions per flow sheet       []  Discharge due to:_  []  Other:_      Rakel Verduzco PTA 6/4/2019  7:54 AM    Future Appointments   Date Time Provider Cristo Buenrostro   6/6/2019  9:30 AM Diego Scott PTA [facility]   6/20/2019 11:15 AM Tawana Horan  [address]   9/9/2019  1:20 PM Jairo Buchanan DO [address] [place] [address]

## 2019-06-05 NOTE — PROGRESS NOTES
[facility] Marge Mcdaniel  [address] [facility]  [phone] fax    Physical Therapy Progress Note  Patient name: Edelmira Lange Start of Care:  4:   Referral source: Arian Timmons NP : 1980   Medical/Treatment Diagnosis: Low back pain [M54.5]  Other chronic pain [G89.29]  Cervicalgia [M54.2]  Myalgia, other site [M79.18]  Payor: Yaima Rivera / Plan: [employer]old Buerger HMO / Product Type: HMO /  Onset Date: 6 weeks ago     Prior Hospitalization: see medical history Provider#: [id number]   Medications: Verified on Patient Summary List    Comorbidities: tobacco use, scoliosis, panic disorder, heart palpitations   Prior Level of Function: Ind with ambulation, working        Visits from Start of Care: 7    Missed Visits: 0    Established Goals:         Excellent           Good         Limited           None  [x] Increased ROM   []  []  [x]  []  [x] Increased Strength  []  []  [x]  []  [x] Increased Mobility  []  []  [x]  []   [x] Decreased Pain   []  []  [x]  []  [] Decreased Swelling  []  []  []  []    Key Functional Changes:  Pt. Is progressing slowly towards goals. He had a gap in treatment secondary to going to [facility] for vacation. He continues to have decreased B HS flexibiity and decreased core stability. Increased thoracic kyphosis. No significant change in FOTO score. He has decreased B hip extension strength at 4-/5. Finger tip to floor lumbar flexion is limited to 27cm. Skilled PT is medically necessary in order to improve strength and core stability for increased ease of working and improve quality of life. Updated Goals: to be achieved in 4 weeks:  1. Patient will improve FOTO score by 16 points in order to demonstrate a significant improvement in function. 2. Patient will improve B hip extension MMT to 4/5 in order to increase ease of ambulation.    3. Patient will perform quadruped opposite UE/LE lift with good form in order to demonstrate improving core stability. 4. Patient will improve lumbar flexion AROM to 15cm from finger tip to floor in order to increase ease of working. ASSESSMENT/RECOMMENDATIONS:  [x]Continue therapy per initial plan/protocol at a frequency of  2 x per week for 4 weeks  []Continue therapy with the following recommended changes:_____________________      _____________________________________________________________________  []Discontinue therapy progressing towards or have reached established goals  []Discontinue therapy due to lack of appreciable progress towards goals  []Discontinue therapy due to lack of attendance or compliance  []Await Physician's recommendations/decisions regarding therapy  []Other:________________________________________________________________    Thank you for this referral.   Janie Noonan, PT 6/5/2019 8:40 AM    NOTE TO PHYSICIAN:  PLEASE COMPLETE THE ORDERS BELOW AND   [facility]: [phone]  If you are unable to process this request in 24 hours please contact our office: [phone] I have read the above report and request that my patient continue as recommended. ? I have read the above report and request that my patient continue therapy with the following changes/special instructions:____________________________________  ? I have read the above report and request that my patient be discharged from therapy.     Physicians signature: ______________________________Date: ______Time:______

## 2019-06-06 ENCOUNTER — HOSPITAL ENCOUNTER (OUTPATIENT)
Dept: PHYSICAL THERAPY | Age: 39
Discharge: HOME | End: 2019-06-06
Payer: COMMERCIAL

## 2019-06-06 PROCEDURE — 97110 THERAPEUTIC EXERCISES: CPT

## 2019-06-06 NOTE — PROGRESS NOTES
PT DAILY TREATMENT NOTE 10-18    Patient Name: Abelardo Constantino  Date:2019  : 1980  [x]  Patient  Verified  Payor: Glen Loyd / Plan: [employer] / Product Type: HMO /    In time: 12:30   Out time: 1:20  Total Treatment Time (min): 50  Visit #: 8 of 12    Treatment Area: Low back pain [M54.5]  Other chronic pain [G89.29]  Cervicalgia [M54.2]  Myalgia, other site [M79.18]    SUBJECTIVE  Pain Level (0-10 scale): 6/10   Any medication changes, allergies to medications, adverse drug reactions, diagnosis change, or new procedure performed?: [x] No    [] Yes (see summary sheet for update)  Subjective functional status/changes:   [] No changes reported  Pt reports he was helping his mom with car shopping and had to run in from the rain. He woke up today and his low back was hurting and had tingling from his elbow to the last 3 fingers. He states he tosses and turns and ends up in weird positions. He saw the ENT that felt the swallowing issue was muscular in nature. He continues to have soreness along the left side of his neck/shoulder region.        OBJECTIVE    45 min Therapeutic Exercise:  [x] See flow sheet :   Rationale: increase ROM and increase strength to improve the patients ability to increase ease of ADLs    5  minutes of manual therapy to improve alignment prior to strengthening to restore normal muscle length tension relationship for ease of completing ADLs and improving posture for decreased pain   Leg lengthening for left upslip x 10   MET for right AI/left PI x 3   Shotgun technique (audible pop at pubic symphysis)   MET for ERSL C6-C7             With   [x] TE   [] TA   [] neuro   [] other: Patient Education: [x] Review HEP    [] Progressed/Changed HEP based on:   [] positioning   [] body mechanics   [] transfers   [] heat/ice application    [] other:      Other Objective/Functional Measures:   With back against wall pt is able to achieve soft knees with buttocks to wall; he is challenged with t/s kyphosis and forward head along with preventing rib flare  Worked on stretching l/s and hamstrings to achieve neutral pelvis  Challenged with relaxing to perform t/s foam roll on floor so performed in sitting  Dizziness symptoms when trying to stretch left c/s but resolved upon relaxing  Educated on not leaning on left elbow when sitting in the car to prevent cubital tunnel irritation  Fatigue by end of session but no increased pain    Pain Level (0-10 scale) post treatment: 5/10    ASSESSMENT/Changes in Function:  Pt is progressing in regards to awareness of posture and working on exercises in therapy. His anxiety and fear of stroke is preventing him from adequately stretching his c/s as he was told in the past by a MD that he could have a stroke by seeing a chiropractor and having his neck worked on. He continues with swayback posture and rib flare that are contributing to the t/s kyphosis and right t/s hypertonicity with resultant left t/s weakness and left c/s tightness. Will continue to work on restoring normal posture and muscle balance to reduce pain for ease of performing ADLs and work on the phone. Progress towards goals / Updated goals:  Updated Goals: to be achieved in 4 weeks:  1. Patient will improve FOTO score by 16 points in order to demonstrate a significant improvement in function. 2. Patient will improve B hip extension MMT to 4/5 in order to increase ease of ambulation. 3. Patient will perform quadruped opposite UE/LE lift with good form in order to demonstrate improving core stability.    4. Patient will improve lumbar flexion AROM to 15cm from finger tip to floor in order to increase ease of working.       PLAN  [x]  Upgrade activities as tolerated     [x]  Continue plan of care  []  Update interventions per flow sheet       []  Discharge due to:_  []  Other:_      Ang Castaneda PTA 6/6/2019  7:54 AM    Future Appointments   Date Time Provider Cristo Buenrostro   6/10/2019 9:30 AM Clois Safe, PTA [facility]   6/12/2019  3:30 PM Clois Safe, PTA [facility]   6/14/2019  1:00 PM Clois Safe, PTA [facility]   6/20/2019 11:15 AM Lucien Pineda  [address]   9/9/2019  1:20 PM Dawson Parks DO [address] [place] [address]

## 2019-06-10 ENCOUNTER — HOSPITAL ENCOUNTER (OUTPATIENT)
Dept: PHYSICAL THERAPY | Age: 39
Discharge: HOME | End: 2019-06-10
Payer: COMMERCIAL

## 2019-06-10 PROCEDURE — 97110 THERAPEUTIC EXERCISES: CPT

## 2019-06-10 NOTE — PROGRESS NOTES
PT DAILY TREATMENT NOTE 10-18    Patient Name: Olena Disla  Date:6/10/2019  : 1980  [x]  Patient  Verified  Payor: Keon Mercer / Plan: [employer] / Product Type: HMO /    In time:9:35    Out time:10:11   Total Treatment Time (min): 36  Visit #: 9 of 12    Treatment Area: Low back pain [M54.5]  Other chronic pain [G89.29]  Cervicalgia [M54.2]  Myalgia, other site [M79.18]    SUBJECTIVE  Pain Level (0-10 scale):  5/10  Any medication changes, allergies to medications, adverse drug reactions, diagnosis change, or new procedure performed?: [x] No    [] Yes (see summary sheet for update)  Subjective functional status/changes:   [] No changes reported  Pt reports aching along his lower back. He states the left side of his neck is feeling better today but isn't sure if it flares up the way he sleeps or from working on it. He is trying to work on his posture some but feels he is crunching to get his ribs down.      OBJECTIVE    31 min Therapeutic Exercise:  [x] See flow sheet :   Rationale: increase ROM and increase strength to improve the patients ability to increase ease of ADLs     5  minutes of manual therapy to improve alignment prior to strengthening to restore normal muscle length tension relationship for ease of completing ADLs and improving posture for decreased pain   Leg lengthening for left upslip x 10   MET for right AI/left PI x 3   Shotgun technique           With   [x] TE   [] TA   [] neuro   [] other: Patient Education: [x] Review HEP    [] Progressed/Changed HEP based on:   [] positioning   [] body mechanics   [] transfers   [] heat/ice application    [] other:      Other Objective/Functional Measures:   Worked in supine to maintain neutral spine without rib flare while performing core and t/s strengthening  Cues for breathing with exercises  No increased pain during session  Educated on rib flare to softly tuck downward versus engaging hips  Educated on importance of foam rolling to t/s for mobility to decrease hypertonicity of musculature    Pain Level (0-10 scale) post treatment: 5/10    ASSESSMENT/Changes in Function:  Pt is making slow progress due to habitual swayback posture with resultant muscular imbalances. He has tight hamstrings restricting l/s AROM flexion. He has decreased t/s and periscapular strength due to kyphosis and forward head. Will continue working to improve posture to neutral spine for improved muscle tension relationships and for decreased pain with job working on the computer and his phone. Progress towards goals / Updated goals:  Updated Goals: to be achieved in 4 weeks:  1. Patient will improve FOTO score by 16 points in order to demonstrate a significant improvement in function. 2. Patient will improve B hip extension MMT to 4/5 in order to increase ease of ambulation. 3. Patient will perform quadruped opposite UE/LE lift with good form in order to demonstrate improving core stability.    4. Patient will improve lumbar flexion AROM to 15cm from finger tip to floor in order to increase ease of working. Continues to have tight HS restricting motion (6/10/19)      PLAN  [x]  Upgrade activities as tolerated     [x]  Continue plan of care  []  Update interventions per flow sheet       []  Discharge due to:_  []  Other:_      Maty Hopper PTA 6/10/2019  7:54 AM    Future Appointments   Date Time Provider Cristo Buenrostro   6/10/2019  9:30 AM Elma Andersen PTA [facility]   6/12/2019  3:30 PM Elma Andersen PTA [facility]   6/14/2019  1:00 PM Elma Andersen PTA [facility]   6/20/2019 11:15 AM Lizette Atkinson  [address]   9/9/2019  1:20 PM Joycelyn Olea DO [address] [place] [address]

## 2019-06-12 ENCOUNTER — HOSPITAL ENCOUNTER (OUTPATIENT)
Dept: PHYSICAL THERAPY | Age: 39
Discharge: HOME | End: 2019-06-12
Payer: COMMERCIAL

## 2019-06-12 PROCEDURE — 97110 THERAPEUTIC EXERCISES: CPT

## 2019-06-12 PROCEDURE — 97140 MANUAL THERAPY 1/> REGIONS: CPT

## 2019-06-12 NOTE — PROGRESS NOTES
PT DAILY TREATMENT NOTE 10-18    Patient Name: Linus Blizzard  Date:2019  : 1980  [x]  Patient  Verified  Payor: Daphne Jasmine / Plan: [place] [employer] / Product Type: HMO /    In time: 3:33   Out time: 4:20  Total Treatment Time (min): 47  Visit #: 10 of 12    Treatment Area: Low back pain [M54.5]  Other chronic pain [G89.29]  Cervicalgia [M54.2]  Myalgia, other site [M79.18]    SUBJECTIVE  Pain Level (0-10 scale):  7/10  Any medication changes, allergies to medications, adverse drug reactions, diagnosis change, or new procedure performed?: [x] No    [] Yes (see summary sheet for update)  Subjective functional status/changes:   [] No changes reported  Pt reports a lot of low back pain today from bending to weed over the weekend and pulling in his hamstrings. He states some lightheadedness after stretching still on the left. OBJECTIVE    27 min Therapeutic Exercise:  [x] See flow sheet :   Rationale: increase ROM and increase strength to improve the patients ability to increase ease of ADLs    20 minutes of manual therapy to perform SOR and work on TPR of the levator scapula and scalenes left 1st rib mobs for decreased pain and radicular symptoms              With   [x] TE   [] TA   [] neuro   [] other: Patient Education: [x] Review HEP    [] Progressed/Changed HEP based on:   [] positioning   [] body mechanics   [] transfers   [] heat/ice application    [] other:      Other Objective/Functional Measures:  Decreased back pain post stretching and heat (during manual in supine)  Continues with HS tightness  Improved awareness of posture but challenged with maintaining in standing  Educated on squatting or sitting for weeding versus trunk flexion  Headache referred to temple working on levator scap TPR  Elevated left scapula compared to right  Left radicular symptoms working on left scalene TPR  Slight lightheadedness upon sitting up post manual BP was 102/70 pt had already eaten at 1 P.M.  But does note his is hypoglycemic  Educated we could monitor BP changes with supine>sit if continues to happen  Did no assess c/s mobility and rotation for cause of scalene hypertonicity    Pain Level (0-10 scale) post treatment: 6/10    ASSESSMENT/Changes in Function:  Pt is making slow progress towards updated goals. He continues to by hyperfocused on adverse effects of working on muscles in the c/s including stroke and compression on arteries despite verbal explanation. He has elevated left scapula with hypertonic left anterior scalenes, elevated left first rib and hypertonicity of the left levator scapula. He has decreased lower trap strength. He had increase in l/s pain due to repetitive trunk flexion weeding with increased use of quadratus and l/s paraspinals. Will continue working on posture, left UE strength/stability and possibly refer to outside therapist to address anxiety which would assist with reduced tension in the c/s. Progress towards goals / Updated goals:  Updated Goals: to be achieved in 4 weeks:  1. Patient will improve FOTO score by 16 points in order to demonstrate a significant improvement in function. 2. Patient will improve B hip extension MMT to 4/5 in order to increase ease of ambulation. 3. Patient will perform quadruped opposite UE/LE lift with good form in order to demonstrate improving core stability.    4. Patient will improve lumbar flexion AROM to 15cm from finger tip to floor in order to increase ease of working. Continues to have tight HS restricting motion (6/10/19)      PLAN  [x]  Upgrade activities as tolerated     [x]  Continue plan of care  []  Update interventions per flow sheet       []  Discharge due to:_  []  Other:_      Colby Sinha PTA 6/12/2019  7:54 AM    Future Appointments   Date Time Provider Cristo Buenrostro   6/12/2019  3:30 PM Hollie Bentley PTA [facility]   6/14/2019  1:00 PM Hollie Bentley PTA [facility]   6/17/2019  9:00 AM [facility] DX RM 4 [facility] 6/20/2019 11:15 AM Jenny Tucker  [address] 9/9/2019  1:20 PM Isak Judge DO [address] [place] [address]

## 2019-06-14 ENCOUNTER — HOSPITAL ENCOUNTER (OUTPATIENT)
Dept: PHYSICAL THERAPY | Age: 39
Discharge: HOME | End: 2019-06-14
Payer: COMMERCIAL

## 2019-06-14 PROCEDURE — 97140 MANUAL THERAPY 1/> REGIONS: CPT

## 2019-06-14 PROCEDURE — 97110 THERAPEUTIC EXERCISES: CPT

## 2019-06-14 NOTE — PROGRESS NOTES
PT DAILY TREATMENT NOTE 10-18    Patient Name: Faraz Damon  Date:2019  : 1980  [x]  Patient  Verified  Payor: Hilda Baig / Plan: [place] [employer] / Product Type: HMO /    In time: 1:01  Out time: 1:40  Total Treatment Time (min): 39  Visit #: 11 of 12    Treatment Area: Low back pain [M54.5]  Other chronic pain [G89.29]  Cervicalgia [M54.2]  Myalgia, other site [M79.18]    SUBJECTIVE  Pain Level (0-10 scale):  7/10 neck and 5/10 back  Any medication changes, allergies to medications, adverse drug reactions, diagnosis change, or new procedure performed?: [x] No    [] Yes (see summary sheet for update)  Subjective functional status/changes:   [] No changes reported  Pt reports less back pain today after the stretches and heat. He states he continues to get a lot of tightness along his left neck and upper shoulder. He goes back to the MD next week. He has a dentist appt at 2. OBJECTIVE    20 min Therapeutic Exercise:  [x] See flow sheet :   Rationale: increase ROM and increase strength to improve the patients ability to increase ease of ADLs    19 minutes of manual therapy to correct a left upslip and right AI; shotgun technique all MET: MET for right rotated l/s and t/s; MET for left 1st rib and left rotation of the c/s to reduce pain            With   [x] TE   [] TA   [] neuro   [] other: Patient Education: [x] Review HEP    [] Progressed/Changed HEP based on:   [] positioning   [] body mechanics   [] transfers   [] heat/ice application    [] other:      Other Objective/Functional Measures:   FOTO:53  Hip extension MMT: 4/5-left right 4-/5  Quadruped opposite UE/LE lift- UT compensation and challenged with neutral spine  Flexion AROM: 9.25 inches  Given information on smoking cessation  Given information for clinical psychologist to help with pain/anxiety if MD refers (Dr. Flavia Reynaga)    Pain Level (0-10 scale) post treatment: 7/10 neck and 5/10 back    ASSESSMENT/Changes in Function:  Pt is making slow progress towards goals in therapy. He continues with swayback posture contributing to increased l/s compression, tight hamstrings, and increased t/s kyphosis with forward head posture. He has a right t/s and l/s rotation with hypertrophy noted of the right paraspinals. He continues to have an elevated left scapula with tight scalenes and an elevated left first rib. He has been re-educated on smoking cessation. Will have 4 more sessions to updated HEP and progress towards independent management of symptoms for pain control and posture correction. Progress towards goals / Updated goals:  Updated Goals: to be achieved in 4 weeks:  1. Patient will improve FOTO score by 16 points in order to demonstrate a significant improvement in function. No change  2. Patient will improve B hip extension MMT to 4/5 in order to increase ease of ambulation. Progressing  3. Patient will perform quadruped opposite UE/LE lift with good form in order to demonstrate improving core stability. Not met continues to have UT compensation and difficulty with neutral spine  4. Patient will improve lumbar flexion AROM to 15cm from finger tip to floor in order to increase ease of working.  MET 9.25 inches       PLAN  [x]  Upgrade activities as tolerated     [x]  Continue plan of care  []  Update interventions per flow sheet       []  Discharge due to:_  []  Other:_      Darian Landin PTA 6/14/2019  7:54 AM    Future Appointments   Date Time Provider Cristo Buenrostro   6/14/2019  1:00 PM Hope Villatoro PTA [facility]   6/17/2019  9:00 AM [facility] DX RM 4 [facility]   6/20/2019 11:15 AM Ashley Pendleton  [address]   9/9/2019  1:20 PM Racheal Roe  [facility]

## 2019-06-20 ENCOUNTER — OFFICE VISIT (OUTPATIENT)
Dept: ORTHOPEDIC SURGERY | Age: 39
End: 2019-06-20

## 2019-06-20 VITALS
DIASTOLIC BLOOD PRESSURE: 64 MMHG | TEMPERATURE: 98.6 F | HEIGHT: 65 IN | WEIGHT: 150 LBS | RESPIRATION RATE: 18 BRPM | SYSTOLIC BLOOD PRESSURE: 99 MMHG | BODY MASS INDEX: 24.99 KG/M2 | HEART RATE: 79 BPM

## 2019-06-20 DIAGNOSIS — M54.2 CERVICALGIA: ICD-10-CM

## 2019-06-20 DIAGNOSIS — M79.18 MYOFASCIAL PAIN: ICD-10-CM

## 2019-06-20 DIAGNOSIS — M54.59 MECHANICAL LOW BACK PAIN: ICD-10-CM

## 2019-06-20 DIAGNOSIS — R20.2 NUMBNESS AND TINGLING OF UPPER AND LOWER EXTREMITIES OF BOTH SIDES: ICD-10-CM

## 2019-06-20 DIAGNOSIS — M54.2 NECK PAIN: Primary | ICD-10-CM

## 2019-06-20 DIAGNOSIS — M79.18 CERVICAL MYOFASCIAL PAIN SYNDROME: ICD-10-CM

## 2019-06-20 DIAGNOSIS — R20.0 NUMBNESS AND TINGLING OF UPPER AND LOWER EXTREMITIES OF BOTH SIDES: ICD-10-CM

## 2019-06-20 RX ORDER — PREGABALIN 150 MG/1
150 CAPSULE ORAL 2 TIMES DAILY
Qty: 60 CAP | Refills: 2 | Status: SHIPPED | OUTPATIENT
Start: 2019-06-20 | End: 2020-11-09

## 2019-06-20 RX ORDER — PREGABALIN 75 MG/1
75 CAPSULE ORAL 2 TIMES DAILY
Qty: 28 CAP | Refills: 0 | Status: SHIPPED | OUTPATIENT
Start: 2019-06-20 | End: 2020-11-09

## 2019-06-20 NOTE — PROGRESS NOTES
[address]  [address]  [place]  Phone: [phone]  Fax: [phone]        Sondra Frandy  : 1980  PCP: Solo Richardson MD  2019    PROGRESS NOTE      HISTORY OF PRESENT ILLNESS  Willian Fothergill is a [address] y.o. male. He was seen 16 for a f/u. His pain continued to appear myofascial in nature. He did not find relief from PT in the past or from using an antiinflammatory cream. He was advised to continue using his theracane and performing his exercises from PT. Since the cream did not provide relief and NSAIDs upset his stomach, he was prescribed Celebrex 200mg BID PRN. Trigger point injections were offered but declined at this time. Pt said he would like to get a massage first. Cervical MRI 16: Stable mild degenerative disease at C3-C4 and C4-C5. No significant central stenosis, cord compression or foraminal stenosis. He comes in today with c/o cervical pain. His biggest complaint is a headache that seems consistent with occipital neuralgia, radiating from his left eye to a region below his left ear. He returned 17 for new onset of numbness and tingling that radiate down the left upper extremity that ends in the last two fingesr. He states that his pain in the cervical region has decreased since the last visit. He is also complaining of chronic pain in the left shoulder. Pt presents with continued numbness and tingling in the left upper extremity. He was last referred to get a BUE EMG 17: This was a normal nerve conduction and EMG study showing no signs of neuropathy myopathy or radiculopathy in the nerves and muscles tested. He returned 3/6/18 for new low back pain that began a few weeks ago. He recently bought a Tempurpedic mattress and suspects that may be the cause of his pain as his back began hurting around the time he bought the bed. He continues to have muscle pain and tension in his neck. He returned 9/20/18 for a PRN f/u. He now c/o a heavy sensation and weakness in his RUE in a C7/8 vs ulnar nerve distribution that tends to be worse in the mornings. He reports that he fell down his brother's stairs where he sprained his right ankle and exacerbated his neck and upper back pain and RUE paraesthesia. Cervical MRI 9/28/18: Minimal disc bulging at C3-4, C4-5 and C5-6 without evidence of focal disc pathology. No central canal or foraminal stenosis. 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 He reports over the last month, his RUE heaviness has improved. He now c/o a \"jerking\" in his RLE when he is lying down about 10 times during the night. He states that his stress level and sleep have been normal. He reports that he had a loop recorder, for evaluation of heart palpitations, and a colonoscopy. He continues to have some right ankle pain following his fall 2 months ago. He returned 3/7/19 for a PRN f/u. He notes that he has had a burning pain in his neck and upper back (L>R). He states that he has not been maintaining his HEP consistently and his pain returned. He states that he often keeps tension in his shoulders. He bought an Upright Go device that alerts him when he has poor posture, but he only used it for a week after he purchased it. He has begun exercising again over the last 2 days, and has seen improvement of his headaches that are in the trapezius referral pattern. He continues to use his TheraCane. He recently began a [facility] yoga program. He sees a neurologist for headaches. He states that during some of the neck exercises, he occasionally feels a head spinning sensation. He states that he assumes it is more likely inner ear related, but one of his biggest fears is having a stroke, so he remains very guarded during his exercises. He also has h/o left inner ear surgery when he was a child. He sleeps on his left side, but has been trying to sleep on his back to help alleviate his neck pain.  He has h/o anxiety. He saw Deven Recinos NP 4/19/19 for continued neck and low back pain. He states he went for a massage, and therapist stretched him beforehand. He states once he laid back down his entire back \"seized up\" but the therapist was able to get it loose again. Since then, the pain has improved. The pain was \"on his spine\" not on the muscle. He gets a heavy feeling in his arms and legs. He states his neck is worse. He has not had any recent steroids or medications for this. He can not take steroids. He would like to get back into PT. He states that he only finds about half a day of relief with massage, then the next day, his pain appears to increase. Cliff Jack comes in to the office today for f/u. He attended PT (4/29/19 - 6/4/19; [facility]) with some relief. He traveled to [facility] for 10 days during his course of PT, and he notes that the extra walking exacerbated his pain. He has found PT beneficial, so he would like to continue. He states that he was given a shoe lift for his left side. He was ill earlier this week will the stomach bug, and he has been sleeping on his couch, so it exacerbated his neck pain. He continues to have episodic numbness and tingling in all of his extremities. Pt notes that he has not been consistent with maintaining exercises outside of PT. He also c/o neck spasms and difficulty swallowing. He was cleared by an ENT, and he was advised by a neurologist it could be related to his neck muscles. He is scheduled for a Barium swallow test next week. He also notes that his equilibrium has been off since his neck pain has increased. He rates his pain as a 5/10 today. ASSESSMENT  His pain likely remains myofascial in nature. PLAN  1. Lyrica 75 mg BID for 2 weeks, then 150 mg BID moving forward. 2. Referral to Sports Medicine - Dr. Kodi Crawford. 3. Continue with PT - he can call to request an extension if he wishes. 4. Lumbar MRI.      5. Referral to chiropractic (Dr. Tessy Estrada)    Pt will f/u after MRI or sooner as needed. Diagnoses and all orders for this visit:    1. Neck pain  -     REFERRAL TO SPORTS MEDICINE  -     REFERRAL TO CHIROPRACTIC    2. Cervical myofascial pain syndrome  -     REFERRAL TO SPORTS MEDICINE  -     REFERRAL TO CHIROPRACTIC    3. Cervicalgia  -     REFERRAL TO CHIROPRACTIC    4. Mechanical low back pain  -     REFERRAL TO SPORTS MEDICINE  -     MRI LUMB SPINE WO CONT; Future  -     REFERRAL TO CHIROPRACTIC    5. Myofascial pain  -     REFERRAL TO SPORTS MEDICINE  -     MRI LUMB SPINE WO CONT; Future  -     REFERRAL TO CHIROPRACTIC    6. Numbness and tingling of upper and lower extremities of both sides  -     pregabalin (LYRICA) 75 mg capsule; Take 1 Cap by mouth two (2) times a day. Max Daily Amount: 150 mg.  -     pregabalin (LYRICA) 150 mg capsule; Take 1 Cap by mouth two (2) times a day. Max Daily Amount: 300 mg.  -     REFERRAL TO SPORTS MEDICINE  -     MRI LUMB SPINE WO CONT; Future  -     REFERRAL TO CHIROPRACTIC         PAST MEDICAL HISTORY   Past Medical History:   Diagnosis Date    Anxiety     Arm fatigue     Forearms    Balance problems     Cardiac Agatston CAC score 100-199 02/04/2015    Coronary calcium score 0.    Cardiac echocardiogram 10/29/2014    EF 55-60%. No RWMA. Normal diastolic fx. RVSP 30 mmHg.  Cardiac Holter monitor study 10/29/2014    Normal Holter study.     Chronic cough     chronically coughing up sputum    Depression     Dizziness     Dyspnea     Esophageal reflux     Headache     Hypercholesterolemia     Lumbar pain     Myofascial pain     Neck pain     Numbness and tingling     PAC (premature atrial contraction)     Palpitations     presumably benign    Panic disorder     Reflux     Thoracic back pain     Mid-thoracic    Upper extremity weakness     Vertigo, intermittent        Past Surgical History:   Procedure Laterality Date    APPENDECTOMY  11/23/2010    HX HEENT      corrective hearing for left ear     HX HEENT      plastic surgery on both ears    HX HEENT  10/08 & 09/09    Reformed jaw bone, bone graft    HX RENAL BIOPSY      HX TONSILLECTOMY      SC IMPLANTATION PT-ACTIVATED CARDIAC EVENT RECORDER N/A 10/24/2018    Loop Recorder Insert performed by Vamshi Dias MD at [facility]   . MEDICATIONS    Current Outpatient Medications   Medication Sig Dispense Refill    escitalopram oxalate (LEXAPRO) 20 mg tablet TAKE 1 TABLET BY MOUTH EVERY DAY  1    methocarbamol (ROBAXIN) 500 mg tablet Take 1 Tab by mouth two (2) times daily as needed (spasm). 45 Tab 0    ALPRAZolam (XANAX) 0.5 mg tablet Take 0.5 mg by mouth two (2) times a day.  ascorbic acid (VITAMIN C) 500 mg tablet Take 1,000 mg by mouth daily.  cholecalciferol, vitamin D3, (VITAMIN D3) 2,000 unit Tab Take 1 Tab by mouth daily.  omeprazole-sodium bicarbonate (ZEGERID) 40-1.1 mg-gram capsule Take 2 Caps by mouth daily.  acetaminophen (TYLENOL EXTRA STRENGTH) 500 mg tablet Take 500 mg by mouth every six (6) hours as needed.  MULTIVITAMIN PO Take 1 Tab by mouth daily.           ALLERGIES  Allergies   Allergen Reactions    Opioids - Morphine Analogues Myalgia    Other Medication Other (comments)     Pt reports allergy to steroids, with psychological side effects    Pollen Extracts Unable to Obtain    Zithromax [Azithromycin] Itching          SOCIAL HISTORY    Social History     Socioeconomic History    Marital status: SINGLE     Spouse name: Not on file    Number of children: Not on file    Years of education: Not on file    Highest education level: Not on file   Occupational History    Occupation: real estate   Tobacco Use    Smoking status: Current Every Day Smoker     Packs/day: 1.00     Years: 20.00     Pack years: 20.00     Types: Cigarettes    Smokeless tobacco: Never Used   Substance and Sexual Activity    Alcohol use: No     Alcohol/week: 0.0 oz    Drug use: No       FAMILY HISTORY  Family History   Problem Relation Age of Onset    Hypertension Father     Liver Disease Father     Hypertension Mother          REVIEW OF SYSTEMS  Review of Systems   Musculoskeletal: Positive for back pain and neck pain. BUE paraesthesia (R>L)            PHYSICAL EXAMINATION  There were no vitals taken for this visit. Pain Assessment  4/19/2019   Location of Pain Neck   Location Modifiers -   Severity of Pain 3   Quality of Pain Aching;Dull   Quality of Pain Comment -   Duration of Pain -   Frequency of Pain Constant   Date Pain First Started -   Aggravating Factors Bending;Stairs;Stretching;Straightening;Exercise;Squatting;Kneeling;Standing;Walking   Aggravating Factors Comment -   Limiting Behavior -   Relieving Factors Nothing   Relieving Factors Comment -   Result of Injury -           Constitutional:  Well developed, well nourished, in no acute distress. Psychiatric: Affect and mood are appropriate. Integumentary: No rashes or abrasions noted on exposed areas. SPINE/MUSCULOSKELETAL EXAM    Cervical spine:  Neck is midline.    Normal muscle tone.    No focal atrophy is noted.    ROM pain free.    Shoulder ROM intact.    Tenderness to palpation at left scalene's.    Negative Spurling's sign.    Negative Tinel's sign.    Negative Garcia's sign.    Flex forward posture.    No clonus.      Sensation in the bilateral arms grossly intact to light touch.        Lumbar spine:  No rash, ecchymosis, or gross obliquity.    No fasciculations.    No focal atrophy is noted.    No pain with hip ROM.    Range of motion is normal.    Tenderness to palpation. No tenderness to palpation at the sciatic notch.    SI joints non-tender.    Trochanters non tender.      Sensation in the bilateral legs grossly intact to light touch.      Updates from 12/02/16:  Negative Garcia's sign bilaterally. No clonus. Pain in left scalene muscles with turning head to the right.    Updates from 3/6/18:  Tenderness to palpation of lumbar region  No directional preference     Updates from 3/7/19:  Tenderness to palpation of trapezii (L>R)  Tenderness to palpation of C7 spinous process    MOTOR:      Biceps  Triceps Deltoids Wrist Ext Wrist Flex Hand Intrin   Right 5/5 5/5 5/5 5/5 5/5 5/5   Left 5/5 5/5 5/5 5/5 5/5 5/5             Hip Flex  Quads Hamstrings Ankle DF EHL Ankle PF   Right 5/5 5/5 5/5 5/5 5/5 5/5   Left 5/5 5/5 5/5 5/5 5/5 5/5     DTRs are 2+ biceps, triceps, brachioradialis, patella, and Achilles.      Negative Straight Leg raise.    Squat not tested.    No difficulty with tandem gait.    Normal heel walk.    Normal toe rise.       Ambulation without assistive device. FWB.       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 Normal cervical alignment and vertebral body heights maintained. No pathologic marrow signal.The cervical cord is normal in signal and caliber. No cerebellar tonsillar ectopia. Paraspinal soft tissues are unremarkable.     C2-3: Normal looking disc. Central canal and neural foramen are widely patent.     C3-4: Minimal disc bulge. Central canal and neural foramen are widely patent.     C4-5: Minimal disc bulge. Central canal and neural foramen are widely patent.     C5-6: Minimal disc bulge. Central canal and neural foramen are widely patent.     C6-7: Partial congenital bony fusion. Central canal and neural foramen are  widely patent.     C7-T1: Normal looking disc. Central canal and neural foramen are widely patent.     IMPRESSION  Impression:     Minimal disc bulging at C3-4, C4-5 and C5-6 without evidence of focal disc  pathology.  No central canal or foraminal stenosis.     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     BUE EMG taken on 08/04/2017 personally reviewed with patient:  NCS/EMG FINDINGS:      · Evaluation of the Left median motor, the Right median motor, the Left ulnar motor, the Right ulnar motor, the Left Median 2nd Digit sensory, the Right Median 2nd Digit sensory, the Left ulnar sensory, and the Right ulnar sensory nerves were unremarkable.          INTERPRETATION: This was a normal nerve conduction and EMG study showing no signs of neuropathy myopathy or radiculopathy in the nerves and muscles tested.          Cervical MRI from 9/15/2014 personally reviewed with patient:  1. Stable appearance of cervical spine examination. 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 Mild multilevel degenerative changes. No high-grade spinal or foraminal  Stenosis.     Cervical MRI images taken on 11/23/2016 personally reviewed with patient:  Mild reversal of cervical curvature, centered at C3-C4, stable. Partial fusion  at C6-C7, stable. No compression fracture or pathologic marrow signal. No  prevertebral soft tissue abnormalities. Spinous processes and posterior soft  tissues unremarkable. Craniocervical junction and spinal cord are also  unremarkable. Dominant left vertebral artery, stable.      C2-C3: No disc herniation, central or foraminal stenosis.     C3-C4: Mild posterior disc bulge. No central stenosis, cord contact or foraminal  stenosis.     C4-C5: Mild posterior disc bulge with no central or foraminal stenosis.     C5-C6: Mild posterior disc bulge. No central or foraminal stenosis.     C6-7 and C7-T1: No disc herniation, central or foraminal stenosis.      IMPRESSION  IMPRESSION: Stable mild degenerative disease at C3-C4 and C4-C5. No significant  central stenosis, cord compression or foraminal stenosis.      29 minutes of face-to-face contact were spent with the patient during today's visit extensively discussing symptoms and treatment plan. All questions were answered. More than half of this visit today was spent on counseling.      Written by Cori Malone [address] as dictated by Herlinda Severin, MD

## 2019-06-21 ENCOUNTER — HOSPITAL ENCOUNTER (OUTPATIENT)
Dept: PHYSICAL THERAPY | Age: 39
Discharge: HOME | End: 2019-06-21
Payer: COMMERCIAL

## 2019-06-21 PROCEDURE — 97140 MANUAL THERAPY 1/> REGIONS: CPT

## 2019-06-21 PROCEDURE — 97110 THERAPEUTIC EXERCISES: CPT

## 2019-06-21 NOTE — PROGRESS NOTES
PT DAILY TREATMENT NOTE 10-18    Patient Name: Rajendra Sandoval  Date:2019  : 1980  [x]  Patient  Verified  Payor: Deann Barnes / Plan: [employer] / Product Type: HMO /    In time: 1:05   Out time: 2:10  Total Treatment Time (min): 65  Visit #: 1 of 8    Treatment Area: Low back pain [M54.5]  Other chronic pain [G89.29]  Cervicalgia [M54.2]  Myalgia, other site [M79.18]    SUBJECTIVE  Pain Level (0-10 scale):  7/10 neck; 5/10 back  Any medication changes, allergies to medications, adverse drug reactions, diagnosis change, or new procedure performed?: [x] No    [] Yes (see summary sheet for update)  Subjective functional status/changes:   [] No changes reported  Pt reports he followed up with Dr. Minoo Soriano whom prescribed him Lyrica but he doesn't really want to take medicine he wants to fix the muscles. He is going to see a DO and chiropractor to help with his alignment. He is going to try to quit smoking and go see a psychologist to help with his pain symptoms. He states he was sick for the past week and his neck is really bothering him. He also was curled up a lot and not doing his exercises while sick.      OBJECTIVE    35 min Therapeutic Exercise:  [x] See flow sheet :   Rationale: increase ROM and increase strength to improve the patients ability to increase ease of ADLs     30 minutes of manual therapy to correct a left upslip and right AI; shotgun technique all MET: MET for right rotated l/s and t/s; MET for left 1st rib and left rotation of the c/s to reduce pain; TPR to splenius capitus and scalenes and right c/s paraspinals            With   [x] TE   [] TA   [] neuro   [] other: Patient Education: [x] Review HEP    [] Progressed/Changed HEP based on:   [] positioning   [] body mechanics   [] transfers   [] heat/ice application    [] other:      Other Objective/Functional Measures:  Extensive time spent answering pt questions and reviewing posture correction questions  Decreased pain post c/s manual  Continues to have sway back posture but better understanding of corrections  In sitting slump sitting due to tight hamstrings  Pt re-educated on hamstring stretching and t/s mobility with foam rolling  Will D/C in 3 visits    Pain Level (0-10 scale) post treatment: 5/10    ASSESSMENT/Changes in Function:  Pt making slow progress towards goals due to continued poor posture that is swayback causing myofacial pain. He is challenged attaining an anterior pelvic tilt due to increased hamstring tightness. He has forward shoulder and head posture causing c/s tightness as well. Will continue working to restore alignment and muscle balance for decreased pain and improved posture. Will transition to home program in 3 visits for D/C. Progress towards goals / Updated goals:  Updated Goals: to be achieved in 4 weeks:  1. Patient will improve FOTO score by 16 points in order to demonstrate a significant improvement in function. No change  2. Patient will improve B hip extension MMT to 4/5 in order to increase ease of ambulation. Progressing  3. Patient will perform quadruped opposite UE/LE lift with good form in order to demonstrate improving core stability. Not met continues to have UT compensation and difficulty with neutral spine  4. Patient will improve lumbar flexion AROM to 15cm from finger tip to floor in order to increase ease of working.  MET 9.25 inches       PLAN  [x]  Upgrade activities as tolerated     [x]  Continue plan of care  []  Update interventions per flow sheet       []  Discharge due to:_  []  Other:_      Maty Hopper PTA 6/21/2019  7:54 AM    Future Appointments   Date Time Provider Cristo Menezesi   6/21/2019  1:00 PM Elma Andersen PTA [facility]   6/24/2019  9:00 AM [facility] DX RM 4 [facility]   7/1/2019 11:00 AM Sidra Kohler  [address] 7/17/2019  8:50 AM Lizette Atkinson  [address] 7/25/2019  2:00 PM Lizette Atkinson  [address] 9/9/2019 1:20 PM Marcelino Baldwin,  [facility]

## 2019-06-24 ENCOUNTER — HOSPITAL ENCOUNTER (OUTPATIENT)
Dept: GENERAL RADIOLOGY | Age: 39
Discharge: HOME | End: 2019-06-24
Attending: INTERNAL MEDICINE
Payer: COMMERCIAL

## 2019-06-24 DIAGNOSIS — R13.10 DYSPHAGIA: ICD-10-CM

## 2019-06-24 PROCEDURE — 74011000255 HC RX REV CODE- 255: Performed by: INTERNAL MEDICINE

## 2019-06-24 PROCEDURE — 74011000250 HC RX REV CODE- 250: Performed by: INTERNAL MEDICINE

## 2019-06-24 PROCEDURE — 74220 X-RAY XM ESOPHAGUS 1CNTRST: CPT

## 2019-06-24 RX ADMIN — ANTACID/ANTIFLATULENT 4 G: 380; 550; 10; 10 GRANULE, EFFERVESCENT ORAL at 09:40

## 2019-06-24 RX ADMIN — BARIUM SULFATE 30 G: 960 POWDER, FOR SUSPENSION ORAL at 09:40

## 2019-06-24 RX ADMIN — BARIUM SULFATE 135 ML: 980 POWDER, FOR SUSPENSION ORAL at 09:40

## 2019-06-24 RX ADMIN — BARIUM SULFATE 700 MG: 700 TABLET ORAL at 09:40

## 2019-06-25 ENCOUNTER — HOSPITAL ENCOUNTER (OUTPATIENT)
Dept: PHYSICAL THERAPY | Age: 39
Discharge: HOME | End: 2019-06-25
Payer: COMMERCIAL

## 2019-06-25 PROCEDURE — 97140 MANUAL THERAPY 1/> REGIONS: CPT

## 2019-06-25 PROCEDURE — 97110 THERAPEUTIC EXERCISES: CPT

## 2019-06-25 NOTE — PROGRESS NOTES
PT DAILY TREATMENT NOTE 10-18    Patient Name: Ernestina Trujillo  Date:2019  : 1980  [x]  Patient  Verified  Payor: Randall Oquendo / Plan: [employer] / Product Type: HMO /    In time: 12:03   Out time: 12:35  Total Treatment Time (min):32  Visit #: 2 of 8    Treatment Area: Low back pain [M54.5]  Other chronic pain [G89.29]  Cervicalgia [M54.2]  Myalgia, other site [M79.18]    SUBJECTIVE  Pain Level (0-10 scale):  6/10 neck and 5/10 back  Any medication changes, allergies to medications, adverse drug reactions, diagnosis change, or new procedure performed?: [x] No    [] Yes (see summary sheet for update)  Subjective functional status/changes:   [] No changes reported  Pt reports his neck and back are more calm today. He goes to see the chiropractor Thursday. OBJECTIVE    12 min Therapeutic Exercise:  [x] See flow sheet :   Rationale: increase ROM and increase strength to improve the patients ability to increase ease of ADLs    20  minutes of manual therapy to correct a right AI; shotgun technique all MET: MET for right rotated l/s and t/s; PA mobs grade III-IV to the t/s and  TPR to splenius capitus and scalenes on the left with SOR            With   [x] TE   [] TA   [] neuro   [] other: Patient Education: [x] Review HEP    [] Progressed/Changed HEP based on:   [] positioning   [] body mechanics   [] transfers   [] heat/ice application    [] other:      Other Objective/Functional Measures:  Cavitation with t/s mobs  Cavitation with shotgun technique  Improved open book mobility post manual  Increased thoracolumbar pain lying supine for c/s manual but reduced with stretching  Continues with increased hamstring tightness    Pain Level (0-10 scale) post treatment: 5/10    ASSESSMENT/Changes in Function:   Pt continues with myofascial pain from alignment due to swayback posture. He has increased hamstring tightness preventing anterior pelvic tight.  He has hypomobility of the t/s with increased kyphosis and right rotation. He has left c/s rotation with increased tightness of the scalenes and an elevated first rib. Will prepare for D/C with review of exercises and focus on correcting posture for pain relief. Progress towards goals / Updated goals:  Updated Goals: to be achieved in 4 weeks:  1. Patient will improve FOTO score by 16 points in order to demonstrate a significant improvement in function. No change  2. Patient will improve B hip extension MMT to 4/5 in order to increase ease of ambulation. Progressing  3. Patient will perform quadruped opposite UE/LE lift with good form in order to demonstrate improving core stability. Not met continues to have UT compensation and difficulty with neutral spine  4. Patient will improve lumbar flexion AROM to 15cm from finger tip to floor in order to increase ease of working.  MET 9.25 inches       PLAN  [x]  Upgrade activities as tolerated     [x]  Continue plan of care  []  Update interventions per flow sheet       []  Discharge due to:_  []  Other:_      Ilsa Gudino PTA 6/25/2019  7:54 AM    Future Appointments   Date Time Provider Cristo Buenrostro   6/25/2019 12:00 PM Hamilton Pantoja, PTA [facility]   6/27/2019 12:00 PM Hamilton Pantoja, PTA [facility]   7/1/2019  9:30 AM Hamilton Pantoja, PTA [facility]   7/1/2019 11:00 AM Carrie Gillette  [address] 7/17/2019  8:50 AM Oj Sutton  [address] 7/25/2019  2:00 PM Oj Sutton  [address] 9/9/2019  1:20 PM Guerrero Parikh DO [address] [place] [address]

## 2019-06-26 ENCOUNTER — TELEPHONE (OUTPATIENT)
Dept: ORTHOPEDIC SURGERY | Age: 39
End: 2019-06-26

## 2019-06-26 NOTE — TELEPHONE ENCOUNTER
Patient called in to ask if you would order his T-Spine MRI along with the L-spine that is ordered? No one has called him yet to schedule so if you place the order they can set him up for the 2 at the same time.

## 2019-06-27 ENCOUNTER — HOSPITAL ENCOUNTER (OUTPATIENT)
Dept: PHYSICAL THERAPY | Age: 39
Discharge: HOME | End: 2019-06-27
Payer: COMMERCIAL

## 2019-06-27 PROCEDURE — 97140 MANUAL THERAPY 1/> REGIONS: CPT

## 2019-06-27 PROCEDURE — 97110 THERAPEUTIC EXERCISES: CPT

## 2019-06-27 NOTE — PROGRESS NOTES
PT DAILY TREATMENT NOTE 10-18    Patient Name: Jeremy Mosquera  Date:2019  : 1980  [x]  Patient  Verified  Payor: Gabriela Lopez / Plan: [employer] / Product Type: HMO /    In time: 12:01   Out time: 12:47  Total Treatment Time (min): 46  Visit #: 3 of 8    Treatment Area: Low back pain [M54.5]  Other chronic pain [G89.29]  Cervicalgia [M54.2]  Myalgia, other site [M79.18]    SUBJECTIVE  Pain Level (0-10 scale): 5/10 neck; 3/10 back  Any medication changes, allergies to medications, adverse drug reactions, diagnosis change, or new procedure performed?: [x] No    [] Yes (see summary sheet for update)  Subjective functional status/changes:   [] No changes reported  Pt reports his back is feeling better today. He is going to the chiropractor at 2 P.M. He would like to review his exercises next visit. He is still having a hard time swallowing. He is trying to remember how to turn his head to correct his alignment.      OBJECTIVE    31 min Therapeutic Exercise:  [x] See flow sheet :   Rationale: increase ROM and increase strength to improve the patients ability to increase ease of ADLs    15 minutes of manual therapy to correct a left upslip and right AI; shotgun technique all MET: MET for right rotated l/s and t/s; PA mobs grade III-IV to the t/s and MET for left C3-T2 rotation for decreased pain with ADLs            With   [x] TE   [] TA   [] neuro   [] other: Patient Education: [x] Review HEP    [] Progressed/Changed HEP based on:   [] positioning   [] body mechanics   [] transfers   [] heat/ice application    [] other:      Other Objective/Functional Measures: continues to have rotations throughout the spine  Continued education on sitting posture and importance of gaining HS flexibility and t/s mobility  Educated on importance of strengthening exercises as well as stretching  Will review exercises next time for final session    Pain Level (0-10 scale) post treatment: 5/10    ASSESSMENT/Changes in Function:   Pt has made slow progress towards goals but has much better understanding of how posture plays a role in his pain symptoms. He has improved awareness of posture and stretches/strengthening to help allow for proper body positioning. Will have one final session to review exercises in order to help encourage maintaining alignment for pt to continue self management at home. Progress towards goals / Updated goals:  Updated Goals: to be achieved in 4 weeks:  1. Patient will improve FOTO score by 16 points in order to demonstrate a significant improvement in function. No change  2. Patient will improve B hip extension MMT to 4/5 in order to increase ease of ambulation. Progressing  3. Patient will perform quadruped opposite UE/LE lift with good form in order to demonstrate improving core stability. Not met continues to have UT compensation and difficulty with neutral spine  4. Patient will improve lumbar flexion AROM to 15cm from finger tip to floor in order to increase ease of working.  MET 9.25 inches       PLAN  [x]  Upgrade activities as tolerated     [x]  Continue plan of care  []  Update interventions per flow sheet       []  Discharge due to:_  []  Other:_      Hilda Cervantes PTA 6/27/2019  7:54 AM    Future Appointments   Date Time Provider Cristo Buenrostro   6/27/2019 12:00 PM Kenya Colorado [facility]   7/1/2019  9:30 AM Renee Mccarthy PTA [facility]   7/1/2019 11:00 AM Taty Rossi  [address] 7/17/2019  8:50 AM Laura Fox  [address] 7/25/2019  2:00 PM Laura Fox  [address] 9/9/2019  1:20 PM Janet Mendez, DO [address] [place] [address]

## 2019-06-28 NOTE — TELEPHONE ENCOUNTER
Patient stated the Chiropractor requested to have images of the thoracic spine before he would treat him. Dr. Kenan Laura contacted the Chiropractor and the Chiropractor was speaking of the lumbar MRI that the patient will be having soon not requiring him to have a MRI of the thoracic spine. This was explained to patient. Patient was also told that he was referred to Dr. Jon Mariee and he could mention the thoracic area during the visits and maybe Dr. Jon Mariee could order it. Dr Kenan Laura stated he could not order it due to a lack of supporting clinical documentation and treatments of the thoracic region.

## 2019-07-01 ENCOUNTER — OFFICE VISIT (OUTPATIENT)
Dept: ORTHOPEDIC SURGERY | Age: 39
End: 2019-07-01

## 2019-07-01 ENCOUNTER — HOSPITAL ENCOUNTER (OUTPATIENT)
Dept: PHYSICAL THERAPY | Age: 39
Discharge: HOME | End: 2019-07-01
Payer: COMMERCIAL

## 2019-07-01 PROCEDURE — 97110 THERAPEUTIC EXERCISES: CPT

## 2019-07-01 NOTE — PROGRESS NOTES
[facility]  [address] [facility] [address] [facility]  [phone] fax    Physical Therapy Discharge Summary    Patient name: Dimas Palomares Start of Care: 2019   Referral source: Jaquan Mayer NP : 1980   Medical/Treatment Diagnosis: Low back pain [M54.5]  Other chronic pain [G89.29]  Cervicalgia [M54.2]  Myalgia, other site [M79.18]  Payor: Qiana Rohit / Plan: [employer] HMO / Product Type: HMO /  Onset Date:6 weeks prior to [facility]     Prior Hospitalization: see medical history Provider#: [id number]   Medications: Verified on Patient Summary List    Comorbidities: tobacco use, scoliosis, panic disorder, heart palpitations  Prior Level of Function:Ind with ambulation, working      Visits from Start of Care: 15    Missed Visits: 0    Reporting Period : 2019 to 2019    Summary of Care:  Goal:Patient will improve FOTO score by 16 points in order to demonstrate a significant improvement in function. Status at last note/certification: No Change  Status at discharge: not met    Goal:Patient will improve B hip extension MMT to 4/5 in order to increase ease of ambulation. Status at last note/certification: 4-/5  Status at discharge: not met    Goal:Patient will perform quadruped opposite UE/LE lift with good form in order to demonstrate improving core stability. Status at last note/certification: Progressing: improving form with quadruped LE lift  Status at discharge: met    Goal:Patient will improve lumbar flexion AROM to 15cm from finger tip to floor in order to increase ease of working. Status at last note/certification: 27 cm  Status at discharge: not met      ASSESSMENT/RECOMMENDATIONS: Pt has made progress meeting 1/4 goals in therapy and progressing towards 3/4 goals. He had 3 point improvement in FOTO goal and improvement in his hip and core strength/endurance. He gained 8 cm in l/s flexion AROM.  He continues to be mostly limited by myofascial restrictions due to swayback posture and scoliosis. He has intermittent numbness/tingling in the left upper extremity and has vertigo symptoms with c/s stretching on the left. He was given a heel lift to assist with pelvic obliquity and is following up with a chiropractor for further alignment corrections. Will D/C at this time to allow pt continued independent progression with HEP and posture corrections for pain relief and ease of working on both his phone and the computer as well as participating in recreational golf.        [x]Discontinue therapy: [x]Patient has reached or is progressing toward set goals      []Patient is non-compliant or has abdicated      []Due to lack of appreciable progress towards set Vijaya 296, PTA 7/1/2019 3:08 PM

## 2019-07-01 NOTE — PROGRESS NOTES
PT DAILY TREATMENT NOTE 10-18    Patient Name: Jeremy Mosquera  Date:2019  : 1980  [x]  Patient  Verified  Payor: Gabriela Lopez / Plan: [employer] / Product Type: HMO /    In time: 9:35   Out time: 10:10  Total Treatment Time (min): 35  Visit #: 4 of 8    Treatment Area: Low back pain [M54.5]  Other chronic pain [G89.29]  Cervicalgia [M54.2]  Myalgia, other site [M79.18]    SUBJECTIVE  Pain Level (0-10 scale): 5/10 neck; 3/10 back  Any medication changes, allergies to medications, adverse drug reactions, diagnosis change, or new procedure performed?: [x] No    [] Yes (see summary sheet for update)  Subjective functional status/changes:   [] No changes reported  Pt reports did a lot of cleaning and housework over the weekend and his back did pretty well. He went to see the chiropractor whom evaluated him and asked him to bring in his imaging. He is going to work on exercising more often to improve his gains from therapy. He would like to review his HEPs that he has from over the years to narrow down which to perform.      OBJECTIVE    30 min Therapeutic Exercise:  [x] See flow sheet :   Rationale: increase ROM and increase strength to improve the patients ability to increase ease of ADLs    5 minutes for T/s PA mobs and review of self correction for right AI and left PI with MET            With   [x] TE   [] TA   [] neuro   [] other: Patient Education: [x] Review HEP    [] Progressed/Changed HEP based on:   [] positioning   [] body mechanics   [] transfers   [] heat/ice application    [] other:      Other Objective/Functional Measures:   Hip extension MMT: left 4-/5 right 4/5  AROM flexion l/s: 21 cm  Reviewed all pt HEPs to narrow down which to perform  Educated on continued stretches of HS and hip flexor strengthening    Pain Level (0-10 scale) post treatment: 5/10 neck; 3/10 back    ASSESSMENT/Changes in Function: see D/C note    Progress towards goals / Updated goals:  Updated Goals: to be achieved in 4 weeks:  1. Patient will improve FOTO score by 16 points in order to demonstrate a significant improvement in function. 3 point improvement (7/1/19)  2. Patient will improve B hip extension MMT to 4/5 in order to increase ease of ambulation. Progressing Hip extension MMT: left 4-/5 right 4/5 (7/1/19)  3. Patient will perform quadruped opposite UE/LE lift with good form in order to demonstrate improving core stability. Not met continues to have UT compensation and difficulty with neutral spine;MET (7/1/19)  4. Patient will improve lumbar flexion AROM to 15cm from finger tip to floor in order to increase ease of working.  Progressed 21 cm (7/1/19)    PLAN  [x]  Upgrade activities as tolerated     [x]  Continue plan of care  []  Update interventions per flow sheet       []  Discharge due to:_  []  Other:_      Carlene Posey PTA 7/1/2019  7:54 AM    Future Appointments   Date Time Provider Cristo Hindsisti   7/1/2019 11:00 AM Lul Gallardo  [address] 7/17/2019  8:50 AM Debbie Mendoza  [address] 7/25/2019  2:00 PM Debbie Mendoza  [address] 9/9/2019  1:20 PM Eun Barboza DO [address] [place] [address]

## 2019-07-11 ENCOUNTER — HOSPITAL ENCOUNTER (OUTPATIENT)
Dept: MRI IMAGING | Age: 39
Discharge: HOME | End: 2019-07-11
Attending: PHYSICAL MEDICINE & REHABILITATION
Payer: COMMERCIAL

## 2019-07-11 DIAGNOSIS — M54.59 MECHANICAL LOW BACK PAIN: ICD-10-CM

## 2019-07-11 DIAGNOSIS — R20.0 NUMBNESS AND TINGLING OF UPPER AND LOWER EXTREMITIES OF BOTH SIDES: ICD-10-CM

## 2019-07-11 DIAGNOSIS — M79.18 MYOFASCIAL PAIN: ICD-10-CM

## 2019-07-11 DIAGNOSIS — R20.2 NUMBNESS AND TINGLING OF UPPER AND LOWER EXTREMITIES OF BOTH SIDES: ICD-10-CM

## 2019-07-11 PROCEDURE — 72148 MRI LUMBAR SPINE W/O DYE: CPT

## 2019-07-17 ENCOUNTER — OFFICE VISIT (OUTPATIENT)
Dept: ORTHOPEDIC SURGERY | Age: 39
End: 2019-07-17

## 2019-07-17 DIAGNOSIS — R20.0 NUMBNESS AND TINGLING OF UPPER AND LOWER EXTREMITIES OF BOTH SIDES: ICD-10-CM

## 2019-07-17 DIAGNOSIS — M54.2 NECK PAIN: ICD-10-CM

## 2019-07-17 DIAGNOSIS — R20.2 NUMBNESS AND TINGLING OF UPPER AND LOWER EXTREMITIES OF BOTH SIDES: ICD-10-CM

## 2019-07-17 DIAGNOSIS — M54.59 MECHANICAL LOW BACK PAIN: Primary | ICD-10-CM

## 2019-07-17 DIAGNOSIS — M79.18 CERVICAL MYOFASCIAL PAIN SYNDROME: ICD-10-CM

## 2019-07-17 DIAGNOSIS — M51.36 ANNULAR TEAR OF LUMBAR DISC: ICD-10-CM

## 2019-07-17 DIAGNOSIS — M79.18 MYOFASCIAL PAIN: ICD-10-CM

## 2019-07-17 NOTE — PATIENT INSTRUCTIONS
Back Stretches: Exercises  Your Care Instructions  Here are some examples of exercises for stretching your back. Start each exercise slowly. Ease off the exercise if you start to have pain. Your doctor or physical therapist will tell you when you can start these exercises and which ones will work best for you. How to do the exercises  Overhead stretch    1. Stand comfortably with your feet shoulder-width apart. 2. Looking straight ahead, raise both arms over your head and reach toward the ceiling. Do not allow your head to tilt back. 3. Hold for 15 to 30 seconds, then lower your arms to your sides. 4. Repeat 2 to 4 times. Side stretch    1. Stand comfortably with your feet shoulder-width apart. 2. Raise one arm over your head, and then lean to the other side. 3. Slide your hand down your leg as you let the weight of your arm gently stretch your side muscles. Hold for 15 to 30 seconds. 4. Repeat 2 to 4 times on each side. Press-up    1. Lie on your stomach, supporting your body with your forearms. 2. Press your elbows down into the floor to raise your upper back. As you do this, relax your stomach muscles and allow your back to arch without using your back muscles. As your press up, do not let your hips or pelvis come off the floor. 3. Hold for 15 to 30 seconds, then relax. 4. Repeat 2 to 4 times. Relax and rest    1. Lie on your back with a rolled towel under your neck and a pillow under your knees. Extend your arms comfortably to your sides. 2. Relax and breathe normally. 3. Remain in this position for about 10 minutes. 4. If you can, do this 2 or 3 times each day. Follow-up care is a key part of your treatment and safety. Be sure to make and go to all appointments, and call your doctor if you are having problems. It's also a good idea to know your test results and keep a list of the medicines you take. Where can you learn more?   Go to http://chiki-zhao.info/. Enter A551 in the search box to learn more about \"Back Stretches: Exercises. \"  Current as of: September 20, 2018  Content Version: 11.9  © 7939-7752 [employer]. Care instructions adapted under license by [employer] (which disclaims liability or warranty for this information). If you have questions about a medical condition or this instruction, always ask your healthcare professional. [address] any warranty or liability for your use of this information. Herniated Disc: Exercises  Your Care Instructions  Here are some examples of typical rehabilitation exercises for your condition. Start each exercise slowly. Ease off the exercise if you start to have pain. Your doctor or physical therapist will tell you when you can start these exercises and which ones will work best for you. How to stay safe  These exercises can help you move easier and feel better. But when you first start doing them, you may have more pain in your back. This is normal. But it is important to pay close attention to your pain during and after each exercise. · Keep doing these exercises if your pain stays the same or moves from your leg and buttock more toward the middle of your spine. Pain moving out of your leg and buttock is a good sign. · Stop doing these exercises if your pain gets worse in your leg and buttock. Stop if you start to have pain in your leg and buttock that you didn't have before. Be sure to do these exercises in the order they appear. Note how your pain changes before you move to the next one. If your pain is much worse right after exercise and stays worse the next day, do not do any of these exercises. How to do the exercises  1. Rest on belly    1. Lie on your stomach, face down, with your head turned to the side. Place your arms beside your body.  If this bothers your neck, place your hands, one on top of the other, underneath your forehead. This will help support your head and neck. 2. Try to relax your lower back muscles as much as you can. 3. Continue to lie on your stomach for 2 minutes. 4. If your pain spreads down your leg or increases down your leg, stop this exercise and do not do the next exercises. 2. Press-up    1. Lie on your stomach, face down. Keep your elbows tucked into your sides and under your shoulders. 2. Press your elbows down into the floor to raise your upper back. As you do this, relax your stomach muscles. Allow your back to arch without using your back muscles. Let your low back relax completely as you arch up. 3. Hold this position for 2 minutes. 4. Repeat 2 to 4 times. 5. If your pain spreads down your leg or increases down your leg, stop this exercise and do not do the next exercises. 3. Full press-up    1. Lie on your stomach, face down. Keep your elbows tucked into your sides and under your shoulders. 2. Straighten your elbows, and push your upper body up as far as you can. Allow your lower back to sag. Keep your hips, pelvis, and legs relaxed. 3. Hold this position for 5 seconds, and then relax. 4. Repeat 10 times. Each time, try to raise your upper body a little higher and hold your arms a bit straighter. 5. If your pain spreads down your leg or gets worse down your leg, stop this exercise and do not move to the next exercise. 6. If you can't do this exercise, you may instead try the backward bend exercise that follows. 4. Backward bend    1. Stand with your feet hip-width apart. Your toes should point forward. Do not lock your knees. 2. Place your hands in the small of your back. 3. Bend backward as far as you can, keeping your knees straight. Hold this position for 2 to 3 seconds. Then return to your starting position. 4. Repeat 2 to 4 times. Each time, try to bend backward a little farther, until you bend backward as far as you can.   5. If your pain spreads down your leg or increases down your leg, stop this exercise. Follow-up care is a key part of your treatment and safety. Be sure to make and go to all appointments, and call your doctor if you are having problems. It's also a good idea to know your test results and keep a list of the medicines you take. Where can you learn more? Go to http://chiki-zhao.info/. Enter 36116 [id number] in the search box to learn more about \"Herniated Disc: Exercises. \"  Current as of: September 20, 2018  Content Version: 11.9  © 6033-0448 [employer] Care instructions adapted under license by [employer] (which disclaims liability or warranty for this information). If you have questions about a medical condition or this instruction, always ask your healthcare professional. [address] any warranty or liability for your use of this information. Low Back Pain: Exercises  Your Care Instructions  Here are some examples of typical rehabilitation exercises for your condition. Start each exercise slowly. Ease off the exercise if you start to have pain. Your doctor or physical therapist will tell you when you can start these exercises and which ones will work best for you. How to do the exercises  Press-up    1. Lie on your stomach, supporting your body with your forearms. 2. Press your elbows down into the floor to raise your upper back. As you do this, relax your stomach muscles and allow your back to arch without using your back muscles. As your press up, do not let your hips or pelvis come off the floor. 3. Hold for 15 to 30 seconds, then relax. 4. Repeat 2 to 4 times. Alternate arm and leg (bird dog) exercise    1. Start on the floor, on your hands and knees. 2. Tighten your belly muscles. 3. Raise one leg off the floor, and hold it straight out behind you. Be careful not to let your hip drop down, because that will twist your trunk.   4. Hold for about 6 seconds, then lower your leg and switch to the other leg. 5. Repeat 8 to 12 times on each leg. 6. Over time, work up to holding for 10 to 30 seconds each time. 7. If you feel stable and secure with your leg raised, try raising the opposite arm straight out in front of you at the same time. Knee-to-chest exercise    1. Lie on your back with your knees bent and your feet flat on the floor. 2. Bring one knee to your chest, keeping the other foot flat on the floor (or keeping the other leg straight, whichever feels better on your lower back). 3. Keep your lower back pressed to the floor. Hold for at least 15 to 30 seconds. 4. Relax, and lower the knee to the starting position. 5. Repeat with the other leg. Repeat 2 to 4 times with each leg. 6. To get more stretch, put your other leg flat on the floor while pulling your knee to your chest.    Curl-ups    1. Lie on the floor on your back with your knees bent at a 90-degree angle. Your feet should be flat on the floor, about 12 inches from your buttocks. 2. Cross your arms over your chest. If this bothers your neck, try putting your hands behind your neck (not your head), with your elbows spread apart. 3. Slowly tighten your belly muscles and raise your shoulder blades off the floor. 4. Keep your head in line with your body, and do not press your chin to your chest.  5. Hold this position for 1 or 2 seconds, then slowly lower yourself back down to the floor. 6. Repeat 8 to 12 times. Pelvic tilt exercise    1. Lie on your back with your knees bent. 2. \"Brace\" your stomach. This means to tighten your muscles by pulling in and imagining your belly button moving toward your spine. You should feel like your back is pressing to the floor and your hips and pelvis are rocking back. 3. Hold for about 6 seconds while you breathe smoothly. 4. Repeat 8 to 12 times. Heel dig bridging    1.  Lie on your back with both knees bent and your ankles bent so that only your heels are digging into the floor. Your knees should be bent about 90 degrees. 2. Then push your heels into the floor, squeeze your buttocks, and lift your hips off the floor until your shoulders, hips, and knees are all in a straight line. 3. Hold for about 6 seconds as you continue to breathe normally, and then slowly lower your hips back down to the floor and rest for up to 10 seconds. 4. Do 8 to 12 repetitions. Hamstring stretch in doorway    1. Lie on your back in a doorway, with one leg through the open door. 2. Slide your leg up the wall to straighten your knee. You should feel a gentle stretch down the back of your leg. 3. Hold the stretch for at least 15 to 30 seconds. Do not arch your back, point your toes, or bend either knee. Keep one heel touching the floor and the other heel touching the wall. 4. Repeat with your other leg. 5. Do 2 to 4 times for each leg. Hip flexor stretch    1. Kneel on the floor with one knee bent and one leg behind you. Place your forward knee over your foot. Keep your other knee touching the floor. 2. Slowly push your hips forward until you feel a stretch in the upper thigh of your rear leg. 3. Hold the stretch for at least 15 to 30 seconds. Repeat with your other leg. 4. Do 2 to 4 times on each side. Wall sit    1. Stand with your back 10 to 12 inches away from a wall. 2. Lean into the wall until your back is flat against it. 3. Slowly slide down until your knees are slightly bent, pressing your lower back into the wall. 4. Hold for about 6 seconds, then slide back up the wall. 5. Repeat 8 to 12 times. Follow-up care is a key part of your treatment and safety. Be sure to make and go to all appointments, and call your doctor if you are having problems. It's also a good idea to know your test results and keep a list of the medicines you take. Where can you learn more? Go to http://chiki-zhao.info/.   Enter Y578 in the search box to learn more about \"Low Back Pain: Exercises. \"  Current as of: September 20, 2018  Content Version: 11.9  © 9145-7691 [employer]. Care instructions adapted under license by [employer] (which disclaims liability or warranty for this information). If you have questions about a medical condition or this instruction, always ask your healthcare professional. [address] any warranty or liability for your use of this information.

## 2019-07-17 NOTE — PROGRESS NOTES
[address], [address]  [place] [address]  Phone: [phone]  Fax: [phone]        Rafa Ballard  : 1980  PCP: Ronald Leonard MD  2019    PROGRESS NOTE      HISTORY OF PRESENT ILLNESS  Buster Mcdonald is a 37 y.o. male. He was seen 16 for a f/u. His pain continued to appear myofascial in nature. He did not find relief from PT in the past or from using an antiinflammatory cream. He was advised to continue using his theracane and performing his exercises from PT. Since the cream did not provide relief and NSAIDs upset his stomach, he was prescribed Celebrex 200mg BID PRN. Trigger point injections were offered but he declined as he would like to try massage first. Cervical MRI 16: Stable mild degenerative disease at C3-C4 and C4-C5. No significant central stenosis, cord compression or foraminal stenosis. He comes in today with c/o cervical pain. His biggest complaint is a headache that seems consistent with occipital neuralgia, radiating from his left eye to a region below his left ear. He returned 17 for new onset of numbness and tingling in the LUE into his last two fingers. He states that his pain in the cervical region has decreased since the last visit. He is also complaining of chronic pain in the left shoulder. He was last referred to get a BUE EMG 17: This was a normal nerve conduction and EMG study showing no signs of neuropathy myopathy or radiculopathy in the nerves and muscles tested. He returned 3/6/18 for new low back pain that began a few weeks ago. He recently bought a Tempurpedic mattress and suspects that may be the cause of his pain as his back began hurting around the time he bought the bed. He continues to have muscle pain and tension in his neck. He returned 18 for a PRN f/u.  He now c/o a heavy sensation and weakness in his RUE in a C7/8 vs ulnar nerve distribution that tends to be worse in the mornings. He reports that he fell down his brother's stairs where he sprained his right ankle and exacerbated his neck and upper back pain and RUE paraesthesia. Cervical MRI 9/28/18: Minimal disc bulging at C3-4, C4-5 and C5-6 without evidence of focal disc pathology. No central canal or foraminal stenosis. 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 He reports over the last month, his RUE heaviness has improved. He now c/o a \"jerking\" in his RLE when he is lying down about 10 times during the night. He states that his stress level and sleep have been normal. He reports that he had a loop recorder, for evaluation of heart palpitations, and a colonoscopy. He continues to have some right ankle pain following his fall 2 months ago. He returned 3/7/19 for a PRN f/u. He notes that he has had a burning pain in his neck and upper back (L>R). He states that he has not been maintaining his HEP consistently and his pain returned. He states that he often keeps tension in his shoulders. He bought an Upright Go device that alerts him when he has poor posture, but he only used it for a week after he purchased it. He has begun exercising again over the last 2 days, and has seen improvement of his headaches that are in the trapezius referral pattern. He continues to use his TheraCane. He recently began a [facility] yoga program. He sees a neurologist for headaches. He states that during some of the neck exercises, he occasionally feels a head spinning sensation. He states that he assumes it is more likely inner ear related, but one of his biggest fears is having a stroke, so he remains very guarded during his exercises. He also has h/o left inner ear surgery when he was a child. He sleeps on his left side, but has been trying to sleep on his back to help alleviate his neck pain. He has h/o anxiety. He saw Bindu Lomeli NP 4/19/19 for continued neck and low back pain.  He states he went for a massage, and therapist stretched him beforehand. He states once he laid back down his entire back \"seized up\" but the therapist was able to get it loose again.  Since then, the pain has improved. The pain was \"on his spine\" not on the muscle. He gets a heavy feeling in his arms and legs. He states his neck is worse. He has not had any recent steroids or medications for this. He cannot take steroids. He would like to get back into PT. He states that he only finds about half a day of relief with massage, then the next day, his pain appears to increase. He attended PT (4/29/19 - 6/4/19; Isaiah Reagan) with some relief. He traveled to [facility] for 10 days during his course of PT, and he notes that the extra walking exacerbated his pain. He has found PT beneficial, so he would like to continue. He states that he was given a shoe lift for his left side. He was ill earlier this week will the stomach bug, and he has been sleeping on his couch, so it exacerbated his neck pain. He continues to have episodic numbness and tingling in all of his extremities. Pt notes that he has not been consistent with maintaining exercises outside of PT. He also c/o neck spasms and difficulty swallowing. He was cleared by an ENT, and he was advised by a neurologist it could be related to his neck muscles. He is scheduled for a Barium swallow test next week. He also notes that his equilibrium has been off since his neck pain has increased. Sharon West comes in to the office today for MRI f/u. Lumbar MRI 7/11/19: Central disc protrusion at L5-S1 which is mildly increased in size compared with most recent prior MRI. It potentially abuts the descending left S1 nerve root. However, it does not appear to compress it. He notes that his lower back pain and paraesthesia in his BLE has improved, while mild residual symptoms remain (R>L) that are worse after prolonged sitting and when he first wakes up in the morning.  He states that the majority of his pain is in his lower thoracic/upper lumbar region. He was seen by Dr. Gamaliel Zepeda who offered trigger point injections, dry needling/acupuncture, and TENS unit for his neck and upper back pain. He plans to proceed with chiropractic care (Dr. Vinicius Hickey) now that we were able to review the MRI. ASSESSMENT  His pain likely remains myofascial in nature, however there is an annular tear at L5-S1 that may be contributory to his pain with potential neuritis causing leg pain. PLAN  1. We discussed the option of an L4-5 interlaminar injection for an annular tear at L5-S1  2. Continue with physical modalities - Sports medicine/Chiropractor/HEP    Pt will f/u in 6 months or sooner as needed. Diagnoses and all orders for this visit:    1. Mechanical low back pain    2. Myofascial pain    3. Annular tear of lumbar disc    4. Neck pain    5. Cervical myofascial pain syndrome    6. Numbness and tingling of upper and lower extremities of both sides       PAST MEDICAL HISTORY   Past Medical History:   Diagnosis Date    Anxiety     Arm fatigue     Forearms    Balance problems     Cardiac Agatston CAC score 100-199 02/04/2015    Coronary calcium score 0.    Cardiac echocardiogram 10/29/2014    EF 55-60%. No RWMA. Normal diastolic fx. RVSP 30 mmHg.  Cardiac Holter monitor study 10/29/2014    Normal Holter study.     Chronic cough     chronically coughing up sputum    Depression     Dizziness     Dyspnea     Esophageal reflux     Headache     Hypercholesterolemia     Lumbar pain     Myofascial pain     Neck pain     Numbness and tingling     PAC (premature atrial contraction)     Palpitations     presumably benign    Panic disorder     Reflux     Thoracic back pain     Mid-thoracic    Upper extremity weakness     Vertigo, intermittent        Past Surgical History:   Procedure Laterality Date    APPENDECTOMY  11/23/2010    HX HEENT      corrective hearing for left ear     HX HEENT      plastic surgery on both ears  HX HEENT  10/08 & 09/09    Reformed jaw bone, bone graft    HX RENAL BIOPSY      HX TONSILLECTOMY      UT IMPLANTATION PT-ACTIVATED CARDIAC EVENT RECORDER N/A 10/24/2018    Loop Recorder Insert performed by Vin Tai MD at [facility]   . MEDICATIONS    Current Outpatient Medications   Medication Sig Dispense Refill    pregabalin (LYRICA) 75 mg capsule Take 1 Cap by mouth two (2) times a day. Max Daily Amount: 150 mg. 28 Cap 0    pregabalin (LYRICA) 150 mg capsule Take 1 Cap by mouth two (2) times a day. Max Daily Amount: 300 mg. 60 Cap 2    escitalopram oxalate (LEXAPRO) 20 mg tablet TAKE 1 TABLET BY MOUTH EVERY DAY  1    methocarbamol (ROBAXIN) 500 mg tablet Take 1 Tab by mouth two (2) times daily as needed (spasm). 45 Tab 0    ALPRAZolam (XANAX) 0.5 mg tablet Take 0.5 mg by mouth two (2) times a day.  ascorbic acid (VITAMIN C) 500 mg tablet Take 1,000 mg by mouth daily.  cholecalciferol, vitamin D3, (VITAMIN D3) 2,000 unit Tab Take 1 Tab by mouth daily.  omeprazole-sodium bicarbonate (ZEGERID) 40-1.1 mg-gram capsule Take 2 Caps by mouth daily.  acetaminophen (TYLENOL EXTRA STRENGTH) 500 mg tablet Take 500 mg by mouth every six (6) hours as needed.  MULTIVITAMIN PO Take 1 Tab by mouth daily.           ALLERGIES  Allergies   Allergen Reactions    Opioids - Morphine Analogues Myalgia    Other Medication Other (comments)     Pt reports allergy to steroids, with psychological side effects    Pollen Extracts Unable to Obtain    Zithromax [Azithromycin] Itching          SOCIAL HISTORY    Social History     Socioeconomic History    Marital status: SINGLE     Spouse name: Not on file    Number of children: Not on file    Years of education: Not on file    Highest education level: Not on file   Occupational History    Occupation: real estate   Tobacco Use    Smoking status: Current Every Day Smoker     Packs/day: 1.00     Years: 20.00     Pack years: 20.00 Types: Cigarettes    Smokeless tobacco: Never Used   Substance and Sexual Activity    Alcohol use: No     Alcohol/week: 0.0 standard drinks    Drug use: No       FAMILY HISTORY  Family History   Problem Relation Age of Onset    Hypertension Father     Liver Disease Father     Hypertension Mother          REVIEW OF SYSTEMS  Review of Systems   Musculoskeletal: Positive for back pain and neck pain. BUE paraesthesia (R>L)   BLE paraesthesia (R>L)          PHYSICAL EXAMINATION  There were no vitals taken for this visit. Pain Assessment  6/20/2019   Location of Pain Neck   Location Modifiers -   Severity of Pain 5   Quality of Pain Aching   Quality of Pain Comment -   Duration of Pain Persistent   Frequency of Pain Constant   Date Pain First Started -   Aggravating Factors -   Aggravating Factors Comment -   Limiting Behavior -   Relieving Factors -   Relieving Factors Comment -   Result of Injury -           Constitutional:  Well developed, well nourished, in no acute distress. Psychiatric: Affect and mood are appropriate. Integumentary: No rashes or abrasions noted on exposed areas. SPINE/MUSCULOSKELETAL EXAM    Cervical spine:  Neck is midline.    Normal muscle tone.    No focal atrophy is noted.    ROM pain free.    Shoulder ROM intact.    Tenderness to palpation at left scalene's.    Negative Spurling's sign.    Negative Tinel's sign.    Negative Garcia's sign.    Flex forward posture.    No clonus.      Sensation in the bilateral arms grossly intact to light touch.        Lumbar spine:  No rash, ecchymosis, or gross obliquity.    No fasciculations.    No focal atrophy is noted.    No pain with hip ROM.    Range of motion is normal.    Tenderness to palpation.   No tenderness to palpation at the sciatic notch.    SI joints non-tender.    Trochanters non tender.      Sensation in the bilateral legs grossly intact to light touch.      Updates from 12/02/16:  Negative Garcia's sign bilaterally. No clonus. Pain in left scalene muscles with turning head to the right.      Updates from 3/6/18:  Tenderness to palpation of lumbar region  No directional preference     Updates from 3/7/19:  Tenderness to palpation of trapezii (L>R)  Tenderness to palpation of C7 spinous process    MOTOR:      Biceps  Triceps Deltoids Wrist Ext Wrist Flex Hand Intrin   Right 5/5 5/5 5/5 5/5 5/5 5/5   Left 5/5 5/5 5/5 5/5 5/5 5/5             Hip Flex  Quads Hamstrings Ankle DF EHL Ankle PF   Right 5/5 5/5 5/5 5/5 5/5 5/5   Left 5/5 5/5 5/5 5/5 5/5 5/5     DTRs are 2+ biceps, triceps, brachioradialis, patella, and Achilles.      Negative Straight Leg raise.    Squat not tested.    No difficulty with tandem gait.    Normal heel walk.    Normal toe rise.       Ambulation without assistive device. FWB.       RADIOGRAPHS  Lumbar MRI images taken on 7/11/19 personally reviewed with patient:  FINDINGS: All numbering assumes 5 lumbar type vertebrae. The alignment  demonstrates mild levocurvature. The marrow signal is normal. The cord  terminates at L1. No cord signal abnormalities appreciated.     The abdominal aorta is without evidence of aneurysm. No paraaortic adenopathy  appreciated. The visualized kidneys are unremarkable.     L1-L2 level: Minimal degenerative changes. No significant canal or neural  foraminal stenosis.     L2-L3: Mild facet arthropathy and mild degenerative disc disease. No significant  canal or neural foraminal stenosis.     L3-L4: Mild degenerative changes. No significant canal or neural foraminal  stenosis.     L4-L5: Mild facet arthropathy and degenerative disc disease. No significant  canal or neural foraminal stenosis.     L5-S1: Central disc protrusion with annular tear is noted. The protrusion is  mildly more conspicuous than prior imaging. There is possible abutment of the  descending left S1 nerve root as it crosses the disc level. Facet arthropathy is  noted.  No significant neural foraminal stenosis.     IMPRESSION  Impression:  1. Central disc protrusion at L5-S1 which is mildly increased in size compared  with most recent prior MRI. It potentially abuts the descending left S1 nerve  root. However, it does not appear to compress it. 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 Normal cervical alignment and vertebral body heights maintained. No pathologic marrow signal.The cervical cord is normal in signal and caliber. No cerebellar tonsillar ectopia. Paraspinal soft tissues are unremarkable.     C2-3: Normal looking disc. Central canal and neural foramen are widely patent.     C3-4: Minimal disc bulge. Central canal and neural foramen are widely patent.     C4-5: Minimal disc bulge. Central canal and neural foramen are widely patent.     C5-6: Minimal disc bulge. Central canal and neural foramen are widely patent.     C6-7: Partial congenital bony fusion. Central canal and neural foramen are  widely patent.     C7-T1: Normal looking disc. Central canal and neural foramen are widely patent.     IMPRESSION  Impression:     Minimal disc bulging at C3-4, C4-5 and C5-6 without evidence of focal disc  pathology.  No central canal or foraminal stenosis.     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     BUE EMG taken on 08/04/2017 personally reviewed with patient:  NCS/EMG FINDINGS:      · Evaluation of the Left median motor, the Right median motor, the Left ulnar motor, the Right ulnar motor, the Left Median 2nd Digit sensory, the Right Median 2nd Digit sensory, the Left ulnar sensory, and the Right ulnar sensory nerves were unremarkable.          INTERPRETATION: This was a normal nerve conduction and EMG study showing no signs of neuropathy myopathy or radiculopathy in the nerves and muscles jay Rm      Cervical MRI from 9/15/2014 personally reviewed with patient:  1. Stable appearance of cervical spine examination. 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 Mild multilevel degenerative changes. No high-grade spinal or foraminal  Stenosis.     Cervical MRI images taken on 11/23/2016 personally reviewed with patient:  Mild reversal of cervical curvature, centered at C3-C4, stable. Partial fusion  at C6-C7, stable. No compression fracture or pathologic marrow signal. No  prevertebral soft tissue abnormalities. Spinous processes and posterior soft  tissues unremarkable. Craniocervical junction and spinal cord are also  unremarkable. Dominant left vertebral artery, stable.      C2-C3: No disc herniation, central or foraminal stenosis.     C3-C4: Mild posterior disc bulge. No central stenosis, cord contact or foraminal  stenosis.     C4-C5: Mild posterior disc bulge with no central or foraminal stenosis.     C5-C6: Mild posterior disc bulge. No central or foraminal stenosis.     C6-7 and C7-T1: No disc herniation, central or foraminal stenosis.      IMPRESSION  IMPRESSION: Stable mild degenerative disease at C3-C4 and C4-C5. No significant  central stenosis, cord compression or foraminal stenosis.      29 minutes of face-to-face contact were spent with the patient during today's visit extensively discussing symptoms and treatment plan. All questions were answered. More than half of this visit today was spent on counseling.      Written by Fitz Kelsey as dictated by Steven Sanchez MD

## 2019-08-05 ENCOUNTER — TELEPHONE (OUTPATIENT)
Dept: CARDIOLOGY CLINIC | Age: 39
End: 2019-08-05

## 2019-08-05 NOTE — TELEPHONE ENCOUNTER
OM for patient to call to get his EP Study rescheduled. He will also need to schedule a Pre-Procedure appointment. The appointment that he has with Dr. Nalini Max on 9/9/19 can be cancelled if he schedules procedure for September. He will be scheduled a follow up post procedure.

## 2019-09-09 ENCOUNTER — OFFICE VISIT (OUTPATIENT)
Dept: CARDIOLOGY CLINIC | Age: 39
End: 2019-09-09

## 2019-09-09 VITALS
BODY MASS INDEX: 24.32 KG/M2 | WEIGHT: 146 LBS | SYSTOLIC BLOOD PRESSURE: 100 MMHG | DIASTOLIC BLOOD PRESSURE: 70 MMHG | OXYGEN SATURATION: 98 % | HEART RATE: 72 BPM | HEIGHT: 65 IN

## 2019-09-09 DIAGNOSIS — R00.2 PALPITATIONS: ICD-10-CM

## 2019-09-09 DIAGNOSIS — R42 DIZZINESS AND GIDDINESS: ICD-10-CM

## 2019-09-09 DIAGNOSIS — E78.00 HYPERCHOLESTEROLEMIA: ICD-10-CM

## 2019-09-09 DIAGNOSIS — I47.1 PAROXYSMAL SUPRAVENTRICULAR TACHYCARDIA (HCC): Primary | ICD-10-CM

## 2019-09-09 DIAGNOSIS — Z95.9 CARDIAC DEVICE IN SITU: ICD-10-CM

## 2019-09-09 DIAGNOSIS — R55 SYNCOPE AND COLLAPSE: ICD-10-CM

## 2019-09-09 NOTE — PATIENT INSTRUCTIONS
[facility]          Patient  EP Instructions                  1. You are scheduled to have a EP Study on  October 7, 2019 , at 0800 am.    Please check in at 0700 am.    2. Please go to [facility] and park in the outpatient parking lot that is located around to the back of the hospital and enter through the [employer]. Once you enter through the [facility] check in with the  there. The  will either give you directions or assist you in getting to the cath holding area. 3.  You are not to eat or drink anything after midnight the night before your                   procedure. 4. Please continue to take your medications with a small sip of water on the morning of the procedure with the following exceptions:       5. If you are diabetic, do not take your insulin/sugar pill the morning of the procedure. 6. We encourage families to wait in the waiting room on the first floor while the procedure is being done. The Doctor will come out and talk with you as soon as the procedure is over. 7. There is the possibility that you may spend the night in the hospital, depending on the results of the procedure. This will be determined after the procedure is done. 8.   If you or your family have any questions, please call our office Monday-Friday 9:00am         -4:30 pm , at [phone], and ask to speak to one of the nurses.

## 2019-09-09 NOTE — PROGRESS NOTES
HPI:  I saw Talbot Habermann in my office today in cardiovascular evaluation regarding his problems with palpitations in the past and some dizziness issues. Mr. Isaiah Santo is a pleasant 38 year old white male with history of palpitations and some anxiety issues with Holters in the past, dating back to 2006, showing some PACs and reconfirmed on a Holter in November of 2014.       He also has history of dizziness problems, which back in November of 2012 prompted us to do a tilt table test and the stress portion of that test using sublingual nitroglycerin demonstrated a severe bradycardia with junctional rhythm and an 8 second pause secondary to very high vagal tone. We decided not to place a pacemaker and the patient has had no further syncopal episodes, but he still complaints of some vague lightheadedness from time to time. He unfortunately continues to smoke a pack of cigarettes per day. He has been somewhat concerned about the possibility of developing heart disease, so we did do a calcium score on him back in February of 2011, which was 0, suggesting he has a very low ten year risk of cardiovascular event.      Due to his frequent palpitations and we did ultimately place a loop recorder in October 2018. Since that time the patient has complained of significant palpitations and the subsequent strips have for the most part demonstrated what appeared to be sinus tachycardia. There have been having some episodes with appears to be right bundle branch block aberration supraventricular tachycardia with some ventricular ectopy cannot be excluded. The patient's palpitations seem to primarily be related to sinus tachycardia and this gentleman has not been placed on beta-blockers due to his history of pauses. He relates that he has some palpitations nearly every day, but some days he has them very frequently numerous times a day these are usually described as a fluttering in his chest followed by a racing.  He admits to frequent dizziness, but really denies any other cardiovascular complaints except for little shortness of breath at times which is poorly described which can occasionally be associated with palpitations. Encounter Diagnoses   Name Primary?  Palpitations     Dizziness and giddiness     Syncope and collapse only at the time of NTG given for TILT table test with marked bradycardia and 8 second pause in 2012     Paroxysmal supraventricular tachycardia ([address] suspected Yes    Cardiac device in situ     Hypercholesterolemia        Discussion: This patient is really very difficult to sort out. He seems to have palpitations which are out of proportion to what we can find with interrogation of his loop recorder, but he has had a documented prolonged pauses with a tilt table test in the past and he is going to need to be considered for a rather extensive esophageal surgery in the not too distant future and we will need to decide whether or not he is going to need to be considered for a temporary pacemaker at that time. He has been so difficult to sort out over the years that consideration has been given to completing an electrophysiology study on him to better clarify his conduction system issues prior to the planned surgery as indicated above as well as to see if any inducible supraventricular tachycardias can be found. He is going to consider getting this completed in the near future and for now I am not going to make any changes in his medications and will simply follow him up after that test is completed by Dr. Thelma Wright  PCP:  Zachary Acosta MD      Past Medical History:   Diagnosis Date    Anxiety     Arm fatigue     Forearms    Balance problems     Cardiac Agatston CAC score 100-199 02/04/2015    Coronary calcium score 0.    Cardiac echocardiogram 10/29/2014    EF 55-60%. No RWMA. Normal diastolic fx. RVSP 30 mmHg.  Cardiac Holter monitor study 10/29/2014    Normal Holter study.     Chronic cough     chronically coughing up sputum    Depression     Dizziness     Dyspnea     Esophageal reflux     Headache     Hypercholesterolemia     Lumbar pain     Myofascial pain     Neck pain     Numbness and tingling     PAC (premature atrial contraction)     Palpitations     presumably benign    Panic disorder     Reflux     Thoracic back pain     Mid-thoracic    Upper extremity weakness     Vertigo, intermittent          Past Surgical History:   Procedure Laterality Date    APPENDECTOMY  11/23/2010    HX HEENT      corrective hearing for left ear     HX HEENT      plastic surgery on both ears    HX HEENT  10/08 & 09/09    Reformed jaw bone, bone graft    HX RENAL BIOPSY      HX TONSILLECTOMY      [place] IMPLANTATION PT-ACTIVATED CARDIAC EVENT RECORDER N/A 10/24/2018    Loop Recorder Insert performed by Renata Schuster MD at [facility]       Current Outpatient Medications   Medication Sig    pregabalin (LYRICA) 75 mg capsule Take 1 Cap by mouth two (2) times a day. Max Daily Amount: 150 mg.  pregabalin (LYRICA) 150 mg capsule Take 1 Cap by mouth two (2) times a day. Max Daily Amount: 300 mg.  escitalopram oxalate (LEXAPRO) 20 mg tablet TAKE 1 TABLET BY MOUTH EVERY DAY    methocarbamol (ROBAXIN) 500 mg tablet Take 1 Tab by mouth two (2) times daily as needed (spasm).  ALPRAZolam (XANAX) 0.5 mg tablet Take 0.5 mg by mouth two (2) times a day.  ascorbic acid (VITAMIN C) 500 mg tablet Take 1,000 mg by mouth daily.  cholecalciferol, vitamin D3, (VITAMIN D3) 2,000 unit Tab Take 1 Tab by mouth daily.  omeprazole-sodium bicarbonate (ZEGERID) 40-1.1 mg-gram capsule Take 2 Caps by mouth daily.  acetaminophen (TYLENOL EXTRA STRENGTH) 500 mg tablet Take 500 mg by mouth every six (6) hours as needed.  MULTIVITAMIN PO Take 1 Tab by mouth daily. No current facility-administered medications for this visit.            Allergies   Allergen Reactions    Opioids - Morphine Analogues Myalgia    Other Medication Other (comments)     Pt reports allergy to steroids, with psychological side effects    Pollen Extracts Unable to Obtain    Zithromax [Azithromycin] Itching       Review of Systems:  Constitutional: Positive for malaise/fatigue. Negative for chills, fever and weight loss. Respiratory: Positive for cough and shortness of breath. Negative for hemoptysis and wheezing. Cardiovascular: Positive for palpitations. Negative for chest pain, orthopnea and leg swelling. Gastrointestinal: Positive for abdominal pain, diarrhea and heartburn. Negative for blood in stool, constipation, melena, nausea and vomiting. Musculoskeletal: Positive for myalgias. Negative for falls and joint pain. Neurological: Positive for dizziness. Physical Exam:  This is a well-developed, well-nourished, anxious 34year-old  male in no acute distress at the time of the examination. Visit Vitals  /70 (BP 1 Location: Right arm, BP Patient Position: Standing)   Pulse 72   Ht 5' 5\" (1.651 m)   Wt 66.2 kg (146 lb)   SpO2 98%   BMI 24.30 kg/m²       HEENT: Conjuctiva white, mucosa moist, no pallor or cyanosis. NECK: Supple without masses, tenderness or thyromegaly. There was no jugular venous distention. Carotid are full bilaterally without bruits. CHEST: Symmetrical with good excursion. LUNGS: Clear to auscultation in all fields. HEART: The apex is not displaced. There were no lifts, thrills or heaves. There is a normal S1 and S2 without appreciable murmurs rubs clicks or gallops auscultated. ABDOMEN: Soft without masses, tenderness or organomegaly. EXTREMITIES: Full peripheral pulses without peripheral edema. Review of Data: Please refer to past medical history for most recent cardiac testing. Results for orders placed or performed in visit on 09/09/19   AMB POC EKG ROUTINE W/ 12 LEADS, INTER & REP     Status: None    Narrative    Normal sinus rhythm rate 72.   Low voltage in the limb leads. This EKG is within normal limits and similar to the EKG of May 2, 2019. Elvis Hernandez D.O., F.A.C.C. [facility]  [facility] [place]  [address] [place] [address]. [address]    PLEASE NOTE:  This document has been produced using voice recognition software. Unrecognized errors in transcription may be present.

## 2019-10-11 NOTE — PROGRESS NOTES
[facility]  Performed by: Michael SALOMON CRNA  Authorized by: Michael SALOMON CRNA            PT DAILY TREATMENT NOTE     Patient Name: Wing David  Date:2017  : 1980  [x]  Patient  Verified  Payor: Kya Breen / Plan: Jim [employer] HMO / Product Type: HMO /    In time:12:00  Out time:12:30  Total Treatment Time (min): 30  Visit #: 3 of     Treatment Area: Pain in right knee [M25.561]  Pain in right ankle and joints of right foot [M25.571]    SUBJECTIVE  Pain Level (0-10 scale): 3/10 knee; 0-1/10 ankle  Any medication changes, allergies to medications, adverse drug reactions, diagnosis change, or new procedure performed?: [x] No    [] Yes (see summary sheet for update)  Subjective functional status/changes:   [] No changes reported  Pt reports he has been doing his exercises at home and would like an updated program. He has not yet ordered a stick massager and forgot to practice not locking his knees in standing. His ankles continue to improve with the orthotics. OBJECTIVE    30 min Therapeutic Exercise:  [x] See flow sheet :   Rationale: increase ROM and increase strength to improve the patients ability to improve ease of standing and walking for work and daily activities          With   [] TE   [] TA   [] neuro   [] other: Patient Education: [x] Review HEP    [] Progressed/Changed HEP based on:   [] positioning   [] body mechanics   [] transfers   [] heat/ice application    [] other:      Other Objective/Functional Measures:   Provided with updated HEP  Provided with OTB for ankle strengthening  Good squat form  Reviewed not hyperextending knees in standing with cues for \"soft knees\"  Challenged/fatigued with 5# for hip glute x 3  Added in leg machines today  Continues to have significant HS tightness     Pain Level (0-10 scale) post treatment: 2/10    ASSESSMENT/Changes in Function: Pt continues to progress with decreasing knee and ankle pain. He continues to have hamstring tightness but has been compliant with stretching at home.  He also needs practice correcting habitual substitution of hyperextending B knees in standing contributing to increased pressure/pain on the posterior knee. Will also continue with general B LE strength for improved ease of prolonged standing and walking. Patient will continue to benefit from skilled PT services to modify and progress therapeutic interventions, address functional mobility deficits, address ROM deficits, address strength deficits, analyze and address soft tissue restrictions, analyze and cue movement patterns, analyze and modify body mechanics/ergonomics, assess and modify postural abnormalities, address imbalance/dizziness and instruct in home and community integration to attain remaining goals. Progress towards goals / Updated goals:  Short Term Goals: To be accomplished in 1 weeks:  1. Pt will be compliant with initial HEP to improve therapy outcomes MET (11/28/17)  Long Term Goals: To be accomplished in 6 weeks:  1. Pt will improve FOTO by 16 points in order to demonstrate functional improvement  2. Pt will improve R hamstring 90/90 stretch to 30 dgrs in order to improve ease of prolonged ambulation   3. Pt will improve B hip abd/ext strength to 4-/5 in order to improve kinetic chain alignment for reduced knee/ankle pain and improved WB tolerance   4.  Pt will report 50% improvement in sx in order to improve QOL    PLAN  [x]  Upgrade activities as tolerated     [x]  Continue plan of care  []  Update interventions per flow sheet       []  Discharge due to:_  []  Other:_      Ilsa Gudino PTA 12/6/2017  11:13 AM    Future Appointments  Date Time Provider Cristo Buenrostro   12/6/2017 12:00 PM Ilsa Gudino PTA [id number]   12/11/2017 11:30 AM Ilsa Gudino PTA [facility]   12/11/2017 12:00 PM Maegan Rae, PT [facility]   12/13/2017 11:00 AM SONI Carrera [facility]   12/18/2017 10:30 AM Ilsa Gudino PTA [id number]   12/18/2017 11:00 AM Ilsa Gudino PTA [id number]   12/20/2017 10:30 AM [employer] [facility]   12/27/2017 11:00 AM Belinda Estrada PT [id number]   12/29/2017 11:00 AM Belinda Estrada PT [id number]   2/21/2018 10:15 AM Todd Roberts  [id number]

## 2019-12-16 ENCOUNTER — OFFICE VISIT (OUTPATIENT)
Dept: ORTHOPEDIC SURGERY | Age: 39
End: 2019-12-16

## 2019-12-16 VITALS
OXYGEN SATURATION: 97 % | HEART RATE: 76 BPM | TEMPERATURE: 98.1 F | SYSTOLIC BLOOD PRESSURE: 95 MMHG | RESPIRATION RATE: 18 BRPM | BODY MASS INDEX: 24.32 KG/M2 | DIASTOLIC BLOOD PRESSURE: 56 MMHG | WEIGHT: 146 LBS | HEIGHT: 65 IN

## 2019-12-16 DIAGNOSIS — M70.62 TROCHANTERIC BURSITIS OF LEFT HIP: ICD-10-CM

## 2019-12-16 DIAGNOSIS — R20.2 NUMBNESS AND TINGLING OF UPPER AND LOWER EXTREMITIES OF BOTH SIDES: ICD-10-CM

## 2019-12-16 DIAGNOSIS — M79.18 CHRONIC PRIMARY MUSCULOSKELETAL PAIN: Primary | ICD-10-CM

## 2019-12-16 DIAGNOSIS — M25.552 LEFT HIP PAIN: ICD-10-CM

## 2019-12-16 DIAGNOSIS — M54.59 MECHANICAL LOW BACK PAIN: ICD-10-CM

## 2019-12-16 DIAGNOSIS — M79.18 CERVICAL MYOFASCIAL PAIN SYNDROME: ICD-10-CM

## 2019-12-16 DIAGNOSIS — G89.29 CHRONIC PRIMARY MUSCULOSKELETAL PAIN: Primary | ICD-10-CM

## 2019-12-16 DIAGNOSIS — M79.18 MYOFASCIAL PAIN: ICD-10-CM

## 2019-12-16 DIAGNOSIS — R20.0 NUMBNESS AND TINGLING OF UPPER AND LOWER EXTREMITIES OF BOTH SIDES: ICD-10-CM

## 2019-12-16 DIAGNOSIS — M54.2 NECK PAIN: ICD-10-CM

## 2019-12-16 DIAGNOSIS — M51.36 ANNULAR TEAR OF LUMBAR DISC: ICD-10-CM

## 2019-12-16 RX ORDER — DICLOFENAC SODIUM 10 MG/G
4 GEL TOPICAL 4 TIMES DAILY
Qty: 5 EACH | Refills: 5 | Status: SHIPPED | OUTPATIENT
Start: 2019-12-16

## 2019-12-16 RX ORDER — BUPIVACAINE HYDROCHLORIDE 2.5 MG/ML
4 INJECTION, SOLUTION INFILTRATION; PERINEURAL ONCE
Qty: 4 ML | Refills: 0 | Status: CANCELLED
Start: 2019-12-16 | End: 2019-12-16

## 2019-12-16 RX ORDER — BETAMETHASONE SODIUM PHOSPHATE AND BETAMETHASONE ACETATE 3; 3 MG/ML; MG/ML
12 INJECTION, SUSPENSION INTRA-ARTICULAR; INTRALESIONAL; INTRAMUSCULAR; SOFT TISSUE ONCE
Qty: 2 ML | Refills: 0 | Status: CANCELLED
Start: 2019-12-16 | End: 2019-12-16

## 2019-12-16 RX ORDER — LIDOCAINE HYDROCHLORIDE 20 MG/ML
4 INJECTION, SOLUTION EPIDURAL; INFILTRATION; INTRACAUDAL; PERINEURAL ONCE
Qty: 4 ML | Refills: 0 | Status: CANCELLED
Start: 2019-12-16 | End: 2019-12-16

## 2019-12-16 NOTE — PROGRESS NOTES
[address], [address]  [place] [address] [address]  Phone: [phone]  Fax: [phone]        Van Adams  : 1980  PCP: Christina Chambers MD  2019    PROGRESS NOTE      HISTORY OF PRESENT ILLNESS  Jelani Lorenzana is a 37 y.o. male. He was seen 16 for a f/u. His pain continued to appear myofascial in nature. He did not find relief from PT in the past or from using an antiinflammatory cream. He was advised to continue using his theracane and performing his exercises from PT. Since the cream did not provide relief and NSAIDs upset his stomach, he was prescribed Celebrex 200mg BID PRN. Trigger point injections were offered but he declined as he would like to try massage first. Cervical MRI 16: Stable mild degenerative disease at C3-C4 and C4-C5. No significant central stenosis, cord compression or foraminal stenosis. He comes in today with c/o cervical pain. His biggest complaint is a headache that seems consistent with occipital neuralgia, radiating from his left eye to a region below his left ear. He returned 17 for new onset of numbness and tingling in the LUE into his last two fingers. He states that his pain in the cervical region has decreased since the last visit. He is also complaining of chronic pain in the left shoulder. He was last referred to get a BUE EMG 17: This was a normal nerve conduction and EMG study showing no signs of neuropathy myopathy or radiculopathy in the nerves and muscles tested. He returned 3/6/18 for new low back pain that began a few weeks ago. He recently bought a Tempurpedic mattress and suspects that may be the cause of his pain as his back began hurting around the time he bought the bed.  He continues to have muscle pain and tension in his neck. He returned 18 for a PRN f/u. He now c/o a heavy sensation and weakness in his RUE in a C7/8 vs ulnar nerve distribution that tends to be worse in the mornings. He reports that he fell down his brother's stairs where he sprained his right ankle and exacerbated his neck and upper back pain and RUE paraesthesia. Cervical MRI 9/28/18: Minimal disc bulging at C3-4, C4-5 and C5-6 without evidence of focal disc pathology. No central canal or foraminal stenosis. 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 He reports over the last month, his RUE heaviness has improved. He now c/o a \"jerking\" in his RLE when he is lying down about 10 times during the night. He states that his stress level and sleep have been normal. He reports that he had a loop recorder, for evaluation of heart palpitations, and a colonoscopy. He continues to have some right ankle pain following his fall 2 months ago. He returned 3/7/19 for a PRN f/u. He notes that he has had a burning pain in his neck and upper back (L>R). He states that he has not been maintaining his HEP consistently and his pain returned. He states that he often keeps tension in his shoulders. He bought an Upright Go device that alerts him when he has poor posture, but he only used it for a week after he purchased it. He has begun exercising again over the last 2 days, and has seen improvement of his headaches that are in the trapezius referral pattern. He continues to use his TheraCane. He recently began a P yoga program. He sees a neurologist for headaches. He states that during some of the neck exercises, he occasionally feels a head spinning sensation. He states that he assumes it is more likely inner ear related, but one of his biggest fears is having a stroke, so he remains very guarded during his exercises. He also has h/o left inner ear surgery when he was a child. He sleeps on his left side, but has been trying to sleep on his back to help alleviate his neck pain.  He has h/o anxiety. He saw Anais Rodriguez NP 4/19/19 for continued neck and low back pain. He states he went for a massage, and therapist stretched him beforehand. He states once he laid back down his entire back \"seized up\" but the therapist was able to get it loose again.  Since then, the pain has improved. The pain was \"on his spine\" not on the muscle. He gets a heavy feeling in his arms and legs. He states his neck is worse. He has not had any recent steroids or medications for this. He cannot take steroids. He would like to get back into PT. He states that he only finds about half a day of relief with massage, then the next day, his pain appears to increase. He attended PT (4/29/19 - 6/4/19; Ivania Cochran) with some relief. He traveled to [facility] for 10 days during his course of PT, and he notes that the extra walking exacerbated his pain. He has found PT beneficial, so he would like to continue. He states that he was given a shoe lift for his left side. He was ill earlier this week will the stomach bug, and he has been sleeping on his couch, so it exacerbated his neck pain. He continues to have episodic numbness and tingling in all of his extremities. Pt notes that he has not been consistent with maintaining exercises outside of PT. He also c/o neck spasms and difficulty swallowing. He was cleared by an ENT, and he was advised by a neurologist it could be related to his neck muscles. He is scheduled for a Barium swallow test next week. He also notes that his equilibrium has been off since his neck pain has increased. Lumbar MRI 7/11/19: Central disc protrusion at L5-S1 which is mildly increased in size compared with most recent prior MRI. It potentially abuts the descending left S1 nerve root. However, it does not appear to compress it. He notes that his lower back pain and paraesthesia in his BLE has improved, while mild residual symptoms remain (R>L) that are worse after prolonged sitting and when he first wakes up in the morning. He states that the majority of his pain is in his lower thoracic/upper lumbar region.  He was seen by Dr. Estephanie Dan who offered trigger point injections, dry needling/acupuncture, and TENS unit for his neck and upper back pain. He plans to proceed with chiropractic care (Dr. Kurtz) now that we were able to review the MRI. Kale Salamanca comes in to the office today for f/u. He notes that he has seen improvement with seeing Dr. Jose Mena. He continues to have some residual low back pain. Pt notes that Dr. Jose Mena has not begun treating his neck pain yet. He continues to c/o LUE (ulnar distribution) and LLE paraesthesia. Pt reports lateral left hip pain that wakes him up at night when he sleeps on his left side. Of note, he has a snapping ulnar nerve bilaterally. He also reports a flare up of his left biceps tendinopathy pain. He rates his pain as a 3/10 today. ASSESSMENT  His pain likely remains myofascial in nature with a component of trochanteric bursitis on the left, however there is an annular tear at L5-S1 that may be contributory to his pain with potential neuritis causing leg pain. His left shoulder pain is likely a rotator cuff pathology. He had a positive Montoya sign on the left. Of note, he has a snapping ulnar nerve bilaterally that is likely causing his LUE paraesthesia. PLAN  1. I offered a trochanteric bursa injection, but he declines given his h/o adverse reaction to steroids. 2. Provided Myrtl exercises to add to HEP. 3. Diclofenac gel for left lateral hip/trochanter pain. 4. Continue with Dr. Jose Mena. Pt will f/u in 4 months or sooner as needed. Diagnoses and all orders for this visit:    1. Chronic primary musculoskeletal pain    2. Trochanteric bursitis of left hip  -     diclofenac (VOLTAREN) 1 % gel; Apply 4 g to affected area four (4) times daily. 3. Mechanical low back pain    4. Myofascial pain    5. Annular tear of lumbar disc    6. Neck pain    7. Cervical myofascial pain syndrome    8. Numbness and tingling of upper and lower extremities of both sides    9.  Left hip pain  - diclofenac (VOLTAREN) 1 % gel; Apply 4 g to affected area four (4) times daily. PAST MEDICAL HISTORY   Past Medical History:   Diagnosis Date    Anxiety     Arm fatigue     Forearms    Balance problems     Cardiac Agatston CAC score 100-199 02/04/2015    Coronary calcium score 0.    Cardiac echocardiogram 10/29/2014    EF 55-60%. No RWMA. Normal diastolic fx. RVSP 30 mmHg.  Cardiac Holter monitor study 10/29/2014    Normal Holter study.  Chronic cough     chronically coughing up sputum    Depression     Dizziness     Dyspnea     Esophageal reflux     Headache     Hypercholesterolemia     Lumbar pain     Myofascial pain     Neck pain     Numbness and tingling     PAC (premature atrial contraction)     Palpitations     presumably benign    Panic disorder     Reflux     Thoracic back pain     Mid-thoracic    Upper extremity weakness     Vertigo, intermittent        Past Surgical History:   Procedure Laterality Date    APPENDECTOMY  11/23/2010    HX HEENT      corrective hearing for left ear     HX HEENT      plastic surgery on both ears    HX HEENT  10/08 & 09/09    Reformed jaw bone, bone graft    HX RENAL BIOPSY      HX TONSILLECTOMY      LA IMPLANTATION PT-ACTIVATED CARDIAC EVENT RECORDER N/A 10/24/2018    Loop Recorder Insert performed by Zhou Armijo MD at [facility] C[facility]   . MEDICATIONS    Current Outpatient Medications   Medication Sig Dispense Refill    escitalopram oxalate (LEXAPRO) 20 mg tablet TAKE 1 TABLET BY MOUTH EVERY DAY  1    ALPRAZolam (XANAX) 0.5 mg tablet Take 0.5 mg by mouth two (2) times a day.  ascorbic acid (VITAMIN C) 500 mg tablet Take 1,000 mg by mouth daily.  cholecalciferol, vitamin D3, (VITAMIN D3) 2,000 unit Tab Take 1 Tab by mouth daily.  omeprazole-sodium bicarbonate (ZEGERID) 40-1.1 mg-gram capsule Take 2 Caps by mouth daily.       acetaminophen (TYLENOL EXTRA STRENGTH) 500 mg tablet Take 500 mg by mouth every six (6) hours as needed.  MULTIVITAMIN PO Take 1 Tab by mouth daily.  pregabalin (LYRICA) 75 mg capsule Take 1 Cap by mouth two (2) times a day. Max Daily Amount: 150 mg. 28 Cap 0    pregabalin (LYRICA) 150 mg capsule Take 1 Cap by mouth two (2) times a day. Max Daily Amount: 300 mg. 60 Cap 2    methocarbamol (ROBAXIN) 500 mg tablet Take 1 Tab by mouth two (2) times daily as needed (spasm). 45 Tab 0        ALLERGIES  Allergies   Allergen Reactions    Opioids - Morphine Analogues Myalgia    Other Medication Other (comments)     Pt reports allergy to steroids, with psychological side effects    Pollen Extracts Unable to Obtain    Zithromax [Azithromycin] Itching          SOCIAL HISTORY    Social History     Socioeconomic History    Marital status: SINGLE     Spouse name: Not on file    Number of children: Not on file    Years of education: Not on file    Highest education level: Not on file   Occupational History    Occupation: real estate   Tobacco Use    Smoking status: Current Every Day Smoker     Packs/day: 1.00     Years: 20.00     Pack years: 20.00     Types: Cigarettes    Smokeless tobacco: Never Used   Substance and Sexual Activity    Alcohol use: No     Alcohol/week: 0.0 standard drinks    Drug use: No       FAMILY HISTORY  Family History   Problem Relation Age of Onset    Hypertension Father     Liver Disease Father     Hypertension Mother          REVIEW OF SYSTEMS  Review of Systems   Musculoskeletal: Positive for back pain and neck pain. BUE paraesthesia (R>L)   BLE paraesthesia (R>L)    Neurological: Positive for headaches.           PHYSICAL EXAMINATION  Visit Vitals  BP 95/56   Pulse 76   Temp 98.1 °F (36.7 °C)   Resp 18   Ht 5' 5\" (1.651 m)   Wt 146 lb (66.2 kg)   SpO2 97%   BMI 24.30 kg/m²       Pain Assessment  6/20/2019   Location of Pain Neck   Location Modifiers -   Severity of Pain 5   Quality of Pain Aching   Quality of Pain Comment -   Duration of Pain Persistent   Frequency of Pain Constant   Date Pain First Started -   Aggravating Factors -   Aggravating Factors Comment -   Limiting Behavior -   Relieving Factors -   Relieving Factors Comment -   Result of Injury -           Constitutional:  Well developed, well nourished, in no acute distress. Psychiatric: Affect and mood are appropriate. Integumentary: No rashes or abrasions noted on exposed areas. SPINE/MUSCULOSKELETAL EXAM    Cervical spine:  Neck is midline.    Normal muscle tone.    No focal atrophy is noted.    ROM pain free.    Shoulder ROM intact.    Tenderness to palpation at left scalene's.    Negative Spurling's sign.    Negative Tinel's sign.    Negative Garcia's sign.    Flex forward posture.    No clonus.      Sensation in the bilateral arms grossly intact to light touch.        Lumbar spine:  No rash, ecchymosis, or gross obliquity.    No fasciculations.    No focal atrophy is noted.    No pain with hip ROM.    Range of motion is normal.    Tenderness to palpation. No tenderness to palpation at the sciatic notch.    SI joints non-tender.    Trochanters non tender.      Sensation in the bilateral legs grossly intact to light touch.      Updates from 12/02/16:  Negative Garcia's sign bilaterally. No clonus. Pain in left scalene muscles with turning head to the right.      Updates from 3/6/18:  Tenderness to palpation of lumbar region  No directional preference     Updates from 3/7/19:  Tenderness to palpation of trapezii (L>R)  Tenderness to palpation of C7 spinous process    Updates 12/16/19:  Positive Montoya sign on the L.    Tenderness to palpation of left trochanter    MOTOR:      Biceps  Triceps Deltoids Wrist Ext Wrist Flex Hand Intrin   Right 5/5 5/5 5/5 5/5 5/5 5/5   Left 5/5 5/5 5/5 5/5 5/5 5/5             Hip Flex  Quads Hamstrings Ankle DF EHL Ankle PF   Right 5/5 5/5 5/5 5/5 5/5 5/5   Left 5/5 5/5 5/5 5/5 5/5 5/5   DTRs are 2+ biceps, triceps, brachioradialis, patella, and Achilles.      Negative Straight Leg raise.    Squat not tested.    No difficulty with tandem gait.    Normal heel walk.    Normal toe rise.       Ambulation without assistive device. FWB.       RADIOGRAPHS  Lumbar MRI images taken on 7/11/19 personally reviewed with patient:  FINDINGS: All numbering assumes 5 lumbar type vertebrae. The alignment  demonstrates mild levocurvature. The marrow signal is normal. The cord  terminates at L1.  No cord signal abnormalities appreciated.     The abdominal aorta is without evidence of aneurysm. No paraaortic adenopathy  appreciated. The visualized kidneys are unremarkable.     L1-L2 level: Minimal degenerative changes. No significant canal or neural  foraminal stenosis.     L2-L3: Mild facet arthropathy and mild degenerative disc disease. No significant  canal or neural foraminal stenosis.     L3-L4: Mild degenerative changes. No significant canal or neural foraminal  stenosis.     L4-L5: Mild facet arthropathy and degenerative disc disease. No significant  canal or neural foraminal stenosis.     L5-S1: Central disc protrusion with annular tear is noted. The protrusion is  mildly more conspicuous than prior imaging. There is possible abutment of the  descending left S1 nerve root as it crosses the disc level. Facet arthropathy is  noted. No significant neural foraminal stenosis.     IMPRESSION  Impression:  1.  Central disc protrusion at L5-S1 which is mildly increased in size compared  with most recent prior MRI. It potentially abuts the descending left S1 nerve  root. However, it does not appear to compress it.     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 Normal cervical alignment and vertebral body heights maintained.  No pathologic marrow signal.The cervical cord is normal in signal and caliber. No cerebellar tonsillar ectopia. Paraspinal soft tissues are unremarkable.     C2-3: Normal looking disc. Central canal and neural foramen are widely patent.     C3-4: Minimal disc bulge. Central canal and neural foramen are widely patent.     C4-5: Minimal disc bulge. Central canal and neural foramen are widely patent.     C5-6: Minimal disc bulge. Central canal and neural foramen are widely patent.     C6-7: Partial congenital bony fusion. Central canal and neural foramen are  widely patent.     C7-T1: Normal looking disc. Central canal and neural foramen are widely patent.     IMPRESSION  Impression:     Minimal disc bulging at C3-4, C4-5 and C5-6 without evidence of focal disc  pathology. No central canal or foraminal stenosis.     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     BUE EMG taken on 08/04/2017 personally reviewed with patient:  NCS/EMG FINDINGS:      · Evaluation of the Left median motor, the Right median motor, the Left ulnar motor, the Right ulnar motor, the Left Median 2nd Digit sensory, the Right Median 2nd Digit sensory, the Left ulnar sensory, and the Right ulnar sensory nerves were unremarkable.          INTERPRETATION: This was a normal nerve conduction and EMG study showing no signs of neuropathy myopathy or radiculopathy in the nerves and muscles tested.          Cervical MRI from 9/15/2014 personally reviewed with patient:  1. Stable appearance of cervical spine examination. 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 Mild multilevel degenerative changes. No high-grade spinal or foraminal  Stenosis.     Cervical MRI images taken on 11/23/2016 personally reviewed with patient:  Mild reversal of cervical curvature, centered at C3-C4, stable. Partial fusion  at C6-C7, stable. No compression fracture or pathologic marrow signal. No  prevertebral soft tissue abnormalities. Spinous processes and posterior soft  tissues unremarkable.  Craniocervical junction and spinal cord are also  unremarkable. Dominant left vertebral artery, stable.      C2-C3: No disc herniation, central or foraminal stenosis.     C3-C4: Mild posterior disc bulge. No central stenosis, cord contact or foraminal  stenosis.     C4-C5: Mild posterior disc bulge with no central or foraminal stenosis.     C5-C6: Mild posterior disc bulge. No central or foraminal stenosis.     C6-7 and C7-T1: No disc herniation, central or foraminal stenosis.      IMPRESSION  IMPRESSION: Stable mild degenerative disease at C3-C4 and C4-C5. No significant  central stenosis, cord compression or foraminal stenosis.      26 minutes of face-to-face contact were spent with the patient during today's visit extensively discussing symptoms and treatment plan. All questions were answered. More than half of this visit today was spent on counseling.      Written by Zehra Wiley as dictated by Rene Lamas MD

## 2020-01-15 ENCOUNTER — HOSPITAL ENCOUNTER (OUTPATIENT)
Dept: GENERAL RADIOLOGY | Age: 40
Discharge: HOME | End: 2020-01-15
Payer: COMMERCIAL

## 2020-01-15 DIAGNOSIS — R05.9 COUGH: ICD-10-CM

## 2020-01-15 PROCEDURE — 71046 X-RAY EXAM CHEST 2 VIEWS: CPT

## 2020-03-04 ENCOUNTER — OFFICE VISIT (OUTPATIENT)
Dept: CARDIOLOGY CLINIC | Age: 40
End: 2020-03-04

## 2020-03-04 DIAGNOSIS — Z95.9 CARDIAC DEVICE IN SITU: ICD-10-CM

## 2020-03-04 DIAGNOSIS — R00.2 PALPITATIONS: Primary | ICD-10-CM

## 2020-04-06 NOTE — PROGRESS NOTES
I have personally seen and evaluated the device findings. Interrogation reviewed and I agree with assessment.     Renetta Matta

## 2020-06-01 ENCOUNTER — VIRTUAL VISIT (OUTPATIENT)
Dept: ORTHOPEDIC SURGERY | Age: 40
End: 2020-06-01

## 2020-06-01 DIAGNOSIS — M54.50 LUMBAR SPINE PAIN: ICD-10-CM

## 2020-06-01 DIAGNOSIS — M79.18 CHRONIC PRIMARY MUSCULOSKELETAL PAIN: Primary | ICD-10-CM

## 2020-06-01 DIAGNOSIS — M25.551 RIGHT HIP PAIN: ICD-10-CM

## 2020-06-01 DIAGNOSIS — M25.521 RIGHT ELBOW PAIN: ICD-10-CM

## 2020-06-01 DIAGNOSIS — M21.70 LEG LENGTH DISCREPANCY: ICD-10-CM

## 2020-06-01 DIAGNOSIS — M54.2 NECK PAIN: ICD-10-CM

## 2020-06-01 DIAGNOSIS — G89.29 CHRONIC PRIMARY MUSCULOSKELETAL PAIN: Primary | ICD-10-CM

## 2020-06-01 NOTE — PROGRESS NOTES
[address]  [address], [address] [facility],[address]  [address] [facility] [facility] [address]  Phone: [phone]  Fax: [phone]        Ilya Funes  : 1980  PCP: Jerome Sever, MD  2020    PROGRESS NOTE    Consent: Greg Pompa is a 41 y.o. male who was seen by synchronous (real-time) audio-video technology, and/or her healthcare decision maker, is aware that this patient-initiated, Telehealth encounter on 2020 is a billable service, with coverage as determined by his/her insurance carrier. He/She is aware that he/she may receive a bill and has provided verbal consent to proceed: Yes. The visit was conducted in the office with the patient being in home through a Doxy platform. Visit video time start: 2:18 PM end: 2:40 PM      HISTORY OF PRESENT ILLNESS  Greg Pompa is a 41 y.o. male with a history of chronic neck pain. He did not find relief from PT, topical creams, or NSAIDs. He was prescribed Celebrex. He returned with c/o a headache that was consistent with occipital neuralgia, radiating from his left eye to a region below his left ear. He returned in  with a new problem of LUE paraesthesia into the hand. He also c/o chronic left shoulder pain. He underwent a BUE EMG 17: This was a normal nerve conduction and EMG study showing no signs of neuropathy myopathy or radiculopathy in the nerves and muscles tested. He returned 3/6/18 for a new low back pain that he contributed to his new mattress. He continued to have neck pain and a heavy sensation in the RUE in a C7/8 vs ulnar nerve distribution that tended to be worse in the mornings. Cervical MRI 18: Minimal disc bulging at C3-4, C4-5 and C5-6 without evidence of focal disc pathology. No central canal or foraminal stenosis. 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 He reported improvement in his RUE numbness, but c/o a \"jerking\" in the RLE while sleeping.      He returned 3/7/19 for a PRN f/u. He noted that he had a burning pain in his neck and upper back (L>R) as he was inconsistent with maintaining an HEP. His symptoms improved once he began maintaining an HEP. He saw a neurologist for headaches. His back pain was exacerbated when he went to see a massage therapist who stretched him out beforehand. He noted that his entire back \"seized up\" but the massage helped loosen it up. He is intolerant to steroids. He attended PT (4/29/19 - 6/4/19; [facility]) with some relief. He was given a shoe lift for his left side. He also c/o neck spasms and difficulty swallowing. He was cleared by an ENT, and he was advised by a neurologist it could be related to his neck muscles. He also noted that his equilibrium has been off since his neck pain has increased. Lumbar MRI 7/11/19: Central disc protrusion at L5-S1 which is mildly increased in size compared with most recent prior MRI. It potentially abuts the descending left S1 nerve root. However, it does not appear to compress it. He was seen by Dr. Roddy Shelton offered trigger point injections, dry needling/acupuncture, and TENS unit for his neck and upper back pain. He underwent continuous chiropractic care (Dr. Jonathan Gunter) with benefit. He continued to c/o LUE (ulnar distribution) and LLE paraesthesia. Pt reported lateral left hip pain that woke him up at night when he slept on his left side. Of note, he had a snapping ulnar nerve bilaterally. He also reported a flare up of his left biceps tendinopathy pain.        Eliz Sorensen is seen virtually for f/u. Pt notes that the shooting pains down the RLE have improved, but he c/o hamstring and right groin pain that is worse with walking. He describes the groin pain as a stretching pain. He confirms a positive C sign. He also c/o an exacerbation of his chronic right elbow pain. His neck and back pain have flared up since he has not been able to go to the chiropractor due to [address]  Pt notes that he has a leg length discrepancy and is interested in seeing a foot orthopedist or podiatrist to consider an updated shoe lift. Pain Scale: 5/10    Treatments patient has tried:  Physical therapy:Yes  Doing HEP: Yes  Non-opioid medications: Yes  Spinal injections: No  Spinal surgery- No.      reviewed. PMHx of anxiety. ASSESSMENT  This is a 41year old male with known history of chronic neck and low back pain with a newer symptom of right groin pain. He would like to be evaluated by an orthopedist for foot issues related to his leg length discreprency which was previously being managed by his podiatrist.     PLAN  1. Right hip XR. 2. Referral to Dr. Brown Bustillos for right hip pain. 3. Referral to Dr. Aleena Tatum for foot issues and leg length discreprency    Pt will f/u in after seeing orthopedics or sooner as needed. Diagnoses and all orders for this visit:    1. Chronic primary musculoskeletal pain    2. Right hip pain  -     REFERRAL TO ORTHOPEDICS  -     XR HIP RT W OR WO PELV 2-3 VWS; Future    3. Right elbow pain  -     REFERRAL TO ORTHOPEDICS    4. Lumbar spine pain    5. Neck pain    6. Leg length discrepancy  -     REFERRAL TO ORTHOPEDICS               PAST MEDICAL HISTORY   Past Medical History:   Diagnosis Date    Anxiety     Arm fatigue     Forearms    Balance problems     Cardiac Agatston CAC score 100-199 02/04/2015    Coronary calcium score 0.    Cardiac echocardiogram 10/29/2014    EF 55-60%. No RWMA. Normal diastolic fx. RVSP 30 mmHg.  Cardiac Holter monitor study 10/29/2014    Normal Holter study.     Chronic cough     chronically coughing up sputum    Depression     Dizziness     Dyspnea     Esophageal reflux     Headache     Hypercholesterolemia     Lumbar pain     Myofascial pain     Neck pain     Numbness and tingling     PAC (premature atrial contraction)     Palpitations     presumably benign    Panic disorder     Reflux     Thoracic back pain     Mid-thoracic    Upper extremity weakness     Vertigo, intermittent        Past Surgical History:   Procedure Laterality Date    APPENDECTOMY  11/23/2010    HX HEENT      corrective hearing for left ear     HX HEENT      plastic surgery on both ears    HX HEENT  10/08 & 09/09    Reformed jaw bone, bone graft    HX RENAL BIOPSY      HX TONSILLECTOMY      UT IMPLANTATION PT-ACTIVATED CARDIAC EVENT RECORDER N/A 10/24/2018    Loop Recorder Insert performed by Alexa Pride MD at [facility]   . MEDICATIONS    Current Outpatient Medications   Medication Sig Dispense Refill    escitalopram oxalate (LEXAPRO) 20 mg tablet TAKE 1 TABLET BY MOUTH EVERY DAY  1    ALPRAZolam (XANAX) 0.5 mg tablet Take 0.5 mg by mouth two (2) times a day.  ascorbic acid (VITAMIN C) 500 mg tablet Take 1,000 mg by mouth daily.  cholecalciferol, vitamin D3, (VITAMIN D3) 2,000 unit Tab Take 1 Tab by mouth daily.  omeprazole-sodium bicarbonate (ZEGERID) 40-1.1 mg-gram capsule Take 2 Caps by mouth daily.  acetaminophen (TYLENOL EXTRA STRENGTH) 500 mg tablet Take 500 mg by mouth every six (6) hours as needed.  MULTIVITAMIN PO Take 1 Tab by mouth daily.  diclofenac (VOLTAREN) 1 % gel Apply 4 g to affected area four (4) times daily. (Patient not taking: Reported on 6/1/2020) 5 Each 5    pregabalin (LYRICA) 75 mg capsule Take 1 Cap by mouth two (2) times a day. Max Daily Amount: 150 mg. 28 Cap 0    pregabalin (LYRICA) 150 mg capsule Take 1 Cap by mouth two (2) times a day. Max Daily Amount: 300 mg. 60 Cap 2    methocarbamol (ROBAXIN) 500 mg tablet Take 1 Tab by mouth two (2) times daily as needed (spasm).  (Patient not taking: Reported on 6/1/2020) 45 Tab 0        ALLERGIES  Allergies   Allergen Reactions    Opioids - Morphine Analogues Myalgia    Other Medication Other (comments)     Pt reports allergy to steroids, with psychological side effects    Pollen Extracts Unable to Obtain    Zithromax [Azithromycin] Itching          SOCIAL HISTORY    Social History     Socioeconomic History    Marital status: SINGLE     Spouse name: Not on file    Number of children: Not on file    Years of education: Not on file    Highest education level: Not on file   Occupational History    Occupation: real estate   Tobacco Use    Smoking status: Current Every Day Smoker     Packs/day: 1.00     Years: 20.00     Pack years: 20.00     Types: Cigarettes    Smokeless tobacco: Never Used   Substance and Sexual Activity    Alcohol use: No     Alcohol/week: 0.0 standard drinks    Drug use: No       FAMILY HISTORY  Family History   Problem Relation Age of Onset    Hypertension Father     Liver Disease Father     Hypertension Mother          REVIEW OF SYSTEMS  Review of Systems   Musculoskeletal: Positive for back pain and neck pain. Right groin pain        PHYSICAL EXAMINATION    Pain Assessment  6/1/2020   Location of Pain Neck; Shoulder;Back;Hip;Leg   Location Modifiers Left;Right   Severity of Pain 5   Quality of Pain Dull;Aching   Quality of Pain Comment -   Duration of Pain Persistent   Frequency of Pain Constant   Date Pain First Started -   Aggravating Factors (No Data)   Aggravating Factors Comment constant pain   Limiting Behavior Yes   Relieving Factors Rest;Heat   Relieving Factors Comment -   Result of Injury No           -Constitutional: Healthy appearing, well developed, alert, in no acute distress  - Eyes: Conjunctiva clear, lids unremarkable, sclera anicteric  - Ears, nose, mouth, and throat: External inspection head, face, ears and nose identifies normal appearance without obvious deformity.  -Neck: No asymmetry, no masses, trachea is midline, and normal overall appearance.  -Respiratory: Respirations are even and unlabored.    -Musculoskeletal: No cyanosis, clubbing, or edema observed  -Musculoskeletal: Inspection of the following identifies no gross deformity, misalignment, or asymmetry:   -Head/neck   -Spine   -Ribs/pelvis   -Right upper extremity    -Left upper extremity    -Right lower extremity  -Left lower extremity  -Musculoskeletal: Assessment of range of motion of the following identifies no gross limitations:    -Head/neck   -Spine   -Ribs/pelvis   -Right upper extremity    -Left upper extremity    -Right lower extremity   -Left lower extremity  -Skin: Head and neck skin with no lesions or rash  -Neurologic: Cranial nerves 3-12 grossly intact. -Psychiatric: Judgement and insight intact. The limitations of a telemedicine visit including the inability to perform a detailed physical examination may miss significant findings was presented to the patient, and the patient still elected to proceed with the telemedicine visit. RADIOGRAPHS  Lumbar MRI images taken on 7/11/19 personally reviewed with patient:  FINDINGS: All numbering assumes 5 lumbar type vertebrae. The alignment  demonstrates mild levocurvature. The marrow signal is normal. The cord  terminates at L1.  No cord signal abnormalities appreciated.     The abdominal aorta is without evidence of aneurysm. No paraaortic adenopathy  appreciated. The visualized kidneys are unremarkable.     L1-L2 level: Minimal degenerative changes. No significant canal or neural  foraminal stenosis.     L2-L3: Mild facet arthropathy and mild degenerative disc disease. No significant  canal or neural foraminal stenosis.     L3-L4: Mild degenerative changes. No significant canal or neural foraminal  stenosis.     L4-L5: Mild facet arthropathy and degenerative disc disease. No significant  canal or neural foraminal stenosis.     L5-S1: Central disc protrusion with annular tear is noted. The protrusion is  mildly more conspicuous than prior imaging. There is possible abutment of the  descending left S1 nerve root as it crosses the disc level. Facet arthropathy is  noted. No significant neural foraminal stenosis.     IMPRESSION  Impression:  1.  Central disc protrusion at L5-S1 which is mildly increased in size compared  with most recent prior MRI. It potentially abuts the descending left S1 nerve  root. However, it does not appear to compress it.     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 Normal cervical alignment and vertebral body heights maintained. No pathologic marrow signal.The cervical cord is normal in signal and caliber. No cerebellar tonsillar ectopia. Paraspinal soft tissues are unremarkable.     C2-3: Normal looking disc. Central canal and neural foramen are widely patent.     C3-4: Minimal disc bulge. Central canal and neural foramen are widely patent.     C4-5: Minimal disc bulge. Central canal and neural foramen are widely patent.     C5-6: Minimal disc bulge. Central canal and neural foramen are widely patent.     C6-7: Partial congenital bony fusion. Central canal and neural foramen are  widely patent.     C7-T1: Normal looking disc. Central canal and neural foramen are widely patent.     IMPRESSION  Impression:     Minimal disc bulging at C3-4, C4-5 and C5-6 without evidence of focal disc  pathology.  No central canal or foraminal stenosis.     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     BUE EMG taken on 08/04/2017 personally reviewed with patient:  NCS/EMG FINDINGS:      · Evaluation of the Left median motor, the Right median motor, the Left ulnar motor, the Right ulnar motor, the Left Median 2nd Digit sensory, the Right Median 2nd Digit sensory, the Left ulnar sensory, and the Right ulnar sensory nerves were unremarkable.          INTERPRETATION: This was a normal nerve conduction and EMG study showing no signs of neuropathy myopathy or radiculopathy in the nerves and muscles tested.          Cervical MRI from 9/15/2014 personally reviewed with patient:  1. Stable appearance of cervical spine examination. 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 Mild multilevel degenerative changes. No high-grade spinal or foraminal  Stenosis.     Cervical MRI images taken on 11/23/2016 personally reviewed with patient:  Mild reversal of cervical curvature, centered at C3-C4, stable. Partial fusion  at C6-C7, stable. No compression fracture or pathologic marrow signal. No  prevertebral soft tissue abnormalities. Spinous processes and posterior soft  tissues unremarkable. Craniocervical junction and spinal cord are also  unremarkable. Dominant left vertebral artery, stable.      C2-C3: No disc herniation, central or foraminal stenosis.     C3-C4: Mild posterior disc bulge. No central stenosis, cord contact or foraminal  stenosis.     C4-C5: Mild posterior disc bulge with no central or foraminal stenosis.     C5-C6: Mild posterior disc bulge. No central or foraminal stenosis.     C6-7 and C7-T1: No disc herniation, central or foraminal stenosis.      IMPRESSION  IMPRESSION: Stable mild degenerative disease at C3-C4 and C4-C5. No significant  central stenosis, cord compression or foraminal stenosis.      reviewed    Mr. Diego Fontana has a reminder for a \"due or due soon\" health maintenance. I have asked that he contact his primary care provider for follow-up on this health maintenance. Pursuant to the emergency declaration under the [address] [place] [address], [facility] waiver authority and the [employer] and [employer] Act, this Virtual  Visit was conducted, with patient's consent, to reduce the patient's risk of exposure to COVID-19 and provide continuity of care for an established patient. Services were provided through a video synchronous discussion virtually to substitute for in-person clinic visit.       CPT Codes 28038-54034 for Established Patients may apply to this Telehealth Visit  Time-based coding, delete if not needed: I spent at least 15 minutes with this established patient, and >50% of the time was spent counseling and/or coordinating care. Written by Barrie Horowitz, as dictated by Dr. Ju Cross. I, Dr. Ju Cross, confirm that all documentation is accurate.

## 2020-06-10 ENCOUNTER — OFFICE VISIT (OUTPATIENT)
Dept: CARDIOLOGY CLINIC | Age: 40
End: 2020-06-10

## 2020-06-10 DIAGNOSIS — R55 SYNCOPE AND COLLAPSE: ICD-10-CM

## 2020-06-10 DIAGNOSIS — R00.2 PALPITATIONS: Primary | ICD-10-CM

## 2020-06-10 DIAGNOSIS — Z95.9 CARDIAC DEVICE IN SITU: ICD-10-CM

## 2020-06-15 NOTE — PROGRESS NOTES
I have personally seen and evaluated the device findings. Interrogation reviewed and I agree with assessment.     Abner Robison

## 2020-06-22 ENCOUNTER — OFFICE VISIT (OUTPATIENT)
Dept: ORTHOPEDIC SURGERY | Age: 40
End: 2020-06-22

## 2020-06-22 VITALS
BODY MASS INDEX: 24.03 KG/M2 | RESPIRATION RATE: 16 BRPM | TEMPERATURE: 97.1 F | DIASTOLIC BLOOD PRESSURE: 60 MMHG | HEART RATE: 73 BPM | HEIGHT: 65 IN | SYSTOLIC BLOOD PRESSURE: 96 MMHG | WEIGHT: 144.2 LBS | OXYGEN SATURATION: 96 %

## 2020-06-22 DIAGNOSIS — M21.70 LEG LENGTH DISCREPANCY: Primary | ICD-10-CM

## 2020-06-22 NOTE — PROGRESS NOTES
AMBULATORY PROGRESS NOTE      Patient: Greg Pompa             MRN: [id number]     SSN: [id number] Body mass index is 24 kg/m². YOB: 1980     AGE: 41 y.o. EX: male    PCP: Jerome Sever, DO       IMPRESSION //  DIAGNOSIS AND TREATMENT PLAN      DIAGNOSES  1. Leg length discrepancy        Orders Placed This Encounter    Generic Supply Order     Accomadative trilaminar orthotic. Please account for right leg discrepancy.  [32036] Hip Uni with Pelvis 2-3 Views     Order Specific Question:   Reason for Exam     Answer:   pain      My pressure, he has a leg length discrepancy, left shorter than right, congenital.  He has some mild right and left greater trochanteric bursitis. Conservative care is recommended, terms of stretching exercise comfort each. Over-the-counter Tylenol, recommended for him as well. From today 6/22/2020 : x-rays of the right hip, AP pelvis, with a lateral right hip, with comparison AP left hip: AP right hip, shows normal alignment no fracture no supple is no desiccation. AP left, shows normal alignment no fracture no slippage no dislocation. The lateral, the right hip, shows no significant degenerative changes of the right acetabular region, no lucencies, no fractures, no dislocations. PLAN:    1. DME order: Accommodative trilaminar orthotic. Please account for left leg discrepancy. I have him go to [facility] orthotics and prosthetics, for 1 orthotic, as he has a left shorter than right leg length discrepancy. 2. Encourage the pt to employ hip mobility stretching in the morning. 3. Anticipate cortisone injection and MRI of the right hip if condition does not improve. HPI //  [address] IS A 41 y.o. male who presents to my outpatient office for evaluation of right leg pain. The pt states that his right leg is longer than his left leg, possibly congential due to spine curvature/scoliosis issues.  He presents to the office with a trilaminar orthotic in both shoes and an additional lift insert in the left shoe. His chief complaint is right hip pain which he has been experiencing for the last 2 years. He states that it sometimes radiates down his leg, deepthi with prolonged walking/activity. He feels this pain to a considerable degree everyday. He also reports groin pain, which he states causes him to shorten his stride when walking. He mostly feels this right hip pain when lifting his leg. Denies any numbness or tingling shooting pain, from his butt, or back to his butt. He points to his right hip is having some discomfort, as well as his left hip. Left bothers when he sleeps on his left side down. The pt also states having left hip pain, possibly bursitis. The pt states that his bilateral hip is most evident in the morning. The pt currently works for real estate. ANKLE/FOOT left foot and ankle, right foot and ankle    Psychiatry: Alert, oriented x 3; speech normal in context and clarity, memory intact grossly, no involuntary movements - tremors, no dementia  Gait: normal  Tenderness: no   , Cutaneous: WNL, Joint Motion: WNL  Joint / Tendon Stability: No Ankle or Subtalar instability or joint laxity. No peroneal sublux ability or dislocation  Alignment: neutral Hindfoot, none Metatarsus Adductus Metatarsus. Neuro Motor/Sensory: NL/NL, Vascular: NL foot/ankle pulses,   Lymphatics: No extremity lymphedema, No calf swelling, no tenderness to calf muscles.     HIP REGION: both    Gait normal  Cutaneous: Skin intact, redness not present, erythema not present,drainage not present , rash not present  Visible swelling: not present  ROM: Normal IR, Normal ER   Normal FLEXION, Normal EXTENSION   Stability: none noted  Effusion: difficult to assess due to soft tissues  Tenderness: to Groin not present, GT region on the right side mild, left side mild present, none to the to distal femur on either side  Fullness: Popliteal region not present  Deformity: not present  He does have some tight hamstrings left and right side. Typically when I hold his knee extended on each side and dorsiflex his ankles, he has tightness of the hamstrings bilaterally. Examined him while he is sitting in front of me, his left leg is shorter than the right leg, by about a centimeter. Is only measured on the medial malleolus. CHART REVIEW     Patient Active Problem List   Diagnosis Code    Palpitations R00.2    Anxiety F41.9    Depression F32.9    Chronic cough R05    Dizziness R42    Dyspnea R06.00    PAC (premature atrial contraction) I49.1    Esophageal reflux K21.9    Hypercholesterolemia E78.00    Syncope, vasovagal R55    Abnormal digital rectal exam R68.89    Cervical spinal cord injury [facility]) S14.109A        Lianna Shay has been experiencing pain and discomfort confirmed as outlined in the pain assessment outlined below. Pain Assessment  6/22/2020   Location of Pain Leg   Location Modifiers Right   Severity of Pain 3   Quality of Pain Aching   Quality of Pain Comment -   Duration of Pain A few hours   Frequency of Pain Intermittent   Date Pain First Started -   Aggravating Factors Walking;Squatting   Aggravating Factors Comment -   Limiting Behavior -   Relieving Factors Rest   Relieving Factors Comment -   Result of Injury No        Lianna Shay  has a past medical history of Anxiety, Arm fatigue, Balance problems, Cardiac Agatston CAC score 100-199 (02/04/2015), Cardiac echocardiogram (10/29/2014), Cardiac Holter monitor study (10/29/2014), Chronic cough, Depression, Dizziness, Dyspnea, Esophageal reflux, Headache, Hypercholesterolemia, Lumbar pain, Myofascial pain, Neck pain, Numbness and tingling, PAC (premature atrial contraction), Palpitations, Panic disorder, Reflux, Thoracic back pain, Upper extremity weakness, and Vertigo, intermittent.      Patients is employed at: Past Medical History:   Diagnosis Date    Anxiety     Arm fatigue     Forearms    Balance problems     Cardiac Agatston CAC score 100-199 02/04/2015    Coronary calcium score 0.    Cardiac echocardiogram 10/29/2014    EF 55-60%. No RWMA. Normal diastolic fx. RVSP 30 mmHg.  Cardiac Holter monitor study 10/29/2014    Normal Holter study.  Chronic cough     chronically coughing up sputum    Depression     Dizziness     Dyspnea     Esophageal reflux     Headache     Hypercholesterolemia     Lumbar pain     Myofascial pain     Neck pain     Numbness and tingling     PAC (premature atrial contraction)     Palpitations     presumably benign    Panic disorder     Reflux     Thoracic back pain     Mid-thoracic    Upper extremity weakness     Vertigo, intermittent      Current Outpatient Medications   Medication Sig    escitalopram oxalate (LEXAPRO) 20 mg tablet TAKE 1 TABLET BY MOUTH EVERY DAY    ALPRAZolam (XANAX) 0.5 mg tablet Take 0.5 mg by mouth two (2) times a day.  ascorbic acid (VITAMIN C) 500 mg tablet Take 1,000 mg by mouth daily.  cholecalciferol, vitamin D3, (VITAMIN D3) 2,000 unit Tab Take 1 Tab by mouth daily.  omeprazole-sodium bicarbonate (ZEGERID) 40-1.1 mg-gram capsule Take 2 Caps by mouth daily.  acetaminophen (TYLENOL EXTRA STRENGTH) 500 mg tablet Take 500 mg by mouth every six (6) hours as needed.  MULTIVITAMIN PO Take 1 Tab by mouth daily.  diclofenac (VOLTAREN) 1 % gel Apply 4 g to affected area four (4) times daily. (Patient not taking: Reported on 6/1/2020)    pregabalin (LYRICA) 75 mg capsule Take 1 Cap by mouth two (2) times a day. Max Daily Amount: 150 mg.  pregabalin (LYRICA) 150 mg capsule Take 1 Cap by mouth two (2) times a day. Max Daily Amount: 300 mg.    methocarbamol (ROBAXIN) 500 mg tablet Take 1 Tab by mouth two (2) times daily as needed (spasm).  (Patient not taking: Reported on 6/1/2020)     No current facility-administered medications for this visit. THE  FOR Chyna Woods MD 6/22/2020 . Allergies   Allergen Reactions    Opioids - Morphine Analogues Myalgia    Other Medication Other (comments)     Pt reports allergy to steroids, with psychological side effects    Pollen Extracts Unable to Obtain    Zithromax [Azithromycin] Itching     Past Surgical History:   Procedure Laterality Date    APPENDECTOMY  11/23/2010    HX HEENT      corrective hearing for left ear     HX HEENT      plastic surgery on both ears    HX HEENT  10/08 & 09/09    Reformed jaw bone, bone graft    HX RENAL BIOPSY      HX TONSILLECTOMY      ME IMPLANTATION PT-ACTIVATED CARDIAC EVENT RECORDER N/A 10/24/2018    Loop Recorder Insert performed by Deena Herrera MD at [facility]     Social History     Occupational History    Occupation: real estate   Tobacco Use    Smoking status: Current Every Day Smoker     Packs/day: 1.00     Years: 20.00     Pack years: 20.00     Types: Cigarettes    Smokeless tobacco: Never Used   Substance and Sexual Activity    Alcohol use: No     Alcohol/week: 0.0 standard drinks    Drug use: No    Sexual activity: Not on file     Family History   Problem Relation Age of Onset    Hypertension Father     Liver Disease Father     Hypertension Mother         DIAGNOSTIC IMAGING  LAB DATA      No results found for: HBA1C, HGBE8, QVU6UCIX, KLF3NNHL //   Lab Results   Component Value Date/Time    Glucose 80 10/17/2018 08:00 AM    Glucose (POC) 87 11/13/2012 12:20 PM        No results found for: DUZ3QSPZ, HTH6ZIZD      No results found for: Ted Cheese, XQVID3, XQVID, VD3RIA, [id number]      REVIEW OF SYSTEMS : 6/22/2020  ALL BELOW ARE Negative except : SEE HPI     Constitutional: Negative for fever, chills and weight loss. Neg Weight Loss  Cardiovascular: Negative for chest pain, claudication and leg swelling.  SOB, CARLIN   Gastrointestinal/Urological: Negative for pain, N/V/D/C, Blood in stool or urine,dysuria, Hematuria, Incontinence  Musculoskeletal: see HPI. Neurological: Negative for dizziness and weakness, headaches,Visual Changes or             Confusion, or Seizures,   Psychiatric/Behavioral: Negative for depression, memory loss and substance abuse. Extremities:  Negative for hair changes, rash or skin lesion changes. Hematologic: Negative for Bleeding problems, bruising, pallor or swollen lymph nodes. Peripheral Vascular: No calf pain, vascular vein tenderness to calf pain// No calf throbbing, posterior knee throbbing pain     DIAGNOSTIC IMAGING      Please see above section of this report. See above section please      I have personally reviewed the results of the above study. The interpretation of this study is my professional opinion.       Dagoberto Higuera MD  6/22/2020  8:36 AM

## 2020-06-22 NOTE — PATIENT INSTRUCTIONS
You have been provided with an order for durable medical equipment that you may  at an outside facility as our office does not carry the equipment you need. You may pick it up at any medical supply company you like. Listed below are a few different locations for your convenience: 
 
[address] Phone: [phone] [place]:  
[address] [facility], [address] [place], [address] [place]: 
[address] H[address] 6 [facility] [address] [place]: 
[address] [facility], [address] Accomadative trilaminar orthotic. Please account for right leg discrepancy.

## 2020-06-29 ENCOUNTER — OFFICE VISIT (OUTPATIENT)
Dept: ORTHOPEDIC SURGERY | Facility: CLINIC | Age: 40
End: 2020-06-29

## 2020-06-29 VITALS
TEMPERATURE: 97 F | BODY MASS INDEX: 23.89 KG/M2 | HEIGHT: 65 IN | OXYGEN SATURATION: 96 % | SYSTOLIC BLOOD PRESSURE: 102 MMHG | DIASTOLIC BLOOD PRESSURE: 67 MMHG | HEART RATE: 82 BPM | WEIGHT: 143.4 LBS

## 2020-06-29 DIAGNOSIS — G89.29 CHRONIC LEFT SHOULDER PAIN: ICD-10-CM

## 2020-06-29 DIAGNOSIS — M25.512 CHRONIC LEFT SHOULDER PAIN: ICD-10-CM

## 2020-06-29 DIAGNOSIS — M77.11 LATERAL EPICONDYLITIS OF RIGHT ELBOW: ICD-10-CM

## 2020-06-29 DIAGNOSIS — M75.52 CHRONIC BURSITIS OF LEFT SHOULDER: Primary | ICD-10-CM

## 2020-06-29 DIAGNOSIS — M25.521 RIGHT ELBOW PAIN: ICD-10-CM

## 2020-06-29 NOTE — PROGRESS NOTES
Patient: Desiree Gregg                MRN: [id number]       SSN: [id number]  YOB: 1980        AGE: 41 y.o. SEX: male    PCP: Olegario Ulrich DO  06/29/20    CC: RIGHT ELBOW AND LEFT SHOULDER PAIN    HISTORY:  Desiree Gregg is a 41 y.o. male who is seen for bilateral shoulder and right elbow  pain. He has been experiencing bilateral shoulder pain for the past six months. He does not recall any injury. He thinks he might have aggravated his shoulder while sorting through baseball cards. Clenton Reas He feels shoulder pain with overhead activities and at night. He has been treated at [address] [place] [address] by Dr. Daniel Cooper where he received a steroid injection and PT. Dr. Diego Braswell had x-rays taken that were negative per patient. He has pain while doing dishes. He cannot sleep on his left side. He is also seen for right elbow pain. He notices pain when picking up heavy items. He states he feels popping and pain when setting down objects. He was previously seen for right leg and left shoulder pain. He has pain radiating from his right posterior thigh to his right foot. He reports increased right leg pain while in a course of PT to his neck and back in March of 2017. He was previously seen by Dr. Nathan Andre for neck and back pain. He was previously seen for thumb pain about 2 years ago. He noted increased right knee pain while walking a lot on a trip to [facility] and [place], [place] with his father and brother in March of 2017. He reports chronic left shoulder pain at times with motion. He sees Dr. Lisa Kumar for his foot pain.     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 He is not diabetic or hypertensive. He recently put down his cat due to heart failure. Current weight is 144 pounds. He is 5'5\" tall. He is a fan of the [employer]. He lives alone in [facility]. He does not exercise regularly. Weight Metrics 6/29/2020 6/22/2020 12/16/2019 9/9/2019 6/20/2019 5/2/2019 4/19/2019   Weight 143 lb 6.4 oz 144 lb 3.2 oz 146 lb 146 lb 150 lb 152 lb 152 lb 12.8 oz   BMI 23.86 kg/m2 24 kg/m2 24.3 kg/m2 24.3 kg/m2 24.96 kg/m2 25.29 kg/m2 25.43 kg/m2     Patient Active Problem List   Diagnosis Code    Palpitations R00.2    Anxiety F41.9    Depression F32.9    Chronic cough R05    Dizziness R42    Dyspnea R06.00    PAC (premature atrial contraction) I49.1    Esophageal reflux K21.9    Hypercholesterolemia E78.00    Syncope, vasovagal R55    Abnormal digital rectal exam R68.89    Cervical spinal cord injury ([facility] [address] S14.109A     REVIEW OF SYSTEMS: All Below are Negative except: See HPI   Constitutional: negative for fever, chills, and weight loss. Cardiovascular: negative for chest pain, claudication, leg swelling, SOB, CARLIN   Gastrointestinal: Negative for pain, N/V/C/D, Blood in stool or urine, dysuria,  hematuria, incontinence, pelvic pain. Musculoskeletal: See HPI   Neurological: Negative for dizziness and weakness. Negative for headaches, Visual changes, confusion, seizures   Phychiatric/Behavioral: Negative for depression, memory loss, substance  abuse. Extremities: Negative for hair changes, rash, or skin lesion changes. Hematologic: Negative for bleeding problems, bruising, pallor or swollen lymph  nodes   Peripheral Vascular: No calf pain, no circulation deficits.     Social History     Socioeconomic History    Marital status: SINGLE Spouse name: Not on file    Number of children: Not on file    Years of education: Not on file    Highest education level: Not on file   Occupational History    Occupation: real estate   Social Needs    Financial resource strain: Not on file    Food insecurity     Worry: Not on file     Inability: Not on file   [employer] needs     Medical: Not on file     Non-medical: Not on file   Tobacco Use    Smoking status: Current Every Day Smoker     Packs/day: 1.00     Years: 20.00     Pack years: 20.00     Types: Cigarettes    Smokeless tobacco: Never Used   Substance and Sexual Activity    Alcohol use: No     Alcohol/week: 0.0 standard drinks    Drug use: No    Sexual activity: Not on file   Lifestyle    Physical activity     Days per week: Not on file     Minutes per session: Not on file    Stress: Not on file   Relationships    Social connections     Talks on phone: Not on file     Gets together: Not on file     Attends Protestant service: Not on file     Active member of club or organization: Not on file     Attends meetings of clubs or organizations: Not on file     Relationship status: Not on file    Intimate partner violence     Fear of current or ex partner: Not on file     Emotionally abused: Not on file     Physically abused: Not on file     Forced sexual activity: Not on file   Other Topics Concern    Not on file   Social History Narrative    Not on file      Allergies   Allergen Reactions    Opioids - Morphine Analogues Myalgia    Other Medication Other (comments)     Pt reports allergy to steroids, with psychological side effects    Pollen Extracts Unable to Obtain    Zithromax [Azithromycin] Itching      Current Outpatient Medications   Medication Sig    pregabalin (LYRICA) 75 mg capsule Take 1 Cap by mouth two (2) times a day. Max Daily Amount: 150 mg.  pregabalin (LYRICA) 150 mg capsule Take 1 Cap by mouth two (2) times a day. Max Daily Amount: 300 mg.     escitalopram oxalate (LEXAPRO) 20 mg tablet TAKE 1 TABLET BY MOUTH EVERY DAY    ALPRAZolam (XANAX) 0.5 mg tablet Take 0.5 mg by mouth two (2) times a day.  ascorbic acid (VITAMIN C) 500 mg tablet Take 1,000 mg by mouth daily.  cholecalciferol, vitamin D3, (VITAMIN D3) 2,000 unit Tab Take 1 Tab by mouth daily.  omeprazole-sodium bicarbonate (ZEGERID) 40-1.1 mg-gram capsule Take 2 Caps by mouth daily.  acetaminophen (TYLENOL EXTRA STRENGTH) 500 mg tablet Take 500 mg by mouth every six (6) hours as needed.  MULTIVITAMIN PO Take 1 Tab by mouth daily.  diclofenac (VOLTAREN) 1 % gel Apply 4 g to affected area four (4) times daily. (Patient not taking: Reported on 6/1/2020)    methocarbamol (ROBAXIN) 500 mg tablet Take 1 Tab by mouth two (2) times daily as needed (spasm). (Patient not taking: Reported on 6/1/2020)     No current facility-administered medications for this visit. PHYSICAL EXAMINATION:  Visit Vitals  /67 (BP 1 Location: Left arm)   Pulse 82   Temp 97 °F (36.1 °C)   Ht 5' 5\" (1.651 m)   Wt 143 lb 6.4 oz (65 kg)   SpO2 96%   BMI 23.86 kg/m²      ORTHO EXAMINATION:  Examination Right shoulder Left shoulder   Skin Intact Intact   Effusion - -   Biceps deformity - -   Atrophy - -   AC joint tenderness - -   Acromial tenderness - +   Biceps tenderness - -   Forward flexion/Elevation  175   Active abduction  175   External rotation ROM 30 30   Internal rotation ROM 70 95   Apprehension - -   Impingement - -   Drop Arm Test - -   Neurovascular Intact Intact     Examination Right Elbow Left Elbow   Skin Intact Intact   Range of Motion 135-0 135-0   Tenderness + -   Swelling - -   Bruising - -   Stability Normal Normal   Motor Strength  Normal Normal   Neurovascular Intact Intact     MRI CERVICAL SPINE WO CONT 11/23/16  IMPRESSION:  Stable mild degenerative disease at C3-C4 and C4-C5. No significant central stenosis, cord compression or foraminal stenosis.     MRI LUMBAR SPINE WO CONT 6/27/11  IMPRESSION:  Mild broad-based left paracentral disc protrusion at L5-S1 without significant encroachment on the central canal but with possible contact of the descending left S1 root crossing into the lateral recess. The exit foramina are patent. Canal and foramina are patent at the remaining levels without evidence of significant abnormality. RADIOGRAPHS:  XR RIGHT KNEE 3/30/17  IMPRESSION:  Normal radiographs of right knee. IMPRESSION:      ICD-10-CM ICD-9-CM    1. Chronic bursitis of left shoulder M75.52 726.10 REFERRAL TO PHYSICAL THERAPY   2. Chronic left shoulder pain M25.512 719.41 REFERRAL TO PHYSICAL THERAPY    G89.29 338.29    3. Lateral epicondylitis of right elbow M77.11 726.32 REFERRAL TO PHYSICAL THERAPY   4. Right elbow pain M25.521 719.42 REFERRAL TO PHYSICAL THERAPY     PLAN:  Start PT. There is no need for surgery or injection at this time. He will follow up as needed.       Scribed by Ludmila Zhang  ([address]) as dictated by Madhu Davidson MD

## 2020-07-07 ENCOUNTER — OFFICE VISIT (OUTPATIENT)
Dept: ORTHOPEDIC SURGERY | Age: 40
End: 2020-07-07

## 2020-07-07 VITALS
BODY MASS INDEX: 24.32 KG/M2 | HEIGHT: 65 IN | OXYGEN SATURATION: 95 % | DIASTOLIC BLOOD PRESSURE: 64 MMHG | WEIGHT: 146 LBS | TEMPERATURE: 97.9 F | SYSTOLIC BLOOD PRESSURE: 102 MMHG | RESPIRATION RATE: 12 BRPM | HEART RATE: 79 BPM

## 2020-07-07 DIAGNOSIS — G89.29 CHRONIC PRIMARY MUSCULOSKELETAL PAIN: Primary | ICD-10-CM

## 2020-07-07 DIAGNOSIS — M54.6 THORACIC SPINE PAIN: ICD-10-CM

## 2020-07-07 DIAGNOSIS — M25.551 RIGHT HIP PAIN: ICD-10-CM

## 2020-07-07 DIAGNOSIS — M41.9 SCOLIOSIS, UNSPECIFIED SCOLIOSIS TYPE, UNSPECIFIED SPINAL REGION: ICD-10-CM

## 2020-07-07 DIAGNOSIS — M79.18 MYOFASCIAL PAIN: ICD-10-CM

## 2020-07-07 DIAGNOSIS — M79.18 CHRONIC PRIMARY MUSCULOSKELETAL PAIN: Primary | ICD-10-CM

## 2020-07-07 DIAGNOSIS — M40.204 KYPHOSIS OF THORACIC REGION, UNSPECIFIED KYPHOSIS TYPE: ICD-10-CM

## 2020-07-07 NOTE — PATIENT INSTRUCTIONS
Healthy Upper Back: Exercises Introduction Here are some examples of exercises for your upper back. Start each exercise slowly. Ease off the exercise if you start to have pain. Your doctor or physical therapist will tell you when you can start these exercises and which ones will work best for you. How to do the exercises Lower neck and upper back stretch 1. Stretch your arms out in front of your body. Clasp one hand on top of your other hand. 2. Gently reach out so that you feel your shoulder blades stretching away from each other. 3. Gently bend your head forward. 4. Hold for 15 to 30 seconds. 5. Repeat 2 to 4 times. Midback stretch If you have knee pain, do not do this exercise. 1. Kneel on the floor, and sit back on your ankles. 2. Lean forward, place your hands on the floor, and stretch your arms out in front of you. Rest your head between your arms. 3. Gently push your chest toward the floor, reaching as far in front of you as possible. 4. Hold for 15 to 30 seconds. 5. Repeat 2 to 4 times. Shoulder rolls 1. Sit comfortably with your feet shoulder-width apart. You can also do this exercise while standing. 2. Roll your shoulders up, then back, and then down in a smooth, circular motion. 3. Repeat 2 to 4 times. Wall push-up 1. Stand against a wall with your feet about 12 to 24 inches back from the wall. If you feel any pain when you do this exercise, stand closer to the wall. 2. Place your hands on the wall slightly wider apart than your shoulders, and lean forward. 3. Gently lean your body toward the wall. Then push back to your starting position. Keep the motion smooth and controlled. 4. Repeat 8 to 12 times. Resisted shoulder blade squeeze For this exercise, you will need elastic exercise material, such as surgical tubing or Thera-Band. 1. Sit or stand, holding the band in both hands in front of you.  Keep your elbows close to your sides, bent at a 90-degree angle. Your palms should face up. 2. Squeeze your shoulder blades together, and move your arms to the outside, stretching the band. Be sure to keep your elbows at your sides while you do this. 3. Relax. 4. Repeat 8 to 12 times. Resisted rows For this exercise, you will need elastic exercise material, such as surgical tubing or Thera-Band. 1. Put the band around a solid object, such as a bedpost, at about waist level. Hold one end of the band in each hand. 2. With your elbows at your sides and bent to 90 degrees, pull the band back to move your shoulder blades toward each other. Return to the starting position. 3. Repeat 8 to 12 times. Follow-up care is a key part of your treatment and safety. Be sure to make and go to all appointments, and call your doctor if you are having problems. It's also a good idea to know your test results and keep a list of the medicines you take. Where can you learn more? Go to http://www.gray.com/ Enter G980 in the search box to learn more about \"Healthy Upper Back: Exercises. \" Current as of: March 2, 2020               Content Version: 12.5 © 1385-0307 [employer]. Care instructions adapted under license by [employer] (which disclaims liability or warranty for this information). If you have questions about a medical condition or this instruction, always ask your healthcare professional. [place] [address] any warranty or liability for your use of this information. Shoulder Blade: Exercises Introduction Here are some examples of exercises for you to try. The exercises may be suggested for a condition or for rehabilitation. Start each exercise slowly. Ease off the exercises if you start to have pain. You will be told when to start these exercises and which ones will work best for you. How to do the exercises Shoulder roll 1. Stand tall with your chin slightly tucked. Imagine that a string at the top of your head is pulling you straight up. 2. Keep your arms relaxed. All motion will be in your shoulders. 3. Shrug your shoulders up toward your ears, then up and back. [place] your shoulders down and back, like you're sliding your hands down into your back pants pockets. 4. Repeat the circles at least 2 to 4 times. 5. This exercise is also helpful anytime you want to relax. Lower neck and upper back stretch 1. With your arms about shoulder height, clasp your hands in front of you. 2. Drop your chin toward your chest. 
3. Reach straight forward so you are rounding your upper back. Think about pulling your shoulder blades apart. [address] feel a stretch across your upper back and shoulders. Hold for at least 6 seconds. 4. Repeat 2 to 4 times. Triceps stretch 1. Reach your arm straight up. 2. Keeping your elbow in place, bend your arm and reach your hand down behind your back. 3. With your other hand, apply gentle pressure to the bent elbow. [address] feel a stretch at the back of your upper arm and shoulder. Hold about 6 seconds. 4. Repeat 2 to 4 times with each arm. Shoulder stretch 1. Relax your shoulders. 2. Raise one arm to shoulder height, and reach it across your chest. 
3. Pull the arm slightly toward you with your other arm. This will help you get a gentle stretch. Hold for about 6 seconds. 4. Repeat 2 to 4 times. Shoulder blade squeeze 1. Sit or stand up tall with your arms at your sides. 2. Keep your shoulders relaxed and down, not shrugged. 3. Squeeze your shoulder blades together. Hold for 6 seconds, then relax. 4. Repeat 8 to 12 times. Straight-arm shoulder blade squeeze 1. Sit or stand tall. Relax your shoulders. 2. With palms down, hold your elastic tubing or band straight out in front of you. 3. Start with slight tension in the tubing or band, with your hands about shoulder-width apart. 4. Slowly pull straight out to the sides, squeezing your shoulder blades together. Keep your arms straight and at shoulder height. Slowly release. 5. Repeat 8 to 12 times. Rowing 1. [place] your elastic tubing or band at about waist height. Take one end in each hand. 2. Sit or stand with your feet hip-width apart. 3. Hold your arms straight in front of you. Adjust your distance to create slight tension in the tubing or band. 4. Slightly tuck your chin. Relax your shoulders. 5. Without shrugging your shoulders, pull straight back. Your elbows will pass alongside your waist.   
Pull-downs 1. [place] your elastic tubing or band in the top of a closed door. Take one end in each hand. 2. Either sit or stand, depending on what is more comfortable. If you feel unsteady, sit on a chair. 3. Start with your arms up and comfortably apart, elbows straight. There should be a slight tension in the tubing or band. 4. Slightly tuck your chin, and look straight ahead. 5. Keeping your back straight, slowly pull down and back, bending your elbows. 6. Stop where your hands are level with your chin, in a \"goalpost\" position. 7. Repeat 8 to 12 times. Chest T stretch 1. Lie on your back. Raise your knees so they are bent. Plant your feet on the floor, hip-width apart. 2. Tuck your chin, and relax your shoulders. 3. Reach your arms straight out to the sides. If you don't feel a mild stretch in your shoulders and across your chest, use a foam roll or a tightly rolled blanket under your spine, from your tailbone to your head. 4. Relax in this position for at least 15 to 30 seconds while you breathe normally. Repeat 2 to 4 times. 5. As you get used to this stretch, keep adding a little more time until you are able relax in this position for 2 or 3 minutes. When you can relax for at least 2 minutes, you only need to do the exercise 1 time per session. Chest goalpost stretch 1. Lie on your back. Raise your knees so they are bent. Plant your feet on the floor, hip-width apart. 2. Tuck your chin, and relax your shoulders. 3. Reach your arms straight out to the sides. 4. Bend your arms at the elbows, with your hands pointed toward the top of your head. Your arms should make an L on either side of your head. Your palms should be facing up. 5. If you don't feel a mild stretch in your shoulders and across your chest, use a foam roll or tightly rolled blanket under your spine, from your tailbone to your head. 6. Relax in this position for at least 15 to 30 seconds while you breathe normally. Repeat 2 to 4 times. 7. Each day you do this exercise, add a little more time until you can relax in this position for 2 or 3 minutes. When you can relax for at least 2 minutes, you only need to do the exercise 1 time per session. Follow-up care is a key part of your treatment and safety. Be sure to make and go to all appointments, and call your doctor if you are having problems. It's also a good idea to know your test results and keep a list of the medicines you take. Where can you learn more? Go to http://chiki-zhao.info/ Enter ([phone] in the search box to learn more about \"Shoulder Blade: Exercises. \" Current as of: March 2, 2020               Content Version: 12.5 © 3761-3525 [employer], [employer]. Care instructions adapted under license by [employer] (which disclaims liability or warranty for this information). If you have questions about a medical condition or this instruction, always ask your healthcare professional. [place] [address] any warranty or liability for your use of this information.

## 2020-07-07 NOTE — PROGRESS NOTES
[address]  [address], [address]  [place] [address]  Phone: [phone]  Fax: [phone]        Murtaza Crouch  : 1980  PCP: Abelardo Bautista DO  2020    PROGRESS NOTE      HISTORY OF PRESENT ILLNESS  Sharon Domingo is a 41 y.o. male with a history of chronic neck pain. He did not find relief from PT, topical creams, or NSAIDs. He was prescribed Celebrex. He returned with c/o a headache that was consistent with occipital neuralgia, radiating from his left eye to a region below his left ear. He returned in  with a new problem of LUE paraesthesia into the hand. He also c/o chronic left shoulder pain. He underwent a BUE EMG 17: This was a normal nerve conduction and EMG study showing no signs of neuropathy myopathy or radiculopathy in the nerves and muscles tested. He returned 3/6/18 for a new low back pain that he contributed to his new mattress. He continued to have neck pain and a heavy sensation in the RUE in a C7/8 vs ulnar nerve distribution that tended to be worse in the mornings. Cervical MRI 18: Minimal disc bulging at C3-4, C4-5 and C5-6 without evidence of focal disc pathology. No central canal or foraminal stenosis. 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 He reported improvement in his RUE numbness, but c/o a \"jerking\" in the RLE while sleeping.      He returned 3/7/19 for a PRN f/u. He noted that he had a burning pain in his neck and upper back (L>R) as he was inconsistent with maintaining an HEP. His symptoms improved once he began maintaining an HEP. He saw a neurologist for headaches. His back pain was exacerbated when he went to see a massage therapist who stretched him out beforehand. He noted that his entire back \"seized up\" but the massage helped loosen it up. He is intolerant to steroids. He attended PT (19 - 19; Sandee Zambrano) with some relief. He was given a shoe lift for his left side.  He also c/o neck spasms and difficulty swallowing. He was cleared by an ENT, and he was advised by a neurologist it could be related to his neck muscles. He also noted that his equilibrium has been off since his neck pain has increased. Lumbar MRI 7/11/19: Central disc protrusion at L5-S1 which is mildly increased in size compared with most recent prior MRI. It potentially abuts the descending left S1 nerve root. However, it does not appear to compress it. He was seen by Dr. Kay Gitelman offered trigger point injections, dry needling/acupuncture, and TENS unit for his neck and upper back pain. He underwent continuous chiropractic care (Dr. Jonathan Gunter) with benefit. He continued to c/o LUE (ulnar distribution) and LLE paraesthesia. Pt reported lateral left hip pain that woke him up at night when he slept on his left side. Of note, he had a snapping ulnar nerve bilaterally. He also reported a flare up of his left biceps tendinopathy pain. The shooting pains down the RLE  improved, but he c/o hamstring and right groin pain that was worse with walking. He described the groin pain as a stretching pain. He confirmed a positive C sign. He also c/o an exacerbation of his chronic right elbow pain. His neck and back pain flared up since he was not able to go to the chiropractor due to [address]. He has a leg length discrepancy and was interested in seeing a foot orthopedist or podiatrist to consider an updated shoe lift.      Sharon Domingo comes in to the office today for f/u. He saw Dr. Dutch Chris who referred him to an orthotist for a shoe lift. The orthotist discussed the option/possibility of a SpineCor brace for his scoliosis. Dr. Dutch Chris also encouraged he employ hip mobility stretching in the morning. He anticipates a cortisone injection and MRI of the right hip if his condition does not improve. He saw Dr. Ely Clay for the right elbow and the left shoulder pain - he begins PT tomorrow for them both.  He c/o exacerbation of his thoracolumbar pain with a tingling sensation. He may have exacerbated his pain when he was going through his old baseball cards or since he has been kayaking. Pain Score: 7/10. Treatments patient has tried:  Physical therapy:Yes  Doing HEP: Yes  Non-opioid medications: Yes  Spinal injections: No  Spinal surgery- No.       reviewed. PMHx of anxiety, leg length discrepancy         ASSESSMENT  This is a 41year old male with known history of chronic neck and low back pain with a newer symptom of right groin pain and an exacerbation of thoracolumbar myofascial pain. His pain remains myofascial in nature with a likely right hip pathology. PLAN  1. Scoliosis XR to take to orthotist to consider for SpineCor brace. 2. Thoracic MRI - persistent, progressive thoracic spine pain despite multiple rounds of PT      Pt will f/u after MRI or sooner as needed. Diagnoses and all orders for this visit:    1. Chronic primary musculoskeletal pain  -     MRI [facility] SPINE WO CONT; Future    2. Thoracic spine pain  -     MRI [facility] SPINE WO CONT; Future    3. Myofascial pain  -     MRI [facility] SPINE WO CONT; Future    4. Kyphosis of thoracic region, unspecified kyphosis type  -     MRI [facility] SPINE WO CONT; Future    5. Scoliosis, unspecified scoliosis type, unspecified spinal region  -     XR SPINE ENTIRE T-L , SKULL TO SACRUM 1 VW SCOLIOSIS; Future  -     MRI [facility] SPINE WO CONT; Future    6. Right hip pain         PAST MEDICAL HISTORY   Past Medical History:   Diagnosis Date    Anxiety     Arm fatigue     Forearms    Balance problems     Cardiac Agatston CAC score 100-199 02/04/2015    Coronary calcium score 0.    Cardiac echocardiogram 10/29/2014    EF 55-60%. No RWMA. Normal diastolic fx. RVSP 30 mmHg.  Cardiac Holter monitor study 10/29/2014    Normal Holter study.     Chronic cough     chronically coughing up sputum    Depression     Dizziness     Dyspnea     Esophageal reflux     Headache     Hypercholesterolemia     Lumbar pain     Myofascial pain     Neck pain     Numbness and tingling     PAC (premature atrial contraction)     Palpitations     presumably benign    Panic disorder     Reflux     Thoracic back pain     Mid-thoracic    Upper extremity weakness     Vertigo, intermittent        Past Surgical History:   Procedure Laterality Date    APPENDECTOMY  11/23/2010    HX HEENT      corrective hearing for left ear     HX HEENT      plastic surgery on both ears    HX HEENT  10/08 & 09/09    Reformed jaw bone, bone graft    HX RENAL BIOPSY      HX TONSILLECTOMY      ME IMPLANTATION PT-ACTIVATED CARDIAC EVENT RECORDER N/A 10/24/2018    Loop Recorder Insert performed by iPno Corona MD at [facility]   . MEDICATIONS    Current Outpatient Medications   Medication Sig Dispense Refill    diclofenac (VOLTAREN) 1 % gel Apply 4 g to affected area four (4) times daily. (Patient not taking: Reported on 6/1/2020) 5 Each 5    pregabalin (LYRICA) 75 mg capsule Take 1 Cap by mouth two (2) times a day. Max Daily Amount: 150 mg. 28 Cap 0    pregabalin (LYRICA) 150 mg capsule Take 1 Cap by mouth two (2) times a day. Max Daily Amount: 300 mg. 60 Cap 2    escitalopram oxalate (LEXAPRO) 20 mg tablet TAKE 1 TABLET BY MOUTH EVERY DAY  1    methocarbamol (ROBAXIN) 500 mg tablet Take 1 Tab by mouth two (2) times daily as needed (spasm). (Patient not taking: Reported on 6/1/2020) 45 Tab 0    ALPRAZolam (XANAX) 0.5 mg tablet Take 0.5 mg by mouth two (2) times a day.  ascorbic acid (VITAMIN C) 500 mg tablet Take 1,000 mg by mouth daily.  cholecalciferol, vitamin D3, (VITAMIN D3) 2,000 unit Tab Take 1 Tab by mouth daily.  omeprazole-sodium bicarbonate (ZEGERID) 40-1.1 mg-gram capsule Take 2 Caps by mouth daily.  acetaminophen (TYLENOL EXTRA STRENGTH) 500 mg tablet Take 500 mg by mouth every six (6) hours as needed.  MULTIVITAMIN PO Take 1 Tab by mouth daily. ALLERGIES  Allergies   Allergen Reactions    Opioids - Morphine Analogues Myalgia    Other Medication Other (comments)     Pt reports allergy to steroids, with psychological side effects    Pollen Extracts Unable to Obtain    Zithromax [Azithromycin] Itching          SOCIAL HISTORY    Social History     Socioeconomic History    Marital status: SINGLE     Spouse name: Not on file    Number of children: Not on file    Years of education: Not on file    Highest education level: Not on file   Occupational History    Occupation: real estate   Tobacco Use    Smoking status: Current Every Day Smoker     Packs/day: 1.00     Years: 20.00     Pack years: 20.00     Types: Cigarettes    Smokeless tobacco: Never Used   Substance and Sexual Activity    Alcohol use: No     Alcohol/week: 0.0 standard drinks    Drug use: No       FAMILY HISTORY  Family History   Problem Relation Age of Onset    Hypertension Father     Liver Disease Father     Hypertension Mother          REVIEW OF SYSTEMS  Review of Systems   Musculoskeletal: Positive for back pain and neck pain. Right groin pain           PHYSICAL EXAMINATION  Visit Vitals  /64 (BP 1 Location: Left arm, BP Patient Position: Sitting)   Pulse 79   Temp 97.9 °F (36.6 °C) (Oral)   Resp 12   Ht 5' 5\" (1.651 m)   Wt 146 lb (66.2 kg)   SpO2 95% Comment: RA   BMI 24.30 kg/m²       Pain Assessment  6/29/2020   Location of Pain Shoulder   Location Modifiers Left;Right   Severity of Pain 5   Quality of Pain Sharp;Burning   Quality of Pain Comment -   Duration of Pain Persistent   Frequency of Pain Intermittent   Date Pain First Started -   Aggravating Factors Straightening;Stretching   Aggravating Factors Comment -   Limiting Behavior Some   Relieving Factors Rest   Relieving Factors Comment -   Result of Injury No           Constitutional:  Well developed, well nourished, in no acute distress. Psychiatric: Affect and mood are appropriate.    Integumentary: No rashes or abrasions noted on exposed areas. SPINE/MUSCULOSKELETAL EXAM    Cervical spine:  Neck is midline.    Normal muscle tone.    No focal atrophy is noted.    ROM pain free.    Shoulder ROM intact.    Tenderness to palpation at left scalene's.    Negative Spurling's sign.    Negative Tinel's sign.    Negative Garcia's sign.    Flex forward posture.    No clonus.      Sensation in the bilateral arms grossly intact to light touch.        Lumbar spine:  No rash, ecchymosis, or gross obliquity.    No fasciculations.    No focal atrophy is noted.    No pain with hip ROM.    Range of motion is normal.    Tenderness to palpation. No tenderness to palpation at the sciatic notch.    SI joints non-tender.    Trochanters non tender.      Sensation in the bilateral legs grossly intact to light touch.      Updates from 12/02/16:  Negative Garcia's sign bilaterally. No clonus. Pain in left scalene muscles with turning head to the right.      Updates from 3/6/18:  Tenderness to palpation of lumbar region  No directional preference     Updates from 3/7/19:  Tenderness to palpation of trapezii (L>R)  Tenderness to palpation of C7 spinous process     Updates 12/16/19:  Positive Montoya sign on the L. Tenderness to palpation of left trochanter    Updates 7/7/2020:  Tenderness to palpation of thoracolumbar paraspinals       MOTOR:      Biceps  Triceps Deltoids Wrist Ext Wrist Flex Hand Intrin   Right 5/5 5/5 5/5 5/5 5/5 5/5   Left 5/5 5/5 5/5 5/5 5/5 5/5             Hip Flex  Quads Hamstrings Ankle DF EHL Ankle PF   Right 5/5 5/5 5/5 5/5 5/5 5/5   Left 5/5 5/5 5/5 5/5 5/5 5/5     DTRs are 2+ biceps, triceps, brachioradialis, patella, and Achilles.      Negative Straight Leg raise.    Squat not tested.    No difficulty with tandem gait.    Normal heel walk.    Normal toe rise.       Ambulation without assistive device. FWB.         RADIOGRAPHS  Lumbar MRI images taken on 7/11/19 personally reviewed with patient:  FINDINGS: All numbering assumes 5 lumbar type vertebrae. The alignment  demonstrates mild levocurvature. The marrow signal is normal. The cord  terminates at L1.  No cord signal abnormalities appreciated.     The abdominal aorta is without evidence of aneurysm. No paraaortic adenopathy  appreciated. The visualized kidneys are unremarkable.     L1-L2 level: Minimal degenerative changes. No significant canal or neural  foraminal stenosis.     L2-L3: Mild facet arthropathy and mild degenerative disc disease. No significant  canal or neural foraminal stenosis.     L3-L4: Mild degenerative changes. No significant canal or neural foraminal  stenosis.     L4-L5: Mild facet arthropathy and degenerative disc disease. No significant  canal or neural foraminal stenosis.     L5-S1: Central disc protrusion with annular tear is noted. The protrusion is  mildly more conspicuous than prior imaging. There is possible abutment of the  descending left S1 nerve root as it crosses the disc level. Facet arthropathy is  noted. No significant neural foraminal stenosis.     IMPRESSION  Impression:  1.  Central disc protrusion at L5-S1 which is mildly increased in size compared  with most recent prior MRI. It potentially abuts the descending left S1 nerve  root. However, it does not appear to compress it.     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 Normal cervical alignment and vertebral body heights maintained. No pathologic marrow signal.The cervical cord is normal in signal and caliber. No cerebellar tonsillar ectopia. Paraspinal soft tissues are unremarkable.     C2-3: Normal looking disc. Central canal and neural foramen are widely patent.     C3-4: Minimal disc bulge.  Central canal and neural foramen are widely patent.     C4-5: Minimal disc bulge. Central canal and neural foramen are widely patent.     C5-6: Minimal disc bulge. Central canal and neural foramen are widely patent.     C6-7: Partial congenital bony fusion. Central canal and neural foramen are  widely patent.     C7-T1: Normal looking disc. Central canal and neural foramen are widely patent.     IMPRESSION  Impression:     Minimal disc bulging at C3-4, C4-5 and C5-6 without evidence of focal disc  pathology. No central canal or foraminal stenosis.     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     BUE EMG taken on 08/04/2017 personally reviewed with patient:  NCS/EMG FINDINGS:      · Evaluation of the Left median motor, the Right median motor, the Left ulnar motor, the Right ulnar motor, the Left Median 2nd Digit sensory, the Right Median 2nd Digit sensory, the Left ulnar sensory, and the Right ulnar sensory nerves were unremarkable.          INTERPRETATION: This was a normal nerve conduction and EMG study showing no signs of neuropathy myopathy or radiculopathy in the nerves and muscles tested.          Cervical MRI from 9/15/2014 personally reviewed with patient:  1. Stable appearance of cervical spine examination. 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 Mild multilevel degenerative changes. No high-grade spinal or foraminal  Stenosis.     Cervical MRI images taken on 11/23/2016 personally reviewed with patient:  Mild reversal of cervical curvature, centered at C3-C4, stable. Partial fusion  at C6-C7, stable. No compression fracture or pathologic marrow signal. No  prevertebral soft tissue abnormalities. Spinous processes and posterior soft  tissues unremarkable. Craniocervical junction and spinal cord are also  unremarkable. Dominant left vertebral artery, stable.      C2-C3: No disc herniation, central or foraminal stenosis.     C3-C4: Mild posterior disc bulge. No central stenosis, cord contact or foraminal  stenosis.       C4-C5: Mild posterior disc bulge with no central or foraminal stenosis.     C5-C6: Mild posterior disc bulge. No central or foraminal stenosis.     C6-7 and C7-T1: No disc herniation, central or foraminal stenosis.      IMPRESSION  IMPRESSION: Stable mild degenerative disease at C3-C4 and C4-C5. No significant  central stenosis, cord compression or foraminal stenosis.     20 minutes of face-to-face contact were spent with the patient during today's visit extensively discussing symptoms and treatment plan. All questions were answered. More than half of this visit today was spent on counseling.      Written by Divya Barney as dictated by Yehuda Washington MD

## 2020-07-07 NOTE — PROGRESS NOTES
True Ort presents today for   Chief Complaint   Patient presents with    Back Pain       Is someone accompanying this pt? no    Is the patient using any DME equipment during OV? no    Depression Screening:  No flowsheet data found. Learning Assessment:  Learning Assessment 3/7/2016   PRIMARY LEARNER Patient   PRIMARY LANGUAGE ENGLISH   LEARNER PREFERENCE PRIMARY DEMONSTRATION     READING   ANSWERED BY patient   RELATIONSHIP SELF       Coordination of Care:  1. Have you been to the ER, urgent care clinic since your last visit? no  Hospitalized since your last visit? no    2. Have you seen or consulted any other health care providers outside of the [address] [place] [address] since your last visit? no Include any pap smears or colon screening.  no

## 2020-07-08 ENCOUNTER — HOSPITAL ENCOUNTER (OUTPATIENT)
Dept: PHYSICAL THERAPY | Age: 40
Discharge: HOME | End: 2020-07-08
Payer: COMMERCIAL

## 2020-07-08 PROCEDURE — 97161 PT EVAL LOW COMPLEX 20 MIN: CPT

## 2020-07-08 PROCEDURE — 97530 THERAPEUTIC ACTIVITIES: CPT

## 2020-07-08 NOTE — PROGRESS NOTES
PT DAILY TREATMENT NOTE 10-18    Patient Name: Britta Jeong  Date:2020  : 1980  [x]  Patient  Verified  Payor: Manjit Deluna / Plan: [place] [employer]  CAPITATED PT / Product Type: Commerical /    In time:11:47  Out time:12:37  Total Treatment Time (min): 50  Visit #: 1 of 10    Medicare/BCBS Only   Total Timed Codes (min):   1:1 Treatment Time:         Treatment Area: Right elbow pain [M25.521]  Left shoulder pain [M25.512]    SUBJECTIVE  Pain Level (0-10 scale): 3  Any medication changes, allergies to medications, adverse drug reactions, diagnosis change, or new procedure performed?: [x] No    [] Yes (see summary sheet for update)  Subjective functional status/changes:   [] No changes reported  See POC    OBJECTIVE    25 min [x]Eval                  []Re-Eval       25 min Therapeutic Activity:  []  See flow sheet : Patient education on therapy assessment, prognosis, expectations for therapy sessions, patient goals, compression tendinosis of right elbow with visual/verbal explanation, education of lifting techniques, discussion of pectoral major vs pectoral minor, and HEP. Rationale: to improve the patients ability to adhere to HEP and therapy sessions for increased compliance when working toward therapy goals.           With   [] TE   [x] TA   [] neuro   [] other: Patient Education: [x] Review HEP    [] Progressed/Changed HEP based on:   [] positioning   [] body mechanics   [] transfers   [] heat/ice application    [] other:      Other Objective/Functional Measures: See POC     Pain Level (0-10 scale) post treatment: 3    ASSESSMENT/Changes in Function: See POC    Patient will continue to benefit from skilled PT services to modify and progress therapeutic interventions, address functional mobility deficits, address ROM deficits, address strength deficits, analyze and address soft tissue restrictions, analyze and cue movement patterns, analyze and modify body mechanics/ergonomics, assess and modify postural abnormalities, address imbalance/dizziness and instruct in home and community integration to attain remaining goals.      [x]  See Plan of Care  []  See progress note/recertification  []  See Discharge Summary         Progress towards goals / Updated goals:  See POC    PLAN  [x]  Upgrade activities as tolerated     []  Continue plan of care  [x]  Update interventions per flow sheet       []  Discharge due to:_  []  Other:_      [place] Pickup 7/8/2020  11:39 AM    Future Appointments   Date Time Provider Cristo Menezesi   7/8/2020 11:45 AM Alexis Kaur [id number]   8/4/2020 11:30 AM Brody Norton MD [facility]   9/9/2020  1:00 PM CSI, PACER  [facility]

## 2020-07-08 NOTE — PROGRESS NOTES
[facility]  [place] [employer] [facility]  [facility]  [address] [place] [address]  [phone] fax    Plan of Care/ Statement of Necessity for Physical Therapy Services    Patient name: Lluvia Mera Start of Care: 2020   Referral source: Odalis Judd MD : 1980    Medical Diagnosis: Right elbow pain [M25.521]  Left shoulder pain [M25.512]  Payor: Monica Gonzalez / Plan: [address] PT / Product Type: Commerical /  Onset Date:20    Treatment Diagnosis: left shoulder pain, right elbow pain   Prior Hospitalization: see medical history Provider#: [id number]   Medications: Verified on Patient summary List    Comorbidities: hx of chronic neck/back pain; IBS; vertigo; anxiety   Prior Level of Function: functionally independent, right handed, /     The Plan of Care and following information is based on the information from the initial evaluation. Assessment/ key information: Pt is a pleasant 41 y.o. male who presents with c/o low shoulder pain and right elbow pain. The patient reports a chronic history of left shoulder pain that has been exacerbated in the past few months and now limits his ability to lift and carry objects; his right elbow pain is more recent in nature with onset approximately 3 months ago. Signs/symptoms at eval consistent with right triceps tendinosis and left shoulder pain consistent with pectoral strain. Functional deficits include: decreased ability to perform waist<>shoulder lifts, decreased shoulder strength at end range flexion, and pain that restricts participation in household/kitchen tasks. Rehab potential is good due to previous positive experience with therapy interventions. . Pt would benefit from skilled PT to address above deficits to improve Pt's function and ability to return to PLOF work/household tasks with decreased pain and improved activity tolerance.     Evaluation Complexity History HIGH Complexity :3+ comorbidities / personal factors will impact the outcome/ POC ; Examination LOW Complexity : 1-2 Standardized tests and measures addressing body structure, function, activity limitation and / or participation in recreation  ;Presentation LOW Complexity : Stable, uncomplicated  ;Clinical Decision Making MEDIUM Complexity : FOTO score of 26-74  Overall Complexity Rating: LOW   Problem List: pain affecting function, decrease strength, decrease ADL/ functional abilitiies, decrease activity tolerance, decrease flexibility/ joint mobility and decrease transfer abilities   Treatment Plan may include any combination of the following: Therapeutic exercise, Therapeutic activities, Neuromuscular re-education, Physical agent/modality, Gait/balance training, Manual therapy, Aquatic therapy, Patient education, Self Care training, Functional mobility training, Home safety training and Stair training  Patient / Family readiness to learn indicated by: asking questions  Persons(s) to be included in education: patient (P)  Barriers to Learning/Limitations: None  Patient Goal (s): pain free, strength  Patient Self Reported Health Status: good  Rehabilitation Potential: good    Short Term Goals: To be accomplished in 1 week  - Goal: Pt to be compliant with initial HEP to improve strength of bilateral shoulder/triceps musculature and improve seated posture. Status at last note/certification: Established and reviewed with Pt  Long Term Goals: To be accomplished in 10 treatments  - Goal: Pt to demonstrate 5/5 shoulder flexion strength at end-range flexion to improve ease of reaching/lifting to overhead cabinets  Status at last note/certification: 4+/5 right, 4/5 left with increased pain  - Goal: Pt to perform 10 waist to shoulder lifts without increased pain demonstrating little to no impinging positions at the shoulder to improve ease of lifting tasks.   Status at last note/certification: pain in right elbow and left shoulder with waist to shoulder lifts; both shoulders naturally fall into internal rotation/impinging positions  - Goal: Pt to report less than or equal to 2/10 pain at worst in right elbow and left shoulder to increase ease of kitchen tasks. Status at last note/certification: 2/11 pain at worst  - Goal: Pt to report FOTO score of at least 70 pts to show improved function and quality of life. Status at last note/certification: FOTO 61 pts       Frequency / Duration: Patient to be seen 1-2 times per week for 10 treatments. Patient/ CarPatient/ Caregiver education and instruction: Diagnosis, prognosis, self care, activity modification and exercises   [x]  Plan of care has been reviewed with NAKIA Parks 7/8/2020 11:40 AM    ________________________________________________________________________    I certify that the above Therapy Services are being furnished while the patient is under my care. I agree with the treatment plan and certify that this therapy is necessary.     Physician's Signature:____________Date:_________TIME:________    ** Signature, Date and Time must be completed for valid certification **    Please sign and return to [facility]  [facility]  [address] [place] [address]  [phone] fax

## 2020-07-21 ENCOUNTER — HOSPITAL ENCOUNTER (OUTPATIENT)
Dept: PHYSICAL THERAPY | Age: 40
Discharge: HOME | End: 2020-07-21
Payer: COMMERCIAL

## 2020-07-21 PROCEDURE — 97110 THERAPEUTIC EXERCISES: CPT

## 2020-07-21 NOTE — PROGRESS NOTES
PT DAILY TREATMENT NOTE 10-18    Patient Name: Shanika Paintingster  Date:2020  : 1980  [x]  Patient  Verified  Payor: Vince Nunez / Plan: [employer]  [employer] / Product Type: Commerical /    In time: 2:45  Out time: 3:30  Total Treatment Time (min): 45  Visit #: 2 of 10      Treatment Area: Right elbow pain [M25.521]  Left shoulder pain [M25.512]    SUBJECTIVE  Pain Level (0-10 scale): 3-4/10 left shoulder; 1/10 right elbow  Any medication changes, allergies to medications, adverse drug reactions, diagnosis change, or new procedure performed?: [x] No    [] Yes (see summary sheet for update)  Subjective functional status/changes:   [] No changes reported  Pt reports soreness with resisted across the body reaching on the left shoulder in the front of his arm/chest. He notes his right elbow has been feeling a lot better recently and he has been careful about how he is lifting cat litter. He reports he is getting a MRI on his t/s tomorrow.      OBJECTIVE    40 min Therapeutic Exercise:  [x] See flow sheet :   Rationale: increase ROM, increase strength and improve coordination to improve the patients ability to complete household chores with decreased pain    5 min Manual Therapy:  TPR pectoralis minor and major   Rationale: decrease pain, increase ROM and increase tissue extensibility to complete household chores with decreased pain          With   [] TE   [] TA   [] neuro   [] other: Patient Education: [x] Review HEP    [] Progressed/Changed HEP based on:   [] positioning   [] body mechanics   [] transfers   [] heat/ice application    [] other:      Other Objective/Functional Measures:   Educated on light pectoral stretching and eccentric strengthening  No increased pain during session just fatigue of weak muscles  T/s kyphosis in standing with forward head posture but improved since previous time spent in therapy  No right elbow pain during session but added tricep conditioning to prevent reoccurrence  Reviewed open books for t/s mobility     Pain Level (0-10 scale) post treatment: 0/10 sore from workout    ASSESSMENT/Changes in Function: Pt with decreased right elbow pain but continues with pain with resisted left shoulder horizontal adduction in the pectoral region. He has triggers points in pectoralis major and minor with decreased strength. He has forward shoulder/head posture with increased t/s kyphosis. Skilled PT is medically necessary to work on eccentric pectoral conditioning with flexibility, postural correction and reduced pain with lifting OH and performing household chores. Progress towards goals / Updated goals:  Short Term Goals: To be accomplished in 1 week  - Goal: Pt to be compliant with initial HEP to improve strength of bilateral shoulder/triceps musculature and improve seated posture. Status at last note/certification: Established and reviewed with Pt  MET  Long Term Goals: To be accomplished in 10 treatments  - Goal: Pt to demonstrate 5/5 shoulder flexion strength at end-range flexion to improve ease of reaching/lifting to overhead cabinets  Status at last note/certification: 4+/5 right, 4/5 left with increased pain  - Goal: Pt to perform 10 waist to shoulder lifts without increased pain demonstrating little to no impinging positions at the shoulder to improve ease of lifting tasks. Status at last note/certification: pain in right elbow and left shoulder with waist to shoulder lifts; both shoulders naturally fall into internal rotation/impinging positions  - Goal: Pt to report less than or equal to 2/10 pain at worst in right elbow and left shoulder to increase ease of kitchen tasks. Status at last note/certification: 0/24 pain at worst  - Goal: Pt to report FOTO score of at least 70 pts to show improved function and quality of life.   Status at last note/certification: FOTO 61 pts     PLAN  [x]  Upgrade activities as tolerated     [x]  Continue plan of care  [] Update interventions per flow sheet       []  Discharge due to:_  []  Other:_      Ashley Sharma, PTA 7/21/2020  8:23 AM    Future Appointments   Date Time Provider Cristo Buenrostro   7/21/2020  2:45 PM [facility]   7/22/2020 11:30 AM [facility] MRI RM 1 [facility]   7/23/2020  2:45 PM Michelle Quintero [facility]   8/4/2020 11:30 AM Ginny Nichols  [address]   9/9/2020  1:00 PM [facility]SI, PACER  [address] [facility]

## 2020-08-04 ENCOUNTER — HOSPITAL ENCOUNTER (OUTPATIENT)
Dept: PHYSICAL THERAPY | Age: 40
Discharge: HOME | End: 2020-08-04
Payer: COMMERCIAL

## 2020-08-04 PROCEDURE — 97110 THERAPEUTIC EXERCISES: CPT

## 2020-08-04 NOTE — PROGRESS NOTES
PT DAILY TREATMENT NOTE 10-18    Patient Name: Chelsi Portillo  Date:2020  : 1980  [x]  Patient  Verified  Payor: /    In time: 3:08  Out time:3:46   Total Treatment Time (min): 38  Visit #: 3 of 10      Treatment Area: Right elbow pain [M25.521]  Left shoulder pain [M25.512]    SUBJECTIVE  Pain Level (0-10 scale): 4/10  Any medication changes, allergies to medications, adverse drug reactions, diagnosis change, or new procedure performed?: [x] No    [] Yes (see summary sheet for update)  Subjective functional status/changes:   [] No changes reported  Pt reports just got back from his trip to [place]. He states his elbow has been better but still has minor discomfort in his left shoulder. He would like to review the exercises he should be working on next time. He went to the chiropractor whom adjusted his back and ribs and states it feels much better today. He got his MRI reschedule and his MD appt rescheduled. He notes fatigue with prolonged OH activity in his shoulders. OBJECTIVE    38 min Therapeutic Exercise:  [x] See flow sheet :   Rationale: increase ROM, increase strength and improve coordination to improve the patients ability to complete household chores with decreased pain          With   [] TE   [] TA   [] neuro   [] other: Patient Education: [x] Review HEP    [] Progressed/Changed HEP based on:   [] positioning   [] body mechanics   [] transfers   [] heat/ice application    [] other:      Other Objective/Functional Measures:   TTP left posterior deltoid  Minor pectoral discomfort left during eccentric lowering  Challenged with ball stability exercises for shoulder endurance  Chest pain (has an chest wall hernia) with prone on ball exercises  Reviewed posture to prevent rib flare during exercises    Pain Level (0-10 scale) post treatment: 0/10    ASSESSMENT/Changes in Function: Pt progressing with decreased elbow and left shoulder pain.  He has improved posture awareness for scap retraction but still needs cueing to prevent rib flare with exercises. He has some pectoral discomfort on the left likely from previous strain but improving posterior kinetic chain strength. Will continue to monitor symptoms and aggravating factors to progress ease of performing ADLs with decreased pain. Progress towards goals / Updated goals:  Short Term Goals: To be accomplished in 1 week  - Goal: Pt to be compliant with initial HEP to improve strength of bilateral shoulder/triceps musculature and improve seated posture. Status at last note/certification: Established and reviewed with Pt  MET  Long Term Goals: To be accomplished in 10 treatments  - Goal: Pt to demonstrate 5/5 shoulder flexion strength at end-range flexion to improve ease of reaching/lifting to overhead cabinets  Status at last note/certification: 4+/5 right, 4/5 left with increased pain  - Goal: Pt to perform 10 waist to shoulder lifts without increased pain demonstrating little to no impinging positions at the shoulder to improve ease of lifting tasks. Status at last note/certification: pain in right elbow and left shoulder with waist to shoulder lifts; both shoulders naturally fall into internal rotation/impinging positions  - Goal: Pt to report less than or equal to 2/10 pain at worst in right elbow and left shoulder to increase ease of kitchen tasks. Status at last note/certification: 9/67 pain at worst  Progressing 4/10 today  - Goal: Pt to report FOTO score of at least 70 pts to show improved function and quality of life.   Status at last note/certification: FOTO 61 pts     PLAN  [x]  Upgrade activities as tolerated     [x]  Continue plan of care  []  Update interventions per flow sheet       []  Discharge due to:_  []  Other:_      Primo Bennett PTA 8/4/2020  8:23 AM    Future Appointments   Date Time Provider Cristo Buenrostro   8/4/2020 11:30 AM Juan Antonio Johnson  [address]   8/4/2020  3:00 PM Ramon Mendes PTA [facility]   8/6/2020  2:30 PM [facility] MRI RM 1 [facility]   9/9/2020  1:00 PM CSI, PACER  [address] [facility]

## 2020-08-06 ENCOUNTER — HOSPITAL ENCOUNTER (OUTPATIENT)
Dept: GENERAL RADIOLOGY | Age: 40
Discharge: HOME | End: 2020-08-06
Attending: PHYSICAL MEDICINE & REHABILITATION
Payer: COMMERCIAL

## 2020-08-06 ENCOUNTER — HOSPITAL ENCOUNTER (OUTPATIENT)
Dept: MRI IMAGING | Age: 40
Discharge: HOME | End: 2020-08-06
Attending: PHYSICAL MEDICINE & REHABILITATION
Payer: COMMERCIAL

## 2020-08-06 DIAGNOSIS — M54.6 THORACIC SPINE PAIN: ICD-10-CM

## 2020-08-06 DIAGNOSIS — M41.9 SCOLIOSIS, UNSPECIFIED SCOLIOSIS TYPE, UNSPECIFIED SPINAL REGION: ICD-10-CM

## 2020-08-06 DIAGNOSIS — M40.204 KYPHOSIS OF THORACIC REGION, UNSPECIFIED KYPHOSIS TYPE: ICD-10-CM

## 2020-08-06 DIAGNOSIS — M79.18 CHRONIC PRIMARY MUSCULOSKELETAL PAIN: ICD-10-CM

## 2020-08-06 DIAGNOSIS — M79.18 MYOFASCIAL PAIN: ICD-10-CM

## 2020-08-06 DIAGNOSIS — G89.29 CHRONIC PRIMARY MUSCULOSKELETAL PAIN: ICD-10-CM

## 2020-08-06 PROCEDURE — 72082 X-RAY EXAM ENTIRE SPI 2/3 VW: CPT

## 2020-08-06 PROCEDURE — 72146 MRI CHEST SPINE W/O DYE: CPT

## 2020-08-10 ENCOUNTER — OFFICE VISIT (OUTPATIENT)
Dept: ORTHOPEDIC SURGERY | Age: 40
End: 2020-08-10

## 2020-08-10 VITALS
BODY MASS INDEX: 24.26 KG/M2 | DIASTOLIC BLOOD PRESSURE: 65 MMHG | TEMPERATURE: 98 F | HEIGHT: 65 IN | OXYGEN SATURATION: 92 % | WEIGHT: 145.6 LBS | SYSTOLIC BLOOD PRESSURE: 98 MMHG | HEART RATE: 67 BPM

## 2020-08-10 DIAGNOSIS — M54.50 LUMBAR SPINE PAIN: ICD-10-CM

## 2020-08-10 DIAGNOSIS — M54.6 THORACIC SPINE PAIN: ICD-10-CM

## 2020-08-10 DIAGNOSIS — M79.18 MYOFASCIAL PAIN: ICD-10-CM

## 2020-08-10 DIAGNOSIS — M79.18 CERVICAL MYOFASCIAL PAIN SYNDROME: ICD-10-CM

## 2020-08-10 DIAGNOSIS — G89.29 CHRONIC PRIMARY MUSCULOSKELETAL PAIN: Primary | ICD-10-CM

## 2020-08-10 DIAGNOSIS — M54.59 MECHANICAL LOW BACK PAIN: ICD-10-CM

## 2020-08-10 DIAGNOSIS — M54.2 NECK PAIN: ICD-10-CM

## 2020-08-10 DIAGNOSIS — M79.18 CHRONIC PRIMARY MUSCULOSKELETAL PAIN: Primary | ICD-10-CM

## 2020-08-10 NOTE — PROGRESS NOTES
[address], [address]  [place]  Phone: [phone]  Fax: [phone]        Stefani Bonilla  : 1980  PCP: Cristian Weiner DO  8/10/2020    PROGRESS NOTE      HISTORY OF PRESENT ILLNESS  Marko Castillo is a 41 y.o. male with a history of chronic neck pain. He did not find relief from PT, topical creams, or NSAIDs. He was prescribed Celebrex. He returned with c/o a headache that was consistent with occipital neuralgia, radiating from his left eye to a region below his left ear. He returned in  with a new problem of LUE paraesthesia into the hand. He also c/o chronic left shoulder pain. He underwent a BUE EMG 17: This was a normal nerve conduction and EMG study showing no signs of neuropathy myopathy or radiculopathy in the nerves and muscles tested. He returned 3/6/18 for a new low back pain that he contributed to his new mattress. He continued to have neck pain and a heavy sensation in the RUE in a C7/8 vs ulnar nerve distribution that tended to be worse in the mornings. Cervical MRI 18: Minimal disc bulging at C3-4, C4-5 and C5-6 without evidence of focal disc pathology. No central canal or foraminal stenosis. 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 He reported improvement in his RUE numbness, but c/o a \"jerking\" in the RLE while sleeping.      He returned 3/7/19 for a PRN f/u. He noted that he had a burning pain in his neck and upper back (L>R) as he was inconsistent with maintaining an HEP. His symptoms improved once he began maintaining an HEP. He saw a neurologist for headaches. His back pain was exacerbated when he went to see a massage therapist who stretched him out beforehand. He noted that his entire back \"seized up\" but the massage helped loosen it up. He is intolerant to steroids. He attended PT (19 - 19; [facility]) with some relief. He was given a shoe lift for his left side.  He also c/o neck spasms and difficulty swallowing. He was cleared by an ENT, and he was advised by a neurologist it could be related to his neck muscles. He also noted that his equilibrium has been off since his neck pain has increased. Lumbar MRI 7/11/19: Central disc protrusion at L5-S1 which is mildly increased in size compared with most recent prior MRI. It potentially abuts the descending left S1 nerve root. However, it does not appear to compress it. He was seen by Dr. Engle Has offered trigger point injections, dry needling/acupuncture, and TENS unit for his neck and upper back pain. He underwent continuous chiropractic care (Dr. Jonathan Gunter) with benefit. He continued to c/o LUE (ulnar distribution) and LLE paraesthesia. Pt reported lateral left hip pain that woke him up at night when he slept on his left side. Of note, he had a snapping ulnar nerve bilaterally. He also reported a flare up of his left biceps tendinopathy pain. The shooting pains down the RLE  improved, but he c/o hamstring and right groin pain that was worse with walking. He described the groin pain as a stretching pain. He confirmed a positive C sign. He also c/o an exacerbation of his chronic right elbow pain. His neck and back pain flared up since he was not able to go to the chiropractor due to [address] He saw Dr. Shabnam Thomas who referred him to an orthotist for an updated shoe lift. The orthotist discussed the option/possibility of a SpineCor brace for his scoliosis. Dr. Shabnam Thomas also encouraged he employ hip mobility stretching in the morning. He anticipates a cortisone injection and MRI of the right hip if his condition does not improve. He saw Dr. Goldie Borja for the right elbow and the left shoulder pain - he begins PT tomorrow for them both. He c/o exacerbation of his thoracolumbar pain with a tingling sensation. He may have exacerbated his pain when he was going through his old baseball cards or since he has been kayaking.      Simba  Nadine Rivera comes in to the office today for f/u. Pt notes that he saw the prosthetist and they are considering a SpineCor brace. Thoracic spine MRI dated 8/6/2020 reviewed. Per report, Mild rotoscoliosis convexity to the right. Minimal kyphotic angulation. Incidental hiatal hernia. Scoliotic XR dated 8/6/2020 reviewed. Per report, S-shaped thoracolumbar scoliosis. No significant coronal or sagittal balance. Exaggerated thoracic kyphosis. Mild left-sided upward pelvic tilt. Mild to moderate degenerative changes above. Partial C6/C7 congenital fusion. Transitional anatomy with 13 thoracic type vertebral bodies. Pt notes that he is supposed to have surgery on 9/11 for the hiatal hernia, but he may need to reschedule it. He has been in PT for his right shoulder and left elbow. He did see about 3 days of improvement of his thoracic pain with an adjustment by Dr. Magalis Brown. Pain Score: 5/10. Treatments patient has tried:  Physical therapy:Yes  Doing HEP: Yes  Non-opioid medications: Yes  Spinal injections: No  Spinal surgery- No.       reviewed. PMHx of anxiety, leg length discrepancy      ASSESSMENT  This is a 41year old male with known history of chronic neck and low back pain with a newer symptom of right groin pain and an exacerbation of thoracolumbar myofascial pain. His pain remains myofascial in nature with a likely right hip pathology. PLAN  1. Maintain HEP. Pt will f/u in 6 months or sooner as needed. Diagnoses and all orders for this visit:    1. Chronic primary musculoskeletal pain    2. Mechanical low back pain    3. Myofascial pain    4. Cervical myofascial pain syndrome    5. Thoracic spine pain    6. Neck pain    7.  Lumbar spine pain         PAST MEDICAL HISTORY   Past Medical History:   Diagnosis Date    Anxiety     Arm fatigue     Forearms    Balance problems     Cardiac Agatston CAC score 100-199 02/04/2015    Coronary calcium score 0.    Cardiac echocardiogram 10/29/2014 EF 55-60%. No RWMA. Normal diastolic fx. RVSP 30 mmHg.  Cardiac Holter monitor study 10/29/2014    Normal Holter study.  Chronic cough     chronically coughing up sputum    Depression     Dizziness     Dyspnea     Esophageal reflux     Headache     Hypercholesterolemia     Lumbar pain     Myofascial pain     Neck pain     Numbness and tingling     PAC (premature atrial contraction)     Palpitations     presumably benign    Panic disorder     Reflux     Thoracic back pain     Mid-thoracic    Upper extremity weakness     Vertigo, intermittent        Past Surgical History:   Procedure Laterality Date    APPENDECTOMY  11/23/2010    HX HEENT      corrective hearing for left ear     HX HEENT      plastic surgery on both ears    HX HEENT  10/08 & 09/09    Reformed jaw bone, bone graft    HX RENAL BIOPSY      HX TONSILLECTOMY      PA IMPLANTATION PT-ACTIVATED CARDIAC EVENT RECORDER N/A 10/24/2018    Loop Recorder Insert performed by Vero Guzman MD at [facility]   . MEDICATIONS    Current Outpatient Medications   Medication Sig Dispense Refill    diclofenac (VOLTAREN) 1 % gel Apply 4 g to affected area four (4) times daily. 5 Each 5    pregabalin (LYRICA) 75 mg capsule Take 1 Cap by mouth two (2) times a day. Max Daily Amount: 150 mg. 28 Cap 0    pregabalin (LYRICA) 150 mg capsule Take 1 Cap by mouth two (2) times a day. Max Daily Amount: 300 mg. 60 Cap 2    escitalopram oxalate (LEXAPRO) 20 mg tablet TAKE 1 TABLET BY MOUTH EVERY DAY  1    methocarbamol (ROBAXIN) 500 mg tablet Take 1 Tab by mouth two (2) times daily as needed (spasm). 45 Tab 0    ALPRAZolam (XANAX) 0.5 mg tablet Take 0.5 mg by mouth two (2) times a day.  ascorbic acid (VITAMIN C) 500 mg tablet Take 1,000 mg by mouth daily.  cholecalciferol, vitamin D3, (VITAMIN D3) 2,000 unit Tab Take 1 Tab by mouth daily.       omeprazole-sodium bicarbonate (ZEGERID) 40-1.1 mg-gram capsule Take 2 Caps by mouth daily.  acetaminophen (TYLENOL EXTRA STRENGTH) 500 mg tablet Take 500 mg by mouth every six (6) hours as needed.  MULTIVITAMIN PO Take 1 Tab by mouth daily. ALLERGIES  Allergies   Allergen Reactions    Opioids - Morphine Analogues Myalgia    Other Medication Other (comments)     Pt reports allergy to steroids, with psychological side effects    Pollen Extracts Unable to Obtain    Zithromax [Azithromycin] Itching          SOCIAL HISTORY    Social History     Socioeconomic History    Marital status: SINGLE     Spouse name: Not on file    Number of children: Not on file    Years of education: Not on file    Highest education level: Not on file   Occupational History    Occupation: real estate   Tobacco Use    Smoking status: Current Every Day Smoker     Packs/day: 1.00     Years: 20.00     Pack years: 20.00     Types: Cigarettes    Smokeless tobacco: Never Used   Substance and Sexual Activity    Alcohol use: No     Alcohol/week: 0.0 standard drinks    Drug use: No       FAMILY HISTORY  Family History   Problem Relation Age of Onset    Hypertension Father     Liver Disease Father     Hypertension Mother          REVIEW OF SYSTEMS  Review of Systems   Musculoskeletal: Positive for back pain and neck pain.         Right groin pain           PHYSICAL EXAMINATION  Visit Vitals  BP 98/65 (BP 1 Location: Right arm, BP Patient Position: Sitting)   Pulse 67   Temp 98 °F (36.7 °C) (Oral)   Ht 5' 5\" (1.651 m)   Wt 145 lb 9.6 oz (66 kg)   SpO2 92%   BMI 24.23 kg/m²       Pain Assessment  8/10/2020   Location of Pain Back   Location Modifiers -   Severity of Pain 5   Quality of Pain Dull;Aching   Quality of Pain Comment N/T feet   Duration of Pain -   Frequency of Pain -   Date Pain First Started -   Aggravating Factors -   Aggravating Factors Comment -   Limiting Behavior -   Relieving Factors -   Relieving Factors Comment -   Result of Injury -           Constitutional:  Well developed, well nourished, in no acute distress. Psychiatric: Affect and mood are appropriate. Integumentary: No rashes or abrasions noted on exposed areas. SPINE/MUSCULOSKELETAL EXAM    Cervical spine:  Neck is midline.    Normal muscle tone.    No focal atrophy is noted.    ROM pain free.    Shoulder ROM intact.    Tenderness to palpation at left scalene's.    Negative Spurling's sign.    Negative Tinel's sign.    Negative Garcia's sign.    Flex forward posture.    No clonus.      Sensation in the bilateral arms grossly intact to light touch.        Lumbar spine:  No rash, ecchymosis, or gross obliquity.    No fasciculations.    No focal atrophy is noted.    No pain with hip ROM.    Range of motion is normal.    Tenderness to palpation. No tenderness to palpation at the sciatic notch.    SI joints non-tender.    Trochanters non tender.      Sensation in the bilateral legs grossly intact to light touch.      Updates from 12/02/16:  Negative Garcia's sign bilaterally. No clonus. Pain in left scalene muscles with turning head to the right.      Updates from 3/6/18:  Tenderness to palpation of lumbar region  No directional preference     Updates from 3/7/19:  Tenderness to palpation of trapezii (L>R)  Tenderness to palpation of C7 spinous process     Updates 12/16/19:  Positive Montoya sign on the L.    Tenderness to palpation of left trochanter     Updates 7/7/2020:  Tenderness to palpation of thoracolumbar paraspinals    MOTOR:      Biceps  Triceps Deltoids Wrist Ext Wrist Flex Hand Intrin   Right 5/5 5/5 5/5 5/5 5/5 5/5   Left 5/5 5/5 5/5 5/5 5/5 5/5             Hip Flex  Quads Hamstrings Ankle DF EHL Ankle PF   Right 5/5 5/5 5/5 5/5 5/5 5/5   Left 5/5 5/5 5/5 5/5 5/5 5/5     DTRs are 2+ biceps, triceps, brachioradialis, patella, and Achilles.      Negative Straight Leg raise.    Squat not tested.    No difficulty with tandem gait.    Normal heel walk.    Normal toe rise.       Ambulation without assistive device. FWB.       RADIOGRAPHS  Thoracic MRI images taken on 8/6/2020 personally reviewed with patient:  Alignment: Intact there is a mild rotoscoliotic curvature, convexity to the  right. This occurs at the thoracolumbar junction. There is no associated  hemivertebra or segmentation anomaly noted. Kyphosis minimal kyphotic curvature from T3 through T9 no associated collapse of  vertebral bodies identified  Vertebral body height: Normal  Marrow signal: Unremarkable  Disc spaces:  C7-T1: Central canal and neuroforamina intact normal  T2-3: Canal and neuroforamina are intact and normal  T3-4: Central canal and neuroforamina are intact and normal  T4-5: Central canal, right and left neuroforamina normal  T5-6: Intracanal right and left neuroforamina and normal  T6-7: Central canal and neuroforamina are normal  T7-8: Central canal and left neuroforamina and normal  T8-9: No central canal neuroforamina stenosis seen  T9-10 central canal neuroforamina and normal  T10-11: Central canal and neuroforamina are intact normal  T11-12: Central canal and neuroforamina are intact and normal  T12-L1: Central canal and left neuroforamina and normal  L1-2: Central canal and left neuroforamina and normal  Conus: At T12-L1     Pertinent axial imaging:      Incidental there is a moderate to large hiatal hernia present no fluid or  effusion identified no obstruction is suggested.     imaging of the adjacent soft tissues is unremarkable      IMPRESSION  IMPRESSION:     Mild rotoscoliosis convexity to the right     Minimal kyphotic angulation     Incidental hiatal hernia     Scoliotic XR images taken on 8/6/2020 personally reviewed with patient:  There is limited visualization of the inferior cervical and superior thoracic  spine on the lateral view secondary to overlying shoulder soft tissues.     There are 13 thoracic-type 5 nonrib-bearing lumbar-type vertebral bodies.  There  is a unilateral left-sided rib at the 13th thoracic vertebral body.     There is S-shaped scoliosis of the thoracolumbar spine. There is apex leftward  curvature centered at T4/T5, with a Levy angle of 16 degrees and apex rightward  curvature centered at T13, with a Levy angle of 27 degrees.     There is mild exaggeration of the thoracic kyphosis. There is mild 6 mm  left-sided upward pelvic tilt. There is no significant coronal or sagittal  imbalance.     Loop recorder device projects over the chest. Moderate size hiatal hernia.     Straightening of the cervical lordosis. Partial fusion of the C6/C7 vertebral  bodies, likely congenital. There is also fusion of the left-sided facet joints.     There is mild moderate multilevel degenerative disc disease in the thoracic  spine. Mild to moderate moderate multilevel disc space narrowing and endplate  spurring within the lumbar spine. Disc space narrowing and endplate spurring at T3/M3. No definite evidence of spondylolysis is assessment of alignment in the lumbar spine is limited due to patient rotation and scoliosis.     The visualized vertebral body heights are maintained. No spondylolisthesis  appreciated.        IMPRESSION  IMPRESSION:  1. S-shaped thoracolumbar scoliosis. No significant coronal or sagittal  balance. 2.  Exaggerated thoracic kyphosis. Mild left-sided upward pelvic tilt. 3.  Mild to moderate degenerative changes above. Partial C6/C7 congenital  fusion. 4.  Transitional anatomy with 13 thoracic type vertebral bodies. Lumbar MRI images taken on 7/11/19 personally reviewed with patient:  FINDINGS: All numbering assumes 5 lumbar type vertebrae. The alignment  demonstrates mild levocurvature. The marrow signal is normal. The cord  terminates at L1.  No cord signal abnormalities appreciated.     The abdominal aorta is without evidence of aneurysm. No paraaortic adenopathy  appreciated. The visualized kidneys are unremarkable.     L1-L2 level: Minimal degenerative changes.  No significant canal or neural  foraminal stenosis.     L2-L3: Mild facet arthropathy and mild degenerative disc disease. No significant  canal or neural foraminal stenosis.     L3-L4: Mild degenerative changes. No significant canal or neural foraminal  stenosis.     L4-L5: Mild facet arthropathy and degenerative disc disease. No significant  canal or neural foraminal stenosis.     L5-S1: Central disc protrusion with annular tear is noted. The protrusion is  mildly more conspicuous than prior imaging. There is possible abutment of the  descending left S1 nerve root as it crosses the disc level. Facet arthropathy is  noted. No significant neural foraminal stenosis.     IMPRESSION  Impression:  1.  Central disc protrusion at L5-S1 which is mildly increased in size compared  with most recent prior MRI. It potentially abuts the descending left S1 nerve  root. However, it does not appear to compress it.     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 Normal cervical alignment and vertebral body heights maintained. No pathologic marrow signal.The cervical cord is normal in signal and caliber. No cerebellar tonsillar ectopia. Paraspinal soft tissues are unremarkable.     C2-3: Normal looking disc. Central canal and neural foramen are widely patent.     C3-4: Minimal disc bulge. Central canal and neural foramen are widely patent.     C4-5: Minimal disc bulge. Central canal and neural foramen are widely patent.     C5-6: Minimal disc bulge. Central canal and neural foramen are widely patent.     C6-7: Partial congenital bony fusion. Central canal and neural foramen are  widely patent.     C7-T1: Normal looking disc.  Central canal and neural foramen are widely patent.     IMPRESSION  Impression:     Minimal disc bulging at C3-4, C4-5 and C5-6 without evidence of focal disc  pathology. No central canal or foraminal stenosis.     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     BUE EMG taken on 08/04/2017 personally reviewed with patient:  NCS/EMG FINDINGS:      · Evaluation of the Left median motor, the Right median motor, the Left ulnar motor, the Right ulnar motor, the Left Median 2nd Digit sensory, the Right Median 2nd Digit sensory, the Left ulnar sensory, and the Right ulnar sensory nerves were unremarkable.          INTERPRETATION: This was a normal nerve conduction and EMG study showing no signs of neuropathy myopathy or radiculopathy in the nerves and muscles tested.          Cervical MRI from 9/15/2014 personally reviewed with patient:  1. Stable appearance of cervical spine examination. 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 Mild multilevel degenerative changes. No high-grade spinal or foraminal  Stenosis.     Cervical MRI images taken on 11/23/2016 personally reviewed with patient:  Mild reversal of cervical curvature, centered at C3-C4, stable. Partial fusion  at C6-C7, stable. No compression fracture or pathologic marrow signal. No  prevertebral soft tissue abnormalities. Spinous processes and posterior soft  tissues unremarkable. Craniocervical junction and spinal cord are also  unremarkable. Dominant left vertebral artery, stable.      C2-C3: No disc herniation, central or foraminal stenosis.     C3-C4: Mild posterior disc bulge. No central stenosis, cord contact or foraminal  stenosis.     C4-C5: Mild posterior disc bulge with no central or foraminal stenosis.     C5-C6: Mild posterior disc bulge. No central or foraminal stenosis.     C6-7 and C7-T1: No disc herniation, central or foraminal stenosis.      IMPRESSION  IMPRESSION: Stable mild degenerative disease at C3-C4 and C4-C5.  No significant  central stenosis, cord compression or foraminal stenosis.     30 minutes of face-to-face contact were spent with the patient during today's visit extensively discussing symptoms and treatment plan. All questions were answered. More than half of this visit today was spent on counseling.      Written by Norman Dunne as dictated by Frederick Parekh MD

## 2020-08-13 ENCOUNTER — HOSPITAL ENCOUNTER (OUTPATIENT)
Dept: PHYSICAL THERAPY | Age: 40
Discharge: HOME | End: 2020-08-13
Payer: COMMERCIAL

## 2020-08-19 ENCOUNTER — HOSPITAL ENCOUNTER (OUTPATIENT)
Dept: PHYSICAL THERAPY | Age: 40
Discharge: HOME | End: 2020-08-19
Payer: COMMERCIAL

## 2020-08-19 PROCEDURE — 97110 THERAPEUTIC EXERCISES: CPT

## 2020-08-19 PROCEDURE — 97140 MANUAL THERAPY 1/> REGIONS: CPT

## 2020-08-19 NOTE — PROGRESS NOTES
PT DAILY TREATMENT NOTE 10-18    Patient Name: Silvio Artis  Date:2020  : 1980  [x]  Patient  Verified  Payor: [employer] / Plan: [employer] HMO / Product Type: HMO /    In time: 3:00  Out time: 3:45  Total Treatment Time (min): 45  Visit #: 4 of 10      Treatment Area: Right elbow pain [M25.521]  Left shoulder pain [M25.512]    SUBJECTIVE  Pain Level (0-10 scale): 2/10  Any medication changes, allergies to medications, adverse drug reactions, diagnosis change, or new procedure performed?: [x] No    [] Yes (see summary sheet for update)  Subjective functional status/changes:   [] No changes reported  Pt reports no right elbow pain. He states some continued left shoulder pain but overall less. He states he still has a band of numbness through his t/s but the MRI showed nothing but scoliosis. OBJECTIVE    45 min Therapeutic Exercise:  [x] See flow sheet :   Rationale: increase ROM, increase strength and improve coordination to improve the patients ability to complete household chores with decreased pain          With   [] TE   [] TA   [] neuro   [] other: Patient Education: [x] Review HEP    [] Progressed/Changed HEP based on:   [] positioning   [] body mechanics   [] transfers   [] heat/ice application    [] other:      Other Objective/Functional Measures:   Shoulder flexion MMT:4+/5  10 waist to shoulder lifts: slight left shoulder discomfort during final 2 reps  Pain at worst right elbow: 0/10  Pain at worst left shoulder: 4/10  FOTO: 63  Good stretch felt with right t/s stretching  Fatigue with left shoulder strengthening    Pain Level (0-10 scale) post treatment: 2/10    ASSESSMENT/Changes in Function: See Progress Note. Progress towards goals / Updated goals:  Short Term Goals: To be accomplished in 1 week  - Goal: Pt to be compliant with initial HEP to improve strength of bilateral shoulder/triceps musculature and improve seated posture.   Status at last note/certification: Established and reviewed with Pt  MET  Long Term Goals: To be accomplished in 10 treatments  - Goal: Pt to demonstrate 5/5 shoulder flexion strength at end-range flexion to improve ease of reaching/lifting to overhead cabinets  Status at last note/certification: 4+/5 right, 4/5 left with increased pain  Progressing 4+/5  - Goal: Pt to perform 10 waist to shoulder lifts without increased pain demonstrating little to no impinging positions at the shoulder to improve ease of lifting tasks. Status at last note/certification: pain in right elbow and left shoulder with waist to shoulder lifts; both shoulders naturally fall into internal rotation/impinging positions  No pain in right elbow and slight discomfort in left shoulder  - Goal: Pt to report less than or equal to 2/10 pain at worst in right elbow and left shoulder to increase ease of kitchen tasks. Status at last note/certification: 4/81 pain at worst  Progressing 4/10 today  Progressing 0/10 in right elbow and 4/10 in left shoulder  - Goal: Pt to report FOTO score of at least 70 pts to show improved function and quality of life.   Status at last note/certification: FOTO 61 pts   Progressing 63    PLAN  [x]  Upgrade activities as tolerated     [x]  Continue plan of care  []  Update interventions per flow sheet       []  Discharge due to:_  []  Other:_      Corinna Marino PTA 8/19/2020  8:23 AM    Future Appointments   Date Time Provider Cristo Buenrostro   8/19/2020  3:00 PM Morgan Tran [id number]   9/9/2020  1:00 PM [facility], PACER [address]   2/11/2021 10:00 AM Galilea Barbosa MD [facility]

## 2020-08-20 NOTE — PROGRESS NOTES
[facility] [place]  [address] [facility]  [phone] fax    Physical Therapy Progress Note  Patient name: Kuldip Giles Start of Care: 2020   Referral source: Serge Jensen MD : 1980   Medical/Treatment Diagnosis: Right elbow pain [M25.521]  Left shoulder pain [M25.512]  Payor: Laura Cervantesvereign / Plan: Amanda Liang HMO / Product Type: HMO /  Onset Date: 2020     Prior Hospitalization: see medical history Provider#: [id number]   Medications: Verified on Patient Summary List    Comorbidities: hx of chronic neck/back pain; IBS; vertigo; anxiety   Prior Level of Function: functionally independent, right handed, /     Visits from Start of Care: 4    Missed Visits: 3    Established Goals:         Excellent           Good         Limited           None  [x] Increased ROM   []  [x]  []  []  [x] Increased Strength  []  [x]  []  []  [x] Increased Mobility  []  [x]  []  []   [x] Decreased Pain   []  [x]  []  []  [] Decreased Swelling  []  []  []  []    Key Functional Changes: decreased right elbow and left shoulder pain; improved FOTO score; compliant with HEP and improving posture awareness    Updated Goals: to be achieved in 4 weeks:  1. Pt to demonstrate 5/5 shoulder flexion strength at end-range flexion to improve ease of reaching/lifting to overhead cabinets. 2. Pt to report less than or equal to 2/10 pain at worst in right elbow and left shoulder to increase ease of kitchen tasks. 3. Pt to report FOTO score of at least 70 pts to show improved function and quality of life. ASSESSMENT/RECOMMENDATIONS: Mr. Jennifer Haines is progressing well in therapy with decreased right elbow pain to 0/10 and pain at worst in the left shoulder of 4/10. He continues to have some pectoral soreness with progressive resistive exercises of the left shoulder but overall improving strength.  He has improved posture awareness and has been seeing a chiropractor to help with t/s hypomobility and pelvic alignment. He continues to report t/s numbness from T6-T10 but per the MRI with Dr. Chris Kay had normal alignment and spacing but with scoliotic curvature. Skilled PT is medically necessary to progress left shoulder strength for decreased pain to improve ease of performing ADLs. [x]Continue therapy per initial plan/protocol at a frequency of  1-2 x per week for 4 weeks  []Continue therapy with the following recommended changes:_____________________      _____________________________________________________________________  []Discontinue therapy progressing towards or have reached established goals  []Discontinue therapy due to lack of appreciable progress towards goals  []Discontinue therapy due to lack of attendance or compliance  []Await Physician's recommendations/decisions regarding therapy  []Other:________________________________________________________________    Thank you for this referral.   Munira Dahl, PTA 8/20/2020 9:29 AM    NOTE TO PHYSICIAN:  [address] AND   FAX TO [facility]: [phone]  If you are unable to process this request in 24 hours please contact our office: [phone] I have read the above report and request that my patient continue as recommended. ? I have read the above report and request that my patient continue therapy with the following changes/special instructions:____________________________________  ? I have read the above report and request that my patient be discharged from therapy.     Physicians signature: ______________________________Date: ______Time:______

## 2020-09-09 ENCOUNTER — OFFICE VISIT (OUTPATIENT)
Dept: CARDIOLOGY CLINIC | Age: 40
End: 2020-09-09

## 2020-09-09 DIAGNOSIS — Z95.9 CARDIAC DEVICE IN SITU: ICD-10-CM

## 2020-09-09 DIAGNOSIS — R00.2 PALPITATIONS: Primary | ICD-10-CM

## 2020-09-09 DIAGNOSIS — I47.1 PAROXYSMAL SUPRAVENTRICULAR TACHYCARDIA (HCC): ICD-10-CM

## 2020-09-10 ENCOUNTER — HOSPITAL ENCOUNTER (OUTPATIENT)
Dept: PHYSICAL THERAPY | Age: 40
Discharge: HOME | End: 2020-09-10
Payer: COMMERCIAL

## 2020-09-10 PROCEDURE — 97110 THERAPEUTIC EXERCISES: CPT

## 2020-09-10 PROCEDURE — 97140 MANUAL THERAPY 1/> REGIONS: CPT

## 2020-09-10 NOTE — PROGRESS NOTES
PT DAILY TREATMENT NOTE 10-18    Patient Name: Jelani Lorenzana  Date:9/10/2020  : 1980  [x]  Patient  Verified  Payor: Lito Jimenez / Plan: [employer] / Product Type: HMO /    In time: 2:17  Out time: 3:00  Total Treatment Time (min): 43  Visit #: 1 of 8      Treatment Area: Right elbow pain [M25.521]  Left shoulder pain [M25.512]    SUBJECTIVE  Pain Level (0-10 scale): 2/10 shoulder  Any medication changes, allergies to medications, adverse drug reactions, diagnosis change, or new procedure performed?: [x] No    [] Yes (see summary sheet for update)  Subjective functional status/changes:   [] No changes reported  Pt reports no elbow pain and decreased pain overall in the left shoulder. He reports minor pulling in the front of the left shoulder with pushing/pulling motions in the pecs. OBJECTIVE    33 min Therapeutic Exercise:  [x] See flow sheet :   Rationale: increase ROM, increase strength and improve coordination to improve the patients ability to complete household chores with decreased pain    10 minutes of manual therapy for TPR to left pectoralis major and pectoralis minor          With   [] TE   [] TA   [] neuro   [] other: Patient Education: [x] Review HEP    [] Progressed/Changed HEP based on:   [] positioning   [] body mechanics   [] transfers   [] heat/ice application    [] other:      Other Objective/Functional Measures:   Multiple TPs in the left pectorals  Added PNF diagonals for posterior kinetic chain strengthening  Educated on t/s rotation with ball at wall to limit l/s compensation; decreased right rotation compared to left rotation    Pain Level (0-10 scale) post treatment: 2/10    ASSESSMENT/Changes in Function: Pt progressing with decreased left shoulder pain. He mainly has some residual left pectoral TPs with decreased eccentric strength. He continues with forward shoulder posture and decreased t/s mobility.  Skilled PT is medically necessary to progress t/s mobility and improve left pec strength for decreased pain with ADLs. Updated Goals: to be achieved in 4 weeks:  1. Pt to demonstrate 5/5 shoulder flexion strength at end-range flexion to improve ease of reaching/lifting to overhead cabinets. 2. Pt to report less than or equal to 2/10 pain at worst in right elbow and left shoulder to increase ease of kitchen tasks. 3. Pt to report FOTO score of at least 70 pts to show improved function and quality of life.     PLAN  [x]  Upgrade activities as tolerated     [x]  Continue plan of care  []  Update interventions per flow sheet       []  Discharge due to:_  []  Other:_      Antonia Vo PTA 9/10/2020  8:23 AM    Future Appointments   Date Time Provider Cristo Buenrostro   9/10/2020  2:15 PM Joanna Zeng [facility]   11/9/2020  2:40 PM Stefan Gomes DO [address] [place] [address]   2/11/2021 10:00 AM Genaro Parikh  [facility]

## 2020-09-14 ENCOUNTER — HOSPITAL ENCOUNTER (OUTPATIENT)
Dept: PHYSICAL THERAPY | Age: 40
Discharge: HOME | End: 2020-09-14
Payer: COMMERCIAL

## 2020-09-23 ENCOUNTER — HOSPITAL ENCOUNTER (OUTPATIENT)
Dept: PHYSICAL THERAPY | Age: 40
Discharge: HOME | End: 2020-09-23
Payer: COMMERCIAL

## 2020-09-23 PROCEDURE — 97530 THERAPEUTIC ACTIVITIES: CPT

## 2020-09-23 PROCEDURE — 97110 THERAPEUTIC EXERCISES: CPT

## 2020-09-23 NOTE — PROGRESS NOTES
PT DISCHARGE DAILY NOTE AND [id number]    Patient name: Liz Mar Start of Care: 2020   Referral source: Adelaida Suresh MD : 1980   Medical/Treatment Diagnosis: Right elbow pain [M25.521]  Left shoulder pain [M25.512]  Payor: [employer] / Plan: [address] [place] HMO / Product Type: HMO /  Onset Date: 2020      Prior Hospitalization: see medical history Provider#: [id number]   Medications: Verified on Patient Summary List     Comorbidities: hx of chronic neck/back pain; IBS; vertigo; anxiety   Prior Level of Function: functionally independent, right handed, /     Visits from [facility]: 6                                      Missed Visits: 4      Reporting Period : 20 to 20    Date:2020  : 1980  [x]  Patient  Verified  Payor: [email or web address] / Plan: [employer] / Product Type: HMO /    In time:3:04  Out time:3:48  Total Treatment Time (min): 44  Visit #: 2 of 8    Medicare/BCBS Only   Total Timed Codes (min):   1:1 Treatment Time:         SUBJECTIVE  Pain Level (0-10 scale): 0  Any medication changes, allergies to medications, adverse drug reactions, diagnosis change, or new procedure performed?: [x] No    [] Yes (see summary sheet for update)  Subjective functional status/changes:   [] No changes reported  My shoulder is doing pretty good now, I think I am ok for discharge. OBJECTIVE    15 min Therapeutic Exercise:  [x] See flow sheet :   Rationale: increase ROM, increase strength and improve coordination to improve the patients ability to perform UE lifting with improved shoulder strength, endurance, and mobility. 29 min Therapeutic Activity:  [x]  See flow sheet : FOTO, progress towards goals, discharge instructions, updated HEP   Rationale: improve understanding of exercises  to improve the patients ability to maintain therapy gains after discharge.              With   [] TE   [] TA   [] neuro   [] other: Patient Education: [x] Review HEP    [] Progressed/Changed HEP based on:   [] positioning   [] body mechanics   [] transfers   [] heat/ice application    [] other:      Other Objective/Functional Measures:   Functional Gains: pain in left pectoral has diminished drastically, easier lifting litter box, easier lifting overhead on the shoulder  Functional Deficits: lingering neck and back pain, tingling in mid-back on left side  Pain   Average: 0/10       Best: 0/10     Worst: 1/10       Pain Level (0-10 scale) post treatment: 0    Summary of Care:  1. Pt to demonstrate 5/5 shoulder flexion strength at end-range flexion to improve ease of reaching/lifting to overhead cabinets. Current: met, 5/5 at end range without pain  2. Pt to report less than or equal to 2/10 pain at worst in right elbow and left shoulder to increase ease of kitchen tasks. Current: met, 1/10 at worst  3. Pt to report FOTO score of at least 70 pts to show improved function and quality of life. Current: progressing, 68 pts       ASSESSMENT/Changes in Function: Pt attended therapy consistently for 6 visits for the treatment of left shoulder pain and right elbow pain. At this point, the patient reports significant improvement since [facility] with specific improvements in the following areas: improved end range shoulder strength, improved pain levels at worst, increased understanding of scapular protraction/retraction's effect on posture, and improved ease of overhead lifting . The patient has also demonstrated improvement in his FOTO score from 61 pts to 68 pts, demonstrating improved function in the home and community. The patient is appropriate for discharge at this time with a comprehensive HEP and will follow up with our office about any questions that arise following discharge.  Thank you for this referral.      Thank you for this referral!      PLAN  [x]Discontinue therapy: [x]Patient has reached or is progressing toward set goals      []Patient is non-compliant or has abdicated      []Due to lack of appreciable progress towards set goals    Thierry Finnegan 9/23/2020  3:13 PM

## 2020-09-23 NOTE — PROGRESS NOTES
Physical Therapy Discharge Instructions      [facility] [address]  [address] [facility]  [phone] fax    Patient: Simone Menchaca  : 1980      Continue Home Exercise Program 1 times per day for 1 weeks, then decrease to 1-2 times per week      Continue with    [x] Ice  as needed 2-3 times per day     [x] Heat           Follow up with MD:     [x] Upon completion of therapy     [] As needed      Recommendations:     [x]   Return to activity with home program    []   Return to activity with the following modifications:       []Post Rehab Program    []Join Independent aquatic program     []Return to/join local gym        Additional Comments: Take care! Feel free to reach out if you have any questions.           Adela Mesa 2020 3:48 PM

## 2020-09-25 ENCOUNTER — APPOINTMENT (OUTPATIENT)
Dept: PHYSICAL THERAPY | Age: 40
End: 2020-09-25
Payer: COMMERCIAL

## 2020-09-28 NOTE — PROGRESS NOTES
I have personally seen and evaluated the device findings. Interrogation reviewed and I agree with assessment.     Minnie Valdez

## 2020-11-09 ENCOUNTER — OFFICE VISIT (OUTPATIENT)
Dept: CARDIOLOGY CLINIC | Age: 40
End: 2020-11-09
Payer: COMMERCIAL

## 2020-11-09 VITALS
DIASTOLIC BLOOD PRESSURE: 60 MMHG | HEART RATE: 70 BPM | HEIGHT: 65 IN | OXYGEN SATURATION: 97 % | BODY MASS INDEX: 25.16 KG/M2 | SYSTOLIC BLOOD PRESSURE: 94 MMHG | WEIGHT: 151 LBS

## 2020-11-09 DIAGNOSIS — R00.2 PALPITATIONS: ICD-10-CM

## 2020-11-09 DIAGNOSIS — Z95.9 CARDIAC DEVICE IN SITU: ICD-10-CM

## 2020-11-09 DIAGNOSIS — R55 SYNCOPE AND COLLAPSE: ICD-10-CM

## 2020-11-09 DIAGNOSIS — I47.1 PAROXYSMAL SUPRAVENTRICULAR TACHYCARDIA (HCC): Primary | ICD-10-CM

## 2020-11-09 PROCEDURE — 93000 ELECTROCARDIOGRAM COMPLETE: CPT | Performed by: INTERNAL MEDICINE

## 2020-11-09 PROCEDURE — 99214 OFFICE O/P EST MOD 30 MIN: CPT | Performed by: INTERNAL MEDICINE

## 2020-11-09 NOTE — PROGRESS NOTES
Jose L Rodriguez presents today for   Chief Complaint   Patient presents with    Follow-up     1 year follow up        Jose L Rodriguez preferred language for health care discussion is english/other. Is someone accompanying this pt? no    Is the patient using any DME equipment during OV? no    Learning Assessment:  Learning Assessment 3/7/2016   PRIMARY LEARNER Patient   PRIMARY LANGUAGE ENGLISH   LEARNER PREFERENCE PRIMARY DEMONSTRATION     READING   ANSWERED BY patient   RELATIONSHIP SELF       Pt currently taking Anticoagulant therapy? no    Coordination of Care:  1. Have you been to the ER, urgent care clinic since your last visit? Hospitalized since your last visit? no    2. Have you seen or consulted any other health care providers outside of the [address] [place] [address] since your last visit? Include any pap smears or colon screening.  no

## 2020-11-09 NOTE — PROGRESS NOTES
Review of Systems   Constitutional: Positive for malaise/fatigue. Negative for chills, diaphoresis, fever and weight loss. Respiratory: Positive for cough. Cardiovascular: Positive for palpitations. Negative for chest pain, orthopnea, claudication, leg swelling and PND. Gastrointestinal: Positive for abdominal pain, constipation and heartburn. Negative for blood in stool, diarrhea, melena, nausea and vomiting. Musculoskeletal: Positive for myalgias. Negative for back pain, falls, joint pain and neck pain. Neurological: Positive for dizziness.

## 2020-11-09 NOTE — PROGRESS NOTES
HPI: I saw Faby Mayfield in my office today in cardiovascular evaluation regarding his problems with palpitations in the past and some dizziness issues. Mr. Sacha Sanders is a pleasant 35 year old white male with history of palpitations and some anxiety issues with Holters in the past, dating back to 2006, showing some PACs and reconfirmed on a Holter in November of 2014.       He also has history of dizziness problems, which back in November of 2012 prompted us to do a tilt table test and the stress portion of that test using sublingual nitroglycerin demonstrated severe bradycardia with junctional rhythm and an 8 second pause secondary to very high vagal tone. We decided not to place a pacemaker and the patient has had no further syncopal episodes, but he still complaints of some vague lightheadedness from time to time. He unfortunately continues to smoke a pack of cigarettes per day. He has been somewhat concerned about the possibility of developing heart disease, so we did do a calcium score on him back in February of 2011, which was 0, suggesting he has a very low ten year risk of cardiovascular event.      Due to his frequent palpitations and we did ultimately place a loop recorder in October 2018.  Since that time the patient has complained of significant palpitations and the subsequent strips have for the most part demonstrated what appeared to be sinus tachycardia. Janel Benoit have been having some episodes with appears to be right bundle branch block aberration supraventricular tachycardia with some ventricular ectopy cannot be excluded.  The patient's palpitations seem to primarily be related to sinus tachycardia and this gentleman has not been placed on beta-blockers due to his history of pauses on his tilt table test as described above.     He comes in today and continues to complain of the same palpitations but they are usually described as just a skipped heartbeat and they can occur a few times in 1 day but he has more recently noticed them infrequently and at times he will notice them for 3 or 4 days. He really denies any other cardiovascular complaints. Encounter Diagnoses   Name Primary?  Palpitations     Cardiac device in situ     Paroxysmal supraventricular tachycardia (HCC) Yes    Syncope and collapse only at the time of NTG given for TILT table test with marked bradycardia and 8 second pause in 2012        Discussion: This patient appears to be doing about as well as we could expect and really of no recommendations for change. He continues to have some palpitations which are essentially asymptomatic and when we have checked his loop recorder which we did in September 2020 over 3 months we really only saw few questionable PACs and sinus tachycardia without any other cardiovascular abnormalities. He has had no further syncopal episodes and is not having any other cardiovascular symptoms. Historically, his cholesterols have not been significantly elevated and his only other issue is that he does have a little lightheadedness from time to time and generally has a low normal blood pressure but no other clear cardiovascular issues so I am simply going to continue to check his event monitor with a [facility] every 3 months and have him follow-up with my associate Dr. Alphonse Posey in a year or sooner if his palpitations worsen.      PCP:  Devante Seen, DO      Past Medical History:   Diagnosis Date    Anxiety     Arm fatigue     Forearms    Balance problems     Cardiac Agatston CAC score 100-199 02/04/2015    Coronary calcium score 0.    Cardiac echocardiogram 10/29/2014    EF 55-60%. No RWMA. Normal diastolic fx. RVSP 30 mmHg.  Cardiac Holter monitor study 10/29/2014    Normal Holter study.     Chronic cough     chronically coughing up sputum    Depression     Dizziness     Dyspnea     Esophageal reflux     Headache     Hypercholesterolemia     Lumbar pain     Myofascial pain     Neck pain     Numbness and tingling     PAC (premature atrial contraction)     Palpitations     presumably benign    Panic disorder     Reflux     Thoracic back pain     Mid-thoracic    Upper extremity weakness     Vertigo, intermittent          Past Surgical History:   Procedure Laterality Date    APPENDECTOMY  11/23/2010    HX HEENT      corrective hearing for left ear     HX HEENT      plastic surgery on both ears    HX HEENT  10/08 & 09/09    Reformed jaw bone, bone graft    HX RENAL BIOPSY      HX TONSILLECTOMY      CT IMPLANTATION PT-ACTIVATED CARDIAC EVENT RECORDER N/A 10/24/2018    Loop Recorder Insert performed by Asberry Nissen, MD at [facility] CATH LAB       Current Outpatient Medications   Medication Sig    diclofenac (VOLTAREN) 1 % gel Apply 4 g to affected area four (4) times daily.  escitalopram oxalate (LEXAPRO) 20 mg tablet TAKE 1 TABLET BY MOUTH EVERY DAY    ALPRAZolam (XANAX) 0.5 mg tablet Take 0.5 mg by mouth two (2) times a day.  ascorbic acid (VITAMIN C) 500 mg tablet Take 1,000 mg by mouth daily.  cholecalciferol, vitamin D3, (VITAMIN D3) 2,000 unit Tab Take 1 Tab by mouth daily.  omeprazole-sodium bicarbonate (ZEGERID) 40-1.1 mg-gram capsule Take 2 Caps by mouth daily.  acetaminophen (TYLENOL EXTRA STRENGTH) 500 mg tablet Take 500 mg by mouth every six (6) hours as needed.  MULTIVITAMIN PO Take 1 Tab by mouth daily. No current facility-administered medications for this visit. Allergies   Allergen Reactions    Opioids - Morphine Analogues Myalgia    Other Medication Other (comments)     Pt reports allergy to steroids, with psychological side effects    Pollen Extracts Unable to Obtain    Zithromax [Azithromycin] Itching       Review of Systems:  Constitutional: Positive for malaise/fatigue. Negative for chills, diaphoresis, fever and weight loss. Respiratory: Positive for cough. Cardiovascular: Positive for palpitations.  Negative for chest pain, orthopnea, claudication, leg swelling and PND. Gastrointestinal: Positive for abdominal pain, constipation and heartburn. Negative for blood in stool, diarrhea, melena, nausea and vomiting. Musculoskeletal: Positive for myalgias. Negative for back pain, falls, joint pain and neck pain. Neurological: Positive for dizziness. Physical Exam:  This is a well-developed, well-nourished, anxious 34year-old  male in no acute distress at the time of the examination. Visit Vitals  BP 94/60 (BP 1 Location: Left arm, BP Patient Position: Sitting)   Pulse 70   Ht 5' 5\" (1.651 m)   Wt 151 lb (68.5 kg)   SpO2 97%   BMI 25.13 kg/m²       HEENT: Conjuctiva white, mucosa moist, no pallor or cyanosis. NECK: Supple without masses, tenderness or thyromegaly. There was no jugular venous distention. Carotid are full bilaterally without bruits. CHEST: Symmetrical with good excursion. LUNGS: Clear to auscultation in all fields. HEART: The apex is not displaced. There were no lifts, thrills or heaves. There is a normal S1 and S2 without appreciable murmurs rubs clicks or gallops auscultated. ABDOMEN: Soft without masses, tenderness or organomegaly. EXTREMITIES: Full peripheral pulses without peripheral edema. Review of Data: Please refer to past medical history for most recent cardiac testing. Results for orders placed or performed in visit on 09/09/19   AMB POC EKG ROUTINE W/ 12 LEADS, INTER & REP     Status: None    Narrative    Normal sinus rhythm rate 72. Low voltage in the limb leads. This EKG is within normal limits and similar to the EKG of May 2, 2019. Anamaria Jasso D.O., F.A.C.C. [facility]  [facility]  [address] [facility] [address]. [address] [place]    PLEASE NOTE:  This document has been produced using voice recognition software. Unrecognized errors in transcription may be present.

## 2021-02-17 ENCOUNTER — OFFICE VISIT (OUTPATIENT)
Dept: CARDIOLOGY CLINIC | Age: 41
End: 2021-02-17

## 2021-02-17 DIAGNOSIS — R00.2 PALPITATIONS: ICD-10-CM

## 2021-02-17 DIAGNOSIS — Z95.9 CARDIAC DEVICE IN SITU: Primary | ICD-10-CM

## 2021-02-17 PROCEDURE — 93298 REM INTERROG DEV EVAL SCRMS: CPT | Performed by: INTERNAL MEDICINE

## 2021-02-22 NOTE — PROGRESS NOTES
I have personally seen and evaluated the device findings. Interrogation reviewed and I agree with assessment.     Morales Rodriguezutter  Sinus with rare pvc's

## 2021-03-12 ENCOUNTER — OFFICE VISIT (OUTPATIENT)
Dept: CARDIOLOGY CLINIC | Age: 41
End: 2021-03-12
Payer: COMMERCIAL

## 2021-03-12 VITALS
DIASTOLIC BLOOD PRESSURE: 60 MMHG | HEART RATE: 79 BPM | HEIGHT: 65 IN | BODY MASS INDEX: 25.33 KG/M2 | OXYGEN SATURATION: 97 % | WEIGHT: 152 LBS | SYSTOLIC BLOOD PRESSURE: 100 MMHG

## 2021-03-12 DIAGNOSIS — R00.2 PALPITATIONS: Primary | ICD-10-CM

## 2021-03-12 PROCEDURE — 99213 OFFICE O/P EST LOW 20 MIN: CPT | Performed by: NURSE PRACTITIONER

## 2021-03-12 NOTE — PATIENT INSTRUCTIONS
Plan: - Continue current medication regimen. - Referral to electrophysiologist: Dr. Trista Calero re: palpitations.

## 2021-03-12 NOTE — PROGRESS NOTES
Carlee Moran presents today for   Chief Complaint   Patient presents with    Follow-up     increased palpitations       Carlee Elizondor preferred language for health care discussion is english/other. Is someone accompanying this pt? no    Is the patient using any DME equipment during [address]? no    Depression Screening:  3 most recent PHQ Screens 3/12/2021   Little interest or pleasure in doing things Not at all   Feeling down, depressed, irritable, or hopeless Not at all   Total Score PHQ 2 0       Learning Assessment:  Learning Assessment 3/12/2021   PRIMARY LEARNER Patient   PRIMARY LANGUAGE ENGLISH   LEARNER PREFERENCE PRIMARY DEMONSTRATION     -   ANSWERED BY patient   RELATIONSHIP SELF       Abuse Screening:  Abuse Screening Questionnaire 3/12/2021   Do you ever feel afraid of your partner? N   Are you in a relationship with someone who physically or mentally threatens you? N   Is it safe for you to go home? Y       Fall Risk  Fall Risk Assessment, last 12 mths 3/12/2021   Able to walk? Yes   Fall in past 12 months? 0   Do you feel unsteady? 0   Are you worried about falling 0       Pt currently taking Anticoagulant therapy? no    Coordination of Care:  1. Have you been to the ER, urgent care clinic since your last visit? Hospitalized since your last visit? no    2. Have you seen or consulted any other health care providers outside of the [address] [place] [address] since your last visit? Include any pap smears or colon screening.  no

## 2021-04-12 NOTE — PROGRESS NOTES
I have personally seen and evaluated the device findings. Interrogation reviewed and I agree with assessment.     Mable Drew

## 2021-04-21 NOTE — PROGRESS NOTES
I have personally seen and evaluated the device findings. Interrogation reviewed and I agree with assessment.     Jesus Cunningham

## 2021-04-29 ENCOUNTER — CLINICAL SUPPORT (OUTPATIENT)
Dept: CARDIOLOGY CLINIC | Age: 41
End: 2021-04-29

## 2021-04-29 ENCOUNTER — OFFICE VISIT (OUTPATIENT)
Dept: CARDIOLOGY CLINIC | Age: 41
End: 2021-04-29
Payer: COMMERCIAL

## 2021-04-29 VITALS
OXYGEN SATURATION: 98 % | HEART RATE: 71 BPM | DIASTOLIC BLOOD PRESSURE: 60 MMHG | HEIGHT: 65 IN | BODY MASS INDEX: 25.49 KG/M2 | SYSTOLIC BLOOD PRESSURE: 100 MMHG | WEIGHT: 153 LBS

## 2021-04-29 DIAGNOSIS — R55 SYNCOPE AND COLLAPSE: ICD-10-CM

## 2021-04-29 DIAGNOSIS — I47.1 PAROXYSMAL SUPRAVENTRICULAR TACHYCARDIA (HCC): ICD-10-CM

## 2021-04-29 DIAGNOSIS — Z95.9 CARDIAC DEVICE IN SITU: Primary | ICD-10-CM

## 2021-04-29 DIAGNOSIS — R00.2 PALPITATIONS: ICD-10-CM

## 2021-04-29 DIAGNOSIS — Z95.9 CARDIAC DEVICE IN SITU: ICD-10-CM

## 2021-04-29 DIAGNOSIS — R00.2 PALPITATIONS: Primary | ICD-10-CM

## 2021-04-29 DIAGNOSIS — R42 DIZZINESS AND GIDDINESS: ICD-10-CM

## 2021-04-29 PROCEDURE — 99214 OFFICE O/P EST MOD 30 MIN: CPT | Performed by: INTERNAL MEDICINE

## 2021-04-29 NOTE — PROGRESS NOTES
History of Present Illness:  41year old male here for follow up, last seen by Dr. Demetrio Smith November, 2020. He had a subcutaneous loop recorder placed in 2018 for palpitations, remote syncope. He has known vasovagal syncope with 8 second pause with previous tilt table test in 2012. Continues to have palpitations regularly, despite his loop recorder showing only sinus rhythm to sinus arrhythmia, no pathological arrhythmias. He has had no syncope. Impression:  1. Recurrent palpitations correlating with sinus rhythm. 2. Subcutaneous loop recorder 2019 with only sinus arrhythmia. 3. Remote vasovagal syncope with 8 second pause by tilt table test 2012. 4. Chronically low blood pressure. 5. Hiatal hernia. Plan:  I recommended he continue with aggressive hydration. I will see him back in six months. Okay for surgery if needed for his hernia. Last echocardiogram 2014 with normal function. Past Medical History:   Diagnosis Date    Anxiety     Arm fatigue     Forearms    Balance problems     Cardiac Agatston CAC score 100-199 02/04/2015    Coronary calcium score 0.    Cardiac echocardiogram 10/29/2014    EF 55-60%. No RWMA. Normal diastolic fx. RVSP 30 mmHg.  Cardiac Holter monitor study 10/29/2014    Normal Holter study.  Chronic cough     chronically coughing up sputum    Depression     Dizziness     Dyspnea     Esophageal reflux     Headache     Hypercholesterolemia     Lumbar pain     Myofascial pain     Neck pain     Numbness and tingling     PAC (premature atrial contraction)     Palpitations     presumably benign    Panic disorder     Reflux     Thoracic back pain     Mid-thoracic    Upper extremity weakness     Vertigo, intermittent        Current Outpatient Medications   Medication Sig Dispense Refill    diclofenac (VOLTAREN) 1 % gel Apply 4 g to affected area four (4) times daily.  5 Each 5    escitalopram oxalate (LEXAPRO) 20 mg tablet TAKE 1 TABLET BY MOUTH EVERY DAY  1    ALPRAZolam (XANAX) 0.5 mg tablet Take 0.5 mg by mouth two (2) times a day.  ascorbic acid (VITAMIN C) 500 mg tablet Take 1,000 mg by mouth daily.  cholecalciferol, vitamin D3, (VITAMIN D3) 2,000 unit Tab Take 1 Tab by mouth daily.  omeprazole-sodium bicarbonate (ZEGERID) 40-1.1 mg-gram capsule Take 2 Caps by mouth daily.  acetaminophen (TYLENOL EXTRA STRENGTH) 500 mg tablet Take 500 mg by mouth every six (6) hours as needed.  MULTIVITAMIN PO Take 1 Tab by mouth daily. Social History   reports that he has been smoking cigarettes. He has a 20.00 pack-year smoking history. He has never used smokeless tobacco.   reports no history of alcohol use. Family History  family history includes Hypertension in his father and mother; Liver Disease in his father. Review of Systems  Except as stated above include:  Constitutional: Negative for fever, chills and malaise/fatigue. HEENT: No congestion or recent URI. Gastrointestinal: No nausea, vomiting, abdominal pain, bloody stools. Pulmonary:  Negative except as stated above. Cardiac:  Negative except as stated above. Musculoskeletal: Negative except as stated above. Neurological:  No localized symptoms. Skin:  Negative except as stated above. Psych:  Negative except as stated above. Endocrine:  Negative except as stated above. PHYSICAL EXAM  BP Readings from Last 3 Encounters:   04/29/21 100/60   03/12/21 100/60   11/09/20 94/60     Pulse Readings from Last 3 Encounters:   04/29/21 71   03/12/21 79   11/09/20 70     Wt Readings from Last 3 Encounters:   04/29/21 69.4 kg (153 lb)   03/12/21 68.9 kg (152 lb)   11/09/20 68.5 kg (151 lb)     General:   Well developed, well groomed. Head/Neck:   No obvious jugular venous distention     No obvious carotid pulsations. No evidence of xanthelasma. Lungs:   No respiratory distress. Clear bilaterally. Heart:  Regular rate and rhythm. Normal S1/S2.       Palpation grossly normal.    No significant murmurs, rubs or gallops. Abdomen:   Non-acute abdomen. No obvious pulsations. Extremities:   Intact peripheral pulses. No significant edema. Neurological:   Alert and oriented to person, place, time. No focal neurological deficit visually. Skin:   No obvious rash    Blood Pressure Metric:  Monitor recommended and adjustments stated if needed.

## 2021-04-29 NOTE — PROGRESS NOTES
Mariel Hidalgo presents today for   Chief Complaint   Patient presents with    Follow-up     6 month follow up        Mariel Hidalgo preferred language for health care discussion is english/other. Is someone accompanying this pt? no    Is the patient using any DME equipment during [address]? no    Depression Screening:  3 most recent PHQ Screens 3/12/2021   Little interest or pleasure in doing things Not at all   Feeling down, depressed, irritable, or hopeless Not at all   Total Score PHQ 2 0       Learning Assessment:  Learning Assessment 3/12/2021   PRIMARY LEARNER Patient   PRIMARY LANGUAGE ENGLISH   LEARNER PREFERENCE PRIMARY DEMONSTRATION     -   ANSWERED BY patient   RELATIONSHIP SELF       Abuse Screening:  Abuse Screening Questionnaire 3/12/2021   Do you ever feel afraid of your partner? N   Are you in a relationship with someone who physically or mentally threatens you? N   Is it safe for you to go home? Y       Fall Risk  Fall Risk Assessment, last 12 mths 3/12/2021   Able to walk? Yes   Fall in past 12 months? 0   Do you feel unsteady? 0   Are you worried about falling 0       Pt currently taking Anticoagulant therapy? no    Coordination of Care:  1. Have you been to the ER, urgent care clinic since your last visit? Hospitalized since your last visit? no    2. Have you seen or consulted any other health care providers outside of the [address] [place] [address] since your last visit? Include any pap smears or colon screening.  no

## 2021-05-04 NOTE — PROGRESS NOTES
I have personally seen and evaluated the device findings. Interrogation reviewed and I agree with assessment.     Roberto Pandya

## 2021-06-08 ENCOUNTER — OFFICE VISIT (OUTPATIENT)
Dept: ORTHOPEDIC SURGERY | Age: 41
End: 2021-06-08
Payer: COMMERCIAL

## 2021-06-08 VITALS
TEMPERATURE: 97.8 F | BODY MASS INDEX: 25.49 KG/M2 | HEIGHT: 65 IN | WEIGHT: 153 LBS | HEART RATE: 87 BPM | RESPIRATION RATE: 12 BRPM | OXYGEN SATURATION: 96 %

## 2021-06-08 DIAGNOSIS — M54.59 MECHANICAL LOW BACK PAIN: Primary | ICD-10-CM

## 2021-06-08 DIAGNOSIS — M79.18 MYOFASCIAL PAIN: ICD-10-CM

## 2021-06-08 DIAGNOSIS — M79.18 CERVICAL MYOFASCIAL PAIN SYNDROME: ICD-10-CM

## 2021-06-08 PROCEDURE — 99213 OFFICE O/P EST LOW 20 MIN: CPT | Performed by: PHYSICAL MEDICINE & REHABILITATION

## 2021-06-08 NOTE — PROGRESS NOTES
[address]  [address], [address]  [place]  Phone: [phone]  Fax: [phone]        Denise Hale  : 1980  PCP: Oren Andrews DO  2021    PROGRESS NOTE      HISTORY OF PRESENT ILLNESS  Shavonne Thompson is a 35 y.o. male with a history of chronic neck pain. He did not find relief from PT, topical creams, or NSAIDs. He was prescribed Celebrex. He returned with c/o a headache that was consistent with occipital neuralgia, radiating from his left eye to a region below his left ear. He returned in  with a new problem of LUE paraesthesia into the hand. He also c/o chronic left shoulder pain. He underwent a BUE EMG 17: This was a normal nerve conduction and EMG study showing no signs of neuropathy myopathy or radiculopathy in the nerves and muscles tested. He returned 3/6/18 for a new low back pain that he contributed to his new mattress. He continued to have neck pain and a heavy sensation in the RUE in a C7/8 vs ulnar nerve distribution that tended to be worse in the mornings. Cervical MRI 18: Minimal disc bulging at C3-4, C4-5 and C5-6 without evidence of focal disc pathology. No central canal or foraminal stenosis. 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 He reported improvement in his RUE numbness, but c/o a \"jerking\" in the RLE while sleeping.     He returned 3/7/19 for a PRN f/u. He noted that he had a burning pain in his neck and upper back (L>R) as he was inconsistent with maintaining an HEP. His symptoms improved once he began maintaining an HEP. He saw a neurologist for headaches. His back pain was exacerbated when he went to see a massage therapist who stretched him out beforehand. He noted that his entire back \"seized up\" but the massage helped loosen it up. He is intolerant to steroids. [facility] with some relief. He was given a shoe lift for his left side.  He also c/o neck spasms and difficulty swallowing. He was cleared by an ENT, and he was advised by a neurologist it could be related to his neck muscles. He also noted that his equilibrium has been off since his neck pain has increased. Lumbar MRI 7/11/19: Central disc protrusion at L5-S1 which is mildly increased in size compared with most recent prior MRI. It potentially abuts the descending left S1 nerve root. However, it does not appear to compress it. He was seen by Dr. Geraldine Gimenez offered trigger point injections, dry needling/acupuncture, and TENS unit for his neck and upper back pain. He underwent continuous chiropractic care (Dr. Jonathan Gunter) with benefit. He continued to c/o LUE (ulnar distribution) and LLE paraesthesia. Pt reported lateral left hip pain that woke him up at night when he slept on his left side. Of note, he had a snapping ulnar nerve bilaterally. He also reported a flare up of his left biceps tendinopathy pain. The shooting pains down the RLE  improved, but he c/o hamstring and right groin pain that was worse with walking. He described the groin pain as a stretching pain. He confirmed a positive C sign. He also c/o an exacerbation of his chronic right elbow pain. His neck and back pain flared up since he was not able to go to the chiropractor due to [address] Dr. Arabella Camacho who referred him to an orthotist for an updated shoe lift. The orthotist discussed the option/possibility of a SpineCor brace for his scoliosis. Dr. Arabella Camacho also encouraged he employ hip mobility stretching in the morning. He anticipates a cortisone injection and MRI of the right hip if his condition does not improve. He saw Dr. Link Trevino for the right elbow and the left shoulder pain - he begins PT tomorrow for them both. He c/o exacerbation of his thoracolumbar pain with a tingling sensation.  He may have exacerbated his pain when he was going through his old baseball cards or since he has been [facility] saw the prosthetist and they are considering a SpineCor brace. Thoracic spine MRI dated 8/6/2020 reviewed. Per report, Mild rotoscoliosis convexity to the right. Minimal kyphotic angulation. Incidental hiatal hernia. Scoliotic XR dated 8/6/2020 reviewed. Per report, S-shaped thoracolumbar scoliosis. No significant coronal or sagittal balance. Exaggerated thoracic kyphosis. Mild left-sided upward pelvic tilt.  Mild to moderate degenerative changes above. Partial C6/C7 congenital fusion. Transitional anatomy with 13 thoracic type vertebral bodies. Pt notes that he is supposed to have surgery on 9/11 for the hiatal hernia, but he may need to reschedule it. He has been in PT for his right shoulder and left elbow. He did see about 3 days of improvement of his thoracic pain with an adjustment by Dr. Mark Greco. Shavonne Thompson comes in to the office today for f/u. He continues to have neck and low back pain. He continues to see Dr. Jose Ferreira, and he has found improvement with his back and hamstring pains. He continues to have a tingling sensation in his back. He has a child due on July 2. He has not been maintaining a consistent HEP, but he has been lifting/building furniture for the nursery. He continues to have trigger points in his trapezii. He had a flare up of his pain at the insertion of the left pectoralis. Pain Score: 6/10. Treatments patient has tried:  Physical therapy:Yes  Doing HEP: Yes  Chiropractic: Yes  Non-opioid medications: Yes  Spinal injections: No  Spinal surgery- No.       reviewed. PMHx of anxiety, leg length discrepancy    Social hx: He has his first child due on July 2. His girlfriend also has another 10year-old child. ASSESSMENT  Shavonne Thompson is a 35year old male with known history of chronic neck and low back pain. His pain remains myofascial in nature. PLAN  1. Maintain HEP - We talked extensively about a strength and conditioning program that could be self-directed.  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 Pt will f/u in 6 weeks or sooner as needed. Diagnoses and all orders for this visit:    1. Mechanical low back pain    2. Myofascial pain    3. Cervical myofascial pain syndrome         PAST MEDICAL HISTORY   Past Medical History:   Diagnosis Date    Anxiety     Arm fatigue     Forearms    Balance problems     Cardiac Agatston CAC score 100-199 02/04/2015    Coronary calcium score 0.    Cardiac echocardiogram 10/29/2014    EF 55-60%. No RWMA. Normal diastolic fx. RVSP 30 mmHg.  Cardiac Holter monitor study 10/29/2014    Normal Holter study.  Chronic cough     chronically coughing up sputum    Depression     Dizziness     Dyspnea     Esophageal reflux     Headache     Hypercholesterolemia     Lumbar pain     Myofascial pain     Neck pain     Numbness and tingling     PAC (premature atrial contraction)     Palpitations     presumably benign    Panic disorder     Reflux     Thoracic back pain     Mid-thoracic    Upper extremity weakness     Vertigo, intermittent        Past Surgical History:   Procedure Laterality Date    HX HEENT      corrective hearing for left ear     HX HEENT      plastic surgery on both ears    HX HEENT  10/08 & 09/09    Reformed jaw bone, bone graft    HX RENAL BIOPSY      HX TONSILLECTOMY      OH APPENDECTOMY  11/23/2010    OH IMPLANTATION PT-ACTIVATED CARDIAC EVENT RECORDER N/A 10/24/2018    Loop Recorder Insert performed by Zohra Angel MD at [facility]   . MEDICATIONS    Current Outpatient Medications   Medication Sig Dispense Refill    diclofenac (VOLTAREN) 1 % gel Apply 4 g to affected area four (4) times daily. 5 Each 5    escitalopram oxalate (LEXAPRO) 20 mg tablet TAKE 1 TABLET BY MOUTH EVERY DAY  1    ALPRAZolam (XANAX) 0.5 mg tablet Take 0.5 mg by mouth two (2) times a day.  ascorbic acid (VITAMIN C) 500 mg tablet Take 1,000 mg by mouth daily.       cholecalciferol, vitamin D3, (VITAMIN D3) 2,000 unit Tab Take 1 Tab by mouth daily.  omeprazole-sodium bicarbonate (ZEGERID) 40-1.1 mg-gram capsule Take 2 Caps by mouth daily.  acetaminophen (TYLENOL EXTRA STRENGTH) 500 mg tablet Take 500 mg by mouth every six (6) hours as needed.  MULTIVITAMIN PO Take 1 Tab by mouth daily. ALLERGIES  Allergies   Allergen Reactions    Opioids - Morphine Analogues Myalgia    Other Medication Other (comments)     Pt reports allergy to steroids, with psychological side effects    Pollen Extracts Unable to Obtain    Zithromax [Azithromycin] Itching          SOCIAL HISTORY    Social History     Socioeconomic History    Marital status: SINGLE     Spouse name: Not on file    Number of children: Not on file    Years of education: Not on file    Highest education level: Not on file   Occupational History    Occupation: real [employer]   Tobacco Use    Smoking status: Current Every Day Smoker     Packs/day: 1.00     Years: 20.00     Pack years: 20.00     Types: Cigarettes    Smokeless tobacco: Never Used   Substance and Sexual Activity    Alcohol use: No     Alcohol/week: 0.0 standard drinks    Drug use: No     Social Determinants of Health     Financial Resource Strain:     Difficulty of Paying Living Expenses:    Food Insecurity:     Worried About Running Out of Food in the Last Year:     Ran Out of Food in the Last Year:    Transportation Needs:     Lack of Transportation (Medical):      Lack of Transportation (Non-Medical):    Physical Activity:     Days of Exercise per Week:     Minutes of Exercise per Session:    Stress:     Feeling of Stress :    Social Connections:     Frequency of Communication with Friends and Family:     Frequency of Social Gatherings with Friends and Family:     Attends Latter day Services:     Active Member of Clubs or Organizations:     Attends Club or Organization Meetings:     Marital Status:        FAMILY HISTORY  Family History   Problem Relation Age of Onset    Hypertension Father     Liver Disease Father     Hypertension Mother          REVIEW OF SYSTEMS  Review of Systems   Constitutional: Negative for chills, fever and weight loss. Respiratory: Negative for shortness of breath. Cardiovascular: Negative for chest pain. Gastrointestinal: Negative for constipation. Negative for fecal incontinence    Genitourinary: Negative for dysuria. Negative for urinary incontinence   Musculoskeletal: Positive for back pain and neck pain. Skin: Negative for rash. Neurological: Negative for dizziness, tingling, tremors, focal weakness and headaches. Endo/Heme/Allergies: Does not bruise/bleed easily. Psychiatric/Behavioral: The patient does not have insomnia. PHYSICAL EXAMINATION  Visit Vitals  Pulse 87   Temp 97.8 °F (36.6 °C) (Temporal)   Resp 12   Ht 5' 5\" (1.651 m)   Wt 153 lb (69.4 kg)   SpO2 96%   BMI 25.46 kg/m²       Pain Assessment  6/8/2021   Location of Pain Neck;Back   Location Modifiers Left   Severity of Pain 6   Quality of Pain Dull;Aching   Quality of Pain Comment -   Duration of Pain Persistent   Frequency of Pain Constant   Date Pain First Started -   Date Pain First Started Comment -   Aggravating Factors -   Aggravating Factors Comment -   Limiting Behavior -   Relieving Factors -   Relieving Factors Comment -   Result of Injury -           Constitutional:  Well developed, well nourished, in no acute distress. Psychiatric: Affect and mood are appropriate. Integumentary: No rashes or abrasions noted on exposed areas.         SPINE/MUSCULOSKELETAL EXAM    Cervical spine:  Neck is midline.    Normal muscle tone.    No focal atrophy is noted.    ROM pain free.    Shoulder ROM intact.    Tenderness to palpation at left scalene's.    Negative Spurling's sign.    Negative Tinel's sign.    Negative Garcia's sign.    Flex forward posture.    No clonus.      Sensation in the bilateral arms grossly intact to light touch.        Lumbar spine:  No rash, ecchymosis, or gross obliquity.    No fasciculations.    No focal atrophy is noted.    No pain with hip ROM.    Range of motion is normal.    Tenderness to palpation. No tenderness to palpation at the sciatic notch.    SI joints non-tender.    Trochanters non tender.      Sensation in the bilateral legs grossly intact to light touch.      Updates from 12/02/16:  Negative Garcia's sign bilaterally. No clonus. Pain in left scalene muscles with turning head to the right.      Updates from 3/6/18:  Tenderness to palpation of lumbar region  No directional preference     Updates from 3/7/19:  Tenderness to palpation of trapezii (L>R)  Tenderness to palpation of C7 spinous process     Updates 12/16/19:  Positive Montoya sign on the L. Tenderness to palpation of left trochanter     Updates 7/7/2020:  Tenderness to palpation of thoracolumbar paraspinals    MOTOR:      Biceps  Triceps Deltoids Wrist Ext Wrist Flex Hand Intrin   Right 5/5 5/5 5/5 5/5 5/5 5/5   Left 5/5 5/5 5/5 5/5 5/5 5/5             Hip Flex  Quads Hamstrings Ankle DF EHL Ankle PF   Right 5/5 5/5 5/5 5/5 5/5 5/5   Left 5/5 5/5 5/5 5/5 5/5 5/5     DTRs are 2+ biceps, triceps, brachioradialis, patella, and Achilles.      Negative Straight Leg raise.    Squat not tested.    No difficulty with tandem gait.    Normal heel walk.    Normal toe rise.       Ambulation without assistive device. FWB.       RADIOGRAPHS  Thoracic MRI images taken on 8/6/2020 personally reviewed with patient:  Alignment: Intact there is a mild rotoscoliotic curvature, convexity to the  right. This occurs at the thoracolumbar junction. There is no associated  hemivertebra or segmentation anomaly noted.   Kyphosis minimal kyphotic curvature from T3 through T9 no associated collapse of  vertebral bodies identified  Vertebral body height: Normal  Marrow signal: Unremarkable  Disc spaces:  C7-T1: Central canal and neuroforamina intact normal  T2-3: Canal and neuroforamina are intact and normal  T3-4: Central canal and neuroforamina are intact and normal  T4-5: Central canal, right and left neuroforamina normal  T5-6: Intracanal right and left neuroforamina and normal  T6-7: Central canal and neuroforamina are normal  T7-8: Central canal and left neuroforamina and normal  T8-9: No central canal neuroforamina stenosis seen  T9-10 central canal neuroforamina and normal  T10-11: Central canal and neuroforamina are intact normal  T11-12: Central canal and neuroforamina are intact and normal  T12-L1: Central canal and left neuroforamina and normal  L1-2: Central canal and left neuroforamina and normal  Conus: At T12-L1     Pertinent axial imaging:      Incidental there is a moderate to large hiatal hernia present no fluid or  effusion identified no obstruction is suggested.     imaging of the adjacent soft tissues is unremarkable      IMPRESSION  IMPRESSION:     Mild rotoscoliosis convexity to the right     Minimal kyphotic angulation     Incidental hiatal hernia     Scoliotic XR images taken on 8/6/2020 personally reviewed with patient:  There is limited visualization of the inferior cervical and superior thoracic  spine on the lateral view secondary to overlying shoulder soft tissues.     There are 13 thoracic-type 5 nonrib-bearing lumbar-type vertebral bodies. There  is a unilateral left-sided rib at the 13th thoracic vertebral body.     There is S-shaped scoliosis of the thoracolumbar spine. There is apex leftward  curvature centered at T4/T5, with a Levy angle of 16 degrees and apex rightward  curvature centered at T13, with a Levy angle of 27 degrees.     There is mild exaggeration of the thoracic kyphosis. There is mild 6 mm  left-sided upward pelvic tilt.  There is no significant coronal or sagittal  imbalance.     Loop recorder device projects over the chest. Moderate size hiatal hernia.     Straightening of the cervical lordosis. Partial fusion of the C6/C7 vertebral  bodies, likely congenital. There is also fusion of the left-sided facet joints.     There is mild moderate multilevel degenerative disc disease in the thoracic  spine. Mild to moderate moderate multilevel disc space narrowing and endplate  spurring within the lumbar spine. Disc space narrowing and endplate spurring at J1/I9. No definite evidence of spondylolysis is assessment of alignment in the lumbar spine is limited due to patient rotation and scoliosis.     The visualized vertebral body heights are maintained. No spondylolisthesis  appreciated.        IMPRESSION  IMPRESSION:  1.  S-shaped thoracolumbar scoliosis. No significant coronal or sagittal  balance. 2.  Exaggerated thoracic kyphosis. Mild left-sided upward pelvic tilt. 3.  Mild to moderate degenerative changes above. Partial C6/C7 congenital  fusion. 4.  Transitional anatomy with 13 thoracic type vertebral bodies.     Lumbar MRI images taken on 7/11/19 personally reviewed with patient:  FINDINGS: All numbering assumes 5 lumbar type vertebrae. The alignment  demonstrates mild levocurvature. The marrow signal is normal. The cord  terminates at L1.  No cord signal abnormalities appreciated.     The abdominal aorta is without evidence of aneurysm. No paraaortic adenopathy  appreciated. The visualized kidneys are unremarkable.     L1-L2 level: Minimal degenerative changes. No significant canal or neural  foraminal stenosis.     L2-L3: Mild facet arthropathy and mild degenerative disc disease. No significant  canal or neural foraminal stenosis.     L3-L4: Mild degenerative changes. No significant canal or neural foraminal  stenosis.     L4-L5: Mild facet arthropathy and degenerative disc disease. No significant  canal or neural foraminal stenosis.     L5-S1: Central disc protrusion with annular tear is noted. The protrusion is  mildly more conspicuous than prior imaging.  There is possible abutment of the  descending left S1 nerve root as it crosses the disc level. Facet arthropathy is  noted. No significant neural foraminal stenosis.     IMPRESSION  Impression:  1.  Central disc protrusion at L5-S1 which is mildly increased in size compared  with most recent prior MRI. It potentially abuts the descending left S1 nerve  root. However, it does not appear to compress it.     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 Normal cervical alignment and vertebral body heights maintained. No pathologic marrow signal.The cervical cord is normal in signal and caliber. No cerebellar tonsillar ectopia. Paraspinal soft tissues are unremarkable.     C2-3: Normal looking disc. Central canal and neural foramen are widely patent.     C3-4: Minimal disc bulge. Central canal and neural foramen are widely patent.     C4-5: Minimal disc bulge. Central canal and neural foramen are widely patent.     C5-6: Minimal disc bulge. Central canal and neural foramen are widely patent.     C6-7: Partial congenital bony fusion. Central canal and neural foramen are  widely patent.     C7-T1: Normal looking disc. Central canal and neural foramen are widely patent.     IMPRESSION  Impression:     Minimal disc bulging at C3-4, C4-5 and C5-6 without evidence of focal disc  pathology.  No central canal or foraminal stenosis.     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     BUE EMG taken on 08/04/2017 personally reviewed with patient:  NCS/EMG FINDINGS:      Evaluation of the Left median motor, the Right median motor, the Left ulnar motor, the Right ulnar motor, the Left Median 2nd Digit sensory, the Right Median 2nd Digit sensory, the Left ulnar sensory, and the Right ulnar sensory nerves were unremarkable.          INTERPRETATION: This was a normal nerve conduction and EMG study showing no signs of neuropathy myopathy or radiculopathy in the nerves and muscles tested.          Cervical MRI from 9/15/2014 personally reviewed with patient:  1. Stable appearance of cervical spine examination. 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 Mild multilevel degenerative changes. No high-grade spinal or foraminal  Stenosis.     Cervical MRI images taken on 11/23/2016 personally reviewed with patient:  Mild reversal of cervical curvature, centered at C3-C4, stable. Partial fusion  at C6-C7, stable. No compression fracture or pathologic marrow signal. No  prevertebral soft tissue abnormalities. Spinous processes and posterior soft  tissues unremarkable. Craniocervical junction and spinal cord are also  unremarkable. Dominant left vertebral artery, stable.      C2-C3: No disc herniation, central or foraminal stenosis.     C3-C4: Mild posterior disc bulge. No central stenosis, cord contact or foraminal  stenosis.     C4-C5: Mild posterior disc bulge with no central or foraminal stenosis.     C5-C6: Mild posterior disc bulge. No central or foraminal stenosis.     C6-7 and C7-T1: No disc herniation, central or foraminal stenosis.      IMPRESSION  IMPRESSION: Stable mild degenerative disease at C3-C4 and C4-C5. No significant  central stenosis, cord compression or foraminal stenosis.      25 minutes of face-to-face contact were spent with the patient during today's visit extensively discussing symptoms and treatment plan. All questions were answered. More than half of this visit today was spent on counseling.      Written by Yamileth Andrews as dictated by Romayne Colace, MD

## 2021-07-21 ENCOUNTER — OFFICE VISIT (OUTPATIENT)
Dept: CARDIOLOGY CLINIC | Age: 41
End: 2021-07-21
Payer: COMMERCIAL

## 2021-07-21 DIAGNOSIS — Z95.9 CARDIAC DEVICE IN SITU: Primary | ICD-10-CM

## 2021-07-21 DIAGNOSIS — R00.2 PALPITATIONS: ICD-10-CM

## 2021-07-21 PROCEDURE — 93298 REM INTERROG DEV EVAL SCRMS: CPT | Performed by: INTERNAL MEDICINE

## 2021-07-30 NOTE — PROGRESS NOTES
I have personally seen and evaluated the device findings. Interrogation reviewed and I agree with assessment.     Silvano Vincent

## 2021-10-28 ENCOUNTER — HOSPITAL ENCOUNTER (OUTPATIENT)
Dept: LAB | Age: 41
Discharge: HOME | End: 2021-10-28

## 2021-10-28 LAB — SENTARA SPECIMEN COL,SENBCF: NORMAL

## 2021-10-28 PROCEDURE — 99001 SPECIMEN HANDLING PT-LAB: CPT

## 2022-04-20 ENCOUNTER — OFFICE VISIT (OUTPATIENT)
Dept: CARDIOLOGY CLINIC | Age: 42
End: 2022-04-20
Payer: COMMERCIAL

## 2022-04-20 VITALS
SYSTOLIC BLOOD PRESSURE: 84 MMHG | OXYGEN SATURATION: 96 % | WEIGHT: 156 LBS | DIASTOLIC BLOOD PRESSURE: 60 MMHG | HEIGHT: 66 IN | HEART RATE: 74 BPM | BODY MASS INDEX: 25.07 KG/M2

## 2022-04-20 DIAGNOSIS — R00.2 PALPITATIONS: ICD-10-CM

## 2022-04-20 DIAGNOSIS — R42 DIZZINESS AND GIDDINESS: ICD-10-CM

## 2022-04-20 DIAGNOSIS — I47.1 PAROXYSMAL SUPRAVENTRICULAR TACHYCARDIA (HCC): ICD-10-CM

## 2022-04-20 DIAGNOSIS — Z95.9 CARDIAC DEVICE IN SITU: Primary | ICD-10-CM

## 2022-04-20 DIAGNOSIS — R55 SYNCOPE AND COLLAPSE: ICD-10-CM

## 2022-04-20 PROCEDURE — 99214 OFFICE O/P EST MOD 30 MIN: CPT | Performed by: INTERNAL MEDICINE

## 2022-04-20 RX ORDER — ESCITALOPRAM OXALATE 10 MG/1
TABLET ORAL
COMMUNITY
Start: 2022-04-08

## 2022-04-20 NOTE — PROGRESS NOTES
History of Present Illness:  35year old male here for follow up. He gets some vertigo at times and some dizziness, but no loss of consciousness. He is fatigued, but recently there has been a new baby at home as well. Some occasional dyspnea after he eats. No chest pain or syncope, no PND, orthopnea or edema. Impression:  1. History of recurrent palpitations with rare PACs. 2. Subcutaneous loop recorder placed in 2018 with sinus arrhythmia, rare PACs. 3. Remote vasovagal syncope with 8 second pause by tilt table test in 2012. 4. Chronic hypotension. 5. Hiatal hernia. Plan:  Blood pressure is running a little low today, but his symptoms are unchanged. He is doing well from a cardiac standpoint and we interrogated his device with multiple symptoms, which appear to correlate with sinus arrhythmia and occasional PACs, no prolonged pauses. All questions answered and I will see back in a year and likely his device will reach BETTE at that point, requiring explant. Past Medical History:   Diagnosis Date    Anxiety     Arm fatigue     Forearms    Balance problems     Cardiac Agatston CAC score 100-199 02/04/2015    Coronary calcium score 0.    Cardiac echocardiogram 10/29/2014    EF 55-60%. No RWMA. Normal diastolic fx. RVSP 30 mmHg.  Cardiac Holter monitor study 10/29/2014    Normal Holter study.  Chronic cough     chronically coughing up sputum    Depression     Dizziness     Dyspnea     Esophageal reflux     Headache     Hypercholesterolemia     Lumbar pain     Myofascial pain     Neck pain     Numbness and tingling     PAC (premature atrial contraction)     Palpitations     presumably benign    Panic disorder     Reflux     Thoracic back pain     Mid-thoracic    Upper extremity weakness     Vertigo, intermittent        Current Outpatient Medications   Medication Sig Dispense Refill    ibuprofen (Motrin IB) 200 mg tablet Take  by mouth.       diclofenac (VOLTAREN) 1 % gel Apply 4 g to affected area four (4) times daily. 5 Each 5    ALPRAZolam (XANAX) 0.5 mg tablet Take 0.5 mg by mouth two (2) times a day.  ascorbic acid (VITAMIN C) 500 mg tablet Take 1,000 mg by mouth daily.  cholecalciferol, vitamin D3, (VITAMIN D3) 2,000 unit Tab Take 1 Tab by mouth daily.  omeprazole-sodium bicarbonate (ZEGERID) 40-1.1 mg-gram capsule Take 2 Caps by mouth daily.  acetaminophen (TYLENOL EXTRA STRENGTH) 500 mg tablet Take 500 mg by mouth every six (6) hours as needed.  MULTIVITAMIN PO Take 1 Tab by mouth daily.  escitalopram oxalate (LEXAPRO) 10 mg tablet TAKE 1 IN AM AND 1/2 AT BEDTIME         Social History   reports that he has been smoking cigarettes. He has a 20.00 pack-year smoking history. He has never used smokeless tobacco.   reports no history of alcohol use. Family History  family history includes Hypertension in his father and mother; Liver Disease in his father. Review of Systems  Except as stated above include:  Constitutional: Negative for fever, chills and malaise/fatigue. HEENT: No congestion or recent URI. Gastrointestinal: No nausea, vomiting, abdominal pain, bloody stools. Pulmonary:  Negative except as stated above. Cardiac:  Negative except as stated above. Musculoskeletal: Negative except as stated above. Neurological:  No localized symptoms. Skin:  Negative except as stated above. Psych:  Negative except as stated above. Endocrine:  Negative except as stated above. PHYSICAL EXAM  BP Readings from Last 3 Encounters:   04/20/22 (!) 84/60   04/29/21 100/60   03/12/21 100/60     Pulse Readings from Last 3 Encounters:   04/20/22 74   06/08/21 87   04/29/21 71     Wt Readings from Last 3 Encounters:   04/20/22 70.8 kg (156 lb)   12/21/21 68 kg (150 lb)   06/30/21 68 kg (150 lb)     General:   Well developed, well groomed. Head/Neck:   No obvious jugular venous distention     No obvious carotid pulsations.       No evidence of xanthelasma. Lungs:   No respiratory distress. Clear bilaterally. Heart:  Regular rate and rhythm. Normal S1/S2. Palpation grossly normal.    No significant murmurs, rubs or gallops. Abdomen:   Non-acute abdomen. No obvious pulsations. Extremities:   Intact peripheral pulses. No significant edema. Neurological:   Alert and oriented to person, place, time. No focal neurological deficit visually. Skin:   No obvious rash    Blood Pressure Metric:  Monitor recommended and adjustments stated if needed.

## 2022-04-20 NOTE — PROGRESS NOTES
Philipp Vasquez presents today for   Chief Complaint   Patient presents with    Follow-up     over due     Palpitations     racing        Philipp Vasquez preferred language for health care discussion is english/other. Is someone accompanying this pt? no    Is the patient using any DME equipment during [address]? no    Depression Screening:  3 most recent PHQ Screens 4/20/2022   Little interest or pleasure in doing things Not at all   Feeling down, depressed, irritable, or hopeless Not at all   Total Score PHQ 2 0       Learning Assessment:  Learning Assessment 3/12/2021   PRIMARY LEARNER Patient   PRIMARY LANGUAGE ENGLISH   LEARNER PREFERENCE PRIMARY DEMONSTRATION     -   ANSWERED BY patient   RELATIONSHIP SELF       Abuse Screening:  Abuse Screening Questionnaire 3/12/2021   Do you ever feel afraid of your partner? N   Are you in a relationship with someone who physically or mentally threatens you? N   Is it safe for you to go home? Y       Fall Risk  Fall Risk Assessment, last 12 mths 3/12/2021   Able to walk? Yes   Fall in past 12 months? 0   Do you feel unsteady? 0   Are you worried about falling 0       Pt currently taking Anticoagulant therapy? no    Coordination of Care:  1. Have you been to the ER, urgent care clinic since your last visit? Hospitalized since your last visit? no    2. Have you seen or consulted any other health care providers outside of the [address] [place] [address] since your last visit? Include any pap smears or colon screening.  no

## 2022-04-25 NOTE — PROGRESS NOTES
I have personally seen and evaluated the device findings. Interrogation reviewed and I agree with assessment.     Nakul Mota

## 2022-09-07 ENCOUNTER — OFFICE VISIT (OUTPATIENT)
Dept: CARDIOLOGY CLINIC | Age: 42
End: 2022-09-07
Payer: COMMERCIAL

## 2022-09-07 DIAGNOSIS — R55 SYNCOPE AND COLLAPSE: ICD-10-CM

## 2022-09-07 DIAGNOSIS — Z95.9 CARDIAC DEVICE IN SITU: Primary | ICD-10-CM

## 2022-09-07 PROCEDURE — 93298 REM INTERROG DEV EVAL SCRMS: CPT | Performed by: INTERNAL MEDICINE

## 2022-09-15 NOTE — PROGRESS NOTES
I have personally seen and evaluated the device findings. Interrogation reviewed and I agree with assessment.     Refugio Lance

## 2022-09-27 ENCOUNTER — OFFICE VISIT (OUTPATIENT)
Dept: ORTHOPEDIC SURGERY | Age: 42
End: 2022-09-27
Payer: COMMERCIAL

## 2022-09-27 VITALS — HEIGHT: 66 IN | BODY MASS INDEX: 24.27 KG/M2 | WEIGHT: 151 LBS | RESPIRATION RATE: 14 BRPM

## 2022-09-27 DIAGNOSIS — M99.03 LUMBAR REGION SOMATIC DYSFUNCTION: ICD-10-CM

## 2022-09-27 DIAGNOSIS — M99.09 SOMATIC DYSFUNCTION OF ABDOMINAL REGION: ICD-10-CM

## 2022-09-27 DIAGNOSIS — M51.36 DDD (DEGENERATIVE DISC DISEASE), LUMBAR: ICD-10-CM

## 2022-09-27 DIAGNOSIS — M51.34 DDD (DEGENERATIVE DISC DISEASE), THORACIC: ICD-10-CM

## 2022-09-27 DIAGNOSIS — M99.02 THORACIC REGION SOMATIC DYSFUNCTION: ICD-10-CM

## 2022-09-27 DIAGNOSIS — M99.04 SACRAL REGION SOMATIC DYSFUNCTION: ICD-10-CM

## 2022-09-27 DIAGNOSIS — M99.08 RIB CAGE REGION SOMATIC DYSFUNCTION: ICD-10-CM

## 2022-09-27 DIAGNOSIS — M99.05 PELVIC SOMATIC DYSFUNCTION: ICD-10-CM

## 2022-09-27 DIAGNOSIS — M99.07 UPPER EXTREMITY SOMATIC DYSFUNCTION: ICD-10-CM

## 2022-09-27 DIAGNOSIS — M50.30 DDD (DEGENERATIVE DISC DISEASE), CERVICAL: Primary | ICD-10-CM

## 2022-09-27 DIAGNOSIS — M99.06 LOWER LIMB REGION SOMATIC DYSFUNCTION: ICD-10-CM

## 2022-09-27 DIAGNOSIS — M99.01 CERVICAL SOMATIC DYSFUNCTION: ICD-10-CM

## 2022-09-27 PROCEDURE — 99204 OFFICE O/P NEW MOD 45 MIN: CPT | Performed by: FAMILY MEDICINE

## 2022-09-27 PROCEDURE — 98929 OSTEOPATH MANJ 9-10 REGIONS: CPT | Performed by: FAMILY MEDICINE

## 2022-09-27 RX ORDER — DICLOFENAC SODIUM 50 MG/1
50 TABLET, DELAYED RELEASE ORAL 2 TIMES DAILY
Qty: 60 TABLET | Refills: 1 | Status: SHIPPED | OUTPATIENT
Start: 2022-09-27

## 2022-09-27 NOTE — PROGRESS NOTES
Pt states that he has tingling to the hands intermittent and sporadic to the legs. Pt strained his neck about a week ago. 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

## 2022-09-27 NOTE — LETTER
9/27/2022    Patient: Guillermina Bustos   YOB: 1980   Date of Visit: 9/27/2022     [facility] [place] [id number] [facility] [address] [place] [id number]  Via Fax: [phone]    Dear Loretta Lazo DO,      Thank you for referring Mr. Gold Anderson to [place] [address]. for evaluation. My notes for this consultation are attached. If you have questions, please do not hesitate to call me. I look forward to following your patient along with you.       Sincerely,    Naheed Lazaro DO

## 2022-09-27 NOTE — PROGRESS NOTES
HISTORY OF PRESENT ILLNESS    Margie Panchal 1980 is a 38y.o. year old male comes in today as new patient for: neck, back pain    Patients symptoms have been present for 20+ years neck, 10+ years back. Pain level 5/10 neck and low back. It has improved with motrin and will radiate neck to left temple. Patient has tried:  [facility], chiro every couple weeks w/o help, PT prior helped but never maintained and pain worse now with 17 month old. It is described as pain and tightness and neck/back \"will go out\". Did have delayed development left side and short leg. Does use lift left shoe. IMAGING: XR whole spine 8/6/2020  IMPRESSION:  1. S-shaped thoracolumbar scoliosis. No significant coronal or sagittal  balance. 2.  Exaggerated thoracic kyphosis. Mild left-sided upward pelvic tilt. 3.  Mild to moderate degenerative changes above. Partial C6/C7 congenital  fusion. 4.  Transitional anatomy with 13 thoracic type vertebral bodies.     Past Surgical History:   Procedure Laterality Date    HX HEENT      corrective hearing for left ear     HX HEENT      plastic surgery on both ears    HX HEENT  10/08 & 09/09    Reformed jaw bone, bone graft    HX RENAL BIOPSY      HX TONSILLECTOMY      WA APPENDECTOMY  11/23/2010    WA IMPLANTATION PT-ACTIVATED CARDIAC EVENT RECORDER N/A 10/24/2018    Loop Recorder Insert performed by Judd Gray MD at [facility]     Social History     Socioeconomic History    Marital status: SINGLE   Occupational History    Occupation: real estate   Tobacco Use    Smoking status: Every Day     Packs/day: 1.00     Years: 20.00     Pack years: 20.00     Types: Cigarettes    Smokeless tobacco: Never   Substance and Sexual Activity    Alcohol use: No     Alcohol/week: 0.0 standard drinks    Drug use: No      Current Outpatient Medications   Medication Sig Dispense Refill    escitalopram oxalate (LEXAPRO) 10 mg tablet TAKE 1 IN AM AND 1/2 AT BEDTIME      ibuprofen (MOTRIN) 200 mg tablet Take  by mouth. diclofenac (VOLTAREN) 1 % gel Apply 4 g to affected area four (4) times daily. 5 Each 5    ALPRAZolam (XANAX) 0.5 mg tablet Take 0.5 mg by mouth two (2) times a day. ascorbic acid, vitamin C, (VITAMIN C) 500 mg tablet Take 1,000 mg by mouth daily. cholecalciferol, vitamin D3, 50 mcg (2,000 unit) tab Take 1 Tab by mouth daily. omeprazole-sodium bicarbonate (ZEGERID) 40-1.1 mg-gram capsule Take 2 Caps by mouth daily. acetaminophen (TYLENOL) 500 mg tablet Take 500 mg by mouth every six (6) hours as needed. MULTIVITAMIN PO Take 1 Tab by mouth daily. Past Medical History:   Diagnosis Date    Anxiety     Arm fatigue     Forearms    Balance problems     Cardiac Agatston CAC score 100-199 02/04/2015    Coronary calcium score 0. Cardiac echocardiogram 10/29/2014    EF 55-60%. No RWMA. Normal diastolic fx. RVSP 30 mmHg. Cardiac Holter monitor study 10/29/2014    Normal Holter study. Chronic cough     chronically coughing up sputum    Depression     Dizziness     Dyspnea     Esophageal reflux     Headache     Hypercholesterolemia     Lumbar pain     Myofascial pain     Neck pain     Numbness and tingling     PAC (premature atrial contraction)     Palpitations     presumably benign    Panic disorder     Reflux     Thoracic back pain     Mid-thoracic    Upper extremity weakness     Vertigo, intermittent      Family History   Problem Relation Age of Onset    Hypertension Father     Liver Disease Father     Hypertension Mother        ROS:  No numb, incont, fever    Objective:  Resp 14   Ht 5' 6\" (1.676 m)   Wt 151 lb (68.5 kg)   BMI 24.37 kg/m²   NEURO:  Sensation intact to light touch. Reflexes +2/4 biceps, triceps, patellar, and Achilles bilaterally. M/S:  Examined seated and supine. Slump/Spurling negative. Standing flexion test positive left  Sphinx test positive left.   ASIS low left  Iliac crests equal bilaterally Pubes equal bilaterally Medial malleolus low left  Sacral base posterior left  JESE low right  TTA at C3 on left worse flexion, T2, 3, 5 on left worse flexion, and L2, 4, 5 on left worse flexion  Rib(s) 2, 3 left TTP and posterior LE Strength +5/5 bilaterally Thoracic diaphragm restricted left. Scapula motion restricted w/ TTA left. Hip flexion limited right. No leg length difference. Assessment/Plan:     ICD-10-CM ICD-9-CM    1. DDD (degenerative disc disease), cervical  M50.30 722.4 REFERRAL TO PHYSICAL THERAPY      diclofenac EC (VOLTAREN) 50 mg EC tablet      2. DDD (degenerative disc disease), thoracic  M51.34 722.51 REFERRAL TO PHYSICAL THERAPY      diclofenac EC (VOLTAREN) 50 mg EC tablet      3. DDD (degenerative disc disease), lumbar  M51.36 722.52 REFERRAL TO PHYSICAL THERAPY      diclofenac EC (VOLTAREN) 50 mg EC tablet      4. Lumbar region somatic dysfunction  M99.03 739.3 NE OSTEOPATHIC MANIP,9-10 BODY REGN      5. Pelvic somatic dysfunction  M99.05 739.5 NE OSTEOPATHIC MANIP,9-10 BODY REGN      6. Sacral region somatic dysfunction  M99.04 739.4 NE OSTEOPATHIC MANIP,9-10 BODY REGN      7. Thoracic region somatic dysfunction  M99.02 739.2 NE OSTEOPATHIC MANIP,9-10 BODY REGN      8. Rib cage region somatic dysfunction  M99.08 739.8 NE OSTEOPATHIC MANIP,9-10 BODY REGN      9. Cervical somatic dysfunction  M99.01 739.1 NE OSTEOPATHIC MANIP,9-10 BODY REGN      10. Upper extremity somatic dysfunction  M99.07 739.7 NE OSTEOPATHIC MANIP,9-10 BODY REGN      11. Lower limb region somatic dysfunction  M99.06 739.6 NE OSTEOPATHIC MANIP,9-10 BODY REGN      12. Somatic dysfunction of abdominal region  M99.09 739.9 NE OSTEOPATHIC MANIP,9-10 BODY REGN        Patient (or guardian if minor) verbalizes understanding of evaluation and plan. Verbal consent obtained. Cervical, Thoracic, Rib, Lumbar, Pelvic, Sacral, Upper Ext, Lower Ext, and Abdominal  SD treated with myofascial and ME.   Correction of previous malalignments verified after Tx.    Pt tolerated well. Notes improvement of Sx and pain is now rated 2/10. HEP/stretches daily. Discussed stretching/strengthening/posture. Will start HEP, PT, and Rx for voltaren 50mg  as above and plan follow-up 6 weeks.

## 2022-10-05 ENCOUNTER — HOSPITAL ENCOUNTER (OUTPATIENT)
Dept: PHYSICAL THERAPY | Age: 42
End: 2022-10-05

## 2022-10-07 ENCOUNTER — HOSPITAL ENCOUNTER (OUTPATIENT)
Dept: PHYSICAL THERAPY | Age: 42
Discharge: HOME | End: 2022-10-07
Payer: COMMERCIAL

## 2022-10-07 PROCEDURE — 97161 PT EVAL LOW COMPLEX 20 MIN: CPT

## 2022-10-07 NOTE — PROGRESS NOTES
PT DAILY TREATMENT NOTE/CERVICAL [id number]    Patient Name: Howie Moore  Date:10/7/2022  : 1980  [x]  Patient  Verified  Payor: Deisi Mathur / Plan: [place] [employer] / Product Type: HMO /    In time: 11:15  Out time: 11:55  Total Treatment Time (min): 40  Visit #: 1 of 12    Medicare/BCBS Only   Total Timed Codes (min):  8 1:1 Treatment Time:  40     Treatment Area: Neck pain [M54.2]  Thoracic back pain [M54.6]  Other low back pain [M54.59]    SUBJECTIVE  Pain Level (0-10 scale): 5-6/10  []constant []intermittent []improving []worsening []no change since onset    Any medication changes, allergies to medications, adverse drug reactions, diagnosis change, or new procedure performed?: [x] No    [] Yes (see summary sheet for update)  Subjective functional status/changes:       Mechanism of Injury: chronic neck pain. And getting more constant mid back pain. Pt. Reports he was doing some stretches during massage and had increased pain with that a few years ago. Feels like he has no strength with unsupported standing. Sleeps on left side and has increased pain in neck. Has been working on stretches but gets dizziness with them. Has been going to chiropractor but hasn't been the past month. Has a 13 month old baby. Difficult to golf. Work Hx: sits at computer for work and gets right arm heaviness.    Pt Goals: to have less pain    OBJECTIVE/EXAMINATION    8 min Therapeutic Exercise:  [] See flow sheet : HEP   Rationale: increase ROM and increase strength to improve the patients ability to perform ADLs          With   [x] TE   [] TA   [] neuro   [] other: Patient Education: [x] Review HEP    [] Progressed/Changed HEP based on:   [] positioning   [] body mechanics   [] transfers   [] heat/ice application    [] other:      Physical Therapy Evaluation Cervical Spine     OBJECTIVE  Posture: [] WNL  Head Position: fwd  Shoulder/Scapular Position: fwd  C-Kyphosis:  [] increased   [] decreased   C-Lordosis:   [] increased   [] decreased  T-Kyphosis:  [x] increased   [] decreased  T-Lordosis:   [] increased   [] decreased       Active Movements: [] N/A   [] Too acute   [] Other:  ROM % AROM % PROM Comments:pain, area   Forward flexion 50     Extension 30     SB right 60     SB left  60     Rotation right 30     Rotation left 35       Shoulder AROM flex: WNL abd: WNL    Palpation:  [] Min  [x] Mod  [] Severe    Location: left UT, scalenes, and cervical paraspinals   [] Min  [] Mod  [] Severe    Location:  [] Min  [] Mod  [] Severe    Location:    Neuro Screen (myotome/dematome/felexes): [] WNL  Myotome Level Muscle Test Myotome Level Muscle Test   C5 Shoulder Adduction - Deltoid  B: 4+/5 C8 Finger Flexors   C6 Wrist Extension B: 4+/5 T1 Finger Abduction - Interossei   C7 Elbow Extension B: 4+/5     Comments:    Special Tests:  Cervical:        Vertebral Artery:  [x] R    [] L    [] +    [] - pt. Did have some dizziness to right side but no nystagmus was noted       Alar Ligament: [x] R    [x] L    [] +    [x] -       Transverse Lig: [x] R    [x] L    [] +    [x] -       Spurling's:  [x] R    [x] L    [] +    [x] -       Distraction:  [x] R    [x] L    [] +    [x] -       Compression: [x] R    [x] L    [] +    [x] -    Other tests/comments:  Increased pain with trunk flexion and rotation to right and extension and rotation to left  Deep cervical flexion endurane; 14 seconds  Trunk extension endurance: 48 seconds.    B mid trap: 4-/5 B lower trap: 3/5       Pain Level (0-10 scale) post treatment: 5-6/10    ASSESSMENT/Changes in Function:      [x]  See Plan of Care  []  See progress note/recertification  []  See Discharge Summary         Progress towards goals / Updated goals:  See POC    PLAN  []  Upgrade activities as tolerated     [x]  Continue plan of care  []  Update interventions per flow sheet       []  Discharge due to:_  []  Other:Anurag Nails PT 10/7/2022  11:13 AM

## 2022-10-07 NOTE — PROGRESS NOTES
[facility]  [address] [place] [address]  [phone] fax    Plan of Care/ Statement of Necessity for Physical Therapy Services    Patient name: Felipe Abad Start of Care: 10/7/2022   Referral source: Joe Mota DO : 1980    Medical Diagnosis: Neck pain [M54.2]  Other low back pain [M54.59]  Payor: Brenda Galvez / Plan: [facility] HMO / Product Type: HMO /  Onset Date:chronic with worsening symptoms over the past few years    Treatment Diagnosis: neck pain, back pain   Prior Hospitalization: see medical history Provider#: [id number]   Medications: Verified on Patient summary List    Comorbidities: tobacco use, anxiety, depression, IBS, chronic fatigue   Prior Level of Function: Ind with ADLs, working in real estate      The [address] [place] [address] and following information is based on the information from the initial evaluation. Assessment/ key information: pt. Is a 38year old male c/o neck and back pain that has been presents for years but has been worsening the past few years. He has had PT in the past with some positive results. He also has history of seeing a chiropractor. He reports feeling like he slouches with sitting and prolong sitting/standing increases his pain. He also has a 13 month old baby and has pain with lifting and playing with her. He c/o heaviness in his right arm as well. He does have complaints of dizziness as well and reports having some dizziness with cervical stretches. He presents with fwd head/shoulder posture and excessive thoracic kyphosis. He has decreased cervical extension AROM at 30 degrees with reports of pain and stiffness at end range. No myotome involvement was noted but he has pain in upper back with resisted flexion. Negative Spurling and distractions tests. Muscle tightness along left UT, scalenes, and B cervical paraspinals and SCM.  Increased thoracic pain with combined flexion with right rotation and extension with left rotation. Deep cervical flexion endurance was limited to 14 seconds. Decreased B lower trap strength at 3/5. Skilled PT is medically necessary in order to improve cervical and thoracic strength and mobility for increased ease of working and improved quality of life. Evaluation Complexity History HIGH Complexity :3+ comorbidities / personal factors will impact the outcome/ POC ; Examination MEDIUM Complexity : 3 Standardized tests and measures addressing body structure, function, activity limitation and / or participation in recreation  ;Presentation LOW Complexity : Stable, uncomplicated  ;Clinical Decision Making MEDIUM Complexity : FOTO score of 26-74  Overall Complexity Rating: LOW   Problem List: pain affecting function, decrease ROM, decrease strength, impaired gait/ balance, decrease ADL/ functional abilitiies, decrease activity tolerance, decrease flexibility/ joint mobility, and decrease transfer abilities   Treatment Plan may include any combination of the following: Therapeutic exercise, Therapeutic activities, Neuromuscular re-education, Physical agent/modality, Gait/balance training, Manual therapy, Patient education, Self Care training, Functional mobility training, and Home safety training  Patient / Family readiness to learn indicated by: asking questions  Persons(s) to be included in education: patient (P)  Barriers to Learning/Limitations: None  Patient Goal (s): to have less pain  Patient Self Reported Health Status: good  Rehabilitation Potential: fair    Short Term Goals: To be accomplished in 1 weeks:  Patient will demonstrate compliance with HEP in order to improve cervical and thoracic mobility for increased ease of ADLs. Long Term Goals: To be accomplished in 6 weeks:  Goal: Patient will improve FOTO score by 12 points in order to demonstrate a significant improvement in function. Status at evaluation/last progress note: 49    2.    Goal: Patient will improve deep cervical flexion endurance to 25 seconds in order to increase ease of working. Status at evaluation/last progress note: 14 seconds    3. Goal: Patient will improve B lower trap strength to 4-/5 in order to increase ease of overhead reaching. Status at evaluation/last progress note: B: 3/5    4. Goal: Patient will improve cervical AROM to 40 degrees in order to increase ease of ADLs  Status at evaluation/last progress note: 30 degrees    Frequency / Duration: Patient to be seen 2 times per week for 6 weeks. Patient/  Caregiver education and instruction: Diagnosis, prognosis, exercises   [x]  Plan of care has been reviewed with NAKIA Shay, PT 10/7/2022 2:24 PM    ________________________________________________________________________    I certify that the above Therapy Services are being furnished while the patient is under my care. I agree with the treatment plan and certify that this therapy is necessary.     [de-identified] Signature:____________Date:_________TIME:________     Dayanara Garcia DO  ** Signature, Date and Time must be completed for valid certification **    Please sign and return to [facility]  [address] [place] [address]  [phone] fax

## 2022-10-14 ENCOUNTER — HOSPITAL ENCOUNTER (OUTPATIENT)
Dept: PHYSICAL THERAPY | Age: 42
Discharge: HOME | End: 2022-10-14
Payer: COMMERCIAL

## 2022-10-14 PROCEDURE — 97140 MANUAL THERAPY 1/> REGIONS: CPT

## 2022-10-14 PROCEDURE — 97110 THERAPEUTIC EXERCISES: CPT

## 2022-10-14 NOTE — PROGRESS NOTES
PT DAILY TREATMENT NOTE     Patient Name: Edgar Tadeo  Date:10/14/2022  : 1980  [x]  Patient  Verified  Payor: Marco Antonio Salazar / Plan: [employer] / Product Type: HMO /    In time:10:30  Out time:11:15  Total Treatment Time (min): 45  Visit #: 1 of 12    Treatment Area: Neck pain [M54.2]  Other low back pain [M54.59]    SUBJECTIVE  Pain Level (0-10 scale): 7/10  Any medication changes, allergies to medications, adverse drug reactions, diagnosis change, or new procedure performed?: [x] No    [] Yes (see summary sheet for update)  Subjective functional status/changes:   [] No changes reported  Pt reports neck and mid back pain. He feels tingling and itching on his left mid back. He gets symptoms at different times down both arms especially after side sleeping. OBJECTIVE    30 min Therapeutic Exercise:  [x] See flow sheet :   Rationale: increase ROM, increase strength, improve coordination, improve balance, and increase proprioception to improve the patients ability to lift his daughter with decreased pain    15 min Manual Therapy:  leg lengthening for left upslip; MET for right AI; shotgun technique; MET for right T5-T12 rotation; MET for left c/s rotation  MET for elevated left first rib    PA mobs grade IV to the t/s   The manual therapy interventions were performed at a separate and distinct time from the therapeutic activities interventions.   Rationale: decrease pain, increase ROM, and increase tissue extensibility to lift his daughter with decreased pain            With   [] TE   [] TA   [] neuro   [] other: Patient Education: [x] Review HEP    [] Progressed/Changed HEP based on:   [] positioning   [] body mechanics   [] transfers   [] heat/ice application    [] other:      Other Objective/Functional Measures:   Left upslip; added 1/8 to right shoe to try to even out lift built into orthotic on the left to see if this resolves upslip  Noticeable right t/s rotation in prone with severe t/s hypomobility  Educated on open books to the left and t/s mobs with the foam roller to allow mobility  Left c/s rotation and left first rib elevated with pectoralis minor tightness  Educated to work on pillow positioning for side sleeping and will need to address scalene stretching     Pain Level (0-10 scale) post treatment: 4/10    ASSESSMENT/Changes in Function: Pt with postural muscle imbalances and t/s hypomobility contributing to pain symptoms. He has a left upslip with a built in lift to his orthotic on the left but no tibial differences with alignment correction in hooklying so added small lift to right shoe to even out. He demonstrates right t/s rotation and left c/s rotation likely contributing to nerve compression. Will work to restore neutral spinal alignment and then strengthen to maintain for decreased pain. Patient will continue to benefit from skilled PT services to modify and progress therapeutic interventions, address functional mobility deficits, address ROM deficits, address strength deficits, analyze and address soft tissue restrictions, analyze and cue movement patterns, analyze and modify body mechanics/ergonomics, assess and modify postural abnormalities, address imbalance/dizziness, and instruct in home and community integration to attain remaining goals. [x]  See Plan of Care  []  See progress note/recertification  []  See Discharge Summary         Progress towards goals / Updated goals:  Short Term Goals: To be accomplished in 1 weeks:  Patient will demonstrate compliance with HEP in order to improve cervical and thoracic mobility for increased ease of ADLs. Met     Long Term Goals: To be accomplished in 6 weeks:  Goal: Patient will improve FOTO score by 12 points in order to demonstrate a significant improvement in function. Status at evaluation/last progress note: 49     2.    Goal: Patient will improve deep cervical flexion endurance to 25 seconds in order to increase ease of working. Status at evaluation/last progress note: 14 seconds     3. Goal: Patient will improve B lower trap strength to 4-/5 in order to increase ease of overhead reaching. Status at evaluation/last progress note: B: 3/5     4.    Goal: Patient will improve cervical AROM to 40 degrees in order to increase ease of ADLs  Status at evaluation/last progress note: 30 degrees    PLAN  [x]  Upgrade activities as tolerated     [x]  Continue plan of care  []  Update interventions per flow sheet       []  Discharge due to:_  []  Other:_      Bebeto Grooms, PTA 10/14/2022  9:13 AM    Future Appointments   Date Time Provider Cristo Buenrostro   10/14/2022 10:30 AM Abigail Coil, PTA [facility]   10/17/2022  2:15 PM Detroit Pyo, PT [facility]   10/21/2022  3:00 PM Abigail Coil, PTA [facility]   10/24/2022 12:45 PM Abigail Coil, PTA [facility]   10/26/2022 11:15 AM Abigail Coil, PTA [facility]   10/26/2022  1:30 PM Lexii Route, PTA [facility]   11/2/2022  2:45 PM CSI, PACER [facility] BS AMB   11/8/2022  1:00 PM DO KVNG Rodriguez BS AMB   4/19/2023 11:00 AM Kadie Santiago MD [facility] BS AMB

## 2022-10-17 ENCOUNTER — HOSPITAL ENCOUNTER (OUTPATIENT)
Dept: PHYSICAL THERAPY | Age: 42
Discharge: HOME | End: 2022-10-17
Payer: COMMERCIAL

## 2022-10-17 PROCEDURE — 97140 MANUAL THERAPY 1/> REGIONS: CPT

## 2022-10-17 PROCEDURE — 97110 THERAPEUTIC EXERCISES: CPT

## 2022-10-17 NOTE — PROGRESS NOTES
PT DAILY TREATMENT NOTE     Patient Name: Lucinda Salinas  Date:10/17/2022  : 1980  [x]  Patient  Verified  Payor: Misa Memory / Plan: [employer] / Product Type: HMO /    In time: 2:35  Out time:3:02  Total Treatment Time (min): 27  Visit #: 3 of 12    Treatment Area: Neck pain [M54.2]  Other low back pain [M54.59]    SUBJECTIVE  Pain Level (0-10 scale): 6/10  Any medication changes, allergies to medications, adverse drug reactions, diagnosis change, or new procedure performed?: [x] No    [] Yes (see summary sheet for update)  Subjective functional status/changes:   [] No changes reported  Pt. Reports he felt good after last visit until later that night. He was late secondary to road closures from a parade    OBJECTIVE    12 min Therapeutic Exercise:  [x] See flow sheet :   Rationale: increase ROM and increase strength to improve the patients ability to perform ADLs    15 min Manual Therapy:  right upslip correction and MET for right anterior rotated innominate, mid thoracic PA grade 4, trigger point release B UT and right infraspinatus   The manual therapy interventions were performed at a separate and distinct time from the therapeutic activities interventions. Rationale: decrease pain, increase ROM, and increase tissue extensibility to perform ADLs          With   [x] TE   [] TA   [] neuro   [] other: Patient Education: [x] Review HEP    [] Progressed/Changed HEP based on:   [] positioning   [] body mechanics   [] transfers   [] heat/ice application    [] other:      Other Objective/Functional Measures:   Hip alignment: right upslip, right anterior rotated innominate    Pt. Was provided with orange band and educated on supine T for HEP instead of in prone secondary to increased pain  He was more limited with 1/2 knee rotation to right side compared to left    Pain Level (0-10 scale) post treatment: 6/10    ASSESSMENT/Changes in Function: pt. Is progressing slowly towards goals.  He continues to demonstrate decreased posterior shoulder and back strength as well as decreased deep cervical flexion strength. He continues to have decreased cervical mobility and pelvic obliquity. Patient will continue to benefit from skilled PT services to modify and progress therapeutic interventions, address functional mobility deficits, address ROM deficits, address strength deficits, analyze and address soft tissue restrictions, analyze and cue movement patterns, analyze and modify body mechanics/ergonomics, assess and modify postural abnormalities, and address imbalance/dizziness to attain remaining goals. Progress towards goals / Updated goals:  Short Term Goals: To be accomplished in 1 weeks:  Patient will demonstrate compliance with HEP in order to improve cervical and thoracic mobility for increased ease of ADLs. Met     Long Term Goals: To be accomplished in 6 weeks:  Goal: Patient will improve FOTO score by 12 points in order to demonstrate a significant improvement in function. Status at evaluation/last progress note: 49     2. Goal: Patient will improve deep cervical flexion endurance to 25 seconds in order to increase ease of working. Status at evaluation/last progress note: 14 seconds     3. Goal: Patient will improve B lower trap strength to 4-/5 in order to increase ease of overhead reaching. Status at evaluation/last progress note: B: 3/5     4.    Goal: Patient will improve cervical AROM to 40 degrees in order to increase ease of ADLs  Status at evaluation/last progress note: 30 degrees    PLAN  []  Upgrade activities as tolerated     [x]  Continue plan of care  []  Update interventions per flow sheet       []  Discharge due to:_  []  Other:_      Benson Marks PT 10/17/2022  7:52 AM    Future Appointments   Date Time Provider Cristo Buenrostro   10/17/2022  2:15 PM Jorge Cavanaugh, PT [facility]   10/21/2022  3:00 PM Louie Latif PTA [facility]   10/24/2022 12:45 PM Jose Ling [place] [facility]   10/26/2022 11:15 AM Jose Ling \[facility]"" [facility]   10/26/2022  1:30 PM Dave Robison PTA [facility]   11/2/2022  2:45 PM CSI, PACER [facility] BS AMB   11/8/2022  1:00 PM DO KVNG Vivar BS AMB   4/19/2023 11:00 AM Amita Santiago MD [facility] BS AMB

## 2022-10-21 ENCOUNTER — HOSPITAL ENCOUNTER (OUTPATIENT)
Dept: PHYSICAL THERAPY | Age: 42
Discharge: HOME | End: 2022-10-21
Payer: COMMERCIAL

## 2022-10-21 PROCEDURE — 97140 MANUAL THERAPY 1/> REGIONS: CPT

## 2022-10-21 PROCEDURE — 97110 THERAPEUTIC EXERCISES: CPT

## 2022-10-21 NOTE — PROGRESS NOTES
PT DAILY TREATMENT NOTE     Patient Name: Elpidio Kanner  Date:10/21/2022  : 1980  [x]  Patient  Verified  Payor: Dave Morgan / Plan: [employer] / Product Type: HMO /    In time: 3:10  Out time: 3:50  Total Treatment Time (min): 40  Visit #: 4 of 12    Treatment Area: Neck pain [M54.2]  Other low back pain [M54.59]    SUBJECTIVE  Pain Level (0-10 scale): 510  Any medication changes, allergies to medications, adverse drug reactions, diagnosis change, or new procedure performed?: [x] No    [] Yes (see summary sheet for update)  Subjective functional status/changes:   [] No changes reported  Pt reports increased neck soreness since his last session. He states the tingling/burning in his mid back started coming back today. He has some sensation in his left middle finger today. He reports he hasn't found his foam roller yet but thinks he knows where it is at. OBJECTIVE    15 min Therapeutic Exercise:  [x] See flow sheet :   Rationale: increase ROM and increase strength to improve the patients ability to perform ADLs    25 min Manual Therapy: no upslip or innominate rotation; MET for right t/s rotation; MET for left c/s rotation; MET for left elevated first rib    TPR left scalenes (middle and posterior) and pectoralis minor and levator scapuale   The manual therapy interventions were performed at a separate and distinct time from the therapeutic activities interventions.   Rationale: decrease pain, increase ROM, and increase tissue extensibility to perform ADLs          With   [x] TE   [] TA   [] neuro   [] other: Patient Education: [x] Review HEP    [] Progressed/Changed HEP based on:   [] positioning   [] body mechanics   [] transfers   [] heat/ice application    [] other:      Other Objective/Functional Measures:   Continues to have right t/s rotation and left c/s rotation  Elevated left first rib and scalene restrictions left>right  Forward shoulder posture with pectoral tightness  Added pec stretch at the wall high and low; slight low back pain due to loss of t/s mobility to reduce l/s extension performing stretch  Educated to focus on getting foam roller for the weekend to work on t/s mobility  No t/s radicular symptoms post session, improved left c/s mobility post session and less restrictions of scalenes and Levator scapulae    Pain Level (0-10 scale) post treatment: 4/10    ASSESSMENT/Changes in Function: Pt progressing with awareness of how mobility and alignment plays a role in current pain symptoms. He has resolution of t/s symptoms with PA mobs to the t/s and open books. He has left elevated first rib and hypertonicity in the left scalenes and levator scapulae. Will continue to work on postural endurance and mobility for reduction of pain and centralization of symptoms. Patient will continue to benefit from skilled PT services to modify and progress therapeutic interventions, address functional mobility deficits, address ROM deficits, address strength deficits, analyze and address soft tissue restrictions, analyze and cue movement patterns, analyze and modify body mechanics/ergonomics, assess and modify postural abnormalities, and address imbalance/dizziness to attain remaining goals. Progress towards goals / Updated goals:  Short Term Goals: To be accomplished in 1 weeks:  Patient will demonstrate compliance with HEP in order to improve cervical and thoracic mobility for increased ease of ADLs. Met     Long Term Goals: To be accomplished in 6 weeks:  Goal: Patient will improve FOTO score by 12 points in order to demonstrate a significant improvement in function. Status at evaluation/last progress note: 49     2. Goal: Patient will improve deep cervical flexion endurance to 25 seconds in order to increase ease of working. Status at evaluation/last progress note: 14 seconds     3.    Goal: Patient will improve B lower trap strength to 4-/5 in order to increase ease of overhead reaching. Status at evaluation/last progress note: B: 3/5     4.    Goal: Patient will improve cervical AROM to 40 degrees in order to increase ease of ADLs  Status at evaluation/last progress note: 30 degrees    PLAN  [x]  Upgrade activities as tolerated     [x]  Continue plan of care  []  Update interventions per flow sheet       []  Discharge due to:_  []  Other:_      Jaqui Ferraro, PTA 10/21/2022  7:52 AM    Future Appointments   Date Time Provider Cristo Buenrostro   10/21/2022  3:00 PM Myna Colleen [facility]   10/24/2022 12:45 PM Shelagh Number, PTA [facility]   10/26/2022 11:15 AM Shelagh Number, PTA [facility]   11/2/2022  2:45 PM CSI, PACER [facility] BS AMB   11/8/2022  1:00 PM DO KVNG Núñez BS AMB   4/19/2023 11:00 AM Jean Paul Santiago MD [facility] BS AMB

## 2022-10-24 ENCOUNTER — HOSPITAL ENCOUNTER (OUTPATIENT)
Dept: PHYSICAL THERAPY | Age: 42
Discharge: HOME | End: 2022-10-24
Payer: COMMERCIAL

## 2022-10-24 PROCEDURE — 97110 THERAPEUTIC EXERCISES: CPT

## 2022-10-24 PROCEDURE — 97140 MANUAL THERAPY 1/> REGIONS: CPT

## 2022-10-24 NOTE — PROGRESS NOTES
PT DAILY TREATMENT NOTE     Patient Name: Praveen Matt  Date:10/24/2022  : 1980  [x]  Patient  Verified  Payor: Krzysztof Leahy / Plan: [employer] / Product Type: HMO /    In time: 12:48  Out time: 1:40  Total Treatment Time (min): 52  Visit #: 5 of 12    Treatment Area: Neck pain [M54.2]  Other low back pain [M54.59]    SUBJECTIVE  Pain Level (0-10 scale): 510  Any medication changes, allergies to medications, adverse drug reactions, diagnosis change, or new procedure performed?: [x] No    [] Yes (see summary sheet for update)  Subjective functional status/changes:   [] No changes reported  Pt reports tightness in his mid back and left side of his neck today. He is ordering a foam roller. OBJECTIVE    36 min Therapeutic Exercise:  [x] See flow sheet :   Rationale: increase ROM and increase strength to improve the patients ability to perform ADLs    16 min Manual Therapy: no upslip; no innominate rotation; MET for neutral lesion left (right convexity) apex at T12-T13     The manual therapy interventions were performed at a separate and distinct time from the therapeutic activities interventions. Rationale: decrease pain, increase ROM, and increase tissue extensibility to perform ADLs          With   [x] TE   [] TA   [] neuro   [] other: Patient Education: [x] Review HEP    [] Progressed/Changed HEP based on:   [] positioning   [] body mechanics   [] transfers   [] heat/ice application    [] other:      Other Objective/Functional Measures: There is S-shaped scoliosis of the thoracolumbar spine. There is apex leftward  curvature centered at T4/T5, with a Levy angle of 16 degrees and apex rightward  curvature centered at T13, with a Levy angle of 27 degrees.     Provided education on self MET to start correcting lower t/s right convexity   Provided education on self left first rib mobs with belt    Educated on hollow tuck holds and core progression to work towards neutral spine  B hamstring tightness reviewed sitting stretch    Pain Level (0-10 scale) post treatment: 4/10    ASSESSMENT/Changes in Function: Pt progressing with working to mobility and correct t/s imbalances while addressing core stability and posture. He continues with kyphotic posture and increased extension moment of the l/s from core weakness and hamstring tightness. Will continue to progress imbalances to improve stability and reduce pain for ease of performing ADLs and caring for his daughter. Patient will continue to benefit from skilled PT services to modify and progress therapeutic interventions, address functional mobility deficits, address ROM deficits, address strength deficits, analyze and address soft tissue restrictions, analyze and cue movement patterns, analyze and modify body mechanics/ergonomics, assess and modify postural abnormalities, and address imbalance/dizziness to attain remaining goals. Progress towards goals / Updated goals:  Short Term Goals: To be accomplished in 1 weeks:  Patient will demonstrate compliance with HEP in order to improve cervical and thoracic mobility for increased ease of ADLs. Met     Long Term Goals: To be accomplished in 6 weeks:  Goal: Patient will improve FOTO score by 12 points in order to demonstrate a significant improvement in function. Status at evaluation/last progress note: 49     2. Goal: Patient will improve deep cervical flexion endurance to 25 seconds in order to increase ease of working. Status at evaluation/last progress note: 14 seconds  Progressing by adding hollow tuck hold as well for core stability     3. Goal: Patient will improve B lower trap strength to 4-/5 in order to increase ease of overhead reaching. Status at evaluation/last progress note: B: 3/5     4.    Goal: Patient will improve cervical AROM to 40 degrees in order to increase ease of ADLs  Status at evaluation/last progress note: 30 degrees    PLAN  [x]  Upgrade activities as tolerated     [x]  Continue plan of care  []  Update interventions per flow sheet       []  Discharge due to:_  []  Other:_      Soraya Thornton PTA 10/24/2022  7:52 AM    Future Appointments   Date Time Provider Cristo Buenrostro   10/24/2022 12:45 PM Tobin Barrow PTA [facility]   10/26/2022 11:15 AM Tobin Barrow PTA [facility]   11/2/2022  2:45 PM CSI, PACER [facility] BS AMB   11/8/2022  1:00 PM DO KVNG Kahn BS AMB   4/19/2023 11:00 AM Liliana Santiago MD [facility] BS AMB

## 2022-10-26 ENCOUNTER — HOSPITAL ENCOUNTER (OUTPATIENT)
Dept: PHYSICAL THERAPY | Age: 42
Discharge: HOME | End: 2022-10-26
Payer: COMMERCIAL

## 2022-10-26 ENCOUNTER — APPOINTMENT (OUTPATIENT)
Dept: PHYSICAL THERAPY | Age: 42
End: 2022-10-26
Payer: COMMERCIAL

## 2022-10-26 PROCEDURE — 97110 THERAPEUTIC EXERCISES: CPT

## 2022-10-26 PROCEDURE — 97112 NEUROMUSCULAR REEDUCATION: CPT

## 2022-10-26 NOTE — PROGRESS NOTES
PT DAILY TREATMENT NOTE     Patient Name: Yoana Yoo  Date:10/26/2022  : 1980  [x]  Patient  Verified  Payor: Joan Prado / Plan: [place] [employer] / Product Type: HMO /    In time:215  Out time:301  Total Treatment Time (min): 46  Visit #: 6 of 12    Treatment Area: Neck pain [M54.2]  Other low back pain [M54.59]    SUBJECTIVE  Pain Level (0-10 scale): 5/10  Any medication changes, allergies to medications, adverse drug reactions, diagnosis change, or new procedure performed?: [x] No    [] Yes (see summary sheet for update)  Subjective functional status/changes:   [] No changes reported  Pt stated that he always has pain.  Reported having pain in his neck and low back today    OBJECTIVE    Modality rationale: decrease pain and increase tissue extensibility to improve the patients ability to increase ease with ADLs   Min Type Additional Details    [] Estim:  []Unatt       []IFC  []Premod                        []Other:  []w/ice   []w/heat  Position:  Location:    [] Estim: []Att    []TENS instruct  []NMES                    []Other:  []w/US   []w/ice   []w/heat  Position:  Location:    []  Traction: [] Cervical       []Lumbar                       [] Prone          []Supine                       []Intermittent   []Continuous Lbs:  [] before manual  [] after manual    []  Ultrasound: []Continuous   [] Pulsed                           []1MHz   []3MHz W/cm2:  Location:    []  Iontophoresis with dexamethasone         Location: [] Take home patch   [] In clinic   10 []  Ice     [x]  heat  []  Ice massage  []  Laser   []  Anodyne Position:seated  Location:neck and upper back    []  Laser with stim  []  Other:  Position:  Location:    []  Vasopneumatic Device    []  Right     []  Left  Pre-treatment girth:  Post-treatment girth:  Measured at (location):  Pressure:       [] lo [] med [] hi   Temperature: [] lo [] med [] hi   [x] Skin assessment post-treatment:  [x]intact []redness- no adverse reaction []redness - adverse reaction:     26 min Therapeutic Exercise:  [x] See flow sheet :   Rationale: increase ROM and increase strength to improve the patients ability to increase ease with ADLs    10 min Neuromuscular Re-education:  [x]  See flow sheet :   Rationale: increase strength, improve coordination, and increase proprioception  to improve the patients ability to increase ease with ADLs    With   [x] TE   [] TA   [] neuro   [] other: Patient Education: [x] Review HEP    [] Progressed/Changed HEP based on:   [] positioning   [] body mechanics   [] transfers   [] heat/ice application    [] other:      Other Objective/Functional Measures:   Had muscle spasm in Rhomboids with prone SB lower trap facilitation   Had increased neck tightness with quadriped exercises  No other difficulty reported during session     Pain Level (0-10 scale) post treatment: 4/10    ASSESSMENT/Changes in Function:   Pt is making limited progress toward goals. Pt cont with moderate pain in the neck and upper back. Cont to report difficulty with performing ADLs and household chores. Cont with decreased cervical AROM. Cont with decreased strength in B lower traps. Patient will continue to benefit from skilled PT services to modify and progress therapeutic interventions, address functional mobility deficits, address ROM deficits, address strength deficits, analyze and address soft tissue restrictions, analyze and cue movement patterns, analyze and modify body mechanics/ergonomics, assess and modify postural abnormalities, address imbalance/dizziness, and instruct in home and community integration to attain remaining goals. [x]  See Plan of Care  []  See progress note/recertification  []  See Discharge Summary         Progress towards goals / Updated goals:  Short Term Goals: To be accomplished in 1 weeks:  Patient will demonstrate compliance with HEP in order to improve cervical and thoracic mobility for increased ease of ADLs.  Met Long Term Goals: To be accomplished in 6 weeks:  Goal: Patient will improve FOTO score by 12 points in order to demonstrate a significant improvement in function. Status at evaluation/last progress note: 49     2. Goal: Patient will improve deep cervical flexion endurance to 25 seconds in order to increase ease of working. Status at evaluation/last progress note: 14 seconds  Progressing by adding hollow tuck hold as well for core stability     3. Goal: Patient will improve B lower trap strength to 4-/5 in order to increase ease of overhead reaching. Status at evaluation/last progress note: B: 3/5     4.    Goal: Patient will improve cervical AROM to 40 degrees in order to increase ease of ADLs  Status at evaluation/last progress note: 30 degrees    PLAN  []  Upgrade activities as tolerated     [x]  Continue plan of care  []  Update interventions per flow sheet       []  Discharge due to:_  []  Other:_      Donte Barron PTA 10/26/2022  11:11 AM    Future Appointments   Date Time Provider [facility]   10/26/2022  2:15 PM Carin Anaya PTA [facility]   11/2/2022  2:45 PM CSI, PACER [facility] BS AMB   11/8/2022  1:00 PM DO K[facility] Mix BS AMB   4/19/2023 11:00 AM Yong Santiago MD [facility] BS AMB

## 2022-11-01 ENCOUNTER — APPOINTMENT (OUTPATIENT)
Dept: PHYSICAL THERAPY | Age: 42
End: 2022-11-01
Payer: COMMERCIAL

## 2022-11-03 ENCOUNTER — HOSPITAL ENCOUNTER (OUTPATIENT)
Dept: PHYSICAL THERAPY | Age: 42
End: 2022-11-03
Payer: COMMERCIAL

## 2022-11-07 ENCOUNTER — HOSPITAL ENCOUNTER (OUTPATIENT)
Dept: PHYSICAL THERAPY | Age: 42
Discharge: HOME | End: 2022-11-07
Payer: COMMERCIAL

## 2022-11-07 PROCEDURE — 97110 THERAPEUTIC EXERCISES: CPT

## 2022-11-07 PROCEDURE — 97530 THERAPEUTIC ACTIVITIES: CPT

## 2022-11-07 PROCEDURE — 97140 MANUAL THERAPY 1/> REGIONS: CPT

## 2022-11-07 NOTE — PROGRESS NOTES
PT DAILY TREATMENT NOTE     Patient Name: Renée Stuart  Date:2022  : 1980  [x]  Patient  Verified  Payor: Marilynn Rodrigues / Plan: [employer] / Product Type: HMO /    In time: 3:11 Out time: 3:50  Total Treatment Time (min): 39  Visit #: 7 of 12    Treatment Area: Neck pain [M54.2]  Other low back pain [M54.59]    SUBJECTIVE  Pain Level (0-10 scale): 510  Any medication changes, allergies to medications, adverse drug reactions, diagnosis change, or new procedure performed?: [x] No    [] Yes (see summary sheet for update)  Subjective functional status/changes:   [] No changes reported  Pt reports pain and tightness in the middle back with sensations of burning and itching left>right. He was sick the last week with a sore throat and they thought it was strep throat and put him on amoxicillin. He states he was only able to do his exercises 4 times in the last week. He is having a hard time doing the left first rib mobs. He goes back to see the MD tomorrow.      OBJECTIVE    14 min Therapeutic Exercise:  [x] See flow sheet :   Rationale: increase ROM and increase strength to improve the patients ability to increase ease with ADLs    15 min Therapeutic Activity:  [x] See flow sheet : goal reassessment; FOTO   Rationale: increase ROM and increase strength to improve the patients ability to increase ease with ADLs    10 min Manual therapy:  [x]  See flow sheet : leg lengthening for left upslip; shotgun technique; t/s PA mobs grade III-IV; MET for left C5-C7 rotation; MET for left 1st rib     Rationale: increase strength, improve coordination, and increase proprioception  to improve the patients ability to increase ease with ADLs    With   [x] TE   [] TA   [] neuro   [] other: Patient Education: [x] Review HEP    [] Progressed/Changed HEP based on:   [] positioning   [] body mechanics   [] transfers   [] heat/ice application    [] other:      Other Objective/Functional Measures:   HEP compliance: 4 times in the last week due to being sick (1x daily prior to being sick)  FOTO:52/100  Deep cervical flexion endurance: 47 seconds  Lower trap MMT: 3/5  Cervical AROM rotation: left 38 degrees right 42 degrees   HS length from 90 degrees hip flexion: left 57 degrees from 0; right 61 degrees from 0  Right thoracolumbar convexity noted especially in prone    Pain Level (0-10 scale) post treatment: 3-4/10    ASSESSMENT/Changes in Function: See Progress note. Patient will continue to benefit from skilled PT services to modify and progress therapeutic interventions, address functional mobility deficits, address ROM deficits, address strength deficits, analyze and address soft tissue restrictions, analyze and cue movement patterns, analyze and modify body mechanics/ergonomics, assess and modify postural abnormalities, address imbalance/dizziness, and instruct in home and community integration to attain remaining goals. [x]  See Plan of Care  [x]  See progress note/recertification  []  See Discharge Summary         Progress towards goals / Updated goals:  Short Term Goals: To be accomplished in 1 weeks:  Patient will demonstrate compliance with HEP in order to improve cervical and thoracic mobility for increased ease of ADLs. Met     Long Term Goals: To be accomplished in 6 weeks:  Goal: Patient will improve FOTO score by 12 points in order to demonstrate a significant improvement in function. Status at evaluation/last progress note: 49  Progressing 52/100     2. Goal: Patient will improve deep cervical flexion endurance to 25 seconds in order to increase ease of working. Status at evaluation/last progress note: 14 seconds  Progressing by adding hollow tuck hold as well for core stability  MET 47 seconds     3. Goal: Patient will improve B lower trap strength to 4-/5 in order to increase ease of overhead reaching.    Status at evaluation/last progress note: B: 3/5  Not met: 3/5 bilaterally with left UT compensation     4.    Goal: Patient will improve cervical AROM to 40 degrees in order to increase ease of ADLs  Status at evaluation/last progress note: 30 degrees  Progressing Cervical AROM rotation: left 38 degrees right 42 degrees     PLAN  [x]  Upgrade activities as tolerated     [x]  Continue plan of care  []  Update interventions per flow sheet       []  Discharge due to:_  []  Other:_      Marylin Garber, NAKIA 11/7/2022  11:11 AM    Future Appointments   Date Time Provider [facility]   11/7/2022  3:00 PM Rody Murillo, PTA [facility]   11/8/2022  1:00 PM DO KVNG Mart BS AMB   11/11/2022 12:45 PM Rody Murillo, PTA [facility]   11/14/2022  3:00 PM Rody Murillo, PTA [facility]   11/16/2022  2:15 PM Rody Murillo, PTA [facility]   11/21/2022 12:00 PM Rivers Pacini, PT [facility]   11/28/2022 12:45 PM Rivers Pacini, PT [facility]   4/19/2023 11:00 AM Jazmin Santiago MD [facility] BS AMB

## 2022-11-08 ENCOUNTER — OFFICE VISIT (OUTPATIENT)
Dept: ORTHOPEDIC SURGERY | Age: 42
End: 2022-11-08
Payer: COMMERCIAL

## 2022-11-08 VITALS — RESPIRATION RATE: 14 BRPM | WEIGHT: 152 LBS | HEIGHT: 66 IN | BODY MASS INDEX: 24.43 KG/M2

## 2022-11-08 DIAGNOSIS — M99.02 THORACIC REGION SOMATIC DYSFUNCTION: ICD-10-CM

## 2022-11-08 DIAGNOSIS — M51.36 DDD (DEGENERATIVE DISC DISEASE), LUMBAR: ICD-10-CM

## 2022-11-08 DIAGNOSIS — M99.04 SACRAL REGION SOMATIC DYSFUNCTION: ICD-10-CM

## 2022-11-08 DIAGNOSIS — M50.30 DDD (DEGENERATIVE DISC DISEASE), CERVICAL: Primary | ICD-10-CM

## 2022-11-08 DIAGNOSIS — M99.08 RIB CAGE REGION SOMATIC DYSFUNCTION: ICD-10-CM

## 2022-11-08 DIAGNOSIS — M99.06 LOWER LIMB REGION SOMATIC DYSFUNCTION: ICD-10-CM

## 2022-11-08 DIAGNOSIS — M99.07 UPPER EXTREMITY SOMATIC DYSFUNCTION: ICD-10-CM

## 2022-11-08 DIAGNOSIS — M99.01 CERVICAL SOMATIC DYSFUNCTION: ICD-10-CM

## 2022-11-08 DIAGNOSIS — M99.05 PELVIC SOMATIC DYSFUNCTION: ICD-10-CM

## 2022-11-08 DIAGNOSIS — M99.03 LUMBAR REGION SOMATIC DYSFUNCTION: ICD-10-CM

## 2022-11-08 DIAGNOSIS — M99.09 SOMATIC DYSFUNCTION OF ABDOMINAL REGION: ICD-10-CM

## 2022-11-08 DIAGNOSIS — M51.34 DDD (DEGENERATIVE DISC DISEASE), THORACIC: ICD-10-CM

## 2022-11-08 PROCEDURE — 99214 OFFICE O/P EST MOD 30 MIN: CPT | Performed by: FAMILY MEDICINE

## 2022-11-08 PROCEDURE — 98929 OSTEOPATH MANJ 9-10 REGIONS: CPT | Performed by: FAMILY MEDICINE

## 2022-11-08 RX ORDER — AMOXICILLIN 500 MG/1
500 CAPSULE ORAL
COMMUNITY

## 2022-11-08 RX ORDER — MELOXICAM 15 MG/1
TABLET ORAL
Qty: 90 TABLET | Refills: 0 | Status: SHIPPED | OUTPATIENT
Start: 2022-11-08

## 2022-11-08 NOTE — LETTER
11/8/2022    Patient: Gaylia Shone   YOB: 1980   Date of Visit: 11/8/2022     [facility] [address] [address]  Via Fax: [phone]    Dear Brenton Macedo DO,      Thank you for referring Mr. Chon Olivia to [place] [id number]. for evaluation. My notes for this consultation are attached. If you have questions, please do not hesitate to call me. I look forward to following your patient along with you.       Sincerely,    Maggy Martinez DO

## 2022-11-08 NOTE — THERAPY RECERTIFICATION
[facility] [employer] [facility] [employer] [facility]  [address] [place] [address]  [phone] fax    Physical Therapy Progress Note  Patient name: Paulette Spence Start of Care: 10/7/2022   Referral source: Jefzhen HayleezhenDO : 1980   Medical/Treatment Diagnosis: Neck pain [M54.2]  Other low back pain [M54.59]  Payor: OPTIMA / Plan: [place] [employer] HMO / Product Type: HMO /  Onset Date:chronic with worsening symptoms over the past few years       Prior Hospitalization: see medical history Provider#: [id number]   Medications: Verified on Patient Summary List    Comorbidities: tobacco use, anxiety, depression, IBS, chronic fatigue  Prior Level of Function: Ind with ADLs, working in real estate    Visits from [facility]: 7    Missed Visits: 2    Established Goals:         Excellent           Good         Limited           None  [x] Increased ROM   []  [x]  []  []  [x] Increased Strength  []  []  [x]  []  [x] Increased Mobility  []  [x]  []  []   [x] Decreased Pain   []  []  [x]  []  [] Decreased Swelling  []  []  []  []    Key Functional Changes: FOTO 52/100; improved c/s AROM; HEP compliance; increased c/s endurance    Updated Goals: to be achieved in 6 weeks:  1. Patient will improve FOTO score at least 12 points (61/100) to demonstrate improvement in functional mobility. 2. Patient will report \"A little bit of difficulty\" when asked on the FOTO questionnaire, \"How much difficulty to you have sitting performing light desk work for 8 hours? \" For improved ease of working. 3. Patient will improve his popliteal angle test to 45 degrees bilaterally to improve pelvis positioning and reduce low back pain for ease of caring for his daughter. 4. Patient will report <= 4/10 pain on average in the t/s and left c/s to improve ease of working and performing ADLs. 5. Patient will improve B lower trap strength to 4-/5 in order to increase ease of overhead reaching. ASSESSMENT/RECOMMENDATIONS: Mr. Nahum Brito is making slow, steady progress towards established goals in PT in 7 visits. He missed his last week of therapy due to sickness and has now returned with reports of 5/10 pain in primarily the left t/s and left c/s. He has been compliant with his HEP daily consisting of stretching and mobilizing of the spinal segments and working towards postural strengthening. He was educated a heel lift was not needed due to no LLD when assessing tibial differences post alignment correction. Most noticeably when assessing the t/s is the right convexity along the thoracolumbar junction and the t/s kyphosis. He has centralization of radicular symptoms when MET is used to correct alignments and PA mobs to the t/s. His c/s AROM rotation increased to 38 degrees left and 42 degrees right. His popliteal angle test is 57 degrees on the left and 61 degrees on the right. He continues to demonstrate 3/5 lower trap strength with increased left UT compensation when testing. He continues to have an elevated left first rib due to scalene hypertonicity likely a combination of scoliosis and facet fusions per X-rays on the left. His deep cervical flexion endurance improved to 47 seconds and his FOTO increased to 52/100 indicating overall functional improvements. Skilled PT remains medically necessary to progress postural endurance and neutral spinal alignment to centralize radicular symptoms and reduce pain for ease of working, performing ADLs and caring for his daughter.      [x]Continue therapy per initial plan/protocol at a frequency of  2 x per week for 6 weeks  []Continue therapy with the following recommended changes:_____________________      _____________________________________________________________________  []Discontinue therapy progressing towards or have reached established goals  []Discontinue therapy due to lack of appreciable progress towards goals  []Discontinue therapy due to lack of attendance or compliance  []Await Physician's recommendations/decisions regarding therapy  []Other:________________________________________________________________    Thank you for this referral.   Elizabeth Villavicencio, PTA 11/8/2022 8:12 AM    NOTE TO PHYSICIAN:  PLEASE COMPLETE THE ORDERS BELOW AND   FAX TO [facility]: ([phone]  If you are unable to process this request in 24 hours please contact our office: [phone]    [x]  I have read the above report and request that my patient continue as recommended. I have read the above report and request that my patient continue therapy with the following changes/special instructions:____________________________________  I have read the above report and request that my patient be discharged from therapy.     Physicians signature: ______________________________Date: ______Time:______     Nando Kraus,

## 2022-11-08 NOTE — PROGRESS NOTES
HISTORY OF PRESENT ILLNESS    Lo Espinosa 1980 is a 38y.o. year old male comes in today to be evaluated and treated for: neck, back pain    Since last appt has noticed pain improving 7 sessions PT which was recently extended. Pain level 5/10. Using voltaren 50mg with benefit but upsets stomach bad. IMAGING: XR whole spine 8/6/2020  IMPRESSION:  1. S-shaped thoracolumbar scoliosis. No significant coronal or sagittal  balance. 2.  Exaggerated thoracic kyphosis. Mild left-sided upward pelvic tilt. 3.  Mild to moderate degenerative changes above. Partial C6/C7 congenital  fusion. 4.  Transitional anatomy with 13 thoracic type vertebral bodies. MRI thoracic 8/6/2022  IMPRESSION:  Mild rotoscoliosis convexity to the right  Minimal kyphotic angulation  Incidental hiatal hernia    Past Surgical History:   Procedure Laterality Date    HX HEENT      corrective hearing for left ear     HX HEENT      plastic surgery on both ears    HX HEENT  10/08 & 09/09    Reformed jaw bone, bone graft    HX RENAL BIOPSY      HX TONSILLECTOMY      PA APPENDECTOMY  11/23/2010    PA IMPLANTATION PT-ACTIVATED CARDIAC EVENT RECORDER N/A 10/24/2018    Loop Recorder Insert performed by Terri Harper MD at [facility] CATH [facility]     Social History     Socioeconomic History    Marital status: SINGLE   Occupational History    Occupation: real estate   Tobacco Use    Smoking status: Every Day     Packs/day: 1.00     Years: 20.00     Pack years: 20.00     Types: Cigarettes    Smokeless tobacco: Never   Substance and Sexual Activity    Alcohol use: No     Alcohol/week: 0.0 standard drinks    Drug use: No     Current Outpatient Medications   Medication Sig Dispense Refill    amoxicillin (AMOXIL) 500 mg capsule Take 500 mg by mouth. diclofenac EC (VOLTAREN) 50 mg EC tablet Take 1 Tablet by mouth two (2) times a day.  60 Tablet 1    escitalopram oxalate (LEXAPRO) 10 mg tablet TAKE 1 IN AM AND 1/2 AT BEDTIME      ibuprofen (MOTRIN) 200 mg tablet Take  by mouth. diclofenac (VOLTAREN) 1 % gel Apply 4 g to affected area four (4) times daily. 5 Each 5    ALPRAZolam (XANAX) 0.5 mg tablet Take 0.5 mg by mouth two (2) times a day. ascorbic acid, vitamin C, (VITAMIN C) 500 mg tablet Take 1,000 mg by mouth daily. cholecalciferol, vitamin D3, 50 mcg (2,000 unit) tab Take 1 Tab by mouth daily. omeprazole-sodium bicarbonate (ZEGERID) 40-1.1 mg-gram capsule Take 2 Caps by mouth daily. acetaminophen (TYLENOL) 500 mg tablet Take 500 mg by mouth every six (6) hours as needed. MULTIVITAMIN PO Take 1 Tab by mouth daily. Past Medical History:   Diagnosis Date    Anxiety     Arm fatigue     Forearms    Balance problems     Cardiac Agatston CAC score 100-199 02/04/2015    Coronary calcium score 0. Cardiac echocardiogram 10/29/2014    EF 55-60%. No RWMA. Normal diastolic fx. RVSP 30 mmHg. Cardiac Holter monitor study 10/29/2014    Normal Holter study. Chronic cough     chronically coughing up sputum    Depression     Dizziness     Dyspnea     Esophageal reflux     Headache     Hypercholesterolemia     Lumbar pain     Myofascial pain     Neck pain     Numbness and tingling     PAC (premature atrial contraction)     Palpitations     presumably benign    Panic disorder     Reflux     Thoracic back pain     Mid-thoracic    Upper extremity weakness     Vertigo, intermittent      Family History   Problem Relation Age of Onset    Hypertension Father     Liver Disease Father     Hypertension Mother        ROS:  No incont, fever, numb    Objective:  Resp 14   Ht 5' 6\" (1.676 m)   Wt 152 lb (68.9 kg)   BMI 24.53 kg/m²   NEURO:  Sensation intact to light touch. Reflexes +2/4 biceps, triceps, patellar, and Achilles bilaterally. M/S:  Examined seated and supine. Slump/Spurling negative. Standing flexion test positive left  Sphinx test positive left.   ASIS low left  Iliac crests equal bilaterally Pubes equal bilaterally Medial malleolus low left  Sacral base posterior left  JESE low right  TTA at C3 on left worse flexion, T2, 3 on left worse flexion, and L2, 4 on left worse flexion  Rib(s) 2, 3 left TTP and posterior LE Strength +5/5 bilaterally Thoracic diaphragm restricted left. Scapula motion restricted w/ TTA left. Hip flexion limited right. No leg length difference. Assessment/Plan:     ICD-10-CM ICD-9-CM    1. DDD (degenerative disc disease), cervical  M50.30 722.4 meloxicam (MOBIC) 15 mg tablet      2. DDD (degenerative disc disease), thoracic  M51.34 722.51 meloxicam (MOBIC) 15 mg tablet      3. DDD (degenerative disc disease), lumbar  M51.36 722.52 meloxicam (MOBIC) 15 mg tablet      4. Lumbar region somatic dysfunction  M99.03 739.3 OK OSTEOPATHIC MANIP,9-10 BODY REGN      5. Pelvic somatic dysfunction  M99.05 739.5 OK OSTEOPATHIC MANIP,9-10 BODY REGN      6. Sacral region somatic dysfunction  M99.04 739.4 OK OSTEOPATHIC MANIP,9-10 BODY REGN      7. Thoracic region somatic dysfunction  M99.02 739.2 OK OSTEOPATHIC MANIP,9-10 BODY REGN      8. Rib cage region somatic dysfunction  M99.08 739.8 OK OSTEOPATHIC MANIP,9-10 BODY REGN      9. Cervical somatic dysfunction  M99.01 739.1 OK OSTEOPATHIC MANIP,9-10 BODY REGN      10. Upper extremity somatic dysfunction  M99.07 739.7 OK OSTEOPATHIC MANIP,9-10 BODY REGN      11. Lower limb region somatic dysfunction  M99.06 739.6 OK OSTEOPATHIC MANIP,9-10 BODY REGN      12. Somatic dysfunction of abdominal region  M99.09 739.9 OK OSTEOPATHIC MANIP,9-10 BODY REGN        Verbal consent obtained. Cervical, Thoracic, Rib, Lumbar, Pelvic, Sacral, Upper Ext, Lower Ext, and Abdominal SD treated with myofascial and ME. Correction of previous malalignments verified after Tx. Pt tolerated well. Notes improvement of Sx and pain is now rated 2/10. HEP/stretches daily. Discussed stretching/strengthening/posture.     Patient (or guardian if minor) verbalizes understanding of evaluation and plan. Will continue HEP, PT, and Rx for mobic in place of voltaren 50mg  as above and plan follow-up 6 weeks. Total time spent on encounter including chart/imaging/lab review and evaluation/documentation/HEP demo/coordination of care/form completion but not including time for any procedures/manipulation 37 minutes.

## 2022-11-11 ENCOUNTER — HOSPITAL ENCOUNTER (OUTPATIENT)
Dept: PHYSICAL THERAPY | Age: 42
Discharge: HOME | End: 2022-11-11
Payer: COMMERCIAL

## 2022-11-11 PROCEDURE — 97140 MANUAL THERAPY 1/> REGIONS: CPT

## 2022-11-11 PROCEDURE — 97110 THERAPEUTIC EXERCISES: CPT

## 2022-11-11 NOTE — PROGRESS NOTES
PT DAILY TREATMENT NOTE     Patient Name: Edgar Tadeo  Date:2022  : 1980  [x]  Patient  Verified  Payor: Marco Antonio Salazar / Plan: [employer] / Product Type: HMO /    In time: 12:49  Out time: 1:50  Total Treatment Time (min): 61   Visit #: 1 of 12    Treatment Area: Neck pain [M54.2]  Other low back pain [M54.59]    SUBJECTIVE  Pain Level (0-10 scale): 5/10 c/s and l/s  Any medication changes, allergies to medications, adverse drug reactions, diagnosis change, or new procedure performed?: [x] No    [] Yes (see summary sheet for update)  Subjective functional status/changes:   [] No changes reported  Pt reports decreased symptoms after his MD appt but the pain always returns. He is trying to be more aware of his posture. He states increased right arm and shoulder soreness after carrying his daughter a lot yesterday.      OBJECTIVE    51 min Therapeutic Exercise:  [x] See flow sheet :   Rationale: increase ROM and increase strength to improve the patients ability to increase ease with ADLs    10 min Manual therapy:  [x]  See flow sheet : MET for left upslip; shotgun technique; grade III-IV PA t/s mobs   Rationale: increase strength, improve coordination, and increase proprioception  to improve the patients ability to increase ease with ADLs    With   [x] TE   [] TA   [] neuro   [] other: Patient Education: [x] Review HEP    [] Progressed/Changed HEP based on:   [] positioning   [] body mechanics   [] transfers   [] heat/ice application    [] other:      Other Objective/Functional Measures:   Decreased HS flexibility contributing to increased PPT in sitting  T/s kyphosis  In prone able to reduce kyphosis with muscle activation but decreased endurance unable to maintain and right fatigues quickly  Added unilateral strengthening stretching per scoliosis of left concavity and right convexity  Improved hollow tuck hold to 60 seconds  Left lower trap on SB caused radicular symptoms down the left UE  Right lower trap difficulty activating without elbow flexion    Pain Level (0-10 scale) post treatment: 3-4/10    ASSESSMENT/Changes in Function:  Pt continues to progress with reducing pain levels but demonstrates unilateral tightness and weakness based off a left concavity and right convexity on the spine. He continues with t/s kyphosis but is able to improve with muscle activation but is unable to maintain due to decreased endurance. Will continue to progress strength and flexibility to improve posture and ease of carrying his daughter. Patient will continue to benefit from skilled PT services to modify and progress therapeutic interventions, address functional mobility deficits, address ROM deficits, address strength deficits, analyze and address soft tissue restrictions, analyze and cue movement patterns, analyze and modify body mechanics/ergonomics, assess and modify postural abnormalities, address imbalance/dizziness, and instruct in home and community integration to attain remaining goals. [x]  See Plan of Care  [x]  See progress note/recertification  []  See Discharge Summary         Updated Goals: to be achieved in 6 weeks:  1. Patient will improve FOTO score at least 12 points (61/100) to demonstrate improvement in functional mobility. 2. Patient will report \"A little bit of difficulty\" when asked on the FOTO questionnaire, \"How much difficulty to you have sitting performing light desk work for 8 hours? \" For improved ease of working. 3. Patient will improve his popliteal angle test to 45 degrees bilaterally to improve pelvis positioning and reduce low back pain for ease of caring for his daughter. 4. Patient will report <= 4/10 pain on average in the t/s and left c/s to improve ease of working and performing ADLs. 5. Patient will improve B lower trap strength to 4-/5 in order to increase ease of overhead reaching.      PLAN  [x]  Upgrade activities as tolerated     [x]  Continue plan of care  []  Update interventions per flow sheet       []  Discharge due to:_  []  Other:_      Stas García, PTA 11/11/2022  11:11 AM    Future Appointments   Date Time Provider Cristo Buenrostro   11/11/2022 12:45 PM Junior Roman, PTA [facility]   11/14/2022  3:00 PM Junior Roman, PTA [facility]   11/16/2022  2:15 PM Junior Roman, PTA [facility]   11/21/2022 12:00 PM Ric Soares, PT [facility]   11/28/2022 12:45 PM Ric Soares, PT [facility]   12/14/2022  2:00 PM DO KVNG Renner BS AMB   4/19/2023 11:00 AM Geena Santiago MD [facility] BS AMB

## 2022-11-14 ENCOUNTER — HOSPITAL ENCOUNTER (OUTPATIENT)
Dept: PHYSICAL THERAPY | Age: 42
Discharge: HOME | End: 2022-11-14
Payer: COMMERCIAL

## 2022-11-14 PROCEDURE — 97140 MANUAL THERAPY 1/> REGIONS: CPT

## 2022-11-14 PROCEDURE — 97110 THERAPEUTIC EXERCISES: CPT

## 2022-11-14 NOTE — PROGRESS NOTES
PT DAILY TREATMENT NOTE     Patient Name: Trecia Blizzard  Date:2022  : 1980  [x]  Patient  Verified  Payor: Kelby Pinch / Plan: [employer] / Product Type: HMO /    In time: 3:10  Out time: 4:10  Total Treatment Time (min): 60  Visit #: 2 of 12    Treatment Area: Neck pain [M54.2]  Other low back pain [M54.59]    SUBJECTIVE  Pain Level (0-10 scale): 6/10  Any medication changes, allergies to medications, adverse drug reactions, diagnosis change, or new procedure performed?: [x] No    [] Yes (see summary sheet for update)  Subjective functional status/changes:   [] No changes reported  Pt reports after last session he had some pain in the right side of his neck and was overall sore from the exercises. He presents today with tightness in the left low back, left neck and the burning/itching symptoms in his middle back on the left.      OBJECTIVE    30 min Therapeutic Exercise:  [x] See flow sheet :   Rationale: increase ROM and increase strength to improve the patients ability to increase ease with ADLs    30 min Manual therapy:  [x]  See flow sheet : leg lengthening for left upslip; MET for left PI, shotgun technique; MWM for left/left sacral torsion; MET for right t/s rotation; MET for left c/s rotation; DTM left quadratus lumborum and left UT, levator scapulae; TPR right splenius capitus   Rationale: increase strength, improve coordination, and increase proprioception  to improve the patients ability to increase ease with ADLs    With   [x] TE   [] TA   [] neuro   [] other: Patient Education: [x] Review HEP    [] Progressed/Changed HEP based on:   [] positioning   [] body mechanics   [] transfers   [] heat/ice application    [] other:      Other Objective/Functional Measures:   Reduction of pain with manual techniques  Left quadratus remains tight contributing to myofacial pull up the left posterior chain  Centralization of left ring finger symptoms with c/s distraction in supine  Reduction of t/s symptoms with MET for right rotation; taught pt to perform at wall and further reduction with scap retraction  Challenged with shoulder horizontal abduction (right weaker than left) cues to reduce UT hiking  Cues with standing exercises for slight knee flexion to reduce extension moment in the l/s  Educated to continue HS stretching at home  Reduced left low back pain post manual; pt felt globally looser in the neck as well  B pectoral tightness     Pain Level (0-10 scale) post treatment: 4.7/10    ASSESSMENT/Changes in Function:  Pt making slow, steady progress towards goals with new focus of stability in conjunction with mobility. He continues to have most radicular symptom relief with alignment corrections but needs strengthening in order to maintain and stabilize. His main deficits are t/s hypomobility with right rotation due to convexity, pectoral tightness, and posterior chain weakness contributing to forward shoulder posture. Patient will continue to benefit from skilled PT services to modify and progress therapeutic interventions, address functional mobility deficits, address ROM deficits, address strength deficits, analyze and address soft tissue restrictions, analyze and cue movement patterns, analyze and modify body mechanics/ergonomics, assess and modify postural abnormalities, address imbalance/dizziness, and instruct in home and community integration to attain remaining goals. [x]  See Plan of Care  [x]  See progress note/recertification  []  See Discharge Summary         Updated Goals: to be achieved in 6 weeks:  1. Patient will improve FOTO score at least 12 points (61/100) to demonstrate improvement in functional mobility. 2. Patient will report \"A little bit of difficulty\" when asked on the FOTO questionnaire, \"How much difficulty to you have sitting performing light desk work for 8 hours? \" For improved ease of working.    3. Patient will improve his popliteal angle test to 45 degrees bilaterally to improve pelvis positioning and reduce low back pain for ease of caring for his daughter. 4. Patient will report <= 4/10 pain on average in the t/s and left c/s to improve ease of working and performing ADLs. Progressing 5-6/10  5. Patient will improve B lower trap strength to 4-/5 in order to increase ease of overhead reaching.      PLAN  [x]  Upgrade activities as tolerated     [x]  Continue plan of care  []  Update interventions per flow sheet       []  Discharge due to:_  []  Other:_      Renetta Dixon PTA 11/14/2022  11:11 AM    Future Appointments   Date Time Provider Cristo Chitra   11/14/2022  3:00 PM Stafford Kayser, PTA [facility]   11/16/2022  2:15 PM Stafford Kayser, PTA [facility]   11/21/2022 12:00 PM Husam Garcia, PT [facility]   11/28/2022 12:45 PM Husam Garcia PT [facility]   12/14/2022  2:00 PM Kelton Curling, DO [facility] BS AMB   4/19/2023 11:00 AM Genia Santiago MD [facility] BS AMB

## 2022-11-16 ENCOUNTER — HOSPITAL ENCOUNTER (OUTPATIENT)
Dept: PHYSICAL THERAPY | Age: 42
Discharge: HOME | End: 2022-11-16
Payer: COMMERCIAL

## 2022-11-16 PROCEDURE — 97140 MANUAL THERAPY 1/> REGIONS: CPT

## 2022-11-16 PROCEDURE — 97110 THERAPEUTIC EXERCISES: CPT

## 2022-11-16 NOTE — PROGRESS NOTES
PT DAILY TREATMENT NOTE     Patient Name: Pauline Chavez  Date:2022  : 1980  [x]  Patient  Verified  Payor: Chris Keenan / Plan: [employer] / Product Type: HMO /    In time: 2:30  Out time: 3:15  Total Treatment Time (min):45  Visit #: 3 of 12    Treatment Area: Neck pain [M54.2]  Other low back pain [M54.59]    SUBJECTIVE  Pain Level (0-10 scale): 4/10  Any medication changes, allergies to medications, adverse drug reactions, diagnosis change, or new procedure performed?: [x] No    [] Yes (see summary sheet for update)  Subjective functional status/changes:   [] No changes reported  Pt reports overall a better day today. He states he didn't have pain after his last session just some residual soreness from working out. He has noticed he is able to reduce the itching/burning along his mid back with rotation into the wall and posture correction but it doesn't stay gone for long maybe 15 minutes. He feels tightness in the left side of his neck today but his left low back is better today.      OBJECTIVE    30 min Therapeutic Exercise:  [x] See flow sheet :   Rationale: increase ROM and increase strength to improve the patients ability to increase ease with ADLs    10 min Manual therapy:  [x]  See flow sheet : no upslip or innominate rotation today; continues with right t/s rotations due to scoliotic curve and convexity; MET for right t/s rotation; MET for left c/s rotation; PA mobs to the t/s grade III-IV   Rationale: increase strength, improve coordination, and increase proprioception  to improve the patients ability to increase ease with ADLs    With   [x] TE   [] TA   [] neuro   [] other: Patient Education: [x] Review HEP    [] Progressed/Changed HEP based on:   [] positioning   [] body mechanics   [] transfers   [] heat/ice application    [] other:      Other Objective/Functional Measures:   Continue to work inferior to superior in regards to stretching/strengthening with unilateral approach based on concavity/convexity  No upslip noted today so manual of left QL in previous session deemed effective in reducing hypertonicity  Challenged with right mid back strengthening compared to left  Cues for form with prone lower trap activation to elicit depression versus retraction (did best with tactile cueing to reduce UT hiking)  Pt does best with posterior shoulder and mid back strengthening with decreased neck pain when stretching pectorals first (open book has been most effective with least low back pain when performing)  Cues with standing exercises to reduce extension moment in the l/s by performing slight squat with core engagement     Pain Level (0-10 scale) post treatment: 4-5/10     ASSESSMENT/Changes in Function:  Pt continues to progress with stretching of the concavities and strengthening convexities to improve spinal alignment and reduce pain. He continues to demonstrate t/s kyphosis in conjunction with right rotation causing nerve symptoms in the mid back on the left that centralize post manual and with retraction to reduce kyphosis. He continues to get increased pain with prolonged holding of his daughter and performing activities with her. He is more aware of his posture when sitting with work. Continue to progress postural strength and endurance in relation to his scoliosis for decreased pain and centralization of radicular symptoms. Patient will continue to benefit from skilled PT services to modify and progress therapeutic interventions, address functional mobility deficits, address ROM deficits, address strength deficits, analyze and address soft tissue restrictions, analyze and cue movement patterns, analyze and modify body mechanics/ergonomics, assess and modify postural abnormalities, address imbalance/dizziness, and instruct in home and community integration to attain remaining goals.      [x]  See Plan of Care  [x]  See progress note/recertification  []  See Discharge Summary Updated Goals: to be achieved in 6 weeks:  1. Patient will improve FOTO score at least 12 points (61/100) to demonstrate improvement in functional mobility. 2. Patient will report \"A little bit of difficulty\" when asked on the FOTO questionnaire, \"How much difficulty to you have sitting performing light desk work for 8 hours? \" For improved ease of working. 3. Patient will improve his popliteal angle test to 45 degrees bilaterally to improve pelvis positioning and reduce low back pain for ease of caring for his daughter. 4. Patient will report <= 4/10 pain on average in the t/s and left c/s to improve ease of working and performing ADLs. Progressing 5-6/10  5. Patient will improve B lower trap strength to 4-/5 in order to increase ease of overhead reaching.  Working on scap depression on [address] [place] [address] for strengthening (11/16/22)    PLAN  [x]  Upgrade activities as tolerated     [x]  Continue plan of care  []  Update interventions per flow sheet       []  Discharge due to:_  []  Other:_      Ami Armando, PTA 11/16/2022  11:11 AM    Future Appointments   Date Time Provider Cristo Buenrostro   11/16/2022  2:15 PM Amber Bolaños PTA [facility]   11/21/2022 12:00 PM Dayna Prakash PT [facility]   11/28/2022 12:45 PM Dayna Prakash PT [facility]   12/14/2022  2:00 PM DO KVNG Scott BS AMB   4/19/2023 11:00 AM Reymundo Santiago MD [facility] BS AMB

## 2022-11-21 ENCOUNTER — HOSPITAL ENCOUNTER (OUTPATIENT)
Dept: PHYSICAL THERAPY | Age: 42
End: 2022-11-21
Payer: COMMERCIAL

## 2022-11-22 ENCOUNTER — HOSPITAL ENCOUNTER (OUTPATIENT)
Dept: PHYSICAL THERAPY | Age: 42
Discharge: HOME | End: 2022-11-22
Payer: COMMERCIAL

## 2022-11-22 PROCEDURE — 97110 THERAPEUTIC EXERCISES: CPT

## 2022-11-22 PROCEDURE — 97140 MANUAL THERAPY 1/> REGIONS: CPT

## 2022-11-22 NOTE — PROGRESS NOTES
PT DAILY TREATMENT NOTE     Patient Name: Edgar Tadeo  Date:2022  : 1980  [x]  Patient  Verified  Payor: Marco Antonio Salazar / Plan: [address] [place] [employer] / Product Type: HMO /    In time:5:17P  Out time:6:00P  Total Treatment Time (min): 43  Visit #: 4 of 12      Treatment Area: Neck pain [M54.2]  Other low back pain [M54.59]    SUBJECTIVE  Pain Level (0-10 scale): 6/10  Any medication changes, allergies to medications, adverse drug reactions, diagnosis change, or new procedure performed?: [x] No    [] Yes (see summary sheet for update)  Subjective functional status/changes:   [] No changes reported  Patient reports his pain has been worse since driving over the weekend. His pain was down to a 4/10 last week. His right hip is normally up so he does the exercise to lifting. He really just has soreness after therapy. Most of his pain is in his UT in his neck. OBJECTIVE    35 min Therapeutic Exercise:  [] See flow sheet :   Rationale: increase ROM, increase strength, improve coordination, and increase proprioception to improve the patients ability to perform ADLs with increased ease        8 min Manual Therapy:  Grade III P-A mobilizations to thoracic spine   The manual therapy interventions were performed at a separate and distinct time from the therapeutic activities interventions.   Rationale: decrease pain, increase ROM, and increase tissue extensibility to perform ADLs with increased ease            With   [] TE   [] TA   [] neuro   [] other: Patient Education: [x] Review HEP    [] Progressed/Changed HEP based on:   [] positioning   [] body mechanics   [] transfers   [] heat/ice application    [] other:      Other Objective/Functional Measures:      No upslip or innominate rotation this visit  Cramp/spasm reported to medial scapular border on right with attempt of sidelying left QL stretch  Hypertonicity to thoracic spine (most noted at levels T2-T7)  Non painful cavitation with mobilizations  Pain with right SB low trap in prone reduced upon ceasing activity in ulnar nerve distribution  Positive subjective response to seated thoracic ext with half foam roll  Patient reported increased discomfort but more localized pressure with P-A mobilizations this visit  Positive subjective response to left QL stretch in seated with right sidebend  Verbal cues for neutral lumbopelvic positioning with [place] pallof press  Min UT hiking with horizontal abd on right at end range improved with verbal cues for scapular retraction/depression prior to activity   Increased tightness with left hamstring stretch vs right  Frequent shifting of trunk for posture with seated hamstring stretch; patient reports fatigue to back muscles   Patient reported numbness/burning to thoracic spine at end of session which patient reports normally resolved with therapy  Advised possibly due to increased pressure with P-A mobilizations (though patient did not report increase in pain with activity) followed by foam thoracic ext and to monitor if symptoms improve, worsen, or remain unchanged next therapy visit      Pain Level (0-10 scale) post treatment: 4/10 neck, 6/10 back    ASSESSMENT/Changes in Function: Patient continues to mod overall pain levels at time of therapy session. He reports cervical pain more localized to B UT this visit. Patient requires intermittent cueing for core engagement and shoulder relaxation with activities but demonstrates good overall awareness of posture in sitting and is able to self-correct with activities.      Patient will continue to benefit from skilled PT services to modify and progress therapeutic interventions, address functional mobility deficits, address ROM deficits, address strength deficits, analyze and address soft tissue restrictions, analyze and cue movement patterns, analyze and modify body mechanics/ergonomics, assess and modify postural abnormalities, and instruct in home and community integration to attain remaining goals. Progress towards goals / Updated goals:  1. Patient will improve FOTO score at least 12 points (61/100) to demonstrate improvement in functional mobility. 2. Patient will report \"A little bit of difficulty\" when asked on the FOTO questionnaire, \"How much difficulty to you have sitting performing light desk work for 8 hours? \" For improved ease of working. 3. Patient will improve his popliteal angle test to 45 degrees bilaterally to improve pelvis positioning and reduce low back pain for ease of caring for his daughter. 4. Patient will report <= 4/10 pain on average in the t/s and left c/s to improve ease of working and performing ADLs. Progressing 5-6/10  5. Patient will improve B lower trap strength to 4-/5 in order to increase ease of overhead reaching.  Working on scap depression on [address] [place] [address] for strengthening (11/16/22)    PLAN  [x]  Upgrade activities as tolerated     [x]  Continue plan of care  []  Update interventions per flow sheet       []  Discharge due to:_  []  Other:_      Marsha Rosen, PT 11/22/2022  1:10 PM    Future Appointments   Date Time Provider Cristo Buenrostro   11/22/2022  5:15 PM Aelja Glover [facility]   11/28/2022 12:45 PM Junior Roman PTA [facility]   12/14/2022  2:00 PM DO KVNG Renner BS AMB   4/19/2023 11:00 AM Geena Santiago MD [facility] BS AMB

## 2022-11-28 ENCOUNTER — HOSPITAL ENCOUNTER (OUTPATIENT)
Dept: PHYSICAL THERAPY | Age: 42
Discharge: HOME | End: 2022-11-28
Payer: COMMERCIAL

## 2022-11-28 PROCEDURE — 97110 THERAPEUTIC EXERCISES: CPT

## 2022-11-28 PROCEDURE — 97140 MANUAL THERAPY 1/> REGIONS: CPT

## 2022-11-28 NOTE — THERAPY RECERTIFICATION
[facility] [place] [employer]  [address] [facility]  [phone] fax    Physical Therapy Progress Note  Patient name: Celso Centeno Start of Care: 10/7/2022   Referral source: Lane Estevez DO : 1980   Medical/Treatment Diagnosis: Neck pain [M54.2]  Other low back pain [M54.59]  Payor: OPTIMA / Plan: [place] [employer] HMO / Product Type: HMO /  Onset Date:chronic with worsening symptoms over the past few years       Prior Hospitalization: see medical history Provider#: [id number]   Medications: Verified on Patient Summary List    Comorbidities: tobacco use, anxiety, depression, IBS, chronic fatigue  Prior Level of Function: Ind with ADLs, working in real estate    Visits from [place] of Care: 12    Missed Visits: 2    Established Goals:         Excellent           Good         Limited           None  [x] Increased ROM   []  [x]  []  []  [x] Increased Strength  []  []  [x]  []  [x] Increased Mobility  []  [x]  []  []   [x] Decreased Pain   []  []  [x]  []  [] Decreased Swelling  []  []  []  []    Key Functional Changes: FOTO 56/100; decreased average pain 5/10; improving posture    Updated Goals: to be achieved in 6 weeks:  1. Patient will improve FOTO score at least 12 points (61/100) to demonstrate improvement in functional mobility. 2. Patient will report \"A little bit of difficulty\" when asked on the FOTO questionnaire, \"How much difficulty to you have performing recreational activities in which you take some force or impact? \" For improved ease of completing household chores and caring for his daughter. 3. Patient will improve his popliteal angle test to 35 degrees bilaterally to improve pelvis positioning and reduce low back pain for ease of caring for his daughter. 4. Patient will report <= 4/10 pain on average in the t/s and left c/s to improve ease of working and performing ADLs.   5. Patient will improve B lower trap strength to 4-/5 in order to increase ease of overhead reaching. ASSESSMENT/RECOMMENDATIONS: Mr. Venkat Leavitt is making steady progress towards goals with decreased average pain to 5/10 and improving postural awareness. His t/s kyphosis is reducing with progression of mid back strengthening and pt awareness of posture throughout the day. He has decrease in low back pain as he has been working on hamstring stretching more with noticeable improvement in his popliteal angle test to left 47 degrees and right 38 degrees. He continues to have right t/s rotation that aligns with the convexity/concavity of his scoliotic curvature. He has centralization of left t/s radicular symptoms with stretching/manual of the left paraspinals, t/s PA mobs and with scap retraction to reduce kyphotic posture. His lower trap strength remains limited at 3+/5 due to his posture. His FOTO increased to 56/100 indicating overall improvement in functional mobility. Skilled PT remains medically necessary to progress to neutral spinal alignment by stretching concavities and strengthening convexities while also improving postural awareness, strength and endurance for ease of completing household chores that involve OH reaching and for ease of holding and caring for his daughter without increased pain.      [x]Continue therapy per initial plan/protocol at a frequency of  2 x per week for 6 weeks  []Continue therapy with the following recommended changes:_____________________      _____________________________________________________________________  []Discontinue therapy progressing towards or have reached established goals  []Discontinue therapy due to lack of appreciable progress towards goals  []Discontinue therapy due to lack of attendance or compliance  []Await Physician's recommendations/decisions regarding therapy  []Other:________________________________________________________________    Thank you for this referral.   Stas García, PTA 11/28/2022 8:12 AM    NOTE TO PHYSICIAN:  PLEASE COMPLETE THE ORDERS BELOW AND   FAX TO [facility]: [phone]  If you are unable to process this request in 24 hours please contact our office: [phone]    [x]  I have read the above report and request that my patient continue as recommended. I have read the above report and request that my patient continue therapy with the following changes/special instructions:____________________________________  I have read the above report and request that my patient be discharged from therapy.     Physicians signature: ______________________________Date: ______Time:______     Jeet Benitez DO

## 2022-11-28 NOTE — PROGRESS NOTES
PT DAILY TREATMENT NOTE     Patient Name: Praveen Matt  Date:2022  : 1980  [x]  Patient  Verified  Payor: Krzysztof Leahy / Plan: Millie Herrera HMO / Product Type: HMO /    In time: 12:51 Out time: 1:31  Total Treatment Time (min):40  Visit #: 5 of 12      Treatment Area: Neck pain [M54.2]  Other low back pain [M54.59]    SUBJECTIVE  Pain Level (0-10 scale): 4/10 back; 5/10 neck  Any medication changes, allergies to medications, adverse drug reactions, diagnosis change, or new procedure performed?: [x] No    [] Yes (see summary sheet for update)  Subjective functional status/changes:   [] No changes reported  Pt reports he is working on lessening a cold right now. He states increased neck soreness right>left from hanging [place] decorations and fluffing the tree. He has had less low back pain overall. He has some itching and tingling today in his mid back but not as much as usual.       OBJECTIVE    25 min Therapeutic Exercise:  [x] See flow sheet :   Rationale: increase ROM, increase strength, improve coordination, and increase proprioception to improve the patients ability to perform ADLs with increased ease    15 min Manual Therapy:  no pelvic obliquity; MET for right lower t/s rotation; DTM left t/s paraspinals and B UTs and levator scapulae   The manual therapy interventions were performed at a separate and distinct time from the therapeutic activities interventions.   Rationale: decrease pain, increase ROM, and increase tissue extensibility to perform ADLs with increased ease            With   [] TE   [] TA   [] neuro   [] other: Patient Education: [x] Review HEP    [] Progressed/Changed HEP based on:   [] positioning   [] body mechanics   [] transfers   [] heat/ice application    [] other:      Other Objective/Functional Measures:   Popliteal angle: left 47 degrees right 38 degrees  Pain on average: 5/10  Lower trap MMT: 3+/5  FOTO: 56/100    Pain Level (0-10 scale) post treatment: 3/10 t/s and 4/10 neck    ASSESSMENT/Changes in Function: See Progress Note. Patient will continue to benefit from skilled PT services to modify and progress therapeutic interventions, address functional mobility deficits, address ROM deficits, address strength deficits, analyze and address soft tissue restrictions, analyze and cue movement patterns, analyze and modify body mechanics/ergonomics, assess and modify postural abnormalities, and instruct in home and community integration to attain remaining goals. Progress towards goals / Updated goals:  1. Patient will improve FOTO score at least 12 points (61/100) to demonstrate improvement in functional mobility. Progressing  2. Patient will report \"A little bit of difficulty\" when asked on the FOTO questionnaire, \"How much difficulty to you have sitting performing light desk work for 8 hours? \" For improved ease of working. Progressing  3. Patient will improve his popliteal angle test to 45 degrees bilaterally to improve pelvis positioning and reduce low back pain for ease of caring for his daughter. Progressing   4. Patient will report <= 4/10 pain on average in the t/s and left c/s to improve ease of working and performing ADLs. Progressing 5-6/10; Progressing 5/10  5. Patient will improve B lower trap strength to 4-/5 in order to increase ease of overhead reaching.  Working on scap depression on [facility] for strengthening (11/16/22) Progressing 3+/5    PLAN  [x]  Upgrade activities as tolerated     [x]  Continue plan of care  []  Update interventions per flow sheet       []  Discharge due to:_  []  Other:_      Josh Donovan PTA 11/28/2022  1:10 PM    Future Appointments   Date Time Provider Cristo Buenrostro   11/28/2022 12:45 PM Gris Abad PTA [facility]   12/14/2022  2:00 PM DO KVNG Man BS AMB   4/19/2023 11:00 AM Anh Santiago MD [facility] BS AMB

## 2022-12-16 ENCOUNTER — HOSPITAL ENCOUNTER (OUTPATIENT)
Dept: PHYSICAL THERAPY | Age: 42
Discharge: HOME | End: 2022-12-16
Payer: COMMERCIAL

## 2022-12-16 PROCEDURE — 97110 THERAPEUTIC EXERCISES: CPT

## 2022-12-16 PROCEDURE — 97112 NEUROMUSCULAR REEDUCATION: CPT

## 2022-12-16 NOTE — PROGRESS NOTES
PT DAILY TREATMENT NOTE     Patient Name: Tina Gilliland  Date:2022  : 1980  [x]  Patient  Verified  Payor: Karan Kawasaki / Plan: [place] [employer] HMO / Product Type: HMO /    In time:1208  Out time:1245  Total Treatment Time (min): 37  Visit #: 1 of 8    Treatment Area: Neck pain [M54.2]  Other low back pain [M54.59]    SUBJECTIVE  Pain Level (0-10 scale): 4/10 back, 5/10 neck  Any medication changes, allergies to medications, adverse drug reactions, diagnosis change, or new procedure performed?: [x] No    [] Yes (see summary sheet for update)  Subjective functional status/changes:   [] No changes reported  Pt stated that he is about the same    OBJECTIVE    Modality rationale: decrease pain and increase tissue extensibility to improve the patients ability to increase ease with ADLs    Min Type Additional Details     [] Estim:  []Unatt       []IFC  []Premod                        []Other:  []w/ice   []w/heat  Position:  Location:     [] Estim: []Att    []TENS instruct  []NMES                    []Other:  []w/US   []w/ice   []w/heat  Position:  Location:     []  Traction: [] Cervical       []Lumbar                       [] Prone          []Supine                       []Intermittent   []Continuous Lbs:  [] before manual  [] after manual     []  Ultrasound: []Continuous   [] Pulsed                           []1MHz   []3MHz W/cm2:  Location:     []  Iontophoresis with dexamethasone         Location: [] Take home patch   [] In clinic   10 []  Ice     [x]  heat  []  Ice massage  []  Laser   []  Anodyne Position:seated  Location:neck and upper back     []  Laser with stim  []  Other:  Position:  Location:     []  Vasopneumatic Device    []  Right     []  Left  Pre-treatment girth:  Post-treatment girth:  Measured at (location):  Pressure:       [] lo [] med [] hi   Temperature: [] lo [] med [] hi   [x] Skin assessment post-treatment:  [x]intact []redness- no adverse reaction    []redness - adverse reaction: 17 min Therapeutic Exercise:  [x] See flow sheet :   Rationale: increase ROM and increase strength to improve the patients ability to increase ease with ADLs     10 min Neuromuscular Re-education:  [x]  See flow sheet :   Rationale: increase strength, improve coordination, and increase proprioception  to improve the patients ability to increase ease with ADLs          With   [x] TE   [] TA   [] neuro   [] other: Patient Education: [x] Review HEP    [] Progressed/Changed HEP based on:   [] positioning   [] body mechanics   [] transfers   [] heat/ice application    [] other:      Other Objective/Functional Measures:   Had increased tingling with wall W's and Y's  Complained of tightness in the ant neck after quad C/S retractions     Pain Level (0-10 scale) post treatment: 0/10    ASSESSMENT/Changes in Function:   Pt is making limited progress toward goals. Pt cont with pain in the neck and back. Cont to report having difficulty with performing ADLs and household chores. Cont to have tingling with most overhead exercises    Patient will continue to benefit from skilled PT services to modify and progress therapeutic interventions, address functional mobility deficits, address ROM deficits, address strength deficits, analyze and address soft tissue restrictions, analyze and cue movement patterns, analyze and modify body mechanics/ergonomics, assess and modify postural abnormalities, and instruct in home and community integration to attain remaining goals. []  See Plan of Care  [x]  See progress note/recertification  []  See Discharge Summary         Progress towards goals / Updated goals:  1. Patient will improve FOTO score at least 12 points (61/100) to demonstrate improvement in functional mobility. 2. Patient will report \"A little bit of difficulty\" when asked on the FOTO questionnaire, \"How much difficulty to you have performing recreational activities in which you take some force or impact? \" For improved ease of completing household chores and caring for his daughter. 3. Patient will improve his popliteal angle test to 35 degrees bilaterally to improve pelvis positioning and reduce low back pain for ease of caring for his daughter. 4. Patient will report <= 4/10 pain on average in the t/s and left c/s to improve ease of working and performing ADLs. 5. Patient will improve B lower trap strength to 4-/5 in order to increase ease of overhead reaching.      PLAN  []  Upgrade activities as tolerated     [x]  Continue plan of care  []  Update interventions per flow sheet       []  Discharge due to:_  []  Other:_      Deena Mckeon PTA 12/16/2022  9:10 AM    Future Appointments   Date Time Provider Cristo Chitra   12/16/2022 12:00 PM Jorden Ortiz PTA [facility]   1/10/2023 11:00 AM DO KVNG Hedrick BS AMB   4/19/2023 11:00 AM Manuel Santiago MD [facility] BS AMB

## 2022-12-19 ENCOUNTER — HOSPITAL ENCOUNTER (OUTPATIENT)
Dept: PHYSICAL THERAPY | Age: 42
Discharge: HOME | End: 2022-12-19
Payer: COMMERCIAL

## 2022-12-19 PROCEDURE — 97140 MANUAL THERAPY 1/> REGIONS: CPT

## 2022-12-19 PROCEDURE — 97110 THERAPEUTIC EXERCISES: CPT

## 2022-12-19 NOTE — PROGRESS NOTES
PT DAILY TREATMENT NOTE     Patient Name: Gaylia Shone  Date:2022  : 1980  [x]  Patient  Verified  Payor: Tanisha Steen / Plan: [place] [employer] HMO / Product Type: HMO /    In time: 1:34  Out time: 2:15  Total Treatment Time (min): 41  Visit #: 2 of 8    Treatment Area: Neck pain [M54.2]  Other low back pain [M54.59]    SUBJECTIVE  Pain Level (0-10 scale): 6/10  Any medication changes, allergies to medications, adverse drug reactions, diagnosis change, or new procedure performed?: [x] No    [] Yes (see summary sheet for update)  Subjective functional status/changes:   [] No changes reported  Pt reports increased left sided neck tightness and pain with headache along julia's horn distribution. He reports tingling/itching to mid back. He rescheduled his follow up with Dr. Jada Pruitt.        OBJECTIVE     10 min Therapeutic Exercise:  [x] See flow sheet :   Rationale: increase ROM and increase strength to improve the patients ability to increase ease with ADLs     31 min Manual Therapy:  [x]  See flow sheet : no upslip; MET for right AI; shotgun technique; MET for left l/s rotation; MET for right t/s rotation; MET for left c/s rotation; TPR left scalenes; MET for left elevated first rib; DTM left levator scapulae and left UT   Rationale: increase strength, improve coordination, and increase proprioception  to improve the patients ability to increase ease with ADLs          With   [x] TE   [] TA   [] neuro   [] other: Patient Education: [x] Review HEP    [] Progressed/Changed HEP based on:   [] positioning   [] body mechanics   [] transfers   [] heat/ice application    [] other:      Other Objective/Functional Measures:   Decreased pain and tightness post manual  Sore in t/s but no radicular symptoms  Continues with hypertonic left c/s musculature  Reviewed exercises to be working on at home    Pain Level (0-10 scale) post treatment: 5/10    ASSESSMENT/Changes in Function: Pt with gap in treatments while awaiting insurance authorization. He continues to have right t/s rotation and neutral lesion that improves with manual but doesn't stay consistent due to muscle imbalance. He continues with left c/s tightness and pain with prolonged holding of his daughter and OH activities. Patient will continue to benefit from skilled PT services to modify and progress therapeutic interventions, address functional mobility deficits, address ROM deficits, address strength deficits, analyze and address soft tissue restrictions, analyze and cue movement patterns, analyze and modify body mechanics/ergonomics, assess and modify postural abnormalities, and instruct in home and community integration to attain remaining goals. [x]  See Plan of Care  [x]  See progress note/recertification  []  See Discharge Summary         Progress towards goals / Updated goals:  1. Patient will improve FOTO score at least 12 points (61/100) to demonstrate improvement in functional mobility. 2. Patient will report \"A little bit of difficulty\" when asked on the FOTO questionnaire, \"How much difficulty to you have performing recreational activities in which you take some force or impact? \" For improved ease of completing household chores and caring for his daughter. 3. Patient will improve his popliteal angle test to 35 degrees bilaterally to improve pelvis positioning and reduce low back pain for ease of caring for his daughter. 4. Patient will report <= 4/10 pain on average in the t/s and left c/s to improve ease of working and performing ADLs. 5. Patient will improve B lower trap strength to 4-/5 in order to increase ease of overhead reaching.      PLAN  [x]  Upgrade activities as tolerated     [x]  Continue plan of care  []  Update interventions per flow sheet       []  Discharge due to:_  []  Other:_      Shasta Gil PTA 12/19/2022  9:10 AM    Future Appointments   Date Time Provider Cristo Buenrostro   12/19/2022  1:30 PM Holger Todd Patricio Galeana, PTA [facility]   12/21/2022  1:30 PM Katerin Ross, PTA [facility]   12/28/2022 11:00 AM Katerin Ross, PTA [facility]   12/30/2022  3:00 PM Katerin Ross, PTA [facility]   1/4/2023 11:00 AM Mundo Painter, PT [facility]   1/6/2023  3:30 PM Katerin Ross, PTA [facility]   1/9/2023  3:00 PM Katerin Ross, PTA [facility]   1/10/2023 11:00 AM DO KVNG Marin BS AMB   1/11/2023  4:00 PM Mundo Painter, PT [facility]   4/19/2023 11:00 AM Dameon Santiago MD [facility] BS AMB

## 2022-12-21 ENCOUNTER — HOSPITAL ENCOUNTER (OUTPATIENT)
Dept: PHYSICAL THERAPY | Age: 42
Discharge: HOME | End: 2022-12-21
Payer: COMMERCIAL

## 2022-12-21 PROCEDURE — 97140 MANUAL THERAPY 1/> REGIONS: CPT

## 2022-12-21 PROCEDURE — 97110 THERAPEUTIC EXERCISES: CPT

## 2022-12-21 NOTE — PROGRESS NOTES
PT DAILY TREATMENT NOTE     Patient Name: Everett Ayers  Date:2022  : 1980  [x]  Patient  Verified  Payor: Thuan Can / Plan: [employer] HMO / Product Type: HMO /    In time: 1:40  Out time: 2:15  Total Treatment Time (min): 35  Visit #: 3 of 8    Treatment Area: Neck pain [M54.2]  Other low back pain [M54.59]    SUBJECTIVE  Pain Level (0-10 scale): 5/10 neck/back  Any medication changes, allergies to medications, adverse drug reactions, diagnosis change, or new procedure performed?: [x] No    [] Yes (see summary sheet for update)  Subjective functional status/changes:   [] No changes reported  Pt reports less pain today than the other day. He states some tightness still in his neck. He has itching in his left mid back. He states he always slumps when he sits but struggles with standing posture as well.        OBJECTIVE     20 min Therapeutic Exercise:  [x] See flow sheet :   Rationale: increase ROM and increase strength to improve the patients ability to increase ease with ADLs     15 min Manual Therapy:  [x]  See flow sheet : leg lengthening x 10 for left upslip; MET for left PI/right AI; shotgun technique; MET for left/left sacral torsion; MET for right t/s rotation; grade IV PA mobs to the t/s    TPR pectoralis minor bilaterally   Rationale: increase strength, improve coordination, and increase proprioception  to improve the patients ability to increase ease with ADLs          With   [x] TE   [] TA   [] neuro   [] other: Patient Education: [x] Review HEP    [] Progressed/Changed HEP based on:   [] positioning   [] body mechanics   [] transfers   [] heat/ice application    [] other:      Other Objective/Functional Measures:   Heaviness sensation in right arm with prone exercises  Challenged with lower trap right>left  B pectoralis minor TPs right>left  Cues to keep neck relaxed with shoulder and core exercises  Increased accessory muscle use when asked to take large inhale  Reduction of t/s pain post session  Decreased anterior/posterior pelvic mobility due to HS tightness  Cues to work on upright posture with decreased slump to aide in improved t/s and c/s posture    Pain Level (0-10 scale) post treatment: 4/10    ASSESSMENT/Changes in Function: Pt progressing with form with exercises but continues to have decreased postural endurance needed to maintain corrections and reduced rotations. He continues with B HS restrictions contributing to slumped posture which further causes forward head/shoulder posture. He has B pectoralis minor tightness and increased accessory muscle use for inhalation. Will continue to progress c/s muscle flexibility to reduce tightness and pain for ease of holding his daughter and performing ADLs with improved posture. Patient will continue to benefit from skilled PT services to modify and progress therapeutic interventions, address functional mobility deficits, address ROM deficits, address strength deficits, analyze and address soft tissue restrictions, analyze and cue movement patterns, analyze and modify body mechanics/ergonomics, assess and modify postural abnormalities, and instruct in home and community integration to attain remaining goals. [x]  See Plan of Care  [x]  See progress note/recertification  []  See Discharge Summary         Progress towards goals / Updated goals:  1. Patient will improve FOTO score at least 12 points (61/100) to demonstrate improvement in functional mobility. 2. Patient will report \"A little bit of difficulty\" when asked on the FOTO questionnaire, \"How much difficulty to you have performing recreational activities in which you take some force or impact? \" For improved ease of completing household chores and caring for his daughter. 3. Patient will improve his popliteal angle test to 35 degrees bilaterally to improve pelvis positioning and reduce low back pain for ease of caring for his daughter.    4. Patient will report <= 4/10 pain on average in the t/s and left c/s to improve ease of working and performing ADLs. 5. Patient will improve B lower trap strength to 4-/5 in order to increase ease of overhead reaching.      PLAN  [x]  Upgrade activities as tolerated     [x]  Continue plan of care  []  Update interventions per flow sheet       []  Discharge due to:_  []  Other:_      Ami Current, PTA 12/21/2022  9:10 AM    Future Appointments   Date Time Provider Cristo Buenrostro   12/21/2022  1:30 PM Amber Bolaños, PTA [facility]   12/28/2022 11:00 AM Amber Bolaños PTA [facility]   12/30/2022  3:00 PM Amber Bolaños, PTA [facility]   1/4/2023 11:00 AM Hudgins Leopold Hunger, PT [facility]   1/6/2023  3:30 PM Amber Bolaños, PTA [facility]   1/9/2023  3:00 PM Amber Bolaños, PTA [facility]   1/10/2023 11:00 AM DO KVNG Scott BS AMB   1/11/2023  4:00 PM Hudgins Leopold Hunger, PT [facility]   4/19/2023 11:00 AM Reymundo Santiago MD [facility] BS AMB

## 2022-12-28 ENCOUNTER — HOSPITAL ENCOUNTER (OUTPATIENT)
Dept: PHYSICAL THERAPY | Age: 42
Discharge: HOME | End: 2022-12-28
Payer: COMMERCIAL

## 2022-12-28 PROCEDURE — 97530 THERAPEUTIC ACTIVITIES: CPT

## 2022-12-28 PROCEDURE — 97110 THERAPEUTIC EXERCISES: CPT

## 2022-12-28 NOTE — THERAPY RECERTIFICATION
[facility] - [address]  [phone] fax    Physical Therapy Progress Note  Patient name: Anjali Arnett Start of Care: 10/7/2022   Referral source: Ada Salcedo DO : 1980   Medical/Treatment Diagnosis: Neck pain [M54.2]  Other low back pain [M54.59]  Payor: OPTIMA / Plan: [place] [employer] H[place] / Product Type: HMO /  Onset Date:chronic with worsening symptoms over the past few years       Prior Hospitalization: see medical history Provider#: [id number]   Medications: Verified on Patient Summary List    Comorbidities: tobacco use, anxiety, depression, IBS, chronic fatigue  Prior Level of Function: Ind with ADLs, working in real estate  Visits from [facility]: 16    Missed Visits: 3    Established Goals:         Excellent           Good         Limited           None  [x] Increased ROM   []  []  [x]  []  [x] Increased Strength  []  []  [x]  []  [x] Increased Mobility  []  []  [x]  []   [x] Decreased Pain   []  []  [x]  []  [] Decreased Swelling  []  []  []  []    Key Functional Changes: FOTO 52/100; improved understanding of importance of HEP compliance; improved posture awareness    Updated Goals: to be achieved in 6 weeks:  1. Patient will improve FOTO score at least 12 points (61/100) to demonstrate improvement in functional mobility. 2. Patient will report \"A little bit of difficulty\" when asked on the FOTO questionnaire, \"How much difficulty to you have performing recreational activities in which you take some force or impact? \" For improved ease of completing household chores and caring for his daughter. 3. Patient will improve his popliteal angle test to 35 degrees bilaterally to improve pelvis positioning and reduce low back pain for ease of caring for his daughter. 4. Patient will report <= 4/10 pain on average in the t/s and left c/s to improve ease of working and performing ADLs.   5. Patient will improve B lower trap strength to 4-/5 in order to increase ease of overhead reaching. ASSESSMENT/RECOMMENDATIONS: Mr. Mao Ferrell has made slow recent progress having only attended 4 visits since last progress note due to sickness and the holidays. He hasn't been compliant with his HEP but has better understanding after today's session of the heavy importance of improving t/s mobility, global flexibility and of improved postural endurance. He demonstrates ongoing lower trap weakness left 3/5 and right 3+/5. He continues with t/s hypomobility but is going to begin diligently work on foam rolling and open books to improve at home. He has HS tightness bilaterally contributing to increased PPT in standing and l/s pull. His FOTO continues to fluctuate with pain levels as he averages 5/10 pain. Skilled PT is medically necessary to progress pt understanding of strengthening convexities and stretching concavities of his curvatures while stabilizing his postural muscles and improving endurance to reduce kyphotic curve for decreased pain with work, caring for his daughter and performing household chores and ADLs.       [x]Continue therapy per initial plan/protocol at a frequency of  2 x per week for 6 weeks  []Continue therapy with the following recommended changes:_____________________      _____________________________________________________________________  []Discontinue therapy progressing towards or have reached established goals  []Discontinue therapy due to lack of appreciable progress towards goals  []Discontinue therapy due to lack of attendance or compliance  []Await Physician's recommendations/decisions regarding therapy  []Other:________________________________________________________________    Thank you for this referral.   Lawrence Carnes, PTA 12/28/2022 4:39 PM    NOTE TO PHYSICIAN:  Enedina Coronado 172   FAX TO [facility]: [phone]  If you are unable to process this request in 24 hours please contact our office: [phone]    [x]  I have read the above report and request that my patient continue as recommended. I have read the above report and request that my patient continue therapy with the following changes/special instructions:____________________________________  I have read the above report and request that my patient be discharged from therapy.     Physicians signature: ______________________________Date: ______Time:______     Mary Yip DO

## 2022-12-28 NOTE — PROGRESS NOTES
PT DAILY TREATMENT NOTE     Patient Name: Inna Porter  Date:2022  : 1980  [x]  Patient  Verified  Payor: Davdi Goode / Plan: [place] [employer] HMO / Product Type: HMO /    In time: 11:15  Out time: 11:45  Total Treatment Time (min): 30  Visit #: 4 of 8    Treatment Area: Neck pain [M54.2]  Other low back pain [M54.59]    SUBJECTIVE  Pain Level (0-10 scale): 10  Any medication changes, allergies to medications, adverse drug reactions, diagnosis change, or new procedure performed?: [x] No    [] Yes (see summary sheet for update)  Subjective functional status/changes:   [] No changes reported  Pt reports increased pain since the holidays and performing more lifting and carrying of gifts and his daughter. He states mostly pain in his mid back. He did get gift cards to get massages. OBJECTIVE     15 min Therapeutic Exercise:  [x] See flow sheet :   Rationale: increase ROM and increase strength to improve the patients ability to increase ease with ADLs    15 min Therapeutic Activity:  [x] See flow sheet : goal reassessment   Rationale: increase ROM and increase strength to improve the patients ability to increase ease with ADLs              With   [x] TE   [] TA   [] neuro   [] other: Patient Education: [x] Review HEP    [] Progressed/Changed HEP based on:   [] positioning   [] body mechanics   [] transfers   [] heat/ice application    [] other:      Other Objective/Functional Measures: FOTO: 52/100  Popliteal angle test: left 55 degrees right 47 degrees  Pain average: 5/10  Lower trap MMT: left 3/5 right 3+/5  Educated on importance of open books, foam rolling t/s and HS/c/s stretching short increments to avoid dizziness symptoms    Pain Level (0-10 scale) post treatment: 4/10    ASSESSMENT/Changes in Function: See Progress Note.     Patient will continue to benefit from skilled PT services to modify and progress therapeutic interventions, address functional mobility deficits, address ROM deficits, address strength deficits, analyze and address soft tissue restrictions, analyze and cue movement patterns, analyze and modify body mechanics/ergonomics, assess and modify postural abnormalities, and instruct in home and community integration to attain remaining goals. [x]  See Plan of Care  [x]  See progress note/recertification  []  See Discharge Summary         Progress towards goals / Updated goals:  1. Patient will improve FOTO score at least 12 points (61/100) to demonstrate improvement in functional mobility. 2. Patient will report \"A little bit of difficulty\" when asked on the FOTO questionnaire, \"How much difficulty to you have performing recreational activities in which you take some force or impact? \" For improved ease of completing household chores and caring for his daughter. 3. Patient will improve his popliteal angle test to 35 degrees bilaterally to improve pelvis positioning and reduce low back pain for ease of caring for his daughter. 4. Patient will report <= 4/10 pain on average in the t/s and left c/s to improve ease of working and performing ADLs. 5. Patient will improve B lower trap strength to 4-/5 in order to increase ease of overhead reaching.      PLAN  [x]  Upgrade activities as tolerated     [x]  Continue plan of care  []  Update interventions per flow sheet       []  Discharge due to:_  []  Other:_      Geri Lazar PTA 12/28/2022  9:10 AM    Future Appointments   Date Time Provider Cristo Buenrostro   12/28/2022 11:00 AM Puja Maharaj PTA [facility]   12/30/2022  3:00 PM Puja Maharaj PTA [facility]   1/4/2023 11:00 AM Puja Maharaj PTA [facility]   1/6/2023  3:30 PM Puja Maharaj PTA [facility]   1/9/2023  3:00 PM Puja Maharaj PTA [facility]   1/10/2023 11:00 AM DO KVNG Delacruz BS AM[facility]   1/11/2023  4:00 PM Mundo Lyman, PT [facility]   4/19/2023 11:00 AM Andre Santiago MD [facility] BS AMB

## 2022-12-30 ENCOUNTER — HOSPITAL ENCOUNTER (OUTPATIENT)
Dept: PHYSICAL THERAPY | Age: 42
End: 2022-12-30
Payer: COMMERCIAL

## 2023-01-04 ENCOUNTER — HOSPITAL ENCOUNTER (OUTPATIENT)
Dept: PHYSICAL THERAPY | Age: 43
Discharge: HOME | End: 2023-01-04
Payer: COMMERCIAL

## 2023-01-04 PROCEDURE — 97110 THERAPEUTIC EXERCISES: CPT

## 2023-01-04 NOTE — PROGRESS NOTES
PT DAILY TREATMENT NOTE     Patient Name: Pavithra Elizabeth  Date:2023  : 1980  [x]  Patient  Verified  Payor: /    In time:1107  Out time:1142  Total Treatment Time (min): 35  Visit #: 1 of 8    Treatment Area: Neck pain [M54.2]  Other low back pain [M54.59]    SUBJECTIVE  Pain Level (0-10 scale): 5 back and 6 neck  Any medication changes, allergies to medications, adverse drug reactions, diagnosis change, or new procedure performed?: [x] No    [] Yes (see summary sheet for update)  Subjective functional status/changes:   [] No changes reported  Pt stated that he is still having pain    OBJECTIVE    Modality rationale: decrease pain and increase tissue extensibility to improve the patients ability to increase ease with ADLs   Min Type Additional Details    [] Estim:  []Unatt       []IFC  []Premod                        []Other:  []w/ice   []w/heat  Position:  Location:    [] Estim: []Att    []TENS instruct  []NMES                    []Other:  []w/US   []w/ice   []w/heat  Position:  Location:    []  Traction: [] Cervical       []Lumbar                       [] Prone          []Supine                       []Intermittent   []Continuous Lbs:  [] before manual  [] after manual    []  Ultrasound: []Continuous   [] Pulsed                           []1MHz   []3MHz W/cm2:  Location:    []  Iontophoresis with dexamethasone         Location: [] Take home patch   [] In clinic   10 []  Ice     [x]  heat  []  Ice massage  []  Laser   []  Anodyne Position:seated  Location:neck and upper back    []  Laser with stim  []  Other:  Position:  Location:    []  Vasopneumatic Device    []  Right     []  Left  Pre-treatment girth:  Post-treatment girth:  Measured at (location):  Pressure:       [] lo [] med [] hi   Temperature: [] lo [] med [] hi   [x] Skin assessment post-treatment:  [x]intact []redness- no adverse reaction    []redness - adverse reaction:     25 min Therapeutic Exercise:  [x] See flow sheet : Rationale: increase ROM and increase strength to improve the patients ability to increase ease with ADLs    With   [x] TE   [] TA   [] neuro   [] other: Patient Education: [x] Review HEP    [] Progressed/Changed HEP based on:   [] positioning   [] body mechanics   [] transfers   [] heat/ice application    [] other:      Other Objective/Functional Measures:   Pt was 7 min late for session   Had increased tingling in the left UE and hand after wall Y's  Reported itching, nerve pain in the mid back after seated T/S rot to the left  No other complaint of increased pain during session     Pain Level (0-10 scale) post treatment: 4/10    ASSESSMENT/Changes in Function:   Pt is making limited progress toward goals. Pt cont with moderate pain in the neck and upper back. Cont to report having difficulty with performing ADLs and household chores. Patient will continue to benefit from skilled PT services to modify and progress therapeutic interventions, address functional mobility deficits, address ROM deficits, address strength deficits, analyze and address soft tissue restrictions, analyze and cue movement patterns, analyze and modify body mechanics/ergonomics, assess and modify postural abnormalities, and instruct in home and community integration to attain remaining goals. []  See Plan of Care  [x]  See progress note/recertification  []  See Discharge Summary         Progress towards goals / Updated goals:  1. Patient will improve FOTO score at least 12 points (61/100) to demonstrate improvement in functional mobility. 2. Patient will report \"A little bit of difficulty\" when asked on the FOTO questionnaire, \"How much difficulty to you have performing recreational activities in which you take some force or impact? \" For improved ease of completing household chores and caring for his daughter.    3. Patient will improve his popliteal angle test to 35 degrees bilaterally to improve pelvis positioning and reduce low back pain for ease of caring for his daughter. 4. Patient will report <= 4/10 pain on average in the t/s and left c/s to improve ease of working and performing ADLs. 5. Patient will improve B lower trap strength to 4-/5 in order to increase ease of overhead reaching.      PLAN  []  Upgrade activities as tolerated     [x]  Continue plan of care  []  Update interventions per flow sheet       []  Discharge due to:_  []  Other:_      Mikal Guy, NAKIA 1/4/2023  6:46 AM    Future Appointments   Date Time Provider Cristo Buenrostro   1/4/2023 11:00 AM Ortega Arora PTA [facility]   1/6/2023  3:30 PM Dominic Mendoza PTA [facility]   1/9/2023  3:00 PM Dominic Mendoza PTA [facility]   1/10/2023 11:00 AM DO KVNG Toussaint BS AMB   1/11/2023  4:00 PM Mundo Schwartz, PT [facility]   4/19/2023 11:00 AM Airam Santiago MD [facility] BS AMB

## 2023-01-06 ENCOUNTER — HOSPITAL ENCOUNTER (OUTPATIENT)
Dept: PHYSICAL THERAPY | Age: 43
Discharge: HOME | End: 2023-01-06
Payer: COMMERCIAL

## 2023-01-06 PROCEDURE — 97110 THERAPEUTIC EXERCISES: CPT

## 2023-01-06 PROCEDURE — 97140 MANUAL THERAPY 1/> REGIONS: CPT

## 2023-01-06 NOTE — PROGRESS NOTES
PT DAILY TREATMENT NOTE     Patient Name: Ricky Jorge  Date:2023  : 1980  [x]  Patient  Verified  Payor: Alessandro Nava / Plan: [place] [email or web address]  [employer] / Product Type: Commerical /    In time: 3:36 Out time: 4:30  Total Treatment Time (min): 54  Visit #: 2 of 8    Treatment Area: Neck pain [M54.2]  Other low back pain [M54.59]    SUBJECTIVE  Pain Level (0-10 scale): 6/10 c/s and 5/10 t/s  Any medication changes, allergies to medications, adverse drug reactions, diagnosis change, or new procedure performed?: [x] No    [] Yes (see summary sheet for update)  Subjective functional status/changes:   [] No changes reported  Pt reports a lot of neck soreness since his last session doing the wall exercises. He reports tingling in his mid back. He hasn't gotten a foam roller yet and follows up with the MD next week.        OBJECTIVE    30 min Therapeutic Exercise:  [x] See flow sheet :   Rationale: increase ROM and increase strength to improve the patients ability to increase ease with ADLs    29 min Manual Therapy:  [x] See flow sheet : MET to correct right t/s rotation; MET for left c/s rotation; MET for left elevated first rib; PA mobs grade III-IV t/s; TPR left posterior/middle scalenes   Rationale: increase ROM and increase strength to improve the patients ability to increase ease with ADLs    With   [x] TE   [] TA   [] neuro   [] other: Patient Education: [x] Review HEP    [] Progressed/Changed HEP based on:   [] positioning   [] body mechanics   [] transfers   [] heat/ice application    [] other:      Other Objective/Functional Measures:   Cavitations of t/s with PA mobs  Decreased tingling to mid back post MET and mobilizations  Multiple TPs in middle and posterior scalenes  Pain reduction post manual  Discussed diaphragmatic breathing  Neurological stretching release with breath in, hold 6 and exhale further into neck stretches with arm held down  Elevated left first rib  Discussed work posture with mouse and keyboard to reduce UT tension  Forward shoulder posture  Reviewed exercises and corrections for home  Educated on continuation of core exercises in upcoming sessions    Pain Level (0-10 scale) post treatment: 4/10    ASSESSMENT/Changes in Function: Pt continues to have good relief with manual techniques to improve alignment but is unable to maintain with stretching/strengthening independently. He has hypertonicity of left scalenes and increased accessory muscle use with breathing and UE exercising. Will continue to progress neutral spine position with endurance conditioning to decrease pain for ease of work and ADLs. Patient will continue to benefit from skilled PT services to modify and progress therapeutic interventions, address functional mobility deficits, address ROM deficits, address strength deficits, analyze and address soft tissue restrictions, analyze and cue movement patterns, analyze and modify body mechanics/ergonomics, assess and modify postural abnormalities, and instruct in home and community integration to attain remaining goals. []  See Plan of Care  [x]  See progress note/recertification  []  See Discharge Summary         Progress towards goals / Updated goals:  1. Patient will improve FOTO score at least 12 points (61/100) to demonstrate improvement in functional mobility. 2. Patient will report \"A little bit of difficulty\" when asked on the FOTO questionnaire, \"How much difficulty to you have performing recreational activities in which you take some force or impact? \" For improved ease of completing household chores and caring for his daughter. 3. Patient will improve his popliteal angle test to 35 degrees bilaterally to improve pelvis positioning and reduce low back pain for ease of caring for his daughter. 4. Patient will report <= 4/10 pain on average in the t/s and left c/s to improve ease of working and performing ADLs.   5. Patient will improve B lower trap strength to 4-/5 in order to increase ease of overhead reaching.      PLAN  [x]  Upgrade activities as tolerated     [x]  Continue plan of care  []  Update interventions per flow sheet       []  Discharge due to:_  []  Other:_      Spencer Rosario, PTA 1/6/2023  6:46 AM    Future Appointments   Date Time Provider Cristo Chitra   1/6/2023  3:30 PM Pathetalia Speed, PTA [facility]   1/9/2023  3:00 PM Oren Ji, PTA [facility]   1/10/2023 11:00 AM DO K[facility] Siddiqui BS AMB   1/11/2023  4:00 PM Mundo Barrett, PT [facility]   4/19/2023 11:00 AM Kentrell Santiago MD [facility] BS AMB

## 2023-01-11 ENCOUNTER — HOSPITAL ENCOUNTER (OUTPATIENT)
Dept: PHYSICAL THERAPY | Age: 43
Discharge: HOME | End: 2023-01-11
Payer: COMMERCIAL

## 2023-01-11 PROCEDURE — 97110 THERAPEUTIC EXERCISES: CPT

## 2023-01-11 PROCEDURE — 97140 MANUAL THERAPY 1/> REGIONS: CPT

## 2023-01-12 NOTE — PROGRESS NOTES
PT DAILY TREATMENT NOTE     Patient Name: Felipe Abad  Date:2023  : 1980  [x]  Patient  Verified  Payor: Brenda Galvez / Plan: [place] [employer]  [facility] / Product Type: Commerical /    In time: 4:03 Out time: 4:33  Total Treatment Time (min): 30  Visit #: 3 of 8    Treatment Area: Neck pain [M54.2]  Other low back pain [M54.59]    SUBJECTIVE  Pain Level (0-10 scale): 5/10  Any medication changes, allergies to medications, adverse drug reactions, diagnosis change, or new procedure performed?: [x] No    [] Yes (see summary sheet for update)  Subjective functional status/changes:   [] No changes reported  Pt reports having a headache today and has been under more stress with work and home. He did a lot of carrying his daughter around but hasn't been sitting as much at the house today. OBJECTIVE    10 min Therapeutic Exercise:  [x] See flow sheet :   Rationale: increase ROM and increase strength to improve the patients ability to increase ease with ADLs    20 min Manual Therapy:  [x] See flow sheet : no upslip; MET for right AI; shotgun technique; MET for left l/s rotation; MET for right t/s rotation; MET for left c/s rotation    SOR, TPR left scalenes   Rationale: increase ROM and increase strength to improve the patients ability to increase ease with ADLs    With   [x] TE   [] TA   [] neuro   [] other: Patient Education: [x] Review HEP    [] Progressed/Changed HEP based on:   [] positioning   [] body mechanics   [] transfers   [] heat/ice application    [] other:      Other Objective/Functional Measures:   Discussed importance of compliance at home for carryover in therapy  Continues with t/s hypomobility  TTP right t/s paraspinals and left c/s paraspinals  Educated on -8 breathing to aide in relaxation    Pain Level (0-10 scale) post treatment: 5/10    ASSESSMENT/Changes in Function: Pt making slow progress due to limited compliance at home with strengthening and stretching.  He continues to have good relief with manual therapy but is unable to maintain corrections due to muscle imbalance. He demonstrates poor posture with forward head and shoulders. He needs to continue to work on global stability and postural endurance for decreased pain. Patient will continue to benefit from skilled PT services to modify and progress therapeutic interventions, address functional mobility deficits, address ROM deficits, address strength deficits, analyze and address soft tissue restrictions, analyze and cue movement patterns, analyze and modify body mechanics/ergonomics, assess and modify postural abnormalities, and instruct in home and community integration to attain remaining goals. [x]  See Plan of Care  [x]  See progress note/recertification  []  See Discharge Summary         Progress towards goals / Updated goals:  1. Patient will improve FOTO score at least 12 points (61/100) to demonstrate improvement in functional mobility. 2. Patient will report \"A little bit of difficulty\" when asked on the FOTO questionnaire, \"How much difficulty to you have performing recreational activities in which you take some force or impact? \" For improved ease of completing household chores and caring for his daughter. 3. Patient will improve his popliteal angle test to 35 degrees bilaterally to improve pelvis positioning and reduce low back pain for ease of caring for his daughter. 4. Patient will report <= 4/10 pain on average in the t/s and left c/s to improve ease of working and performing ADLs. 5. Patient will improve B lower trap strength to 4-/5 in order to increase ease of overhead reaching.      PLAN  [x]  Upgrade activities as tolerated     [x]  Continue plan of care  []  Update interventions per flow sheet       []  Discharge due to:_  []  Other:_      Stas García PTA 1/11/2023  6:46 AM    Future Appointments   Date Time Provider Cristo Buenrostro   4/19/2023 11:00 AM John Callahan MD [facility] BS AMB

## 2023-01-18 ENCOUNTER — HOSPITAL ENCOUNTER (OUTPATIENT)
Dept: PHYSICAL THERAPY | Age: 43
Discharge: HOME | End: 2023-01-18
Payer: COMMERCIAL

## 2023-01-18 ENCOUNTER — OFFICE VISIT (OUTPATIENT)
Dept: ORTHOPEDIC SURGERY | Age: 43
End: 2023-01-18
Payer: COMMERCIAL

## 2023-01-18 VITALS — WEIGHT: 149 LBS | RESPIRATION RATE: 14 BRPM | BODY MASS INDEX: 23.95 KG/M2 | HEIGHT: 66 IN

## 2023-01-18 DIAGNOSIS — M99.09 SOMATIC DYSFUNCTION OF ABDOMINAL REGION: ICD-10-CM

## 2023-01-18 DIAGNOSIS — M99.06 LOWER LIMB REGION SOMATIC DYSFUNCTION: ICD-10-CM

## 2023-01-18 DIAGNOSIS — M99.08 RIB CAGE REGION SOMATIC DYSFUNCTION: ICD-10-CM

## 2023-01-18 DIAGNOSIS — M99.05 PELVIC SOMATIC DYSFUNCTION: ICD-10-CM

## 2023-01-18 DIAGNOSIS — S46.912A MUSCLE STRAIN OF LEFT SCAPULAR REGION, INITIAL ENCOUNTER: ICD-10-CM

## 2023-01-18 DIAGNOSIS — M99.07 UPPER EXTREMITY SOMATIC DYSFUNCTION: ICD-10-CM

## 2023-01-18 DIAGNOSIS — M99.02 THORACIC REGION SOMATIC DYSFUNCTION: ICD-10-CM

## 2023-01-18 DIAGNOSIS — M51.34 DDD (DEGENERATIVE DISC DISEASE), THORACIC: ICD-10-CM

## 2023-01-18 DIAGNOSIS — M51.36 DDD (DEGENERATIVE DISC DISEASE), LUMBAR: ICD-10-CM

## 2023-01-18 DIAGNOSIS — M50.30 DDD (DEGENERATIVE DISC DISEASE), CERVICAL: Primary | ICD-10-CM

## 2023-01-18 DIAGNOSIS — M99.03 LUMBAR REGION SOMATIC DYSFUNCTION: ICD-10-CM

## 2023-01-18 DIAGNOSIS — M99.04 SACRAL REGION SOMATIC DYSFUNCTION: ICD-10-CM

## 2023-01-18 DIAGNOSIS — M99.01 CERVICAL SOMATIC DYSFUNCTION: ICD-10-CM

## 2023-01-18 PROCEDURE — 98929 OSTEOPATH MANJ 9-10 REGIONS: CPT | Performed by: FAMILY MEDICINE

## 2023-01-18 PROCEDURE — 97140 MANUAL THERAPY 1/> REGIONS: CPT

## 2023-01-18 PROCEDURE — 97110 THERAPEUTIC EXERCISES: CPT

## 2023-01-18 PROCEDURE — 99214 OFFICE O/P EST MOD 30 MIN: CPT | Performed by: FAMILY MEDICINE

## 2023-01-18 RX ORDER — MELOXICAM 15 MG/1
TABLET ORAL
Qty: 90 TABLET | Refills: 0 | Status: SHIPPED | OUTPATIENT
Start: 2023-01-18

## 2023-01-18 NOTE — PROGRESS NOTES
AVS reviewed: YES  HEP: AT demonstrated  Resources Provided: YES Both Videos & Photos  Patient questions/concerns answered: YES  Patient verbalized understanding of treatment plan: YES

## 2023-01-18 NOTE — LETTER
1/18/2023    Patient: Awa Chaparro   YOB: 1980   Date of Visit: 1/18/2023     [place] [id number] [facility] [address] [place] [id number]  Via Fax: [phone]    Dear Leeanna Camargo DO,      Thank you for referring Mr. Kg Winters to [place] [address]. for evaluation. My notes for this consultation are attached. If you have questions, please do not hesitate to call me. I look forward to following your patient along with you.       Sincerely,    Calvin Bueno DO

## 2023-01-18 NOTE — PROGRESS NOTES
HISTORY OF PRESENT ILLNESS    Gilson Goff 1980 is a 38y.o. year old male comes in today to be evaluated and treated for: neck, back pain - chronic    Since last appt has noticed pain still significant despite PT which last scheduled was today. Admits to no HEP. Pain level 7/10. Using mobic with some benefit but GI upset. Has been to neuro for right arm feeling \"heavy\" and wanted MRI cervical and tingle left ear constant. IMAGING: XR whole spine 8/6/2020  IMPRESSION:  1. S-shaped thoracolumbar scoliosis. No significant coronal or sagittal  balance. 2.  Exaggerated thoracic kyphosis. Mild left-sided upward pelvic tilt. 3.  Mild to moderate degenerative changes above. Partial C6/C7 congenital  fusion. 4.  Transitional anatomy with 13 thoracic type vertebral bodies.      MRI thoracic 8/6/2022  IMPRESSION:  Mild rotoscoliosis convexity to the right  Minimal kyphotic angulation  Incidental hiatal hernia    Past Surgical History:   Procedure Laterality Date    HX HEENT      corrective hearing for left ear     HX HEENT      plastic surgery on both ears    HX HEENT  10/08 & 09/09    Reformed jaw bone, bone graft    HX RENAL BIOPSY      HX TONSILLECTOMY      OK APPENDECTOMY  11/23/2010    OK IMPLANTATION PT-ACTIVATED CARDIAC EVENT RECORDER N/A 10/24/2018    Loop Recorder Insert performed by Rupal Neely MD at [facility] CATH LAB     Social History     Socioeconomic History    Marital status: SINGLE   Occupational History    Occupation: real estate   Tobacco Use    Smoking status: Every Day     Packs/day: 1.00     Years: 20.00     Pack years: 20.00     Types: Cigarettes    Smokeless tobacco: Never   Substance and Sexual Activity    Alcohol use: No     Alcohol/week: 0.0 standard drinks    Drug use: No     Current Outpatient Medications   Medication Sig Dispense Refill    amoxicillin (AMOXIL) 500 mg capsule Take 500 mg by mouth.      meloxicam (MOBIC) 15 mg tablet Take 1 tab daily as needed pain with food. 90 Tablet 0    escitalopram oxalate (LEXAPRO) 10 mg tablet TAKE 1 IN AM AND 1/2 AT BEDTIME      ibuprofen (MOTRIN) 200 mg tablet Take  by mouth. diclofenac (VOLTAREN) 1 % gel Apply 4 g to affected area four (4) times daily. 5 Each 5    ALPRAZolam (XANAX) 0.5 mg tablet Take 0.5 mg by mouth two (2) times a day. ascorbic acid, vitamin C, (VITAMIN C) 500 mg tablet Take 1,000 mg by mouth daily. cholecalciferol, vitamin D3, 50 mcg (2,000 unit) tab Take 1 Tab by mouth daily. omeprazole-sodium bicarbonate (ZEGERID) 40-1.1 mg-gram capsule Take 2 Caps by mouth daily. acetaminophen (TYLENOL) 500 mg tablet Take 500 mg by mouth every six (6) hours as needed. MULTIVITAMIN PO Take 1 Tab by mouth daily. Past Medical History:   Diagnosis Date    Anxiety     Arm fatigue     Forearms    Balance problems     Cardiac Agatston CAC score 100-199 02/04/2015    Coronary calcium score 0. Cardiac echocardiogram 10/29/2014    EF 55-60%. No RWMA. Normal diastolic fx. RVSP 30 mmHg. Cardiac Holter monitor study 10/29/2014    Normal Holter study. Chronic cough     chronically coughing up sputum    Depression     Dizziness     Dyspnea     Esophageal reflux     Headache     Hypercholesterolemia     Lumbar pain     Myofascial pain     Neck pain     Numbness and tingling     PAC (premature atrial contraction)     Palpitations     presumably benign    Panic disorder     Reflux     Thoracic back pain     Mid-thoracic    Upper extremity weakness     Vertigo, intermittent      Family History   Problem Relation Age of Onset    Hypertension Father     Liver Disease Father     Hypertension Mother      ROS:  No incont, fever, some tingle left 4/5 finger    Objective:  Resp 14   Ht 5' 6\" (1.676 m)   Wt 149 lb (67.6 kg)   BMI 24.05 kg/m²   NEURO:  Sensation intact to light touch. Reflexes +2/4 biceps, triceps, patellar, and Achilles bilaterally. M/S:  Examined seated and supine.   Slump/Spurling negative. Standing flexion test positive left  Sphinx test positive left. ASIS low left  Iliac crests equal bilaterally Pubes equal bilaterally Medial malleolus low left  Sacral base posterior left  JESE low right  TTA at C3 on left worse flexion, T2, 3, 4 on left worse flexion, and L3, 4 on left worse flexion  Rib(s) 2, 3 left TTP and posterior LE Strength +5/5 bilaterally Thoracic diaphragm restricted left. Scapula motion restricted w/ TTA left. Hip flexion limited right. TTP serratus left  No leg length difference. Assessment/Plan:     ICD-10-CM ICD-9-CM    1. DDD (degenerative disc disease), cervical  M50.30 722.4 meloxicam (MOBIC) 15 mg tablet      2. DDD (degenerative disc disease), thoracic  M51.34 722.51 meloxicam (MOBIC) 15 mg tablet      3. DDD (degenerative disc disease), lumbar  M51.36 722.52 meloxicam (MOBIC) 15 mg tablet      4. Muscle strain of left scapular region, initial encounter  S46.912A 840.9       5. Lumbar region somatic dysfunction  M99.03 739.3 RI OSTEOPATHIC MANIPULATIVE TX 9-10 BODY REGIONS      6. Pelvic somatic dysfunction  M99.05 739.5 RI OSTEOPATHIC MANIPULATIVE TX 9-10 BODY REGIONS      7. Sacral region somatic dysfunction  M99.04 739.4 RI OSTEOPATHIC MANIPULATIVE TX 9-10 BODY REGIONS      8. Thoracic region somatic dysfunction  M99.02 739.2 RI OSTEOPATHIC MANIPULATIVE TX 9-10 BODY REGIONS      9.  Rib cage region somatic dysfunction  M99.08 739.8 RI OSTEOPATHIC MANIPULATIVE TX 9-10 BODY REGIONS      10. Cervical somatic dysfunction  M99.01 739.1 RI OSTEOPATHIC MANIPULATIVE TX 9-10 BODY REGIONS      11. Upper extremity somatic dysfunction  M99.07 739.7 RI OSTEOPATHIC MANIPULATIVE TX 9-10 BODY REGIONS      12. Lower limb region somatic dysfunction  M99.06 739.6 RI OSTEOPATHIC MANIPULATIVE TX 9-10 BODY REGIONS      13. Somatic dysfunction of abdominal region  M99.09 739.9 RI OSTEOPATHIC MANIPULATIVE TX 9-10 BODY REGIONS          Patient (or guardian if minor) verbalizes understanding of evaluation and plan. Verbal consent obtained. Cervical, Thoracic, Rib, Lumbar, Pelvic, Sacral, Upper Ext, Lower Ext, and Abdominal SD treated with myofascial and ME. Correction of previous malalignments verified after Tx. Pt tolerated well. Notes improvement of Sx and pain is now rated 5/10. HEP/stretches daily. Discussed stretching/strengthening/posture. Will continue PT and Rx for mobic and emphasized need HEP  as above and plan follow-up 8 weeks.

## 2023-01-18 NOTE — PATIENT INSTRUCTIONS
Search YouTube for my channel:    Dr. Spencer Cedillo    Neck/Upper back          30 seconds 3 times each side and do with arm at side, 45 degrees up, and 45 degrees down. Repeat 4-6 times per day.

## 2023-01-18 NOTE — PROGRESS NOTES
PT DAILY TREATMENT NOTE     Patient Name: Merrill Knox  Date:2023  : 1980  [x]  Patient  Verified  Payor: Baltazar Echols / [place] [place] [employer]  [facility] / Product Type: Commerical /    In time:***  Out time:***  Total Treatment Time (min): ***  Visit #: 4 of 8    Medicare/BCBS Only   Total Timed Codes (min):  *** 1:1 Treatment Time:  ***       Treatment Area: Neck pain [M54.2]  Other low back pain [M54.59]    SUBJECTIVE  Pain Level (0-10 scale): ***  Any medication changes, allergies to medications, adverse drug reactions, diagnosis change, or new procedure performed?: [x] No    [] Yes (see summary sheet for update)  Subjective functional status/changes:   [] No changes reported  ***    OBJECTIVE    Modality rationale: {[facility] INMOTION MODALITIES:54866} to improve the patients ability to ***   Min Type Additional Details    [] Estim:  []Unatt       []IFC  []Premod                        []Other:  []w/ice   []w/heat  Position:  Location:    [] Estim: []Att    []TENS instruct  []NMES                    []Other:  []w/US   []w/ice   []w/heat  Position:  Location:    []  Traction: [] Cervical       []Lumbar                       [] Prone          []Supine                       []Intermittent   []Continuous Lbs:  [] before manual  [] after manual    []  Ultrasound: []Continuous   [] Pulsed                           []1MHz   []3MHz W/cm2:  Location:    []  Iontophoresis with dexamethasone         Location: [] Take home patch   [] In clinic    []  Ice     []  heat  []  Ice massage  []  Laser   []  Anodyne Position:  Location:    []  Laser with stim  []  Other:  Position:  Location:    []  Vasopneumatic Device    []  Right     []  Left  Pre-treatment girth:  Post-treatment girth:  Measured at (location):  Pressure:       [] lo [] med [] hi   Temperature: [] lo [] med [] hi   [] Skin assessment post-treatment:  []intact []redness- no adverse reaction    []redness - adverse reaction:     *** min []Eval []Re-Eval       *** min Therapeutic Exercise:  [] See flow sheet :   Rationale: {[facility] IMMOTION THER EX:44051:a} to improve the patients ability to ***    *** min Therapeutic Activity:  []  See flow sheet :   Rationale: {[facility] IMMOTION THER EX:81539:a}  to improve the patients ability to ***     *** min Neuromuscular Re-education:  []  See flow sheet :   Rationale: {[facility] IMMOTION THER EX:19219:a}  to improve the patients ability to ***    *** min Manual Therapy:  ***   The manual therapy interventions were performed at a separate and distinct time from the therapeutic activities interventions. Rationale: {[facility] IMMOTION MANUAL THERAPY:99981:a} to ***    *** min Gait Training:  ___ feet with ___ device on level surfaces with ___ level of assist   Rationale:    *** min Self Care/Home Management: ***   Rationale: {[facility] IMMOTION THER EX:42034:a}  to improve the patients ability to ***          With   [] TE   [] TA   [] neuro   [] other: Patient Education: [x] Review HEP    [] Progressed/Changed HEP based on:   [] positioning   [] body mechanics   [] transfers   [] heat/ice application    [] other:      Other Objective/Functional Measures: ***     Pain Level (0-10 scale) post treatment: ***    ASSESSMENT/Changes in Function: ***    Patient will continue to benefit from skilled PT services to [facility]97793:a} to attain remaining goals. []  See Plan of Care  []  See progress note/recertification  []  See Discharge Summary         Progress towards goals / Updated goals:  1. Patient will improve FOTO score at least 12 points (61/100) to demonstrate improvement in functional mobility. 2. Patient will report \"A little bit of difficulty\" when asked on the FOTO questionnaire, \"How much difficulty to you have performing recreational activities in which you take some force or impact? \" For improved ease of completing household chores and caring for his daughter.    3. Patient will improve his popliteal angle test to 35 degrees bilaterally to improve pelvis positioning and reduce low back pain for ease of caring for his daughter. 4. Patient will report <= 4/10 pain on average in the t/s and left c/s to improve ease of working and performing ADLs. 5. Patient will improve B lower trap strength to 4-/5 in order to increase ease of overhead reaching.      PLAN  []  Upgrade activities as tolerated     []  Continue plan of care  []  Update interventions per flow sheet       []  Discharge due to:_  []  Other:_      [place], PT 1/18/2023  8:47 AM    Future Appointments   Date Time Provider Cristo Buenrostro   1/18/2023 12:00 PM Mundo Mathews [place] [facility]   1/18/2023  1:45 PM Felizardo Osgood, DO [facility]   4/19/2023 11:00 AM Destiney Santiago MD [facility]

## 2023-01-18 NOTE — PROGRESS NOTES
PT DAILY TREATMENT NOTE     Patient Name: Misa Hawkins  Date:2023  : 1980  [x]  Patient  Verified  Payor: Alanis Handley / Plan: [address] [place] [address] [employer] [place] HMO / Product Type: HMO /    In time: 12:15 Out time: 12:50   Total Treatment Time (min): 35  Visit #: 4 of 8    Treatment Area: Neck pain [M54.2]  Other low back pain [M54.59]    SUBJECTIVE  Pain Level (0-10 scale): 7/10 left l/s; 4/10 c/s  Any medication changes, allergies to medications, adverse drug reactions, diagnosis change, or new procedure performed?: [x] No    [] Yes (see summary sheet for update)  Subjective functional status/changes:   [] No changes reported  Pt states frustrated with himself because he just isn't making the time to exercise like he needs to. He was lifting his mattress to put a sheet on it and pulled his left low back. He reports some tingling in the mid back today. He saw the neurologist and is supposed to get another c/s MRI. He states he wasn't told anything about the sensation in his left ear or the heaviness of his right arm. He goes to see Dr. Harper Carnes today.          OBJECTIVE    25 min Therapeutic Exercise:  [x] See flow sheet :   Rationale: increase ROM and increase strength to improve the patients ability to increase ease with ADLs    10 min Manual Therapy:  [x] See flow sheet : leg lengthening for left upslip; shotgun technique; MET for left c/s rotation   Rationale: increase ROM and increase strength to improve the patients ability to increase ease with ADLs    With   [x] TE   [] TA   [] neuro   [] other: Patient Education: [x] Review HEP    [] Progressed/Changed HEP based on:   [] positioning   [] body mechanics   [] transfers   [] heat/ice application    [] other:      Other Objective/Functional Measures:   TTP to left quadratus lumborum; educated on heat and stretches to the area  Added thread the needle to open book progression for t/s rotation  Hypertonic left levator and scalenes; educated to talk to Dr. Harper Carnes about dizziness/light headed symptoms when going into neck stretches  Continued t/s hypomobility  Median nerve symptoms performing corner pec stretch on the left  Educated on nerve flossing for median nerve  TTP biceps long head tendon origin on the left  Educated on importance of compliance and diligence with working on strengthening/stretching in order to improve postural endurance    Pain Level (0-10 scale) post treatment: 4/10    ASSESSMENT/Changes in Function: Pt continues to present with myofascial pain and restrictions due to scoliotic curvatures as well poor posture. He has t/s kyphosis with decreased posterior endurance. He demonstrates left c/s hypertonicity contributing to nerve symptoms. He will benefit from continued strengthening of convexities and stretching concavities for pain reduction and ease of performing household chores and caring for his daughter. Patient will continue to benefit from skilled PT services to modify and progress therapeutic interventions, address functional mobility deficits, address ROM deficits, address strength deficits, analyze and address soft tissue restrictions, analyze and cue movement patterns, analyze and modify body mechanics/ergonomics, assess and modify postural abnormalities, and instruct in home and community integration to attain remaining goals. [x]  See Plan of Care  [x]  See progress note/recertification  []  See Discharge Summary         Progress towards goals / Updated goals:  1. Patient will improve FOTO score at least 12 points (61/100) to demonstrate improvement in functional mobility. 2. Patient will report \"A little bit of difficulty\" when asked on the FOTO questionnaire, \"How much difficulty to you have performing recreational activities in which you take some force or impact? \" For improved ease of completing household chores and caring for his daughter.    3. Patient will improve his popliteal angle test to 35 degrees bilaterally to improve pelvis positioning and reduce low back pain for ease of caring for his daughter. 4. Patient will report <= 4/10 pain on average in the t/s and left c/s to improve ease of working and performing ADLs. 5. Patient will improve B lower trap strength to 4-/5 in order to increase ease of overhead reaching.      PLAN  [x]  Upgrade activities as tolerated     [x]  Continue plan of care  []  Update interventions per flow sheet       []  Discharge due to:_  []  Other:_      Sylvie Romano PTA 1/18/2023  6:46 AM    Future Appointments   Date Time Provider Cristo Buenrostro   4/19/2023 11:00 AM Yuki Rashid MD [facility] BS AM[facility]

## 2023-01-31 ENCOUNTER — HOSPITAL ENCOUNTER (OUTPATIENT)
Dept: PHYSICAL THERAPY | Age: 43
Discharge: HOME | End: 2023-01-31
Payer: COMMERCIAL

## 2023-01-31 PROCEDURE — 97530 THERAPEUTIC ACTIVITIES: CPT

## 2023-01-31 PROCEDURE — 97110 THERAPEUTIC EXERCISES: CPT

## 2023-01-31 NOTE — PROGRESS NOTES
PT DAILY TREATMENT NOTE     Patient Name: Colette Palacios  Date:2023  : 1980  [x]  Patient  Verified  Payor: Samanta Reveal / Plan: [employer] / Product Type: HMO /    In time: 11:10 Out time: 11:49  Total Treatment Time (min): 39  Visit #: 5 of 8    Treatment Area: Neck pain [M54.2]  Other low back pain [M54.59]    SUBJECTIVE  Pain Level (0-10 scale): 5/10  Any medication changes, allergies to medications, adverse drug reactions, diagnosis change, or new procedure performed?: [x] No    [] Yes (see summary sheet for update)  Subjective functional status/changes:   [] No changes reported  Pt reports increased mid back pain. He got his MRI results for the c/s. He reports continued right arm heaviness but doesn't have a follow up with the neurologist for 2 months. He goes back to Dr. Nely Roblero in another 8 weeks. He was told to continue PT.     OBJECTIVE    24 min Therapeutic Exercise:  [x] See flow sheet :   Rationale: increase ROM and increase strength to improve the patients ability to increase ease with ADLs    15 min Therapeutic Activity:  [x] See flow sheet : goal reassessment; FOTO   Rationale: increase ROM and increase strength to improve the patients ability to increase ease with ADLs    With   [x] TE   [] TA   [] neuro   [] other: Patient Education: [x] Review HEP    [] Progressed/Changed HEP based on:   [] positioning   [] body mechanics   [] transfers   [] heat/ice application    [] other:      Other Objective/Functional Measures: FOTO: 47/100  Popliteal angle test: left 50 degrees right 43 degrees  Pain average: 5-6/10  Lower trap MMT: 3+/5  Initiated endurance conditioning for posterior kinetic chain    Pain Level (0-10 scale) post treatment: 5/10    ASSESSMENT/Changes in Function: See Progress Note.     Patient will continue to benefit from skilled PT services to modify and progress therapeutic interventions, address functional mobility deficits, address ROM deficits, address strength deficits, analyze and address soft tissue restrictions, analyze and cue movement patterns, analyze and modify body mechanics/ergonomics, assess and modify postural abnormalities, and instruct in home and community integration to attain remaining goals. [x]  See Plan of Care  [x]  See progress note/recertification  []  See Discharge Summary         Progress towards goals / Updated goals:  1. Patient will improve FOTO score at least 12 points (61/100) to demonstrate improvement in functional mobility. 2. Patient will report \"A little bit of difficulty\" when asked on the FOTO questionnaire, \"How much difficulty to you have performing recreational activities in which you take some force or impact? \" For improved ease of completing household chores and caring for his daughter. 3. Patient will improve his popliteal angle test to 35 degrees bilaterally to improve pelvis positioning and reduce low back pain for ease of caring for his daughter. 4. Patient will report <= 4/10 pain on average in the t/s and left c/s to improve ease of working and performing ADLs. 5. Patient will improve B lower trap strength to 4-/5 in order to increase ease of overhead reaching.      PLAN  [x]  Upgrade activities as tolerated     [x]  Continue plan of care  []  Update interventions per flow sheet       []  Discharge due to:_  []  Other:_      Sami Rosenberg PTA 1/31/2023  6:46 AM    Future Appointments   Date Time Provider Cristo Buenrostro   1/31/2023 11:00 AM Sadie Gutierrez PTA [facility]   2/3/2023  1:00 PM Sadie Gutierrez PTA [facility]   2/6/2023  1:00 PM Saba Hartmann, PT [facility]   2/10/2023  1:00 PM Sadie Gutierrez PTA [facility]

## 2023-01-31 NOTE — THERAPY RECERTIFICATION
[facility] [employer]  [facility]  [address] [place] [address]  [phone] fax    Physical Therapy Progress Note  Patient name: Ricky Jorge Start of Care: 10/7/2022   Referral source: Cherry Ocampo DO : 1980   Medical/Treatment Diagnosis: Neck pain [M54.2]  Other low back pain [M54.59]  Payor: OPTIMA / Plan: [place] [employer] H[place] / Product Type: HMO /  Onset Date:chronic with worsening symptoms over the past few years       Prior Hospitalization: see medical history Provider#: [id number]   Medications: Verified on Patient Summary List    Comorbidities: tobacco use, anxiety, depression, IBS, chronic fatigue  Prior Level of Function: Ind with ADLs, working in real estate  Visits from [facility] of Care: 22    Missed Visits: 4    Established Goals:         Excellent           Good         Limited           None  [x] Increased ROM   []  [x]  []  []  [x] Increased Strength  []  []  [x]  []  [x] Increased Mobility  []  [x]  []  []   [x] Decreased Pain   []  []  [x]  []  [] Decreased Swelling  []  []  []  []    Key Functional Changes: FOTO 47/100; improving flexibility and strength; HEP compliance    Updated Goals: to be achieved in 6 weeks:  1. Patient will improve FOTO score at least 12 points (61/100) to demonstrate improvement in functional mobility. 2. Patient will report \"A little bit of difficulty\" when asked on the FOTO questionnaire, \"How much difficulty to you have performing recreational activities in which you take some force or impact? \" For improved ease of completing household chores and caring for his daughter. 3. Patient will improve his popliteal angle test to 35 degrees bilaterally to improve pelvis positioning and reduce low back pain for ease of caring for his daughter. 4. Patient will report <= 4/10 pain on average in the t/s and left c/s to improve ease of working and performing ADLs.   5. Patient will improve B lower trap strength to 4-/5 in order to increase ease of overhead reaching. ASSESSMENT/RECOMMENDATIONS: Mr. Alf Wong had a gap in therapy awaiting insurance authorization. He continues to complain of increased t/s pain but improves with mobilizations and postural correction. He recently had a MRI of his c/s due to increased right UE heaviness and a mild disc bulge was found more pronounced on the right at the C5-C6 region. He has been more compliant with stretching in the last few weeks but needs to work on postural endurance to reduce t/s kyphosis and forward head/shoulder posture. His popliteal angle test improved to 43 degrees on the right and 50 degrees on the left. His B lower traps remain limited in MMT at 3+/5. His FOTO was 47/100 indicating ongoing functional limitations. Skilled PT is medically necessary to progress pt understanding of strengthening convexities and stretching concavities of his curvatures while stabilizing his postural muscles and improving endurance to reduce kyphotic curve for decreased pain with work, caring for his daughter and performing household chores and ADLs.       [x]Continue therapy per initial plan/protocol at a frequency of  2 x per week for 6 weeks  []Continue therapy with the following recommended changes:_____________________      _____________________________________________________________________  []Discontinue therapy progressing towards or have reached established goals  []Discontinue therapy due to lack of appreciable progress towards goals  []Discontinue therapy due to lack of attendance or compliance  []Await Physician's recommendations/decisions regarding therapy  []Other:________________________________________________________________    Thank you for this referral.   Cameron Aguayo, PTA 1/31/2023 8:42 AM    NOTE TO PHYSICIAN:  Enedina Coronado 172   FAX TO [facility]: ([phone]  If you are unable to process this request in 24 hours please contact our office: [phone]    I have read the above report and request that my patient continue as recommended. I have read the above report and request that my patient continue therapy with the following changes/special instructions:____________________________________  I have read the above report and request that my patient be discharged from therapy.     Physicians signature: ______________________________Date: ______Time:______     Katia Gardner DO

## 2023-02-03 ENCOUNTER — HOSPITAL ENCOUNTER (OUTPATIENT)
Dept: PHYSICAL THERAPY | Age: 43
Discharge: HOME | End: 2023-02-03
Payer: COMMERCIAL

## 2023-02-03 PROCEDURE — 97140 MANUAL THERAPY 1/> REGIONS: CPT

## 2023-02-03 PROCEDURE — 97110 THERAPEUTIC EXERCISES: CPT

## 2023-02-03 NOTE — PROGRESS NOTES
PT DAILY TREATMENT NOTE     Patient Name: Erich Monahan  Date:2/3/2023  : 1980  [x]  Patient  Verified  Payor: Mari Corona / Plan: [place] [employer] H[place] / Product Type: HMO /    In time: 1:10  Out time:  1:58  Total Treatment Time (min): 48  Visit #: 1 of 12    Treatment Area: Neck pain [M54.2]  Other low back pain [M54.59]    SUBJECTIVE  Pain Level (0-10 scale): 7-8/10  Any medication changes, allergies to medications, adverse drug reactions, diagnosis change, or new procedure performed?: [x] No    [] Yes (see summary sheet for update)  Subjective functional status/changes:   [] No changes reported  Pt reports increased left neck pain along his middle of his neck and worse when looking down that started about 2 days after his last session. He states he had felt some soreness in his back from working out but not pain really.      OBJECTIVE    23 min Therapeutic Exercise:  [x] See flow sheet :   Rationale: increase ROM and increase strength to improve the patients ability to increase ease with ADLs    15 min Manual Therapy:  [x] See flow sheet : no upslip; no innominate rotation; MET for right t/s rotation; MET for left c/s rotation; PA mobs to t/s grade III-IV; STM c/s paraspinals in prone   Rationale: increase ROM and increase strength to improve the patients ability to increase ease with ADLs    10 minutes of heat in supine to B UTs and c/s for decreased soreness/tightness post exercises    With   [x] TE   [] TA   [] neuro   [] other: Patient Education: [x] Review HEP    [] Progressed/Changed HEP based on:   [] positioning   [] body mechanics   [] transfers   [] heat/ice application    [] other:      Other Objective/Functional Measures:   TTP left c/s paraspinals and significant left c/s and upper t/s rotation  Cavitations of ribs with t/s mobs  Slight t/s tingling with left t/s rotations using felicia  Challenged with l/s performing wall Ys  Added lat pull down for scapular strengthening  Decreased neck pain post manual and heat; educated to work on heat and gentle stretching at home over the weekend      Pain Level (0-10 scale) post treatment: 5/10    ASSESSMENT/Changes in Function: Pt continues with postural pain and rotations due to scoliosis. He demonstrates t/s kyphosis with decreased posterior kinetic chain endurance. He has c/s tightness due to posture and holding tension from anxiety/stress in neck. He needs to continue with progressing endurance of the periscapular musculature to reduce strain to the c/s for ease of lifting his daughter and performing household chores. Patient will continue to benefit from skilled PT services to modify and progress therapeutic interventions, address functional mobility deficits, address ROM deficits, address strength deficits, analyze and address soft tissue restrictions, analyze and cue movement patterns, analyze and modify body mechanics/ergonomics, assess and modify postural abnormalities, and instruct in home and community integration to attain remaining goals. [x]  See Plan of Care  [x]  See progress note/recertification  []  See Discharge Summary         Updated Goals: to be achieved in 6 weeks:  1. Patient will improve FOTO score at least 12 points (61/100) to demonstrate improvement in functional mobility. 2. Patient will report \"A little bit of difficulty\" when asked on the FOTO questionnaire, \"How much difficulty to you have performing recreational activities in which you take some force or impact? \" For improved ease of completing household chores and caring for his daughter. 3. Patient will improve his popliteal angle test to 35 degrees bilaterally to improve pelvis positioning and reduce low back pain for ease of caring for his daughter. 4. Patient will report <= 4/10 pain on average in the t/s and left c/s to improve ease of working and performing ADLs.   5. Patient will improve B lower trap strength to 4-/5 in order to increase ease of overhead reaching.     PLAN  [x]  Upgrade activities as tolerated     [x]  Continue plan of care  []  Update interventions per flow sheet       []  Discharge due to:_  []  Other:_      Bekah Alexandre PTA 2/3/2023  6:46 AM    Future Appointments   Date Time Provider Cristo Buenrostro   2/3/2023  1:00 PM Kevin Palomo [facility]   2/6/2023  1:00 PM Lindsay aGrland PT [facility]   2/10/2023  1:00 PM Vilma Marsh PTA [facility]

## 2023-02-06 ENCOUNTER — HOSPITAL ENCOUNTER (OUTPATIENT)
Dept: PHYSICAL THERAPY | Age: 43
Discharge: HOME | End: 2023-02-06
Payer: COMMERCIAL

## 2023-02-06 PROCEDURE — 97140 MANUAL THERAPY 1/> REGIONS: CPT

## 2023-02-06 PROCEDURE — 97110 THERAPEUTIC EXERCISES: CPT

## 2023-02-06 NOTE — PROGRESS NOTES
PT DAILY TREATMENT NOTE     Patient Name: Milvia Egchantelle  Date:2023  : 1980  [x]  Patient  Verified  Payor: Sunitha Manjarrez / Plan: [employer] [place] HMO / Product Type: HMO /    In time: 1:05  Out time: 1:30  Total Treatment Time (min): 25  Visit #: 2 of 12    Treatment Area: Neck pain [M54.2]  Other low back pain [M54.59]    SUBJECTIVE  Pain Level (0-10 scale): 5/10  Any medication changes, allergies to medications, adverse drug reactions, diagnosis change, or new procedure performed?: [x] No    [] Yes (see summary sheet for update)  Subjective functional status/changes:   [] No changes reported  Pt. Reports he is doing a little better today but still having increased pain in his upper back/neck    OBJECTIVE    15 min Therapeutic Exercise:  [x] See flow sheet :   Rationale: increase ROM and increase strength to improve the patients ability to perform ADLs    10 min Manual Therapy:  grade 4 thoracic PA mobs, trigger point release to rhomboids and middle traps   The manual therapy interventions were performed at a separate and distinct time from the therapeutic activities interventions. Rationale: decrease pain, increase ROM, and increase tissue extensibility to perform ADLs          With   [x] TE   [] TA   [] neuro   [] other: Patient Education: [x] Review HEP    [] Progressed/Changed HEP based on:   [] positioning   [] body mechanics   [] transfers   [] heat/ice application    [] other:      Other Objective/Functional Measures:   Pt. Was challenged with maintaining correct posture during wall lift off  He was educated on gentle active movements when using theracane at home  Continues to be challenged with prone T and W without resistance   Good pelvic alignment today    Pain Level (0-10 scale) post treatment: 4/10    ASSESSMENT/Changes in Function:  pt. Is progressing slowly towards goals. He had less pain today but it continues to fluctuate.  He continues to be limited by decreased posterior shoulder strength and decreased flexibility. He continues to report compliance with his HEP. Patient will continue to benefit from skilled PT services to modify and progress therapeutic interventions, address functional mobility deficits, address ROM deficits, address strength deficits, analyze and address soft tissue restrictions, analyze and cue movement patterns, analyze and modify body mechanics/ergonomics, and assess and modify postural abnormalities to attain remaining goals. Progress towards goals / Updated goals:  1. Patient will improve FOTO score at least 12 points (61/100) to demonstrate improvement in functional mobility. 2. Patient will report \"A little bit of difficulty\" when asked on the FOTO questionnaire, \"How much difficulty to you have performing recreational activities in which you take some force or impact? \" For improved ease of completing household chores and caring for his daughter. 3. Patient will improve his popliteal angle test to 35 degrees bilaterally to improve pelvis positioning and reduce low back pain for ease of caring for his daughter. 4. Patient will report <= 4/10 pain on average in the t/s and left c/s to improve ease of working and performing ADLs. Not met: 5/10 (2/6/23)  5. Patient will improve B lower trap strength to 4-/5 in order to increase ease of overhead reaching.     PLAN  []  Upgrade activities as tolerated     [x]  Continue plan of care  []  Update interventions per flow sheet       []  Discharge due to:_  []  Other:_      Shyla Spine, PT 2/6/2023  7:45 AM    Future Appointments   Date Time Provider Cristo Buenrostro   2/6/2023  1:00 PM Tray Lion, PT [facility]   2/10/2023  1:00 PM Karen Lance PTA [facility]

## 2023-02-10 ENCOUNTER — HOSPITAL ENCOUNTER (OUTPATIENT)
Dept: PHYSICAL THERAPY | Age: 43
Discharge: HOME | End: 2023-02-10
Payer: COMMERCIAL

## 2023-02-10 PROCEDURE — 97110 THERAPEUTIC EXERCISES: CPT

## 2023-02-10 PROCEDURE — 97140 MANUAL THERAPY 1/> REGIONS: CPT

## 2023-02-10 NOTE — PROGRESS NOTES
PT DAILY TREATMENT NOTE     Patient Name: Juan C Akers  Date:2/10/2023  : 1980  [x]  Patient  Verified  Payor: Maury Ferrer / Plan: [employer] [place] HMO / Product Type: HMO /    In time: 1:07  Out time: 1:33  Total Treatment Time (min):26  Visit #: 3 of 12    Treatment Area: Neck pain [M54.2]  Other low back pain [M54.59]    SUBJECTIVE  Pain Level (0-10 scale): 4/10 neck/back  Any medication changes, allergies to medications, adverse drug reactions, diagnosis change, or new procedure performed?: [x] No    [] Yes (see summary sheet for update)  Subjective functional status/changes:   [] No changes reported  Pt reports not too bad after his trip to [facility]. He states most tension up in his neck but his low back did pretty well. He is starting to think maybe his mattress has something to do with his pain. He also notes more anxiety lately. OBJECTIVE    16 min Therapeutic Exercise:  [x] See flow sheet :   Rationale: increase ROM and increase strength to improve the patients ability to perform ADLs    10 min Manual Therapy:  no upslip; MET for right AI; shotgun technique; MET for right t/s rotation; PA mobs grade III-IV to t/s; MET for left c/s rotation   The manual therapy interventions were performed at a separate and distinct time from the therapeutic activities interventions.   Rationale: decrease pain, increase ROM, and increase tissue extensibility to perform ADLs          With   [x] TE   [] TA   [] neuro   [] other: Patient Education: [x] Review HEP    [] Progressed/Changed HEP based on:   [] positioning   [] body mechanics   [] transfers   [] heat/ice application    [] other:      Other Objective/Functional Measures:   Good tolerance to all exercises  Slight increase in left scalene tightness post shoulder exercises  Educated on working on diaphragmatic breathing with exercise to reduce c/s tension  Pt to discuss with psych about medication for management of anxiety as he has been on same meds for a long time but feels more anxious recently  Progressed compound exercises with core and shoulder or core and back exercises and will start to develop program for pt to work on at home    Pain Level (0-10 scale) post treatment: 3/10    ASSESSMENT/Changes in Function:  Pt progressing with reducing l/s pain and t/s pain. He has flattening of his t/s kyphosis but needs to work on endurance to maintain. 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 Will continue to progress mobility and postural endurance for decreased pain performing ADLs and household chores. Patient will continue to benefit from skilled PT services to modify and progress therapeutic interventions, address functional mobility deficits, address ROM deficits, address strength deficits, analyze and address soft tissue restrictions, analyze and cue movement patterns, analyze and modify body mechanics/ergonomics, and assess and modify postural abnormalities to attain remaining goals. Progress towards goals / Updated goals:  1. Patient will improve FOTO score at least 12 points (61/100) to demonstrate improvement in functional mobility. 2. Patient will report \"A little bit of difficulty\" when asked on the FOTO questionnaire, \"How much difficulty to you have performing recreational activities in which you take some force or impact? \" For improved ease of completing household chores and caring for his daughter. 3. Patient will improve his popliteal angle test to 35 degrees bilaterally to improve pelvis positioning and reduce low back pain for ease of caring for his daughter. 4. Patient will report <= 4/10 pain on average in the t/s and left c/s to improve ease of working and performing ADLs. Not met: 5/10 (2/6/23)  5. Patient will improve B lower trap strength to 4-/5 in order to increase ease of overhead reaching.     PLAN  [x]  Upgrade activities as tolerated     [x] Continue plan of care  []  Update interventions per flow sheet       []  Discharge due to:_  []  Other:_      Edgar Woodard PTA 2/10/2023  7:45 AM    Future Appointments   Date Time Provider Cristo Buenrostro   2/10/2023  1:00 PM Harish Akbar [facility]

## 2023-02-20 ENCOUNTER — HOSPITAL ENCOUNTER (OUTPATIENT)
Facility: HOSPITAL | Age: 43
Setting detail: RECURRING SERIES
Discharge: HOME | End: 2023-02-23
Payer: COMMERCIAL

## 2023-02-20 PROCEDURE — 97140 MANUAL THERAPY 1/> REGIONS: CPT

## 2023-02-20 PROCEDURE — 97110 THERAPEUTIC EXERCISES: CPT

## 2023-02-20 NOTE — PROGRESS NOTES
PHYSICAL / OCCUPATIONAL THERAPY - DAILY TREATMENT NOTE (updated )    Patient Name: Roxanna Carrel    Date: 2023    : 1980  Insurance: Payor: Loy Arango / Plan: OPTIMA HMO / Product Type: *No Product type* /      Patient  verified Yes     Visit #   Current / Total 4 12   Time   In / Out 3:01 3:55   Pain   In / Out 4/10 c/s; 3/10 l/s 3/10   Subjective Functional Status/Changes: Pt reports his back hasn't been bothering him too much but his neck has been super sore. He states he tends to look really close at his computer and holds his arm up to type but doesn't have eye issues. Changes to:  Meds, Allergies, Med Hx, Sx Hx? If yes, update Summary List no       TREATMENT AREA =  Neck pain [M54.2]  Other low back pain [M54.59]    OBJECTIVE    Therapeutic Procedures: Tx Min Billable or 1:1 Min (if diff from Tx Min) Procedure, Rationale, Specifics   34  13871 Therapeutic Exercise (timed):  increase ROM, strength, coordination, balance, and proprioception to improve patient's ability to progress to PLOF and address remaining functional goals. (see flow sheet as applicable)     Details if applicable:       20  90233 Manual Therapy (timed):  decrease pain, increase ROM, and increase tissue extensibility to improve patient's ability to progress to PLOF and address remaining functional goals. The manual therapy interventions were performed at a separate and distinct time from the therapeutic activities interventions .  (see flow sheet as applicable)     Details if applicable:  leg lengthening for left upslip; shotgun technique; MET for left l/s rotation; MET for right t/s rotation; PA mobs t/s; left first rib mobs; STM B UTs and scalenes          Details if applicable:            Details if applicable:            Details if applicable:       [facility] BC Totals Reminder: bill using total billable min of TIMED therapeutic procedures (example: do not include dry needle or estim unattended, both untimed codes, in totals to left)  8-22 min = 1 unit; 23-37 min = 2 units; 38-52 min = 3 units; 53-67 min = 4 units; 68-82 min = 5 units   Total Total     [x]  Patient Education billed concurrently with other procedures   [x] Review HEP    [] Progressed/Changed HEP, detail:    [] Other detail:       Objective Information/Functional Measures/Assessment  TTP B UTs and levator scapulae  Cues to reduce rib flare  Numbness in B feet with legs elevated  Education on correction posture with computer at home to reduce c/s hypertonicity  Education on progressing towards final HEP    Pt continues to present with left>right c/s tightness and restrictions but was found to continuously hold head and shoulders forward working on computer at home. He has increased tension in the c/s with stress and anxiety. He has had decrease in back symptoms and mid back symptoms. Will progress towards updated HEP with progression towards D/C once independent. Patient will continue to benefit from skilled PT / OT services to modify and progress therapeutic interventions, analyze and address functional mobility deficits, analyze and address ROM deficits, analyze and address strength deficits, analyze and address soft tissue restrictions, analyze and cue for proper movement patterns, analyze and modify for postural abnormalities, analyze and address imbalance/dizziness, and instruct in home and community integration to address functional deficits and attain remaining goals. Progress toward goals / Updated goals:  [x]  See Progress Note/Recertification  1. Patient will improve FOTO score at least 12 points (61/100) to demonstrate improvement in functional mobility. 2. Patient will report \"A little bit of difficulty\" when asked on the FOTO questionnaire, \"How much difficulty to you have performing recreational activities in which you take some force or impact? \" For improved ease of completing household chores and caring for his daughter.    3. Patient will improve his popliteal angle test to 35 degrees bilaterally to improve pelvis positioning and reduce low back pain for ease of caring for his daughter. 4. Patient will report <= 4/10 pain on average in the t/s and left c/s to improve ease of working and performing ADLs. Not met: 5/10 (2/6/23)  5. Patient will improve B lower trap strength to 4-/5 in order to increase ease of overhead reaching.     PLAN  Yes  Continue plan of care  []  Upgrade activities as tolerated  []  Discharge due to :  []  Other:    Angelina Franco PTA    2/20/2023    4:20 PM    Future Appointments   Date Time Provider Juan Tinajero   2/24/2023  2:30 PM Angelina Franco PTA [facility]   2/27/2023  2:00 PM Angelina Franco PTA [facility]   3/3/2023  1:00 PM Angelina Franco PTA [facility]   3/15/2023  1:00 PM DO DIEGO Georges BS AMB   4/19/2023 11:00 AM Re Gaytan MD [facility] BS AMB

## 2023-02-24 ENCOUNTER — HOSPITAL ENCOUNTER (OUTPATIENT)
Facility: HOSPITAL | Age: 43
Setting detail: RECURRING SERIES
End: 2023-02-24
Payer: COMMERCIAL

## 2023-02-24 NOTE — PROGRESS NOTES
PHYSICAL / OCCUPATIONAL THERAPY - DAILY TREATMENT NOTE (updated )    Patient Name: Cristina Altman    Date: 2023    : 1980  Insurance: Payor: Shae Abel / Plan: [employer]IM[employer] HMO / Product Type: *No Product type* /      Patient  verified Yes     Visit #   Current / Total 5 12   Time   In / Out     Pain   In / Out     Subjective Functional Status/Changes:    Changes to:  Meds, Allergies, Med Hx, Sx Hx? If yes, update Summary List no       TREATMENT AREA =  Neck pain [M54.2]  Other low back pain [M54.59]    OBJECTIVE    Therapeutic Procedures: Tx Min Billable or 1:1 Min (if diff from Tx Min) Procedure, Rationale, Specifics     60813 Therapeutic Exercise (timed):  increase ROM, strength, coordination, balance, and proprioception to improve patient's ability to progress to PLOF and address remaining functional goals. (see flow sheet as applicable)     Details if applicable:         23724 Manual Therapy (timed):  decrease pain, increase ROM, and increase tissue extensibility to improve patient's ability to progress to PLOF and address remaining functional goals. The manual therapy interventions were performed at a separate and distinct time from the therapeutic activities interventions .  (see flow sheet as applicable)     Details if applicable:     [facility] BC Totals Reminder: bill using total billable min of TIMED therapeutic procedures (example: do not include dry needle or estim unattended, both untimed codes, in totals to left)  8-22 min = 1 unit; 23-37 min = 2 units; 38-52 min = 3 units; 53-67 min = 4 units; 68-82 min = 5 units   Total Total     [x]  Patient Education billed concurrently with other procedures   [x] Review HEP    [] Progressed/Changed HEP, detail:    [] Other detail:       Objective Information/Functional Measures/Assessment        Patient will continue to benefit from skilled PT / OT services to modify and progress therapeutic interventions, analyze and address functional mobility deficits, analyze and address ROM deficits, analyze and address strength deficits, analyze and address soft tissue restrictions, analyze and cue for proper movement patterns, analyze and modify for postural abnormalities, analyze and address imbalance/dizziness, and instruct in home and community integration to address functional deficits and attain remaining goals. Progress toward goals / Updated goals:  [x]  See Progress Note/Recertification  1. Patient will improve FOTO score at least 12 points (61/100) to demonstrate improvement in functional mobility. 2. Patient will report \"A little bit of difficulty\" when asked on the FOTO questionnaire, \"How much difficulty to you have performing recreational activities in which you take some force or impact? \" For improved ease of completing household chores and caring for his daughter. 3. Patient will improve his popliteal angle test to 35 degrees bilaterally to improve pelvis positioning and reduce low back pain for ease of caring for his daughter. 4. Patient will report <= 4/10 pain on average in the t/s and left c/s to improve ease of working and performing ADLs. Not met: 5/10 (2/6/23)  5. Patient will improve B lower trap strength to 4-/5 in order to increase ease of overhead reaching.     PLAN  Yes  Continue plan of care  []  Upgrade activities as tolerated  []  Discharge due to :  []  Other:    Kesha Urrutia PTA    2/24/2023    12:43 PM    Future Appointments   Date Time Provider Juan Tinajero   2/24/2023  2:30 PM Kesha Urrutia PTA [facility]   2/27/2023  2:00 PM Kesha Urrutia PTA [facility]   3/3/2023  1:00 PM Kesha Urrutia PTA [facility]   3/15/2023  1:00 PM DO DIEGO Cosby BS AMB   4/19/2023 11:00 AM Cisco Booker MD [facility] BS AMB

## 2023-02-27 ENCOUNTER — HOSPITAL ENCOUNTER (OUTPATIENT)
Facility: HOSPITAL | Age: 43
Setting detail: RECURRING SERIES
Discharge: HOME | End: 2023-03-02
Payer: COMMERCIAL

## 2023-02-27 PROCEDURE — 97110 THERAPEUTIC EXERCISES: CPT

## 2023-02-27 PROCEDURE — 97140 MANUAL THERAPY 1/> REGIONS: CPT

## 2023-02-27 NOTE — PROGRESS NOTES
PHYSICAL / OCCUPATIONAL THERAPY - DAILY TREATMENT NOTE (updated )    Patient Name: Sully Arango    Date: 2023    : 1980  Insurance: Payor: Omar Escalante / Plan: OPTIM[employer] HMO / Product Type: *No Product type* /      Patient  verified Yes     Visit #   Current / Total 5 12   Time   In / Out 2:07 2:35   Pain   In / Out 6/10 c/s 5/10 l/s /10   Subjective Functional Status/Changes: Pt reports more tension in his neck but has been on the computer a lot and looking down at baseball cards. Changes to:  Meds, Allergies, Med Hx, Sx Hx? If yes, update Summary List no       TREATMENT AREA =  Neck pain [M54.2]  Other low back pain [M54.59]    OBJECTIVE    Therapeutic Procedures: Tx Min Billable or 1:1 Min (if diff from Tx Min) Procedure, Rationale, Specifics   20  20618 Therapeutic Exercise (timed):  increase ROM, strength, coordination, balance, and proprioception to improve patient's ability to progress to PLOF and address remaining functional goals. (see flow sheet as applicable)     Details if applicable:       8  12046 Manual Therapy (timed):  decrease pain, increase ROM, and increase tissue extensibility to improve patient's ability to progress to PLOF and address remaining functional goals. The manual therapy interventions were performed at a separate and distinct time from the therapeutic activities interventions .  (see flow sheet as applicable)     Details if applicable:  leg lengthening for left upslip; shotgun technique; MET for left l/s rotation; MET for right t/s rotation; PA mobs t/s; left first rib mobs          Details if applicable:            Details if applicable:            Details if applicable:       [facility] Totals Reminder: bill using total billable min of TIMED therapeutic procedures (example: do not include dry needle or estim unattended, both untimed codes, in totals to left)  8-22 min = 1 unit; 23-37 min = 2 units; 38-52 min = 3 units; 53-67 min = 4 units; 68-82 min = 5 units Total Total     [x]  Patient Education billed concurrently with other procedures   [x] Review HEP    [] Progressed/Changed HEP, detail:    [] Other detail:       Objective Information/Functional Measures/Assessment  SCM restrictions  Educated on posture to reduce forward head or prolonged looking down  Decreased ability to lay supine without increased neck tension from more neutral spinal posture  90/90 position to reduce l/s extension in supine  Decreased pain by end of session    Pt continues to have fluctuating pain levels due to forward head and shoulder posture when working on hobbies and working on the computer. His l/s and t/s pain has lessened. He has a lot of hypertonicity in the SCM, c/s paraspinals and suboccipitals contributing to ongoing pain symptoms. Will progress towards D/C with updated HEP next session. Patient will continue to benefit from skilled PT / OT services to modify and progress therapeutic interventions, analyze and address functional mobility deficits, analyze and address ROM deficits, analyze and address strength deficits, analyze and address soft tissue restrictions, analyze and cue for proper movement patterns, analyze and modify for postural abnormalities, analyze and address imbalance/dizziness, and instruct in home and community integration to address functional deficits and attain remaining goals. Progress toward goals / Updated goals:  [x]  See Progress Note/Recertification  1. Patient will improve FOTO score at least 12 points (61/100) to demonstrate improvement in functional mobility. 2. Patient will report \"A little bit of difficulty\" when asked on the FOTO questionnaire, \"How much difficulty to you have performing recreational activities in which you take some force or impact? \" For improved ease of completing household chores and caring for his daughter.    3. Patient will improve his popliteal angle test to 35 degrees bilaterally to improve pelvis positioning and reduce low back pain for ease of caring for his daughter. 4. Patient will report <= 4/10 pain on average in the t/s and left c/s to improve ease of working and performing ADLs. Not met: 5/10 (2/6/23)  5. Patient will improve B lower trap strength to 4-/5 in order to increase ease of overhead reaching.     PLAN  Yes  Continue plan of care  []  Upgrade activities as tolerated  []  Discharge due to :  []  Other:    Josie Obregon PTA    2/27/2023    12:22 PM    Future Appointments   Date Time Provider Juan Tinajero   2/27/2023  2:00 PM [facility] [address]   3/3/2023  1:00 PM Josie Obregon PTA [facility] [address]   3/15/2023  1:00 PM DO DIEGO Aguayo BS AMB   4/19/2023 11:00 AM Kelsey Gleason MD [facility] BS AMB

## 2023-03-03 ENCOUNTER — HOSPITAL ENCOUNTER (OUTPATIENT)
Facility: HOSPITAL | Age: 43
Setting detail: RECURRING SERIES
Discharge: HOME | End: 2023-03-06
Payer: COMMERCIAL

## 2023-03-03 PROCEDURE — 97140 MANUAL THERAPY 1/> REGIONS: CPT

## 2023-03-03 PROCEDURE — 97110 THERAPEUTIC EXERCISES: CPT

## 2023-03-03 NOTE — PROGRESS NOTES
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 He states his mid back in general is feeling better. He is understanding of D/C next visit and importance of independence with strengthening, posture and discipline to complete. Changes to:  Meds, Allergies, Med Hx, Sx Hx? If yes, update Summary List no       TREATMENT AREA =  Neck pain [M54.2]  Other low back pain [M54.59]    OBJECTIVE    Therapeutic Procedures: Tx Min Billable or 1:1 Min (if diff from Tx Min) Procedure, Rationale, Specifics   10  31655 Therapeutic Exercise (timed):  increase ROM, strength, coordination, balance, and proprioception to improve patient's ability to progress to PLOF and address remaining functional goals. (see flow sheet as applicable)     Details if applicable:  reviewed new HEP and pt requesting strengthening HEP to be supplied and reviewed next visit; FOTO during exercises     22  36227 Manual Therapy (timed):  decrease pain, increase ROM, and increase tissue extensibility to improve patient's ability to progress to PLOF and address remaining functional goals. The manual therapy interventions were performed at a separate and distinct time from the therapeutic activities interventions .  (see flow sheet as applicable)     Details if applicable: leg lengthening for left upslip; MET right AI; shotgun technique; MET for left/left sacral torsion; MET for left L3-L5 rotation; MET for right t/s ~T7-T12; PA mobs grade III-IV t/s; MET left c/s rotation; MET left first rib elevation      BC Totals Reminder: bill using total billable min of TIMED therapeutic procedures (example: do not include dry needle or estim unattended, both untimed codes, in totals to left)  8-22 min = 1 unit; 23-37 min = 2 units; 38-52 min = 3 units; 53-67 min = 4 units; 68-82 min = 5 units   Total Total     [x]  Patient Education billed concurrently with other procedures   [x] Review HEP    [] Progressed/Changed HEP, detail:    [] Other detail:       Objective Information/Functional Measures/Assessment  FOTO: 53/100  Popliteal angle test: left 40 degrees right 40 degrees  Pain average: 5/10   Lower trap MMT: left 3+/5 right 3+/5  Initiated stability, posture HEP  Pt requested strength HEP to be provided next session; has bands at home already    See Physician Update. Patient will continue to benefit from skilled PT / OT services to modify and progress therapeutic interventions, analyze and address functional mobility deficits, analyze and address ROM deficits, analyze and address strength deficits, analyze and address soft tissue restrictions, analyze and cue for proper movement patterns, analyze and modify for postural abnormalities, analyze and address imbalance/dizziness, and instruct in home and community integration to address functional deficits and attain remaining goals. Progress toward goals / Updated goals:  [x]  See Progress Note/Recertification  1. Patient will improve FOTO score at least 12 points (61/100) to demonstrate improvement in functional mobility. Not met  2. Patient will report \"A little bit of difficulty\" when asked on the FOTO questionnaire, \"How much difficulty to you have performing recreational activities in which you take some force or impact? \" For improved ease of completing household chores and caring for his daughter. Not met  3. Patient will improve his popliteal angle test to 35 degrees bilaterally to improve pelvis positioning and reduce low back pain for ease of caring for his daughter. Progressed 40 degrees  4. Patient will report <= 4/10 pain on average in the t/s and left c/s to improve ease of working and performing ADLs. Not met: 5/10 (2/6/23)  5. Patient will improve B lower trap strength to 4-/5 in order to increase ease of overhead reaching.   Not met 3+/5    PLAN  Yes  Continue plan of care  []  Upgrade activities as tolerated  []  Discharge due to :  []  Other:    Ky Stevenson PTA    3/3/2023    8:44 AM    Future Appointments   Date Time Provider Juan Tinajero   3/3/2023  1:00 PM Ky Stevenson PTA [facility] [address]   3/7/2023 11:30 AM Mode May PT [facility] [address]   3/15/2023  1:00 PM DO DEIGO Kennedy BS AMB   4/19/2023 11:00 AM Mack Valentino MD [facility] BS AMB

## 2023-03-06 NOTE — PROGRESS NOTES
[facility] - [facility]  [address]  [phone] fax    Physician Update  [x] Progress Note  [x] Discharge Summary  Patient name: Joe Winkler Start of Care: 10/7/2022   Referral source: Rafiq Chacko DO : 1980   Medical/Treatment Diagnosis: Neck pain [M54.2]  Other low back pain [M54.59]  Payor: OPTIMA / Plan: [employer] HMO / Product Type: *No Product type* /  Onset Date:chronic with worsening symptoms over the past few years     Prior Hospitalization: see medical history Provider#: [id number]   Medications: Verified on Patient Summary List    Comorbidities: tobacco use, anxiety, depression, IBS, chronic fatigue  Prior Level of Function: Ind with ADLs, working in real estate  Visits from [facility]: 27    Missed Visits: 5    Status at Evaluation/Last Progress Note: Mr. Osvaldo Perdomo had a gap in therapy awaiting insurance authorization. He continues to complain of increased t/s pain but improves with mobilizations and postural correction. He recently had a MRI of his c/s due to increased right UE heaviness and a mild disc bulge was found more pronounced on the right at the C5-C6 region. He has been more compliant with stretching in the last few weeks but needs to work on postural endurance to reduce t/s kyphosis and forward head/shoulder posture. His popliteal angle test improved to 43 degrees on the right and 50 degrees on the left. His B lower traps remain limited in MMT at 3+/5. His FOTO was 47/100 indicating ongoing functional limitations. Skilled PT is medically necessary to progress pt understanding of strengthening convexities and stretching concavities of his curvatures while stabilizing his postural muscles and improving endurance to reduce kyphotic curve for decreased pain with work, caring for his daughter and performing household chores and ADLs. Progress towards Goals:   1.  Patient will improve FOTO score at least 12 points (61/100) to demonstrate improvement in functional mobility. Not met 53/100  2. Patient will report \"A little bit of difficulty\" when asked on the FOTO questionnaire, \"How much difficulty to you have performing recreational activities in which you take some force or impact? \" For improved ease of completing household chores and caring for his daughter. Not met \"moderate difficulty\"  3. Patient will improve his popliteal angle test to 35 degrees bilaterally to improve pelvis positioning and reduce low back pain for ease of caring for his daughter. Progressed 40 degrees  4. Patient will report <= 4/10 pain on average in the t/s and left c/s to improve ease of working and performing ADLs. Not met: 5/10  5. Patient will improve B lower trap strength to 4-/5 in order to increase ease of overhead reaching. Not met 3+/5    Goals: to be achieved in 1 visit:  Patient will be compliant and understanding of final HEP for postural strengthening/endurance to reduce pain symptoms when caring for his daughter and working on the computer. ASSESSMENT/RECOMMENDATIONS: Mr. Marilu Alba has had a plateau in progress with therapy due to decreased discipline and compliance with working on his home program. He has good pain relief benefits with manual techniques to correct rotations and alignment but needs to be consistent with strengthening and stretching at home to maintain corrections. He continues to have increased left c/s tightness consistent with his MRI results of congenital fusions and along the concavities of his curvatures. He is anxious with c/s stretching due to symptoms of dizziness with changes in head position causing ongoing left SCM and scalene hypertonicity. He has been heavily educated on smoking cessation as pt is fearful of strokes. He improved his popliteal angle test to 40 degrees bilaterally. His lower trap strength remains reduced at 3+/5.  He has continued t/s kyphosis and forward head but has also been educated on continued postural awareness when working on the computer. At this time the patient will benefit from one final session to review an updated strengthening program to aide in postural endurance and awareness and then D/C due to plateau in progress. [x]Continue therapy per initial plan/protocol at a frequency of  1 x per week for 1 visit  []Continue therapy with the following recommended changes:_____________________      _____________________________________________________________________  []Discontinue therapy progressing towards or have reached established goals  []Discontinue therapy due to lack of appreciable progress towards goals  []Discontinue therapy due to lack of attendance or compliance  []Await Physician's recommendations/decisions regarding therapy  []Other:________________________________________________________________    Thank you for this referral.   Zacarias Rodrigues, PTA 3/6/2023 9:24 AM  NOTE TO PHYSICIAN:  Nadia Harris [address]   [facility]: [phone]  If you are unable to process this request in 24 hours please contact our office: [phone]    [x]  I have read the above report and request that my patient continue as recommended. I have read the above report and request that my patient continue therapy with the following changes/special instructions:____________________________________  I have read the above report and request that my patient be discharged from therapy.     [de-identified] Signature:____________Date:_________TIME:________     Meka Montoya DO  ** Signature, Date and Time must be completed for valid certification **

## 2023-03-07 ENCOUNTER — TELEPHONE (OUTPATIENT)
Facility: HOSPITAL | Age: 43
End: 2023-03-07

## 2023-03-07 NOTE — TELEPHONE ENCOUNTER
called about missed appointment. left message to re-schedule last appointment or he can just come  his HEP.

## 2023-03-10 ENCOUNTER — HOSPITAL ENCOUNTER (OUTPATIENT)
Facility: HOSPITAL | Age: 43
Setting detail: RECURRING SERIES
Discharge: HOME | End: 2023-03-13
Payer: COMMERCIAL

## 2023-03-10 PROCEDURE — 97110 THERAPEUTIC EXERCISES: CPT

## 2023-03-10 PROCEDURE — 97140 MANUAL THERAPY 1/> REGIONS: CPT

## 2023-03-10 NOTE — PROGRESS NOTES
PHYSICAL / OCCUPATIONAL THERAPY - DAILY TREATMENT NOTE (updated )    Patient Name: Jessica Valverde    Date: 3/10/2023    : 1980  Insurance: Payor: Alonso Estelita / Plan: OPTIMA HMO / Product Type: *No Product type* /      Patient  verified Yes     Visit #   Current / Total 1 1   Time   In / Out 1:30 2:00   Pain   In / Out 6/10 6/10   Subjective Functional Status/Changes: Pt reports mainly the left neck is bothering him today. He reports less pain in his low back and mid back. Changes to:  Meds, Allergies, Med Hx, Sx Hx? If yes, update Summary List no       TREATMENT AREA =  Neck pain [M54.2]  Other low back pain [M54.59]    OBJECTIVE    Therapeutic Procedures: Tx Min Billable or 1:1 Min (if diff from Tx Min) Procedure, Rationale, Specifics   15  19268 Therapeutic Exercise (timed):  increase ROM, strength, coordination, balance, and proprioception to improve patient's ability to progress to PLOF and address remaining functional goals. (see flow sheet as applicable)     Details if applicable:      15  73321 Manual Therapy (timed):  decrease pain, increase ROM, and increase tissue extensibility to improve patient's ability to progress to PLOF and address remaining functional goals. The manual therapy interventions were performed at a separate and distinct time from the therapeutic activities interventions .  (see flow sheet as applicable)     Details if applicable: leg lengthening for left upslip; MET right AI; shotgun technique; MET for left/left sacral torsion; MET for left L3-L5 rotation; MET for right t/s ~T7-T12; PA mobs grade III-IV t/s; MET left c/s rotation; MET left first rib elevation    TPR B posterior scalenes   30  [facility] Totals Reminder: bill using total billable min of TIMED therapeutic procedures (example: do not include dry needle or estim unattended, both untimed codes, in totals to left)  8-22 min = 1 unit; 23-37 min = 2 units; 38-52 min = 3 units; 53-67 min = 4 units; 68-82 min = 5 units   Total Total     [x]  Patient Education billed concurrently with other procedures   [x] Review HEP    [] Progressed/Changed HEP, detail:    [] Other detail:       Objective Information/Functional Measures/Assessment    Initiated final HEP  Pt understanding of self control with posture and need to make MD follow ups for other health concerns regarding dizziness with changes in c/s position  Improved t/s mobility with ongoing right rotation due to scoliosis  Continued elevated left first rib with hypertonic left scalenes and levator scapulae    See Discharge Summary. Progress toward goals / Updated goals:  [x]  See Progress Note/Recertification  Patient will be compliant and understanding of final HEP for postural strengthening/endurance to reduce pain symptoms when caring for his daughter and working on the computer. MET    PLAN  No  Continue plan of care  []  Upgrade activities as tolerated  [x]  Discharge due to : Independent with final HEP.   []  Other:    Sarah Denise PTA    3/10/2023    9:05 AM    Future Appointments   Date Time Provider Juan Tinajero   3/10/2023  1:30 PM Sarah Denise PTA [facility] [address]   3/15/2023  1:00 PM DO DIEGO Molina BS AMB   4/19/2023 11:00 AM Tai Hi MD [facility] BS AMB

## 2023-03-16 ENCOUNTER — OFFICE VISIT (OUTPATIENT)
Age: 43
End: 2023-03-16

## 2023-03-16 VITALS — WEIGHT: 140 LBS | BODY MASS INDEX: 22.6 KG/M2

## 2023-03-16 DIAGNOSIS — S46.911D MUSCLE STRAIN OF RIGHT SCAPULAR REGION, SUBSEQUENT ENCOUNTER: ICD-10-CM

## 2023-03-16 DIAGNOSIS — M51.36 DDD (DEGENERATIVE DISC DISEASE), LUMBAR: ICD-10-CM

## 2023-03-16 DIAGNOSIS — M99.07 UPPER EXTREMITY SOMATIC DYSFUNCTION: ICD-10-CM

## 2023-03-16 DIAGNOSIS — M99.01 CERVICAL SOMATIC DYSFUNCTION: ICD-10-CM

## 2023-03-16 DIAGNOSIS — M51.34 DDD (DEGENERATIVE DISC DISEASE), THORACIC: ICD-10-CM

## 2023-03-16 DIAGNOSIS — M99.06 LOWER LIMB REGION SOMATIC DYSFUNCTION: ICD-10-CM

## 2023-03-16 DIAGNOSIS — M99.05 PELVIC SOMATIC DYSFUNCTION: ICD-10-CM

## 2023-03-16 DIAGNOSIS — M99.02 THORACIC REGION SOMATIC DYSFUNCTION: ICD-10-CM

## 2023-03-16 DIAGNOSIS — M99.08 RIB CAGE REGION SOMATIC DYSFUNCTION: ICD-10-CM

## 2023-03-16 DIAGNOSIS — M99.03 LUMBAR REGION SOMATIC DYSFUNCTION: ICD-10-CM

## 2023-03-16 DIAGNOSIS — M99.09 SOMATIC DYSFUNCTION OF ABDOMINAL REGION: ICD-10-CM

## 2023-03-16 DIAGNOSIS — M99.04 SACRAL REGION SOMATIC DYSFUNCTION: ICD-10-CM

## 2023-03-16 DIAGNOSIS — M50.30 DDD (DEGENERATIVE DISC DISEASE), CERVICAL: Primary | ICD-10-CM

## 2023-03-16 RX ORDER — MELOXICAM 15 MG/1
15 TABLET ORAL
Qty: 90 TABLET | Refills: 0 | Status: SHIPPED | OUTPATIENT
Start: 2023-03-16

## 2023-03-16 RX ORDER — OMEPRAZOLE 20 MG/1
40 CAPSULE, DELAYED RELEASE ORAL DAILY
COMMUNITY

## 2023-03-16 NOTE — PROGRESS NOTES
HISTORY OF PRESENT ILLNESS    Valery Hartley 1980 is a 38y.o. year old male comes in today to be evaluated and treated for: neck, back pain    Since last appt has noticed pain in back much better but now neck with worse after finished PT last week. Pain level 6/10. Using mobic with minimal benefit. HEP is maybe 40% of the time. IMAGING: XR whole spine 8/6/2020  IMPRESSION:  1. S-shaped thoracolumbar scoliosis. No significant coronal or sagittal  balance. 2.  Exaggerated thoracic kyphosis. Mild left-sided upward pelvic tilt. 3.  Mild to moderate degenerative changes above. Partial C6/C7 congenital  fusion. 4.  Transitional anatomy with 13 thoracic type vertebral bodies.      MRI thoracic 8/6/2020  IMPRESSION:  Mild rotoscoliosis convexity to the right  Minimal kyphotic angulation  Incidental hiatal hernia       Past Surgical History:   Procedure Laterality Date    HEENT  10/08 & 09/09    Reformed jaw bone, bone graft    HEENT      plastic surgery on both ears    HEENT      corrective hearing for left ear     IMPLANTATION PT-ACTIVATED CARDIAC EVENT RECORDER N/A 10/24/2018    Loop Recorder Insert performed by David Amaro MD at [facility] CATH LAB    KIDNEY BIOPSY      MN APPENDECTOMY  11/23/2010    TONSILLECTOMY       Social History     Socioeconomic History    Marital status: Single     Spouse name: None    Number of children: None    Years of education: None    Highest education level: None   Tobacco Use    Smoking status: Every Day     Packs/day: 1.00     Types: Cigarettes    Smokeless tobacco: Never   Substance and Sexual Activity    Alcohol use: No     Alcohol/week: 0.0 standard drinks    Drug use: No     Current Outpatient Medications   Medication Sig Dispense Refill    omeprazole (PRILOSEC) 20 MG delayed release capsule Take 40 mg by mouth daily      Multiple Vitamin (MULTIVITAMIN PO) Take 1 tablet by mouth daily      acetaminophen (TYLENOL) 500 MG tablet Take 500 mg by mouth every 6 hours as needed      ALPRAZolam (XANAX) 0.5 MG tablet Take 0.5 mg by mouth 2 times daily. ascorbic acid (VITAMIN C) 500 MG tablet Take 1,000 mg by mouth daily      Cholecalciferol 50 MCG (2000 UT) TABS Take 1 tablet by mouth daily      diclofenac sodium (VOLTAREN) 1 % GEL Apply 4 g topically 4 times daily      escitalopram (LEXAPRO) 10 MG tablet TAKE 1 IN AM AND 1/2 AT BEDTIME      ibuprofen (ADVIL;MOTRIN) 200 MG tablet Take by mouth      meloxicam (MOBIC) 15 MG tablet Take 1 tab daily as needed pain with food. No current facility-administered medications for this visit. Past Medical History:   Diagnosis Date    Agatston CAC score 100-199 02/04/2015    Coronary calcium score 0. Anxiety     Arm fatigue     Forearms    Balance problems     Chronic cough     chronically coughing up sputum    Depression     Dizziness     Dyspnea     Esophageal reflux     Headache     History of echocardiogram 10/29/2014    EF 55-60%. No RWMA. Normal diastolic fx. RVSP 30 mmHg. Holter monitor, abnormal 10/29/2014    Normal Holter study. Hypercholesterolemia     Lumbar pain     Myofascial pain     Neck pain     Numbness and tingling     PAC (premature atrial contraction)     Palpitations     presumably benign    Panic disorder     Reflux     Thoracic back pain     Mid-thoracic    Upper extremity weakness     Vertigo, intermittent      Family History   Problem Relation Age of Onset    Hypertension Father     Hypertension Mother     Liver Disease Father          ROS:  No incont, fever, rare tingle left 4/5 finger    Objective: Wt 140 lb (63.5 kg)   BMI 22.60 kg/m²   NEURO:  Sensation intact to light touch. Reflexes +2/4 biceps, triceps, patellar, and Achilles bilaterally. M/S:  Examined seated and supine. Slump/Spurling negative. Standing flexion test positive left  Sphinx test positive left.   ASIS low left  Iliac crests equal bilaterally Pubes equal bilaterally Medial malleolus low left  Sacral base posterior left  KATERYNA low right  TTA at C3 on left worse flexion, T2, 3, 4 on left worse flexion, and L3, 4 on left worse flexion  Rib(s) 2, 3 left TTP and posterior LE Strength +5/5 bilaterally Thoracic diaphragm restricted left. Scapula motion restricted w/ TTA left. Hip flexion limited right. TTP serratus left  No leg length difference. Assessment/Plan:    Diagnosis Orders   1. DDD (degenerative disc disease), cervical  meloxicam (MOBIC) 15 MG tablet      2. DDD (degenerative disc disease), thoracic  meloxicam (MOBIC) 15 MG tablet      3. DDD (degenerative disc disease), lumbar  meloxicam (MOBIC) 15 MG tablet      4. Muscle strain of right scapular region, subsequent encounter  meloxicam (MOBIC) 15 MG tablet      5. Lumbar region somatic dysfunction  [employer] OSTEOPATHIC MANIPULATIVE TX 9-10 BODY REGIONS      6. Pelvic somatic dysfunction  [employer] OSTEOPATHIC MANIPULATIVE TX 9-10 BODY REGIONS      7. Sacral region somatic dysfunction  [employer] OSTEOPATHIC MANIPULATIVE TX 9-10 BODY REGIONS      8. Thoracic region somatic dysfunction  AZ OSTEOPATHIC MANIPULATIVE TX 9-10 BODY REGIONS      9. Rib cage region somatic dysfunction  AZ OSTEOPATHIC MANIPULATIVE TX 9-10 BODY REGIONS      10. Cervical somatic dysfunction  AZ OSTEOPATHIC MANIPULATIVE TX 9-10 BODY REGIONS      11. Upper extremity somatic dysfunction  AZ OSTEOPATHIC MANIPULATIVE TX 9-10 BODY REGIONS      12. Lower limb region somatic dysfunction  AZ OSTEOPATHIC MANIPULATIVE TX 9-10 BODY REGIONS      13. Somatic dysfunction of abdominal region  AZ OSTEOPATHIC MANIPULATIVE TX 9-10 BODY REGIONS        Patient (or guardian if minor) verbalizes understanding of evaluation and plan. Verbal consent obtained. Cervical, Thoracic, Rib, Lumbar, Pelvic, Sacral, Upper Ext, Lower Ext, and Abdominal SD treated with Myofascial and ME. Correction of previous malalignments verified after Tx. Pt tolerated well. Notes improvement of Sx and pain is now rated 1/10. HEP/stretches daily.  Discussed stretching/strengthening/posture. Will continue HEP and Rx for mobic  as above and plan follow-up as needed. Total time spent on encounter including chart/imaging/lab review and evaluation/documentation/demo home program/coordination of care/form completion but not including time for any procedures/manipulation 32 minutes.

## 2023-04-03 NOTE — PROGRESS NOTES
PT DAILY TREATMENT NOTE     Patient Name: Rajendra Sandoval  Date:2017  : 1980  [x]  Patient  Verified  Payor: Deann Barnes / Plan: [employer] [place] [employer] / Product Type: HMO /    In time:11:37 Out time: 12:05  Total Treatment Time (min): 28  Visit #: 2 of 6    Treatment Area: Left shoulder pain [M25.512]    SUBJECTIVE  Pain Level (0-10 scale): 3/10  Any medication changes, allergies to medications, adverse drug reactions, diagnosis change, or new procedure performed?: [x] No    [] Yes (see summary sheet for update)  Subjective functional status/changes:   [] No changes reported  Pt reports soreness in his shoulder from being busy over the weekend. He notes he fatigues quickly with his arms overhead which then irritates his neck. He notes his shoulder has been doing more popping lately. OBJECTIVE    28 min Therapeutic Exercise:  [x] See flow sheet :   Rationale: increase ROM and increase strength to improve the patients ability to perform OH ADLs with good shoulder mechanics and decreased pain/fatigue          With   [] TE   [] TA   [] neuro   [] other: Patient Education: [x] Review HEP    [] Progressed/Changed HEP based on:   [] positioning   [] body mechanics   [] transfers   [] heat/ice application    [] other:      Other Objective/Functional Measures:   Crepitus noted in [facility] [place] joint with rows/pull downs extensions; decreased with inferior pressure provided  Fatigue with [facility]  Challenged with rhythmic stabs of the L UE with ball on wall and perturbations  Added SA wall slides for scapulohumeral rhythm  TTP biceps tendon and pec major/minor; educated on tennis ball massage to pecs    Pain Level (0-10 scale) post treatment: 3/10    ASSESSMENT/Changes in Function: Pt making steady progress towards goals but continues to have some tenderness of the anterior shoulder likely due to pec tightness and forward shoulder posture.  Pt has decreased posterior shoulder strength needed for normal scapulohumeral Type 2 Diabetes Management for Adults   AMBULATORY CARE:   Type 2 diabetes  is a disease that affects how your body uses glucose (sugar)  Either your body cannot make enough insulin, or it cannot use the insulin correctly  It is important to keep diabetes controlled to prevent damage to your heart, blood vessels, and other organs  Management will help you feel well and enjoy your daily activities  Your diabetes care team providers can help you make a plan to fit diabetes care into your schedule  Your plan can change over time to fit your needs and your family's needs  Have someone call your local emergency number (911 in the [address]) if:   • You cannot be woken  • You have signs of diabetic ketoacidosis:     ? confusion, fatigue    ? vomiting    ? rapid heartbeat    ? fruity smelling breath    ? extreme thirst    ? dry mouth and skin    • You have any of the following signs of a heart attack:      ? Squeezing, pressure, or pain in your chest    ? You may  also have any of the following:     - Discomfort or pain in your back, neck, jaw, stomach, or arm    - Shortness of breath    - Nausea or vomiting    - Lightheadedness or a sudden cold sweat    • You have any of the following signs of a stroke:      ? Numbness or drooping on one side of your face     ? Weakness in an arm or leg    ? Confusion or difficulty speaking    ? Dizziness, a severe headache, or vision loss    Call your doctor or diabetes care team provider if:   • You have a sore or wound that will not heal     • You have a change in the amount you urinate  • Your blood sugar levels are higher than your target goals  • You often have lower blood sugar levels than your target goals  • Your skin is red, dry, warm, or swollen  • You have trouble coping with diabetes, or you feel anxious or depressed  • You have questions or concerns about your condition or care      What you need to know about high blood sugar levels:  High blood sugar levels may not cause any symptoms  You may feel more thirsty or urinate more often than usual  Over time, high blood sugar levels can damage your nerves, blood vessels, tissues, and organs  The following can increase your blood sugar levels:  • Large meals or large amounts of carbohydrates at one time    • Less physical activity    • Stress    • Illness    • A lower dose of diabetes medicine or insulin, or a late dose    What you need to know about low blood sugar levels:  Symptoms include feeling shaky, dizzy, irritable, or confused  You can prevent symptoms by keeping your blood sugar levels from going too low  • Treat a low blood sugar level right away:      ? Drink 4 ounces of juice or have 1 tube of glucose gel  ? Check your blood sugar level again 10 to 15 minutes later  ? When the level goes back to normal, eat a meal or snack to prevent another decrease  • Keep glucose gel, raisins, or hard candy with you at all times to treat a low blood sugar level  • Your blood sugar level can get too low if you take diabetes medicine or insulin and do not eat enough food  • If you use insulin, check your blood sugar level before you exercise  ? If your blood sugar level is below 100 mg/dL, eat 4 crackers or 2 ounces of raisins, or drink 4 ounces of juice  ? Check your level every 30 minutes if you exercise longer than 1 hour  ? You may need a snack during or after exercise  What you can do to manage your blood sugar levels:   • Check your blood sugar levels as directed and as needed  Several items are available to use to check your levels  You may need to check by testing a drop of blood in a glucose monitor  You may instead be given a continuous glucose monitoring (CGM) device  The device is worn at all times  The CGM checks your blood sugar level every 5 minutes  It sends results to an electronic device such as a smart phone  A CGM can be used with or without an insulin pump   You and rhythm and for stability with performing OH ADLs. Will continue working on stability and strength of the L shoulder girdle for decreased pain. Patient will continue to benefit from skilled PT services to modify and progress therapeutic interventions, address functional mobility deficits, address ROM deficits, address strength deficits, analyze and address soft tissue restrictions, analyze and cue movement patterns, analyze and modify body mechanics/ergonomics, assess and modify postural abnormalities, address imbalance/dizziness and instruct in home and community integration to attain remaining goals.           Progress towards goals / Updated goals:  Short Term Goals: To be accomplished in 1 weeks:   1. Pt will increase FOTO score by 8 pts to improve function.   2. Pt will increase left shoulder strength to 5/5  to ease with lifting activities.   3. Pt will report >60% improvement to return to PLOF and  Activities including     PLAN  [x]  Upgrade activities as tolerated     [x]  Continue plan of care  []  Update interventions per flow sheet       []  Discharge due to:_  []  Other:_      Hyacinth Mcburney, PTA 12/11/2017  12:12 PM    Future Appointments  Date Time Provider Cristo Buenrostro   12/13/2017 11:00 AM Crescencio Castro PT [facility]   12/18/2017 10:30 AM Hyacinth Mcburney, PTA [facility]   12/18/2017 11:00 AM Hyacinth Mcburney, PTA [facility]   12/20/2017 10:30 AM Hyacinth Mcburney, PTA [facility]   12/27/2017 11:00 AM Crescencio Castro PT LQNYROZ [facility]   12/29/2017 11:00 AM Crescencio Castro PT [id number]   2/21/2018 10:15 AM Jazzmine Hinton  [address] your diabetes care team providers will decide on the best method for you  The goal for blood sugar levels before meals  is between 80 and 130 mg/dL and 2 hours after eating  is lower than 180 mg/dL  • Make healthy food choices  Work with a dietitian to develop a meal plan that works for you and your schedule  A dietitian can help you learn how to eat the right amount of carbohydrates during your meals and snacks  Carbohydrates can raise your blood sugar level if you eat too many at one time  Examples of foods that contain carbohydrates are breads, cereals, rice, pasta, and sweets  • Eat high-fiber foods as directed  Fiber helps improve blood sugar levels  Fiber also lowers your risk for heart disease and other problems diabetes can cause  Examples of high-fiber foods include vegetables, whole-grain bread, and beans such as roque beans  Your dietitian can tell you how much fiber to have each day  • Get regular physical activity  Physical activity can help you get to your target blood sugar level goal and manage your weight  Get at least 150 minutes of moderate to vigorous aerobic physical activity each week  Do not miss more than 2 days in a row  Do not sit longer than 30 minutes at a time  Your healthcare provider can help you create an activity plan  The plan can include the best activities for you and can help you build your strength and endurance  • Maintain a healthy weight  Ask your team what a healthy weight is for you  A healthy weight can help you control diabetes and prevent heart disease  Ask your team to help you create a weight loss plan, if needed  Weight loss can help make a difference in managing diabetes  Your team will help you set a weight-loss goal, such as 10 to 15 pounds, or 5% of your extra weight  Together you and your team can set manageable weight loss goals  • Take your diabetes medicine or insulin as directed    You may need diabetes medicine, insulin, or both to help control your blood sugar levels  Your healthcare provider will teach you how and when to take your diabetes medicine or insulin  You will also be taught about side effects oral diabetes medicine can cause  Insulin may be injected or given through a pump or pen  You and your providers will decide on the best method for you:    ? An insulin pump  is an implanted device that gives your insulin 24 hours a day  An insulin pump prevents the need for multiple insulin injections in a day  ? An insulin pen  is a device prefilled with the right amount of insulin  ? You and your family members will be taught how to draw up and give insulin  if this is the best method for you  Your providers will also teach you how to dispose of needles and syringes  ? You will learn how much insulin you need  and when to give it  You will be taught when not to give insulin  You will also be taught what to do if your blood sugar level drops too low  This may happen if you take insulin and do not eat the right amount of carbohydrates  More ways to manage type 2 diabetes:   • Wear medical alert identification  Wear medical alert jewelry or carry a card that says you have diabetes  Ask your provider where to get these items  • Do not smoke  Nicotine and other chemicals in cigarettes and cigars can cause lung and blood vessel damage  It also makes it more difficult to manage your diabetes  Ask your provider for information if you currently smoke and need help to quit  Do not use e-cigarettes or smokeless tobacco in place of cigarettes or to help you quit  They still contain nicotine  • Check your feet each day for cuts, scratches, calluses, or other wounds  Look for redness and swelling, and feel for warmth  Wear shoes that fit well  Check your shoes for rocks or other objects that can hurt your feet  Do not walk barefoot or wear shoes without socks   Wear cotton socks to help keep your feet dry  • Ask about vaccines you may need  You have a higher risk for serious illness if you get the flu, pneumonia, COVID-19, or hepatitis  Ask your provider if you should get vaccines to prevent these or other diseases, and when to get the vaccines  • Talk to your provider if you become stressed about diabetes care  Sometimes being able to fit diabetes care into your life can cause increased stress  The stress can cause you not to take care of yourself properly  Your care team providers can help by offering tips about self-care  Your providers may suggest you talk to a mental health provider who can listen and offer help with self-care issues  • Have your A1c checked as directed  Your provider may check your A1c every 3 months, or 2 times each year if your diabetes is controlled  An A1c test shows the average amount of sugar in your blood over the past 2 to 3 months  Your provider will tell you what your A1c level should be  • Have screening tests as directed  Your provider may recommend screening for complications of diabetes and other conditions that may develop  Some screenings may begin right away and some may happen within the first 5 years of diagnosis:    ? Examples of diabetes complications  include kidney problems, high cholesterol, high blood pressure, blood vessel problems, eye problems, and sleep apnea  ? You may be screened for a low vitamin B level  if you take oral diabetes medicine for a long time  ? Women of childbearing years may be screened  for polycystic ovarian syndrome (PCOS)  Follow up with your doctor or diabetes care team providers as directed: You may need to have blood tests done before your follow-up visit  The test results will show if changes need to be made in your treatment or self-care  Talk to your provider if you cannot afford your medicine  Write down your questions so you remember to ask them during your visits    © Copyright [employer] 2022 Information is for End User's use only and may not be sold, redistributed or otherwise used for commercial purposes  The above information is an  only  It is not intended as medical advice for individual conditions or treatments  Talk to your doctor, nurse or pharmacist before following any medical regimen to see if it is safe and effective for you

## 2023-04-10 NOTE — Clinical Note
No contrast administered during procedure. Amount: 0 mL. Ftsg Text: The defect edges were debeveled with a #15 scalpel blade.  Given the location of the defect, shape of the defect and the proximity to free margins a full thickness skin graft was deemed most appropriate.  Using a sterile surgical marker, the primary defect shape was transferred to the donor site. The area thus outlined was incised deep to adipose tissue with a #15 scalpel blade.  The harvested graft was then trimmed of adipose tissue until only dermis and epidermis was left.  The skin margins of the secondary defect were undermined to an appropriate distance in all directions utilizing iris scissors.  The secondary defect was closed with interrupted buried subcutaneous sutures.  The skin edges were then re-apposed with running  sutures.  The skin graft was then placed in the primary defect and oriented appropriately.

## 2023-04-19 ENCOUNTER — OFFICE VISIT (OUTPATIENT)
Age: 43
End: 2023-04-19

## 2023-04-19 VITALS
DIASTOLIC BLOOD PRESSURE: 60 MMHG | OXYGEN SATURATION: 98 % | HEIGHT: 66 IN | SYSTOLIC BLOOD PRESSURE: 102 MMHG | HEART RATE: 83 BPM | BODY MASS INDEX: 23.63 KG/M2 | WEIGHT: 147 LBS

## 2023-04-19 DIAGNOSIS — Z95.818 STATUS POST PLACEMENT OF IMPLANTABLE LOOP RECORDER: ICD-10-CM

## 2023-04-19 DIAGNOSIS — Z95.818 STATUS POST PLACEMENT OF IMPLANTABLE LOOP RECORDER: Primary | ICD-10-CM

## 2023-04-19 NOTE — PROGRESS NOTES
History of Present Illness:  38 YOM here for follow up. He gets some vertigo and dizziness at times and tries to stay hydrated as best he can. He has a hernia that is being contemplated being repaired. No new chest pain, dyspnea, presyncope, syncope, PND, orthopnea or edema. Impression:  History of recurrent palpitations, rare PACs by previous loop recorder. Subcutaneous loop recorder placed 2018 with history of rare PACs and now end of life. Remote vagal syncope, 8 second pause by tilt table test 2012. Chronic hypertension. Hiatal hernia. Plan:  I discussed that he likely has a mild underlying dysautonomia with history of vasovagal syncope, hypotension and recurrent palpitations. His device is now end of life and I talked about watching it or explanting and he has elected to do explant. All questions answered and I will make arrangements. Wt Readings from Last 3 Encounters:   04/19/23 147 lb (66.7 kg)   03/16/23 140 lb (63.5 kg)   01/18/23 149 lb (67.6 kg)     Past Medical History:   Diagnosis Date    Agatston CAC score 100-199 02/04/2015    Coronary calcium score 0. Anxiety     Arm fatigue     Forearms    Balance problems     Chronic cough     chronically coughing up sputum    Depression     Dizziness     Dyspnea     Esophageal reflux     Headache     History of echocardiogram 10/29/2014    EF 55-60%. No RWMA. Normal diastolic fx. RVSP 30 mmHg. Holter monitor, abnormal 10/29/2014    Normal Holter study.     Hypercholesterolemia     Lumbar pain     Myofascial pain     Neck pain     Numbness and tingling     PAC (premature atrial contraction)     Palpitations     presumably benign    Panic disorder     Reflux     Thoracic back pain     Mid-thoracic    Upper extremity weakness     Vertigo, intermittent        Current Outpatient Medications   Medication Sig Dispense Refill    omeprazole (PRILOSEC) 20 MG delayed release capsule Take 2 capsules by mouth daily      Multiple Vitamin

## 2023-04-19 NOTE — PATIENT INSTRUCTIONS
[facility]          Patient  Instructions    Patients Name:  Poncho Hutchison are scheduled to have a Explant of Loop Recorder on May 8,2023 , at 8 am.    Please check in at 6:30am.    Please go to [facility] and park in the outpatient parking lot that is located around to the back of the hospital and enter through the [facility]. Once you enter through the [facility] check in with the  there. The  will either give you directions or assist you in getting to the cath holding area. 3.  You are not to eat or drink anything after midnight the night before your procedure. Please continue to take your medications with a small sip of water on the morning of the procedure with the following exceptions: If you are diabetic, do not take your insulin/sugar pill the morning of the procedure. We encourage families to wait in the waiting room on the first floor while the procedure is being done. The Doctor will come out and talk with you as soon as the procedure is over. There is the possibility that you may spend the night in the hospital, depending on the results of the procedure. This will be determined after the procedure is done. 8.   If you or your family have any questions, please call our office Monday-Friday 9:00am         -4:30 pm , at [phone], and ask to speak to one of the nurses.

## 2023-05-02 LAB
A/G RATIO: 2.6 RATIO (ref 1.1–2.6)
ALBUMIN SERPL-MCNC: 4.6 G/DL (ref 3.5–5)
ALP BLD-CCNC: 90 U/L (ref 25–115)
ALT SERPL-CCNC: 12 U/L (ref 5–40)
ANION GAP SERPL CALCULATED.3IONS-SCNC: 8 MMOL/L (ref 3–15)
AST SERPL-CCNC: 16 U/L (ref 10–37)
BILIRUB SERPL-MCNC: 0.2 MG/DL (ref 0.2–1.2)
BUN BLDV-MCNC: 14 MG/DL (ref 6–22)
CALCIUM SERPL-MCNC: 9.5 MG/DL (ref 8.4–10.5)
CHLORIDE BLD-SCNC: 103 MMOL/L (ref 98–110)
CO2: 30 MMOL/L (ref 20–32)
CREAT SERPL-MCNC: 0.8 MG/DL (ref 0.5–1.2)
GLOBULIN: 1.8 G/DL (ref 2–4)
GLOMERULAR FILTRATION RATE: >60 ML/MIN/1.73 SQ.M.
GLUCOSE: 101 MG/DL (ref 70–99)
HCT VFR BLD CALC: 43 % (ref 36.6–51.9)
HEMOGLOBIN: 14.4 G/DL (ref 13.2–17.3)
INR BLD: 0.94 (ref 0.89–1.29)
MCH RBC QN AUTO: 30 PG (ref 26–34)
MCHC RBC AUTO-ENTMCNC: 34 G/DL (ref 31–36)
MCV RBC AUTO: 88 FL (ref 80–95)
PDW BLD-RTO: 12.9 % (ref 10–15.5)
PLATELET # BLD: 225 K/UL (ref 140–440)
PMV BLD AUTO: 11.8 FL (ref 9–13)
POTASSIUM SERPL-SCNC: 4.5 MMOL/L (ref 3.5–5.5)
PROTHROMBIN TIME: 9.9 SEC (ref 9–13)
RBC: 4.88 M/UL (ref 3.8–5.8)
SODIUM BLD-SCNC: 141 MMOL/L (ref 133–145)
TOTAL PROTEIN: 6.4 G/DL (ref 6.4–8.3)
WBC: 8.2 K/UL (ref 4–11)

## 2023-05-07 RX ORDER — FAMOTIDINE 20 MG/1
20 TABLET, FILM COATED ORAL ONCE
Status: CANCELLED | OUTPATIENT
Start: 2023-05-07 | End: 2023-05-07

## 2023-05-07 RX ORDER — SODIUM CHLORIDE 0.9 % (FLUSH) 0.9 %
5-40 SYRINGE (ML) INJECTION PRN
Status: CANCELLED | OUTPATIENT
Start: 2023-05-07

## 2023-05-07 RX ORDER — SODIUM CHLORIDE 0.9 % (FLUSH) 0.9 %
5-40 SYRINGE (ML) INJECTION EVERY 12 HOURS SCHEDULED
Status: CANCELLED | OUTPATIENT
Start: 2023-05-07

## 2023-05-07 RX ORDER — SODIUM CHLORIDE 9 MG/ML
INJECTION, SOLUTION INTRAVENOUS CONTINUOUS
Status: CANCELLED | OUTPATIENT
Start: 2023-05-07

## 2023-05-07 RX ORDER — SODIUM CHLORIDE 9 MG/ML
INJECTION, SOLUTION INTRAVENOUS PRN
Status: CANCELLED | OUTPATIENT
Start: 2023-05-07

## 2023-05-08 ENCOUNTER — HOSPITAL ENCOUNTER (OUTPATIENT)
Facility: HOSPITAL | Age: 43
Setting detail: OUTPATIENT SURGERY
Discharge: HOME | End: 2023-05-08
Attending: INTERNAL MEDICINE | Admitting: INTERNAL MEDICINE
Payer: COMMERCIAL

## 2023-05-08 VITALS
HEART RATE: 63 BPM | SYSTOLIC BLOOD PRESSURE: 101 MMHG | DIASTOLIC BLOOD PRESSURE: 71 MMHG | WEIGHT: 145 LBS | HEIGHT: 65 IN | OXYGEN SATURATION: 98 % | TEMPERATURE: 98 F | BODY MASS INDEX: 24.16 KG/M2 | RESPIRATION RATE: 27 BRPM

## 2023-05-08 DIAGNOSIS — Z45.09 ENCOUNTER FOR INTERROGATION OF CARDIAC RECORDER: ICD-10-CM

## 2023-05-08 LAB — ECHO BSA: 1.74 M2

## 2023-05-08 PROCEDURE — 33286 RMVL SUBQ CAR RHYTHM MNTR: CPT | Performed by: INTERNAL MEDICINE

## 2023-05-08 PROCEDURE — 2709999900 HC NON-CHARGEABLE SUPPLY: Performed by: INTERNAL MEDICINE

## 2023-05-08 PROCEDURE — 2720000010 HC SURG SUPPLY STERILE: Performed by: INTERNAL MEDICINE

## 2023-05-08 PROCEDURE — 2500000003 HC RX 250 WO HCPCS: Performed by: INTERNAL MEDICINE

## 2023-05-08 ASSESSMENT — PAIN DESCRIPTION - FREQUENCY: FREQUENCY: INTERMITTENT

## 2023-05-08 ASSESSMENT — PAIN DESCRIPTION - LOCATION: LOCATION: NECK

## 2023-05-08 ASSESSMENT — PAIN SCALES - GENERAL: PAINLEVEL_OUTOF10: 2

## 2023-05-08 NOTE — PROGRESS NOTES
AVS Discharge instructions reviewed with patient and copy given to patient. All questions answered. Patient verbalized understanding to all discharge instructions. Procedural site within normal limits. No hematoma or bleeding noted from procedural site. No pain noted at discharge. Patient discharged with support person in stable condition. Escorted out to vehicle for transport home.

## 2023-05-08 NOTE — PROGRESS NOTES
Patient arrived ambulatory, alert and oriented times 4. Patient states he has neck pain 2/10 on the pain scale. Patient also stated that when he had the loop placed he had a low blood pressure due to Epinephrine. He also stated that he was told at his dentist office that he had the same findings when they used Epinephrine. Patient hooked up to cardiac monitor, chest shaved and wiped down with Chlorhexidine wipes. Nurse notified of findings and verbalized understanding.

## 2023-05-08 NOTE — PROGRESS NOTES
This RN has reviewed the Technicians documentation. Ensured that all documentation is correct. This RN has preformed all proper assessments for patient care. In addition discharge instructions were provided by this RN with opportunity for questions and concerns.

## 2023-07-19 NOTE — PROGRESS NOTES
Chief Complaint : palpitations. HPI:  Sravani Rogers is a 41 y.o. male with PMHx significant for palpitations, severe bradycardia with junctional rhythm and an 8 second pause. He also has history of dizziness problems, which back in November of 2012 prompted us to do a tilt table test and the stress portion of that test using sublingual nitroglycerin demonstrated severe bradycardia with junctional rhythm and an 8 second pause secondary to very high vagal tone. It was decided not to place a pacemaker and the patient as he had no further syncopal episodes, but still complained of some vague lightheadedness from time to time. He had been somewhat concerned about the possibility of developing heart disease, so a heart CT scan was performed back in Feb. 2010 with a calcium score of 0, suggesting he has a very low ten year risk of cardiovascular event.      Due to his frequent palpitations, a loop recorder was placed in October 2018. Since that time the patient has complained of significant palpitations and the subsequent strips have for the most part demonstrated what appeared to be sinus tachycardia.  There have been having some episodes with appears to be right bundle branch block aberration supraventricular tachycardia with some ventricular ectopy cannot be excluded.  The patient's palpitations seem to primarily be related to sinus tachycardia and this gentleman has not been placed on beta-blockers due to his history of pauses on his tilt table test as described above.     He comes in today and continues to complain of the same palpitations but they are usually described as just a skipped heartbeat and they can occur a several times in 1 day but he has more recently noticed them infrequently and at times he will notice that they will last for 3 or 4 days. He states when he was being followed by Dr. Nikkie Crouch, he mentioned undergoing possible ablation.  Patient informed he would need to follow up with electrophysiologist, Dr. Barbara Bhakta for further evaluation. He denies any other cardiovascular complaints. Past Medical History:  Past Medical History:   Diagnosis Date    Anxiety     Arm fatigue     Forearms    Balance problems     Cardiac Agatston CAC score 100-199 02/04/2015    Coronary calcium score 0.    Cardiac echocardiogram 10/29/2014    EF 55-60%. No RWMA. Normal diastolic fx. RVSP 30 mmHg.  Cardiac Holter monitor study 10/29/2014    Normal Holter study.     Chronic cough     chronically coughing up sputum    Depression     Dizziness     Dyspnea     Esophageal reflux     Headache     Hypercholesterolemia     Lumbar pain     Myofascial pain     Neck pain     Numbness and tingling     PAC (premature atrial contraction)     Palpitations     presumably benign    Panic disorder     Reflux     Thoracic back pain     Mid-thoracic    Upper extremity weakness     Vertigo, intermittent        Surgical History:  Past Surgical History:   Procedure Laterality Date    HX HEENT      corrective hearing for left ear     HX HEENT      plastic surgery on both ears    HX HEENT  10/08 & 09/09    Reformed jaw bone, bone graft    HX RENAL BIOPSY      HX TONSILLECTOMY      WI APPENDECTOMY  11/23/2010    WI IMPLANTATION PT-ACTIVATED CARDIAC EVENT RECORDER N/A 10/24/2018    Loop Recorder Insert performed by Fanny Pablo MD at [facility] CATH LAB        Social History:  Social History     Socioeconomic History    Marital status: SINGLE     Spouse name: Not on file    Number of children: Not on file    Years of education: Not on file    Highest education level: Not on file   Occupational History    Occupation: real estate   Social Needs    Financial resource strain: Not on file    Food insecurity     Worry: Not on file     Inability: Not on file   [employer] needs     Medical: Not on file     Non-medical: Not on file   Tobacco Use    Smoking status: Current Every Day Smoker Packs/day: 1.00     Years: 20.00     Pack years: 20.00     Types: Cigarettes    Smokeless tobacco: Never Used   Substance and Sexual Activity    Alcohol use: No     Alcohol/week: 0.0 standard drinks    Drug use: No    Sexual activity: Not on file   Lifestyle    Physical activity     Days per week: Not on file     Minutes per session: Not on file    Stress: Not on file   Relationships    Social connections     Talks on phone: Not on file     Gets together: Not on file     Attends Mandaeism service: Not on file     Active member of club or organization: Not on file     Attends meetings of clubs or organizations: Not on file     Relationship status: Not on file    Intimate partner violence     Fear of current or ex partner: Not on file     Emotionally abused: Not on file     Physically abused: Not on file     Forced sexual activity: Not on file   Other Topics Concern    Not on file   Social History Narrative    Not on file        Family History:  Family History   Problem Relation Age of Onset    Hypertension Father     Liver Disease Father     Hypertension Mother         Allergies: Allergies   Allergen Reactions    Opioids - Morphine Analogues Myalgia    Other Medication Other (comments)     Pt reports allergy to steroids, with psychological side effects    Pollen Extracts Unable to Obtain    Zithromax [Azithromycin] Itching        Current Medications:  Current Outpatient Medications   Medication Sig Dispense Refill    diclofenac (VOLTAREN) 1 % gel Apply 4 g to affected area four (4) times daily. 5 Each 5    ALPRAZolam (XANAX) 0.5 mg tablet Take 0.5 mg by mouth two (2) times a day.  ascorbic acid (VITAMIN C) 500 mg tablet Take 1,000 mg by mouth daily.  cholecalciferol, vitamin D3, (VITAMIN D3) 2,000 unit Tab Take 1 Tab by mouth daily.  omeprazole-sodium bicarbonate (ZEGERID) 40-1.1 mg-gram capsule Take 2 Caps by mouth daily.       acetaminophen (TYLENOL EXTRA STRENGTH) 500 mg tablet Take 500 mg by mouth every six (6) hours as needed.  MULTIVITAMIN PO Take 1 Tab by mouth daily.  escitalopram oxalate (LEXAPRO) 20 mg tablet TAKE 1 TABLET BY MOUTH EVERY DAY  1       Review of systems:  Review of Systems   Constitutional: Negative for malaise/fatigue. Respiratory: Negative for shortness of breath. Cardiovascular: Negative for chest pain, palpitations, orthopnea, claudication, leg swelling and PND. Gastrointestinal: Negative for blood in stool. Musculoskeletal: Negative for falls and myalgias. Wt Readings from Last 3 Encounters:   03/12/21 68.9 kg (152 lb)   11/09/20 68.5 kg (151 lb)   08/10/20 66 kg (145 lb 9.6 oz)     BP Readings from Last 3 Encounters:   03/12/21 100/60   11/09/20 94/60   08/10/20 98/65     Pulse Readings from Last 3 Encounters:   03/12/21 79   11/09/20 70   08/10/20 67        EKG:  3.12.21: Ekg performed today. Cardiologist to read. Physical Exam:  Physical Exam  Constitutional:       Appearance: Normal appearance. HENT:      Head: Normocephalic and atraumatic. Eyes:      Extraocular Movements: Extraocular movements intact. Pupils: Pupils are equal, round, and reactive to light. Neck:      Musculoskeletal: Normal range of motion and neck supple. Cardiovascular:      Rate and Rhythm: Normal rate and regular rhythm. Pulses: Normal pulses. Heart sounds: No murmur. No friction rub. No gallop. Pulmonary:      Effort: Pulmonary effort is normal.      Breath sounds: Normal breath sounds. No wheezing, rhonchi or rales. Chest:      Chest wall: No tenderness. Abdominal:      General: Bowel sounds are normal.      Palpations: Abdomen is soft. Musculoskeletal: Normal range of motion. Right lower leg: No edema. Left lower leg: No edema. Skin:     General: Skin is warm and dry. Neurological:      Mental Status: He is alert and oriented to person, place, and time.         Labs  Lab Results   Component Value Date/Time WBC 9.7 10/17/2018 08:00 AM    HGB 14.8 10/17/2018 08:00 AM    HCT 45.0 10/17/2018 08:00 AM    PLATELET 524 58/74/0082 08:00 AM    MCV 91 10/17/2018 08:00 AM     Lab Results   Component Value Date/Time    Sodium 147 (H) 10/17/2018 08:00 AM    Potassium 4.7 10/17/2018 08:00 AM    Chloride 101 10/17/2018 08:00 AM    CO2 30 10/17/2018 08:00 AM    Anion gap 16.0 10/17/2018 08:00 AM    Glucose 80 10/17/2018 08:00 AM    BUN 11 10/17/2018 08:00 AM    Creatinine 0.9 10/17/2018 08:00 AM    BUN/Creatinine ratio 12 09/30/2018 04:58 PM    GFR est AA >60 09/30/2018 04:58 PM    GFR est non-AA >60 09/30/2018 04:58 PM    Calcium 9.8 10/17/2018 08:00 AM       Impression/Plan:    1. Palpitations  - S/p loop recorder back in October 2018. - Recent device check on 2.17.21, sinus with rare PVCs. - EP referral re: frequent palps and establish care with Dr. Riley Jacobson. 2. Syncope and collapse with tilt table testing, November 2012  - Found to have severe bradycardia with junctional rhythm and an 8 second pause.   - No further syncopal episodes. - Patient declined pacemaker placement. Follow up with Dr. Riley Jacobson re: palps and to establish care. Patient encouraged to follow up sooner if symptoms worsen or fail to improve. Thank you for allowing me to participate in the care of your patient. Please do not hesitate to call with questions or concerns.     Sondra Hong NP Acitretin Counseling:  I discussed with the patient the risks of acitretin including but not limited to hair loss, dry lips/skin/eyes, liver damage, hyperlipidemia, depression/suicidal ideation, photosensitivity.  Serious rare side effects can include but are not limited to pancreatitis, pseudotumor cerebri, bony changes, clot formation/stroke/heart attack.  Patient understands that alcohol is contraindicated since it can result in liver toxicity and significantly prolong the elimination of the drug by many years.

## 2023-12-14 ENCOUNTER — OFFICE VISIT (OUTPATIENT)
Age: 43
End: 2023-12-14
Payer: COMMERCIAL

## 2023-12-14 VITALS — WEIGHT: 142 LBS | BODY MASS INDEX: 23.63 KG/M2

## 2023-12-14 DIAGNOSIS — M99.02 THORACIC REGION SOMATIC DYSFUNCTION: ICD-10-CM

## 2023-12-14 DIAGNOSIS — M51.36 DDD (DEGENERATIVE DISC DISEASE), LUMBAR: ICD-10-CM

## 2023-12-14 DIAGNOSIS — M99.09 SOMATIC DYSFUNCTION OF ABDOMINAL REGION: ICD-10-CM

## 2023-12-14 DIAGNOSIS — M99.05 PELVIC SOMATIC DYSFUNCTION: ICD-10-CM

## 2023-12-14 DIAGNOSIS — M99.01 CERVICAL SOMATIC DYSFUNCTION: ICD-10-CM

## 2023-12-14 DIAGNOSIS — M99.03 LUMBAR REGION SOMATIC DYSFUNCTION: ICD-10-CM

## 2023-12-14 DIAGNOSIS — M50.30 DDD (DEGENERATIVE DISC DISEASE), CERVICAL: Primary | ICD-10-CM

## 2023-12-14 DIAGNOSIS — M99.07 UPPER EXTREMITY SOMATIC DYSFUNCTION: ICD-10-CM

## 2023-12-14 DIAGNOSIS — M99.04 SACRAL REGION SOMATIC DYSFUNCTION: ICD-10-CM

## 2023-12-14 DIAGNOSIS — M99.06 LOWER LIMB REGION SOMATIC DYSFUNCTION: ICD-10-CM

## 2023-12-14 DIAGNOSIS — M51.34 DDD (DEGENERATIVE DISC DISEASE), THORACIC: ICD-10-CM

## 2023-12-14 DIAGNOSIS — M99.08 RIB CAGE REGION SOMATIC DYSFUNCTION: ICD-10-CM

## 2023-12-14 PROCEDURE — 98929 OSTEOPATH MANJ 9-10 REGIONS: CPT | Performed by: FAMILY MEDICINE

## 2023-12-14 PROCEDURE — 99214 OFFICE O/P EST MOD 30 MIN: CPT | Performed by: FAMILY MEDICINE

## 2023-12-14 RX ORDER — MELOXICAM 15 MG/1
15 TABLET ORAL DAILY
Qty: 30 TABLET | Refills: 1 | Status: SHIPPED | OUTPATIENT
Start: 2023-12-14

## 2023-12-14 NOTE — PROGRESS NOTES
AVS reviewed: yes,   HEP: N/A  Resources Provided: no   Patient questions/concerns answered: yes,   Patient verbalized understanding of treatment plan: yes,
TX 9-10 BODY REGIONS        Patient (or guardian if minor) verbalizes understanding of evaluation and plan. Verbal consent obtained. Cervical, Thoracic, Rib, Lumbar, Pelvic, Sacral, Upper Ext, Lower Ext, and Abdominal SD treated with Myofascial and ME. Correction of previous malalignments verified after Tx. Pt tolerated well. Notes improvement of Sx and pain is now rated 2/10. HEP/stretches daily. Discussed stretching/strengthening/posture. Will start HEP, PT, and Rx for mobic  as above and plan follow-up 6 weeks.

## 2024-01-11 ENCOUNTER — HOSPITAL ENCOUNTER (OUTPATIENT)
Facility: HOSPITAL | Age: 44
Setting detail: RECURRING SERIES
Discharge: HOME | End: 2024-01-14
Payer: COMMERCIAL

## 2024-01-11 PROCEDURE — 97110 THERAPEUTIC EXERCISES: CPT

## 2024-01-11 PROCEDURE — 97163 PT EVAL HIGH COMPLEX 45 MIN: CPT

## 2024-01-11 NOTE — PROGRESS NOTES
[facility]  [address] [place] [address] Ph:[phone] Fx: [phone]  Plan of Care / Statement of Necessity for Physical Therapy Services     Patient Name: Mika Clark : 1980   Medical   Diagnosis: Other low back pain [M54.59]  Thoracic back pain [M54.6] Treatment Diagnosis: M54.2, G89.29  CHRONIC NECK PAIN, M54.6,G89.29  CHRONIC THORACIC BACK PAIN, and M54.59, G89.29  CHRONIC LOWER BACK PAIN      Onset Date: chronic Payor :  [email or web address] / Plan: [employer] (O) / Product Type: *No Product type* /    Referral Source: Fabián Harris DO Start of Care (SOC): 2024   Prior Hospitalization: See medical history Provider #: [id number]   Prior Level of Function: Previous physical therapy for neck and back pain, work duties and ADL's with less pain   Comorbidities: anxiety, depression, gastrointestinal disease, headaches      Assessment / key information:  Patient is a 44 y/o male with chief complaint of mid back pain (T5 - T9). Patient states his pain is constant and increases with activities or computer work. He reports pain relief with previous physical therapy treatments; however, he reports noncompliance with home exercise program upon completion of therapy. We discussed the importance of continuing exercises forever and the possibility of transitioning to a  upon completion of this episode of physical therapy. He presents with impaired posture, decreased strength in B UE musculature (significant weakness in scapular stabilizers), decreased LE flexibility, and decreased cervical and lumbar  AROM.     Cervical Active Movements: [] N/A   [] Too acute   [] Other:  ROM deg Pain Comments:   Forward flexion 60   \"Tight\" (posterior)   Extension 20   \"Tight\" (anterior)   SB right 25   \"Tightness and dizziness\"   SB left  20 1/10 Left side neck    Rotation right 50       Rotation left 50   \"Tight\"      Thoracic 
seconds hold  - Shoulder extension with resistance - Neutral  - 1-2 x daily - 7 x weekly - 1-2 sets - 10 reps - 3 seconds hold    PLAN  [x]  Upgrade activities as tolerated     []  Continue plan of care  []  Update interventions per flow sheet       []  Other:_      Lor Holguin, PT 1/11/2024  11:29 AM     Justification for Eval Code Complexity:  Patient History : high  Examination see exam high  Clinical Presentation: high  Clinical Decision Making : FOTO : 40 /100 high

## 2024-02-02 ENCOUNTER — HOSPITAL ENCOUNTER (OUTPATIENT)
Facility: HOSPITAL | Age: 44
Setting detail: RECURRING SERIES
Discharge: HOME | End: 2024-02-05
Payer: COMMERCIAL

## 2024-02-02 PROCEDURE — 97140 MANUAL THERAPY 1/> REGIONS: CPT

## 2024-02-02 PROCEDURE — 97110 THERAPEUTIC EXERCISES: CPT

## 2024-02-02 NOTE — PROGRESS NOTES
PHYSICAL / OCCUPATIONAL THERAPY - DAILY TREATMENT NOTE    Patient Name: Mika Clark    Date: 2024    : 1980  Insurance: Payor: JOSE / Plan: JOSE DAN (O) / Product Type: *No Product type* /      Patient  verified Yes     Visit #   Current / Total 2 16   Time   In / Out 11:28 12:08   Pain   In / Out 5/10 t/s and 3/10 c/s 4/10 \"looser\"   Subjective Functional Status/Changes: Pt reports hasn't been doing his exercises. He needs to also review his computer ergonomics set up for home. He gets front of the shoulder pain, arm weakness, low back pain. He has a hernia that they need to operate on but he hasn't decided yet.      TREATMENT AREA =  Other low back pain [M54.59]  Thoracic back pain [M54.6]    OBJECTIVE    Therapeutic Procedures:    Tx Min Billable or 1:1 Min (if diff from Tx Min) Procedure, Rationale, Specifics   30  18165 Therapeutic Exercise (timed):  increase ROM, strength, coordination, balance, and proprioception to improve patient's ability to progress to PLOF and address remaining functional goals. (see flow sheet as applicable)     Details if applicable:  see flow sheet     10  03096 Manual Therapy (timed):  decrease pain, increase ROM, and increase tissue extensibility to improve patient's ability to progress to PLOF and address remaining functional goals.  The manual therapy interventions were performed at a separate and distinct time from the therapeutic activities interventions . (see flow sheet as applicable)     Details if applicable:  MET for right t/s rotation and right convexity; t/s PA mobs grade III-IV   40  [facility] Totals Reminder: bill using total billable min of TIMED therapeutic procedures (example: do not include dry needle or estim unattended, both untimed codes, in totals to left)  8-22 min = 1 unit; 23-37 min = 2 units; 38-52 min = 3 units; 53-67 min = 4 units; 68-82 min = 5 units   Total Total     [x]  Patient Education billed concurrently with other

## 2024-02-05 ENCOUNTER — HOSPITAL ENCOUNTER (OUTPATIENT)
Facility: HOSPITAL | Age: 44
Setting detail: RECURRING SERIES
Discharge: HOME | End: 2024-02-08
Payer: COMMERCIAL

## 2024-02-05 PROCEDURE — 97110 THERAPEUTIC EXERCISES: CPT

## 2024-02-05 PROCEDURE — 97535 SELF CARE MNGMENT TRAINING: CPT

## 2024-02-05 PROCEDURE — 97140 MANUAL THERAPY 1/> REGIONS: CPT

## 2024-02-05 NOTE — PROGRESS NOTES
PHYSICAL / OCCUPATIONAL THERAPY - DAILY TREATMENT NOTE    Patient Name: Mika Clark    Date: 2024    : 1980  Insurance: Payor: JOSE / Plan: JOSE DAN (O) / Product Type: *No Product type* /      Patient  verified Yes     Visit #   Current / Total 3 16   Time   In / Out 1:20 2:05   Pain   In / Out 5/10 4/10   Subjective Functional Status/Changes: Pt reports soreness in his mid back and arm from playing darts. He recorded and took pictures of his desktop set up at the house to correct his posture.      TREATMENT AREA =  Other low back pain [M54.59]  Thoracic back pain [M54.6]    OBJECTIVE    Therapeutic Procedures:    Tx Min Billable or 1:1 Min (if diff from Tx Min) Procedure, Rationale, Specifics   15  39196 Therapeutic Exercise (timed):  increase ROM, strength, coordination, balance, and proprioception to improve patient's ability to progress to PLOF and address remaining functional goals. (see flow sheet as applicable)     Details if applicable:  see flow sheet     15  85369 Self Care/Home Management (timed):  improve patient knowledge and understanding of pain reducing techniques, positioning, posture/ergonomics, home safety, activity modification, diagnosis/prognosis, and physical therapy expectations, procedures and progression  to improve patient's ability to progress to PLOF and address remaining functional goals.  (see flow sheet as applicable)      Details if applicable:  see flow sheet; computer ergonomics   15  91139 Manual Therapy (timed):  decrease pain, increase ROM, and increase tissue extensibility to improve patient's ability to progress to PLOF and address remaining functional goals.  The manual therapy interventions were performed at a separate and distinct time from the therapeutic activities interventions . (see flow sheet as applicable)     Details if applicable:  MET for right t/s rotation and right convexity; t/s PA mobs grade III-IV; theragun to left t/s

## 2024-02-07 ENCOUNTER — APPOINTMENT (OUTPATIENT)
Facility: HOSPITAL | Age: 44
End: 2024-02-07
Payer: COMMERCIAL

## 2024-02-13 ENCOUNTER — HOSPITAL ENCOUNTER (OUTPATIENT)
Facility: HOSPITAL | Age: 44
Setting detail: RECURRING SERIES
Discharge: HOME | End: 2024-02-16
Payer: COMMERCIAL

## 2024-02-13 PROCEDURE — 97112 NEUROMUSCULAR REEDUCATION: CPT

## 2024-02-13 PROCEDURE — 97110 THERAPEUTIC EXERCISES: CPT

## 2024-02-13 PROCEDURE — 97530 THERAPEUTIC ACTIVITIES: CPT

## 2024-02-13 NOTE — PROGRESS NOTES
PHYSICAL / OCCUPATIONAL THERAPY - DAILY TREATMENT NOTE    Patient Name: Mika Clark    Date: 2024    : 1980  Insurance: Payor: JOSE / Plan: JOSE DAN HMO / Product Type: *No Product type* /      Patient  verified Yes     Visit #   Current / Total 4 16   Time   In / Out 1:24 2:05   Pain   In / Out 6/10 4/10   Subjective Functional Status/Changes: Pt reports he did exercises 3 days out of the time he was out since his last session. He is working to modify his work/computer set up at home.      TREATMENT AREA =  Other low back pain [M54.59]  Thoracic back pain [M54.6]    OBJECTIVE    Therapeutic Procedures:    Tx Min Billable or 1:1 Min (if diff from Tx Min) Procedure, Rationale, Specifics   16  66254 Therapeutic Exercise (timed):  increase ROM, strength, coordination, balance, and proprioception to improve patient's ability to progress to PLOF and address remaining functional goals. (see flow sheet as applicable)     Details if applicable:  see flow sheet     10  29096 Therapeutic Activity (timed):  use of dynamic activities replicating functional movements to increase ROM, strength, coordination, balance, and proprioception in order to improve patient's ability to progress to PLOF and address remaining functional goals.  (see flow sheet as applicable)      Details if applicable:  goal reassessment   15  53009 Neuromuscular Re-Education (timed):  improve balance, coordination, kinesthetic sense, posture, core stability and proprioception to improve patient's ability to develop conscious control of individual muscles and awareness of position of extremities in order to progress to PLOF and address remaining functional goals. (see flow sheet as applicable)     Details if applicable:  core re-ed; posture re-ed   41  [facility] Totals Reminder: bill using total billable min of TIMED therapeutic procedures (example: do not include dry needle or estim unattended, both untimed codes, in totals to

## 2024-02-13 NOTE — PROGRESS NOTES
[facility]  [address] [place] [address] - Ph: [phone]   Fx: [phone]  PHYSICAL THERAPY PROGRESS NOTE  [x] Progress Note  [] Discharge Summary    Patient Name: Mika Clark : 1980   Treatment/Medical Diagnosis: Other low back pain [M54.59]  Thoracic back pain [M54.6]   Referral Source: Fabián Harris DO     Date of Initial Visit: 2024 Attended Visits: 4 Missed Visits: 0       Comorbidities: anxiety, depression, gastrointestinal disease, headaches   Prior Level of Function:Previous physical therapy for neck and back pain, work duties and ADL's with less pain     SUMMARY OF TREATMENT  Mr. Clark is making slow progress towards initial goals in therapy. He continues to present with a t/s kyphosis, forward head/shoulder posture and increased posterior pelvic tilt. He has been semi-compliant with his HEP contributing to ongoing postural variances with muscle imbalance. He was educated on computer ergonomics to work on adjusting at home due to length of time spent on the computer. His middle trap strength did improve to 3+/5 on the left and 4-/5 on the right. Skilled PT remains medically necessary to improve postural endurance and awareness, improve B UE strength, improve LE flexibility and improve spinal mobility for decreased pain with work duties, household chores and caring for his daughter.     CURRENT STATUS/Progress towards Goals:    Short Term Goals: To be accomplished in 2-4 weeks  Goal: Patient will be compliant with home exercise program.  Status at evaluation: HEP issued  Current: semi-compliant     2.   Goal: Patient will demonstrate increased strength in B mid traps to at least 3+/5 to improve posture and dynamic stability with ADL's.  Status at evaluation: 3/5  Current: left 3+/5; right 4-/5     Long Term Goals: To be accomplished in 4-8 weeks  Goal: Patient will be independent with home exercise

## 2024-02-15 ENCOUNTER — HOSPITAL ENCOUNTER (OUTPATIENT)
Facility: HOSPITAL | Age: 44
Setting detail: RECURRING SERIES
Discharge: HOME | End: 2024-02-18
Payer: COMMERCIAL

## 2024-02-15 PROCEDURE — 97112 NEUROMUSCULAR REEDUCATION: CPT

## 2024-02-15 PROCEDURE — 97110 THERAPEUTIC EXERCISES: CPT

## 2024-02-15 NOTE — PROGRESS NOTES
PHYSICAL / OCCUPATIONAL THERAPY - DAILY TREATMENT NOTE    Patient Name: Mika Clark    Date: 2/15/2024    : 1980  Insurance: Payor: JOSE / Plan: JOSE DAN HMO / Product Type: *No Product type* /      Patient  verified Yes     Visit #   Current / Total 1 12   Time   In / Out 2:12 2:46   Pain   In / Out 6/10 4/10   Subjective Functional Status/Changes: Pt reports some neck soreness after last session but overall muscle fatigue and soreness from working on exercises. He is still working on trying to fix his desktop set up for better posture.      TREATMENT AREA =  Other low back pain [M54.59]  Thoracic back pain [M54.6]    OBJECTIVE    Therapeutic Procedures:    Tx Min Billable or 1:1 Min (if diff from Tx Min) Procedure, Rationale, Specifics   24  37031 Therapeutic Exercise (timed):  increase ROM, strength, coordination, balance, and proprioception to improve patient's ability to progress to PLOF and address remaining functional goals. (see flow sheet as applicable)     Details if applicable:  see flow sheet     10  72917 Neuromuscular Re-Education (timed):  improve balance, coordination, kinesthetic sense, posture, core stability and proprioception to improve patient's ability to develop conscious control of individual muscles and awareness of position of extremities in order to progress to PLOF and address remaining functional goals. (see flow sheet as applicable)     Details if applicable:  core re-ed; posture re-ed   34  [facility] Totals Reminder: bill using total billable min of TIMED therapeutic procedures (example: do not include dry needle or estim unattended, both untimed codes, in totals to left)  8-22 min = 1 unit; 23-37 min = 2 units; 38-52 min = 3 units; 53-67 min = 4 units; 68-82 min = 5 units   Total Total     [x]  Patient Education billed concurrently with other procedures   [x] Review HEP    [] Progressed/Changed HEP, detail:    [] Other detail:       Objective

## 2024-02-28 ENCOUNTER — TELEPHONE (OUTPATIENT)
Age: 44
End: 2024-02-28

## 2024-02-28 ENCOUNTER — HOSPITAL ENCOUNTER (OUTPATIENT)
Facility: HOSPITAL | Age: 44
Setting detail: RECURRING SERIES
Discharge: HOME | End: 2024-03-02
Payer: COMMERCIAL

## 2024-02-28 PROCEDURE — 97112 NEUROMUSCULAR REEDUCATION: CPT

## 2024-02-28 PROCEDURE — 97110 THERAPEUTIC EXERCISES: CPT

## 2024-02-28 NOTE — PROGRESS NOTES
PHYSICAL / OCCUPATIONAL THERAPY - DAILY TREATMENT NOTE    Patient Name: Mika Clark    Date: 2024    : 1980  Insurance: Payor: JOSE / Plan: JOSE DAN HMO / Product Type: *No Product type* /      Patient  verified Yes     Visit #   Current / Total 2 12   Time   In / Out 126 208   Pain   In / Out 6/10 4/10   Subjective Functional Status/Changes: Pt stated that he is still getting over a cold. Stated that his his is tight and still has pain in the neck and upper back     TREATMENT AREA =  Other low back pain [M54.59]  Thoracic back pain [M54.6]    OBJECTIVE    Modalities Rationale:     decrease pain and increase tissue extensibility to improve patient's ability to progress to PLOF and address remaining functional goals.     min [] Estim Unattended, type/location:                                      []  w/ice    []  w/heat    min [] Estim Attended, type/location:                                     []  w/US     []  w/ice    []  w/heat    []  TENS insruct      min []  Mechanical Traction: type/lbs                   []  pro   []  sup   []  int   []  cont    []  before manual    []  after manual    min []  Ultrasound, settings/location:     10 min  unbill []  Ice     [x]  Heat    location/position: Seated  T/S    min []  Paraffin,  details:     min []  Vasopneumatic Device, press/temp:     min []  Whirlpool / Fluido:    If using vaso (only need to measure limb vaso being performed on)      pre-treatment girth :       post-treatment girth :       measured at (landmark location) :      min []  Other:    Skin assessment post-treatment:   Intact      Therapeutic Procedures:    Tx Min Billable or 1:1 Min (if diff from Tx Min) Procedure, Rationale, Specifics   23  76061 Therapeutic Exercise (timed):  increase ROM, strength, coordination, balance, and proprioception to improve patient's ability to progress to PLOF and address remaining functional goals. (see flow sheet as applicable)     Details

## 2024-02-28 NOTE — TELEPHONE ENCOUNTER
Received referral from [facility] for patient to see Dr Pete for a hiatal hernia. Tried to contact patient but person answering phone said that I had the wrong number.  Mialed, emailed and message patient to contact for to make appt.

## 2024-03-01 ENCOUNTER — HOSPITAL ENCOUNTER (OUTPATIENT)
Facility: HOSPITAL | Age: 44
Setting detail: RECURRING SERIES
Discharge: HOME | End: 2024-03-04
Payer: COMMERCIAL

## 2024-03-01 PROCEDURE — 97112 NEUROMUSCULAR REEDUCATION: CPT

## 2024-03-01 PROCEDURE — 97110 THERAPEUTIC EXERCISES: CPT

## 2024-03-01 NOTE — PROGRESS NOTES
8  40381 Neuromuscular Re-Education (timed):  improve balance, coordination, kinesthetic sense, posture, core stability and proprioception to improve patient's ability to develop conscious control of individual muscles and awareness of position of extremities in order to progress to PLOF and address remaining functional goals. (see flow sheet as applicable)     Details if applicable:     31  [facility] Totals Reminder: bill using total billable min of TIMED therapeutic procedures (example: do not include dry needle or estim unattended, both untimed codes, in totals to left)  8-22 min = 1 unit; 23-37 min = 2 units; 38-52 min = 3 units; 53-67 min = 4 units; 68-82 min = 5 units   Total Total     [x]  Patient Education billed concurrently with other procedures   [x] Review HEP    [] Progressed/Changed HEP, detail:    [] Other detail:       Objective Information/Functional Measures/Assessment  Pt reported getting too hot on MH so got up at 7 min  No complaint of increased pain during session   Tolerated all exercises well    Pt is making limited progress toward goals. Pt cont with moderate to severe pain in the neck, T/S and low back. Reported that he has his new keyboard and just needs to set it up and his work station with be all set. Cont with forward head and trunk posture. Cont with poor sitting and standing posture.     Patient will continue to benefit from skilled PT / OT services to modify and progress therapeutic interventions, analyze and address functional mobility deficits, analyze and address ROM deficits, analyze and address strength deficits, analyze and cue for proper movement patterns, analyze and modify for postural abnormalities, and instruct in home and community integration to address functional deficits and attain remaining goals.    Progress toward goals / Updated goals:  [x]  See Progress Note/Recertification    1. Patient will demonstrate increased strength in B middle and lower traps to at least

## 2024-03-04 ENCOUNTER — HOSPITAL ENCOUNTER (OUTPATIENT)
Facility: HOSPITAL | Age: 44
Setting detail: RECURRING SERIES
Discharge: HOME | End: 2024-03-07
Payer: COMMERCIAL

## 2024-03-04 PROCEDURE — 97110 THERAPEUTIC EXERCISES: CPT

## 2024-03-04 PROCEDURE — 97530 THERAPEUTIC ACTIVITIES: CPT

## 2024-03-04 NOTE — PROGRESS NOTES
PHYSICAL / OCCUPATIONAL THERAPY - DAILY TREATMENT NOTE    Patient Name: Mika Clark    Date: 3/4/2024    : 1980  Insurance: Payor: JOSE / Plan: JOSE DAN HMO / Product Type: *No Product type* /      Patient  verified Yes     Visit #   Current / Total 4 12   Time   In / Out 1242 112   Pain   In / Out 7-8/10 7-8/10   Subjective Functional Status/Changes: Pt stated that he is having a lot of pain in the low back today. Stated that he is on antibiotics for a sinus and ear infections     TREATMENT AREA =  Other low back pain [M54.59]  Thoracic back pain [M54.6]    OBJECTIVE    Therapeutic Procedures:    Tx Min Billable or 1:1 Min (if diff from Tx Min) Procedure, Rationale, Specifics   22  77657 Therapeutic Exercise (timed):  increase ROM, strength, coordination, balance, and proprioception to improve patient's ability to progress to PLOF and address remaining functional goals. (see flow sheet as applicable)     Details if applicable:       8  64199 Therapeutic Activity (timed):  use of dynamic activities replicating functional movements to increase ROM, strength, coordination, balance, and proprioception in order to improve patient's ability to progress to PLOF and address remaining functional goals.  (see flow sheet as applicable)     Details if applicable:     30  [facility] Totals Reminder: bill using total billable min of TIMED therapeutic procedures (example: do not include dry needle or estim unattended, both untimed codes, in totals to left)  8-22 min = 1 unit; 23-37 min = 2 units; 38-52 min = 3 units; 53-67 min = 4 units; 68-82 min = 5 units   Total Total     [x]  Patient Education billed concurrently with other procedures   [x] Review HEP    [] Progressed/Changed HEP, detail:    [] Other detail:       Objective Information/Functional Measures/Assessment  Was able to perform W's on the GSB today  Had increased pain with right open books  Reported burning and increased pain after

## 2024-03-07 ENCOUNTER — HOSPITAL ENCOUNTER (OUTPATIENT)
Facility: HOSPITAL | Age: 44
Setting detail: RECURRING SERIES
Discharge: HOME | End: 2024-03-10
Payer: COMMERCIAL

## 2024-03-07 PROCEDURE — 97535 SELF CARE MNGMENT TRAINING: CPT

## 2024-03-07 PROCEDURE — 97110 THERAPEUTIC EXERCISES: CPT

## 2024-03-07 PROCEDURE — 97140 MANUAL THERAPY 1/> REGIONS: CPT

## 2024-03-07 NOTE — PROGRESS NOTES
Note/Recertification    Patient will demonstrate increased strength in B middle and lower traps to at least 4/5 to improve posture and dynamic stability with ADLs.  Status at last progress note: middle traps left 3+/5, right 4-/5; B lower trap 3/5     2. Patient will report pain <=4/10 in the neck and back to demonstrate improved postural awareness and better computer ergonomics.  Status at last progress note: 6/10  Not met. Cont to report 5-6/10 pain. 3/1/24     3. Patient will improve B 90/90 hamstring test to 30 degrees for improved pelvic positioning and posture for decreased low back pain.  Status at last progress note: B 45 degrees     Next PN/ RC due 3/14/2024  Auth due (visit number/ date) 15 visits or 04/11/2024    PLAN  - Continue Plan of Care  - Upgrade activities as tolerated    Laura M Behrens, PTA    3/7/2024    8:54 AM    Future Appointments   Date Time Provider Department Center   3/7/2024  3:20 PM Behrens, Laura M, PTA [facility]   3/12/2024 11:30 AM Fabián Harris DO [facility]   3/12/2024  2:40 PM Behrens, Laura M, PTA [facility]   3/14/2024  2:40 PM Behrens, Laura M, PTA [facility]   3/19/2024 11:20 AM Behrens, Laura M, PTA [facility]   3/22/2024  1:20 PM Carlos Tse, PT [facility]   3/26/2024 12:40 PM Carlos Tse, PT [facility]   3/28/2024 12:00 PM Behrens, Laura M, PTA [facility]   4/2/2024  9:30 AM Asa Pete MD [facility]   4/3/2024 11:20 AM Carlos Tse, PT [facility]   4/5/2024 11:20 AM TseCarlos amado, PT [facility]   4/8/2024  1:20 PM Behrens, Laura M, PTA [facility]   4/10/2024 11:20 AM Carlos Tse, PT [facility]   4/24/2024 11:20 AM Sunny Mccracken MD [facility]

## 2024-03-12 ENCOUNTER — HOSPITAL ENCOUNTER (OUTPATIENT)
Facility: HOSPITAL | Age: 44
Setting detail: RECURRING SERIES
Discharge: HOME | End: 2024-03-15
Payer: COMMERCIAL

## 2024-03-12 ENCOUNTER — OFFICE VISIT (OUTPATIENT)
Age: 44
End: 2024-03-12
Payer: COMMERCIAL

## 2024-03-12 VITALS — BODY MASS INDEX: 23.49 KG/M2 | HEIGHT: 65 IN | WEIGHT: 141 LBS | RESPIRATION RATE: 14 BRPM

## 2024-03-12 DIAGNOSIS — M50.30 DDD (DEGENERATIVE DISC DISEASE), CERVICAL: ICD-10-CM

## 2024-03-12 DIAGNOSIS — M99.04 SACRAL REGION SOMATIC DYSFUNCTION: ICD-10-CM

## 2024-03-12 DIAGNOSIS — M99.02 THORACIC REGION SOMATIC DYSFUNCTION: ICD-10-CM

## 2024-03-12 DIAGNOSIS — M99.05 PELVIC REGION SOMATIC DYSFUNCTION: ICD-10-CM

## 2024-03-12 DIAGNOSIS — M99.07 UPPER EXTREMITY SOMATIC DYSFUNCTION: ICD-10-CM

## 2024-03-12 DIAGNOSIS — M51.34 DDD (DEGENERATIVE DISC DISEASE), THORACIC: Primary | ICD-10-CM

## 2024-03-12 DIAGNOSIS — M51.36 DDD (DEGENERATIVE DISC DISEASE), LUMBAR: ICD-10-CM

## 2024-03-12 DIAGNOSIS — M99.08 RIB CAGE REGION SOMATIC DYSFUNCTION: ICD-10-CM

## 2024-03-12 DIAGNOSIS — M99.01 CERVICAL SOMATIC DYSFUNCTION: ICD-10-CM

## 2024-03-12 DIAGNOSIS — M99.06 LOWER LIMB REGION SOMATIC DYSFUNCTION: ICD-10-CM

## 2024-03-12 DIAGNOSIS — M99.09 SOMATIC DYSFUNCTION OF ABDOMINAL REGION: ICD-10-CM

## 2024-03-12 DIAGNOSIS — M99.03 SOMATIC DYSFUNCTION OF LUMBAR REGION: ICD-10-CM

## 2024-03-12 PROCEDURE — 98929 OSTEOPATH MANJ 9-10 REGIONS: CPT | Performed by: FAMILY MEDICINE

## 2024-03-12 PROCEDURE — 97110 THERAPEUTIC EXERCISES: CPT

## 2024-03-12 PROCEDURE — 99214 OFFICE O/P EST MOD 30 MIN: CPT | Performed by: FAMILY MEDICINE

## 2024-03-12 PROCEDURE — 97535 SELF CARE MNGMENT TRAINING: CPT

## 2024-03-12 PROCEDURE — 97112 NEUROMUSCULAR REEDUCATION: CPT

## 2024-03-12 NOTE — PROGRESS NOTES
PHYSICAL / OCCUPATIONAL THERAPY - DAILY TREATMENT NOTE    Patient Name: Mika Clark    Date: 3/12/2024    : 1980  Insurance: Payor: JOSE / [facility] / Product Type: *No Product type* /      Patient  verified Yes     Visit #   Current / Total 6 12   Time   In / Out 2:44 3:32   Pain   In / Out 5/10 3-4/10   Subjective Functional Status/Changes: Pt reports he was a 7/10 before going to see the MD. They are ordering a MRI for his t/s. He states tightness/aching in the mid right back. He is to continue with PT for posture strengthening.      TREATMENT AREA =  Other low back pain [M54.59]  Thoracic back pain [M54.6]    OBJECTIVE    Therapeutic Procedures:    Tx Min Billable or 1:1 Min (if diff from Tx Min) Procedure, Rationale, Specifics   13  54822 Therapeutic Exercise (timed):  increase ROM, strength, coordination, balance, and proprioception to improve patient's ability to progress to PLOF and address remaining functional goals. (see flow sheet as applicable)     Details if applicable: see flow sheet      20  76018 Self Care/Home Management (timed):  improve patient knowledge and understanding of pain reducing techniques, positioning, posture/ergonomics, home safety, activity modification, diagnosis/prognosis, and physical therapy expectations, procedures and progression  to improve patient's ability to progress to PLOF and address remaining functional goals.  (see flow sheet as applicable)      Details if applicable:  review of left 1st rib mob for home; sitting and exercising on SB; therapy progression; review of c/s and t/s MRI from previous years     15  96146 Neuromuscular Re-Education (timed):  improve balance, coordination, kinesthetic sense, posture, core stability and proprioception to improve patient's ability to develop conscious control of individual muscles and awareness of position of extremities in order to progress to PLOF and address remaining functional goals. (see

## 2024-03-12 NOTE — PROGRESS NOTES
12. Somatic dysfunction of abdominal region  CO OSTEOPATHIC MANIPULATIVE TX 9-10 BODY REGIONS          Patient (or guardian if minor) verbalizes understanding of evaluation and plan.    Verbal consent obtained.  Cervical, Thoracic, Rib, Lumbar, Pelvic, Sacral, Upper Ext, Lower Ext, and Abdominal SD treated with Myofascial, ME, and HVLA.  Correction of previous malalignments verified after Tx.    Pt tolerated well.  Notes improvement of Sx and pain is now rated 1/10.    HEP/stretches daily. Discussed stretching/strengthening/posture.    Will continue HEP, PT, and Rx for Mobic from prior PRN as above and plan follow-up after MRI thoracic.

## 2024-03-13 ENCOUNTER — TELEPHONE (OUTPATIENT)
Age: 44
End: 2024-03-13

## 2024-03-14 ENCOUNTER — HOSPITAL ENCOUNTER (OUTPATIENT)
Facility: HOSPITAL | Age: 44
Setting detail: RECURRING SERIES
End: 2024-03-14
Payer: COMMERCIAL

## 2024-03-14 NOTE — PROGRESS NOTES
[facility]  [address] [place] [address] - Ph: [phone]   Fx: [phone]  PHYSICAL THERAPY PROGRESS NOTE  [x] Progress Note  [] Discharge Summary    Patient Name: Mika Clark : 1980   Treatment/Medical Diagnosis: Other low back pain [M54.59]  Thoracic back pain [M54.6]   Referral Source: Fabián Harris DO     Date of Initial Visit: 2024 Attended Visits: 11 Missed Visits: 1       Comorbidities: anxiety, depression, gastrointestinal disease, headaches   Prior Level of Function:Previous physical therapy for neck and back pain, work duties and ADL's with less pain     SUMMARY OF TREATMENT    Mr. Clark is making slow, steady progress towards goals in therapy. He continues to present with a t/s kyphosis, forward head/shoulder posture and increased posterior pelvic tilt. He has most pain along the right convexity of his scoliotic curve and endorses weakness in B UE with left weaker than the right. He has a left elevated first rib with left scalene hypertonicity causing occasional left radicular symptoms. His middle and lower trap strength did improve to /5 on the left and /5 on the right. His 90/90 hamstring flexibility improved to left degrees and right degrees. Skilled PT remains medically necessary to improve postural endurance and awareness, improve B UE strength, improve LE flexibility and improve spinal mobility for decreased pain with work duties, household chores and caring for his daughter.     CURRENT STATUS/Progress towards Goals:    1. Patient will demonstrate increased strength in B middle and lower traps to at least 4/5 to improve posture and dynamic stability with ADLs.  Status at last progress note: middle traps left 3+/5, right 4-/5; B lower trap 3/5  Current:    2. Patient will report pain <=4/10 in the neck and back to demonstrate improved postural awareness and better computer ergonomics.  Status 
Carmen Walker, PTA [facility]   4/2/2024  3:00 PM [facility] MRI RM 1 [facility] [place]   4/3/2024 11:20 AM TseCarlos amado, PT [facility]   4/5/2024 11:20 AM TseCarlos amado, PT [facility]   4/8/2024 11:00 AM Asa Pete MD [facility] AM[facility]   4/8/2024  1:20 PM Behrens, Laura M, PTA [facility]   4/10/2024 11:20 AM Carlos Tse, PT [facility]   4/12/2024 12:40 PM Behrens, Laura M, PTA [facility]   4/24/2024 11:20 AM Sunny Mccracken MD [facility]

## 2024-03-19 ENCOUNTER — HOSPITAL ENCOUNTER (OUTPATIENT)
Facility: HOSPITAL | Age: 44
Setting detail: RECURRING SERIES
End: 2024-03-19
Payer: COMMERCIAL

## 2024-03-22 ENCOUNTER — HOSPITAL ENCOUNTER (OUTPATIENT)
Facility: HOSPITAL | Age: 44
Setting detail: RECURRING SERIES
Discharge: HOME | End: 2024-03-25
Payer: COMMERCIAL

## 2024-03-22 PROCEDURE — 97110 THERAPEUTIC EXERCISES: CPT

## 2024-03-22 PROCEDURE — 97530 THERAPEUTIC ACTIVITIES: CPT

## 2024-03-22 PROCEDURE — 97112 NEUROMUSCULAR REEDUCATION: CPT

## 2024-03-22 NOTE — PROGRESS NOTES
PHYSICAL / OCCUPATIONAL THERAPY - DAILY TREATMENT NOTE    Patient Name: Mika Clark    Date: 3/22/2024    : 1980  Insurance: Payor: JOSE / Plan: JOSE DAN HMO / Product Type: *No Product type* /      Patient  verified Yes     Visit #   Current / Total 7 12   Time   In / Out 1:21 1:57   Pain   In / Out 3/10 5/10   Subjective Functional Status/Changes: Pt. Reports he has been off work for a few days so his pain isn't as bad today      TREATMENT AREA =  Other low back pain [M54.59]  Thoracic back pain [M54.6]    OBJECTIVE  Therapeutic Procedures:    Tx Min Billable or 1:1 Min (if diff from Tx Min) Procedure, Rationale, Specifics   14  24839 Therapeutic Exercise (timed):  increase ROM, strength, coordination, balance, and proprioception to improve patient's ability to progress to PLOF and address remaining functional goals. (see flow sheet as applicable)     Details if applicable:  see flow sheet     10  36595 Therapeutic Activity (timed):  use of dynamic activities replicating functional movements to increase ROM, strength, coordination, balance, and proprioception in order to improve patient's ability to progress to PLOF and address remaining functional goals.  (see flow sheet as applicable)     Details if applicable:  goal re-assessment    12  37730 Neuromuscular Re-Education (timed):  improve balance, coordination, kinesthetic sense, posture, core stability and proprioception to improve patient's ability to develop conscious control of individual muscles and awareness of position of extremities in order to progress to PLOF and address remaining functional goals. (see flow sheet as applicable)     Details if applicable:  quadruped and swiss ball activities           Details if applicable:            Details if applicable:     36  [facility] Totals Reminder: bill using total billable min of TIMED therapeutic procedures (example: do not include dry needle or estim unattended, both untimed codes,

## 2024-03-22 NOTE — THERAPY RECERTIFICATION
[facility]  [address] [place] [address] - Ph: [phone]   Fx: [phone]  PHYSICAL THERAPY PROGRESS NOTE  [x] Progress Note  [] Discharge Summary    Patient Name: Mika Clark : 1980   Treatment/Medical Diagnosis: Other low back pain [M54.59]  Thoracic back pain [M54.6]   Referral Source: Fabián Harris DO     Date of Initial Visit: 24 Attended Visits: 11 Missed Visits: 2       Comorbidities: anxiety, depression, gastrointestinal disease, headaches   Prior Level of Function:Previous physical therapy for neck and back pain, work duties and ADL's with less pain     SUMMARY OF TREATMENT  Mr. Clark is making slow, steady progress towards goals in therapy. He continues to present with a t/s kyphosis, forward head/shoulder posture and increased posterior pelvic tilt. He has most pain along the right convexity of his scoliotic curve and endorses weakness in B UE with left weaker than the right. He has a left elevated first rib with left scalene hypertonicity causing occasional left radicular symptoms. His middle  trap strength did improve to 4/5 bilaterally  and lower trap strength was limited to 4-/5 on right and 3/5 on left. His 90/90 hamstring flexibility improved to left 39 degrees and right 41 degrees. Skilled PT remains medically necessary to improve postural endurance and awareness, improve B UE strength, improve LE flexibility and improve spinal mobility for decreased pain with work duties, household chores and caring for his daughter.     CURRENT STATUS/Progress towards Goals:  1. Patient will demonstrate increased strength in B middle and lower traps to at least 4/5 to improve posture and dynamic stability with ADLs.  Status at last progress note: middle traps left 3+/5, right 4-/5; B lower trap 3/5   not met    2. Patient will report pain <=4/10 in the neck and back to demonstrate improved postural awareness

## 2024-03-26 ENCOUNTER — APPOINTMENT (OUTPATIENT)
Facility: HOSPITAL | Age: 44
End: 2024-03-26
Payer: COMMERCIAL

## 2024-03-27 ENCOUNTER — TELEPHONE (OUTPATIENT)
Age: 44
End: 2024-03-27

## 2024-03-27 NOTE — TELEPHONE ENCOUNTER
I spoke to beulah from [facility] and I informed her that I haven't been able to contact the patient.  I called the phone number listed on the chart and I was told that this is a wrong phone number.   Per Beulah will try to contact the patient, may mail a letter to the patient as well.     Dinorah

## 2024-03-28 ENCOUNTER — APPOINTMENT (OUTPATIENT)
Facility: HOSPITAL | Age: 44
End: 2024-03-28
Payer: COMMERCIAL

## 2024-03-29 ENCOUNTER — HOSPITAL ENCOUNTER (OUTPATIENT)
Facility: HOSPITAL | Age: 44
Setting detail: RECURRING SERIES
Discharge: HOME | End: 2024-04-01
Payer: COMMERCIAL

## 2024-03-29 PROCEDURE — 97110 THERAPEUTIC EXERCISES: CPT

## 2024-03-29 PROCEDURE — 97530 THERAPEUTIC ACTIVITIES: CPT

## 2024-03-29 PROCEDURE — 97112 NEUROMUSCULAR REEDUCATION: CPT

## 2024-03-29 NOTE — PROGRESS NOTES
PHYSICAL / OCCUPATIONAL THERAPY - DAILY TREATMENT NOTE    Patient Name: Mika Clark    Date: 3/29/2024    : 1980  Insurance: Payor: JOSE / Plan: JOSE DAN HMO / Product Type: *No Product type* /      Patient  verified Yes     Visit #   Current / Total 1 12   Time   In / Out 129 201   Pain   In / Out 4/10 Reported burning everywhere   Subjective Functional Status/Changes: Pt stated that he was doing well and went out of town for a few days. When he came home and held the little one he started having more pain. Slept in a different bed and that increased the pain some too     TREATMENT AREA =  Other low back pain [M54.59]  Thoracic back pain [M54.6]    OBJECTIVE         Therapeutic Procedures:    Tx Min Billable or 1:1 Min (if diff from Tx Min) Procedure, Rationale, Specifics   12  24358 Therapeutic Exercise (timed):  increase ROM, strength, coordination, balance, and proprioception to improve patient's ability to progress to PLOF and address remaining functional goals. (see flow sheet as applicable)     Details if applicable:       10  81490 Neuromuscular Re-Education (timed):  improve balance, coordination, kinesthetic sense, posture, core stability and proprioception to improve patient's ability to develop conscious control of individual muscles and awareness of position of extremities in order to progress to PLOF and address remaining functional goals. (see flow sheet as applicable)     Details if applicable:     10  91438 Therapeutic Activity (timed):  use of dynamic activities replicating functional movements to increase ROM, strength, coordination, balance, and proprioception in order to improve patient's ability to progress to PLOF and address remaining functional goals.  (see flow sheet as applicable)     Details if applicable:     32  [facility] Totals Reminder: bill using total billable min of TIMED therapeutic procedures (example: do not include dry needle or estim unattended, both

## 2024-04-03 ENCOUNTER — HOSPITAL ENCOUNTER (OUTPATIENT)
Facility: HOSPITAL | Age: 44
Setting detail: RECURRING SERIES
Discharge: HOME | End: 2024-04-06
Payer: COMMERCIAL

## 2024-04-03 PROCEDURE — 97110 THERAPEUTIC EXERCISES: CPT

## 2024-04-03 PROCEDURE — 97112 NEUROMUSCULAR REEDUCATION: CPT

## 2024-04-03 PROCEDURE — 97530 THERAPEUTIC ACTIVITIES: CPT

## 2024-04-03 NOTE — PROGRESS NOTES
PHYSICAL / OCCUPATIONAL THERAPY - DAILY TREATMENT NOTE    Patient Name: Mika Clark    Date: 4/3/2024    : 1980  Insurance: Payor: JOSE / Plan: JOSE DAN HMO / Product Type: *No Product type* /      Patient  verified Yes     Visit #   Current / Total 2 12   Time   In / Out 11:25 12:13   Pain   In / Out 4/10 010   Subjective Functional Status/Changes: Pt reports having more pain on the Right mid back & tightness with his Left UT/neck. 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 He got shots which helped some.       TREATMENT AREA =  Other low back pain [M54.59]  Thoracic back pain [M54.6]    OBJECTIVE    Modalities Rationale:     decrease pain and increase tissue extensibility to improve patient's ability to progress to PLOF and address remaining functional goals.     min [] Estim Unattended, type/location:                                      []  w/ice    []  w/heat    min [] Estim Attended, type/location:                                     []  w/US     []  w/ice    []  w/heat    []  TENS insruct      min []  Mechanical Traction: type/lbs                   []  pro   []  sup   []  int   []  cont    []  before manual    []  after manual    min []  Ultrasound, settings/location:     5 min  unbill []  Ice     [x]  Heat    location/position: Supine, back & neck    min []  Paraffin,  details:     min []  Vasopneumatic Device, press/temp:     min []  Whirlpool / Fluido:    If using vaso (only need to measure limb vaso being performed on)      pre-treatment girth :       post-treatment girth :       measured at (landmark location) :      min []  Other:    Skin assessment post-treatment:   Intact      Therapeutic Procedures:  Therapeutic Procedures:     Tx Min Billable or 1:1 Min (if diff from Tx Min) Procedure, Rationale, Specifics   20   58441 Therapeutic Exercise (timed):  increase ROM, strength, coordination, balance, and proprioception to improve patient's

## 2024-04-05 ENCOUNTER — TELEPHONE (OUTPATIENT)
Facility: HOSPITAL | Age: 44
End: 2024-04-05

## 2024-04-05 NOTE — TELEPHONE ENCOUNTER
called about missed appointment. educated on calling back to re-schedule and reminded of next appointment time.

## 2024-04-08 ENCOUNTER — OFFICE VISIT (OUTPATIENT)
Age: 44
End: 2024-04-08
Payer: COMMERCIAL

## 2024-04-08 ENCOUNTER — HOSPITAL ENCOUNTER (OUTPATIENT)
Facility: HOSPITAL | Age: 44
Setting detail: RECURRING SERIES
Discharge: HOME | End: 2024-04-11
Payer: COMMERCIAL

## 2024-04-08 VITALS
HEIGHT: 60 IN | BODY MASS INDEX: 27.68 KG/M2 | WEIGHT: 141 LBS | OXYGEN SATURATION: 98 % | HEART RATE: 80 BPM | RESPIRATION RATE: 18 BRPM | SYSTOLIC BLOOD PRESSURE: 100 MMHG | DIASTOLIC BLOOD PRESSURE: 60 MMHG | TEMPERATURE: 97 F

## 2024-04-08 DIAGNOSIS — R05.3 CHRONIC COUGH: ICD-10-CM

## 2024-04-08 DIAGNOSIS — Z01.818 PRE-OP TESTING: ICD-10-CM

## 2024-04-08 DIAGNOSIS — R13.10 DYSPHAGIA, UNSPECIFIED TYPE: ICD-10-CM

## 2024-04-08 DIAGNOSIS — K21.9 GASTROESOPHAGEAL REFLUX DISEASE, UNSPECIFIED WHETHER ESOPHAGITIS PRESENT: ICD-10-CM

## 2024-04-08 DIAGNOSIS — K44.9 HIATAL HERNIA: ICD-10-CM

## 2024-04-08 DIAGNOSIS — K44.9 HIATAL HERNIA: Primary | ICD-10-CM

## 2024-04-08 PROCEDURE — 97112 NEUROMUSCULAR REEDUCATION: CPT

## 2024-04-08 PROCEDURE — 97140 MANUAL THERAPY 1/> REGIONS: CPT

## 2024-04-08 PROCEDURE — 99204 OFFICE O/P NEW MOD 45 MIN: CPT | Performed by: SURGERY

## 2024-04-08 PROCEDURE — 97110 THERAPEUTIC EXERCISES: CPT

## 2024-04-08 NOTE — PROGRESS NOTES
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     We reviewed the pathophysiology of reflux (imbalances in function or pressures related to the LES, impact of hiatal/paraesophageal hernias, gastroparesis/diabetes, obesity, high intraabdominal pressures, maladaptive eating habits, etc). We discussed treatment options including medications (H2 or PPI, prokinetics in certain cases, medications to address visceral hypersensitivity, etc) and surgery (including MIS approaches for paraesophageal hernia repair with fundoplication vs magnetic sphincter augmentation). We discussed the risks of surgery including, but not limited to: bleeding, infection, visceral injury, VTE, MI, CVA, recurrence of hiatal/paraesophageal hernia, dysphagia, gas bloat, refractory/recurrent reflux/regurgitation, need for future procedures, persistent need for medical management, etc).     We reviewed smoking cessation recommendations related to chronic cough and risk of infection/healing issues related to surgery, as well as risk of anesthesia complications/pulmonary/cardiac risk.     We will proceed with the following workup:    Tests/clearances ordered:  Strongly recommended smoking cessation  CBC, CMP, H pylori, EKG, marshmallow/bagel UGI  Cardiac clearance    Based on the workup and clinical presentation, the patient would likely be best served with MIS paraesophageal hernia repair, possible mesh placement, and fundoplication    All questions from the patient have been answered and they have demonstrated appropriate understanding of the process.      Signed By: Asa Pete MD [facility]  Bariatric and General Surgeon  [facility]    April 8, 2024

## 2024-04-08 NOTE — PROGRESS NOTES
min of TIMED therapeutic procedures (example: do not include dry needle or estim unattended, both untimed codes, in totals to left)  8-22 min = 1 unit; 23-37 min = 2 units; 38-52 min = 3 units; 53-67 min = 4 units; 68-82 min = 5 units   Total Total        [x]  Patient Education billed concurrently with other procedures   [x] Review HEP    [] Progressed/Changed HEP, detail:    [] Other detail:       Objective Information/Functional Measures/Assessment  Discussed trial hold after next session with updated HEP; pt agreeable  L/s hypertonicity left>right  Right rotation from scoliosis  Unilateral strengthening/stretching    Pt making slow, steady progress with decrease in nerve symptoms in the t/s. He continues to demonstrate right rotation and right convexity in the t/s region. He has consistent left upslip and left elevated first rib. Will provide updated HEP next session and trial hold from PT with independent management due to insurance limitations of 30 visits per calendar year.     Patient will continue to benefit from skilled PT / OT services to modify and progress therapeutic interventions, analyze and address functional mobility deficits, analyze and address ROM deficits, analyze and address soft tissue restrictions, analyze and cue for proper movement patterns, analyze and modify for postural abnormalities, and instruct in home and community integration to address functional deficits and attain remaining goals.     Progress toward goals / Updated goals:  [x]  See Progress Note/Recertification     1. Patient will demonstrate increased strength in B lower traps to at least 4/5 to improve posture and dynamic stability with ADLs.  Status at last progress note: right: 4-/5 left: 3/5      2. Patient will report pain <=4/10 in the neck and back to demonstrate improved postural awareness and better computer ergonomics.  Status at last progress note: 6-7/10  Current: 3-4/10 during the last 3 conseccutive visits      3.

## 2024-04-10 ENCOUNTER — APPOINTMENT (OUTPATIENT)
Facility: HOSPITAL | Age: 44
End: 2024-04-10
Payer: COMMERCIAL

## 2024-04-12 ENCOUNTER — HOSPITAL ENCOUNTER (OUTPATIENT)
Facility: HOSPITAL | Age: 44
Setting detail: RECURRING SERIES
Discharge: HOME | End: 2024-04-15
Payer: COMMERCIAL

## 2024-04-12 PROCEDURE — 97110 THERAPEUTIC EXERCISES: CPT

## 2024-04-12 PROCEDURE — 97140 MANUAL THERAPY 1/> REGIONS: CPT

## 2024-04-12 PROCEDURE — 97112 NEUROMUSCULAR REEDUCATION: CPT

## 2024-04-12 NOTE — PROGRESS NOTES
left PI; shotgun technique; MET NSL; MET right t/s rotation   50   MC BC Totals Reminder: bill using total billable min of TIMED therapeutic procedures (example: do not include dry needle or estim unattended, both untimed codes, in totals to left)  8-22 min = 1 unit; 23-37 min = 2 units; 38-52 min = 3 units; 53-67 min = 4 units; 68-82 min = 5 units   Total Total        [x]  Patient Education billed concurrently with other procedures   [x] Review HEP    [] Progressed/Changed HEP, detail:    [] Other detail:       Objective Information/Functional Measures/Assessment  Reviewed final HEP  Pt to go on hold to manage symptoms independently and will call follow up on 4/26  Left t/s concavity right convexity  Reviewed looking for good chair for sitting  Alignment then 1st/2nd rib mobs then exercises  Cued to count out loud to avoid breath holding    Pt going to trial working on exercises and alignment correction at home due to limitation with insurance visits per calendar year. He does well with decreased pain post exercises but needs to work on discipline to complete independently on a regular basis for carryover pain relief. He continues with right t/s convexity causing left t/s paraspinal hypertonicity. Will call in 2 weeks to check in on pt independence to decide on D/C versus another appt follow up.     Patient will continue to benefit from skilled PT / OT services to modify and progress therapeutic interventions, analyze and address functional mobility deficits, analyze and address ROM deficits, analyze and address soft tissue restrictions, analyze and cue for proper movement patterns, analyze and modify for postural abnormalities, and instruct in home and community integration to address functional deficits and attain remaining goals.     Progress toward goals / Updated goals:  [x]  See Progress Note/Recertification     1. Patient will demonstrate increased strength in B lower traps to at least 4/5 to improve posture

## 2024-04-16 ENCOUNTER — TELEPHONE (OUTPATIENT)
Age: 44
End: 2024-04-16

## 2024-04-16 ENCOUNTER — HOSPITAL ENCOUNTER (OUTPATIENT)
Facility: HOSPITAL | Age: 44
Discharge: HOME | End: 2024-04-19

## 2024-04-16 ENCOUNTER — HOSPITAL ENCOUNTER (OUTPATIENT)
Facility: HOSPITAL | Age: 44
Discharge: HOME | End: 2024-04-19
Attending: SURGERY
Payer: COMMERCIAL

## 2024-04-16 DIAGNOSIS — K21.9 GASTROESOPHAGEAL REFLUX DISEASE, UNSPECIFIED WHETHER ESOPHAGITIS PRESENT: ICD-10-CM

## 2024-04-16 DIAGNOSIS — R13.10 DYSPHAGIA, UNSPECIFIED TYPE: ICD-10-CM

## 2024-04-16 DIAGNOSIS — R05.3 CHRONIC COUGH: ICD-10-CM

## 2024-04-16 DIAGNOSIS — K44.9 HIATAL HERNIA: ICD-10-CM

## 2024-04-16 LAB — SENTARA SPECIMEN COLLECTION: NORMAL

## 2024-04-16 PROCEDURE — 2500000003 HC RX 250 WO HCPCS: Performed by: SURGERY

## 2024-04-16 PROCEDURE — 74220 X-RAY XM ESOPHAGUS 1CNTRST: CPT

## 2024-04-16 PROCEDURE — 6370000000 HC RX 637 (ALT 250 FOR IP): Performed by: SURGERY

## 2024-04-16 PROCEDURE — 99001 SPECIMEN HANDLING PT-LAB: CPT

## 2024-04-16 PROCEDURE — 74246 X-RAY XM UPR GI TRC 2CNTRST: CPT

## 2024-04-16 RX ADMIN — BARIUM SULFATE 140 ML: 980 POWDER, FOR SUSPENSION ORAL at 10:39

## 2024-04-16 RX ADMIN — ANTACID/ANTIFLATULENT 1 EACH: 380; 550; 10; 10 GRANULE, EFFERVESCENT ORAL at 10:39

## 2024-04-16 RX ADMIN — BARIUM SULFATE 176 G: 960 POWDER, FOR SUSPENSION ORAL at 10:39

## 2024-04-16 NOTE — TELEPHONE ENCOUNTER
Peer to peer is required in order to approve patients MRI which is still scheduled for 4/23.  Please contact [facility] at [phone], case # [id number].  Patient has no record of 6 weeks of physical therapy in last 6 mos.

## 2024-04-17 LAB
A/G RATIO: 2.4 RATIO (ref 1.1–2.6)
ALBUMIN: 4.4 G/DL (ref 3.5–5)
ALP BLD-CCNC: 94 U/L (ref 25–115)
ALT SERPL-CCNC: 15 U/L (ref 5–40)
ANION GAP SERPL CALCULATED.3IONS-SCNC: 12 MMOL/L (ref 3–15)
AST SERPL-CCNC: 19 U/L (ref 10–37)
BASOPHILS # BLD: 0 % (ref 0–2)
BASOPHILS ABSOLUTE: 0 K/UL (ref 0–0.2)
BILIRUB SERPL-MCNC: 0.3 MG/DL (ref 0.2–1.2)
BUN BLDV-MCNC: 10 MG/DL (ref 6–22)
CALCIUM SERPL-MCNC: 9.6 MG/DL (ref 8.4–10.5)
CHLORIDE BLD-SCNC: 104 MMOL/L (ref 98–110)
CO2: 28 MMOL/L (ref 20–32)
CREAT SERPL-MCNC: 0.9 MG/DL (ref 0.5–1.2)
EOSINOPHIL # BLD: 2 % (ref 0–6)
EOSINOPHILS ABSOLUTE: 0.2 K/UL (ref 0–0.5)
GLOBULIN: 1.8 G/DL (ref 2–4)
GLOMERULAR FILTRATION RATE: >60 ML/MIN/1.73 SQ.M.
GLUCOSE: 92 MG/DL (ref 70–99)
HCT VFR BLD CALC: 44.6 % (ref 39.3–51.6)
HEMOGLOBIN: 14.6 G/DL (ref 13.1–17.2)
LYMPHOCYTES # BLD: 28 % (ref 20–45)
LYMPHOCYTES ABSOLUTE: 2.1 K/UL (ref 1–4.8)
MCH RBC QN AUTO: 30 PG (ref 26–34)
MCHC RBC AUTO-ENTMCNC: 33 G/DL (ref 31–36)
MCV RBC AUTO: 90 FL (ref 80–95)
MONOCYTES ABSOLUTE: 0.5 K/UL (ref 0.1–1)
MONOCYTES: 7 % (ref 3–12)
NEUTROPHILS ABSOLUTE: 4.8 K/UL (ref 1.8–7.7)
NEUTROPHILS: 63 % (ref 40–75)
PDW BLD-RTO: 13.7 % (ref 10–15.5)
PLATELET # BLD: 217 K/UL (ref 140–440)
PMV BLD AUTO: 11.1 FL (ref 9–13)
POTASSIUM SERPL-SCNC: 4.8 MMOL/L (ref 3.5–5.5)
RBC: 4.94 M/UL (ref 3.8–5.8)
SODIUM BLD-SCNC: 144 MMOL/L (ref 133–145)
TOTAL PROTEIN: 6.2 G/DL (ref 6.4–8.3)
WBC: 7.6 K/UL (ref 4–11)

## 2024-04-17 NOTE — TELEPHONE ENCOUNTER
Pt information was uploaded to [email or web address] for the peer to peer for proof of PT in the last 6 weeks.

## 2024-04-22 ENCOUNTER — TELEPHONE (OUTPATIENT)
Age: 44
End: 2024-04-22

## 2024-04-22 NOTE — TELEPHONE ENCOUNTER
Please contact the patient back with an update on the peer to peer.  I did explain Dr. Harris's reply to him and he understood.    Patient can be reached at [phone].

## 2024-04-22 NOTE — TELEPHONE ENCOUNTER
Authorization for MRI was approved and is able to get the MRI that is scheduled for tomorrow.    Authorization has been scanned into the chart.

## 2024-04-23 ENCOUNTER — HOSPITAL ENCOUNTER (OUTPATIENT)
Facility: HOSPITAL | Age: 44
Discharge: HOME | End: 2024-04-26
Attending: FAMILY MEDICINE
Payer: COMMERCIAL

## 2024-04-23 DIAGNOSIS — M51.34 DDD (DEGENERATIVE DISC DISEASE), THORACIC: ICD-10-CM

## 2024-04-23 PROCEDURE — 72146 MRI CHEST SPINE W/O DYE: CPT

## 2024-05-20 ENCOUNTER — OFFICE VISIT (OUTPATIENT)
Age: 44
End: 2024-05-20
Payer: COMMERCIAL

## 2024-05-20 VITALS
BODY MASS INDEX: 23.49 KG/M2 | SYSTOLIC BLOOD PRESSURE: 98 MMHG | OXYGEN SATURATION: 97 % | RESPIRATION RATE: 18 BRPM | HEART RATE: 74 BPM | WEIGHT: 141 LBS | DIASTOLIC BLOOD PRESSURE: 66 MMHG | HEIGHT: 65 IN | TEMPERATURE: 97.6 F

## 2024-05-20 DIAGNOSIS — R13.10 DYSPHAGIA, UNSPECIFIED TYPE: ICD-10-CM

## 2024-05-20 DIAGNOSIS — R05.3 CHRONIC COUGH: ICD-10-CM

## 2024-05-20 DIAGNOSIS — K21.9 GASTROESOPHAGEAL REFLUX DISEASE, UNSPECIFIED WHETHER ESOPHAGITIS PRESENT: ICD-10-CM

## 2024-05-20 DIAGNOSIS — Z72.0 TOBACCO USE: ICD-10-CM

## 2024-05-20 DIAGNOSIS — K44.9 HIATAL HERNIA: Primary | ICD-10-CM

## 2024-05-20 PROCEDURE — 99213 OFFICE O/P EST LOW 20 MIN: CPT | Performed by: SURGERY

## 2024-05-20 NOTE — PROGRESS NOTES
Chief Complaint   Patient presents with    Follow-up     Egd     1. Have you been to the ER, urgent care clinic since your last visit?  Hospitalized since your last visit?No    2. Have you seen or consulted any other health care providers outside of the [facility] since your last visit?  Include any pap smears or colon screening. Yes ears nose throat and pcp   
bleeding, infection, visceral injury, VTE, MI, CVA, recurrence of hiatal/paraesophageal hernia, dysphagia, gas bloat, refractory/recurrent reflux/regurgitation, need for future procedures, persistent need for medical management, etc).     We reviewed smoking cessation recommendations related to chronic cough and risk of infection/healing issues related to surgery, as well as risk of anesthesia complications/pulmonary/cardiac risk.      We will proceed with the following workup:    Tests/clearances ordered:  Strongly recommended smoking cessation  Cardiac clearance  He wants to wait to schedule surgery until he can cut back on tobacco use as well as until his mother's medical status has stabilized.     Based on the workup and clinical presentation, the patient would likely be best served with MIS paraesophageal hernia repair, possible mesh placement, fundoplication     All questions from the patient have been answered and they have demonstrated appropriate understanding of the process.      Signed By: Asa Pete MD [facility]  Bariatric and General Surgeon  [facility]    May 20, 2024

## 2024-05-29 ENCOUNTER — OFFICE VISIT (OUTPATIENT)
Age: 44
End: 2024-05-29
Payer: COMMERCIAL

## 2024-05-29 VITALS
WEIGHT: 140 LBS | OXYGEN SATURATION: 97 % | SYSTOLIC BLOOD PRESSURE: 104 MMHG | HEIGHT: 60 IN | BODY MASS INDEX: 27.48 KG/M2 | HEART RATE: 70 BPM | DIASTOLIC BLOOD PRESSURE: 62 MMHG

## 2024-05-29 DIAGNOSIS — Z01.810 PREOP CARDIOVASCULAR EXAM: ICD-10-CM

## 2024-05-29 DIAGNOSIS — R06.00 DYSPNEA, UNSPECIFIED TYPE: ICD-10-CM

## 2024-05-29 DIAGNOSIS — Z72.0 TOBACCO USE: ICD-10-CM

## 2024-05-29 DIAGNOSIS — R94.31 ABNORMAL ELECTROCARDIOGRAPHY: ICD-10-CM

## 2024-05-29 DIAGNOSIS — R55 SYNCOPE AND COLLAPSE: Primary | ICD-10-CM

## 2024-05-29 PROCEDURE — 93000 ELECTROCARDIOGRAM COMPLETE: CPT | Performed by: INTERNAL MEDICINE

## 2024-05-29 PROCEDURE — 99214 OFFICE O/P EST MOD 30 MIN: CPT | Performed by: INTERNAL MEDICINE

## 2024-05-29 NOTE — PROGRESS NOTES
History of Present Illness:  44 year-old male here for followup, as well as preoperative cardiovascular exam.  He has some chronic dyspnea and is still smoking about a pack a day and trying to quit.  No chest pain.  He has lost about six or seven pounds since I last saw him.  I removed his subcutaneous loop recorder in 05/2023 and he is hoping to get complex hiatal surgery done this fall.  He was told he needs to stop smoking.      Impression:  Preoperative cardiovascular exam for complex abdominal surgery for sliding hernia.  First stage will be laparoscopic bringing down the hernia in the abdominal cavity and ultimately with mesh and fundal plication.    Ongoing tobacco use, one pack a day.    History of exertional dyspnea, concern for anginal equivalent.    Mildly abnormal EKG with poor R-wave progression and possible old septal MI with Q-waves present.   Hypertension history, but now a bit on low side.   History of remote subcutaneous loop recorder 2018 with ectopy with PACs, removal 05/2023.      Plan:  Given his risk factors with tobacco use and limited functional status and exertional dyspnea, I am going to obtain a pharmacologic nuclear stress test, as well as echocardiogram.  If these are low risk with normal EF, he can proceed.  I will tentatively sew him back in a year.        Wt Readings from Last 3 Encounters:   05/20/24 64 kg (141 lb)   04/08/24 64 kg (141 lb)   03/12/24 64 kg (141 lb)     Past Medical History:   Diagnosis Date    Agatston CAC score 100-199 02/04/2015    Coronary calcium score 0.    Anxiety     Arm fatigue     Forearms    Balance problems     Chronic cough     chronically coughing up sputum    Depression     Dizziness     Dyspnea     Esophageal reflux     Headache     History of echocardiogram 10/29/2014    EF 55-60%.  No RWMA.  Normal diastolic fx.  RVSP 30 mmHg.      Holter monitor, abnormal 10/29/2014    Normal Holter study.    Hypercholesterolemia     Lumbar pain     Myofascial

## 2024-06-13 ENCOUNTER — TELEPHONE (OUTPATIENT)
Age: 44
End: 2024-06-13

## 2024-06-13 NOTE — TELEPHONE ENCOUNTER
Pt called to say he was playing in pool with daughter and lifting her up multiple times and now he has chest rib and back pain plus he feels like his area where food had been feeling like getting stuck is now in chest area so a little lower than previous.  No fever no vomiting. He has chronic neck and back pain and has appt Monday with his specialist.  He has started his cardiac clearance prep but needs to see them in approx one month for stress test and echo.  He is able to tolerated liquids and eat.  Encouraged small frequent  meals through chewing and protein supplements and to call back for appt if condition worsens.  He is on omeprazole twice a day which he states he takes daily.

## 2024-06-17 ENCOUNTER — OFFICE VISIT (OUTPATIENT)
Age: 44
End: 2024-06-17
Payer: COMMERCIAL

## 2024-06-17 VITALS — WEIGHT: 140 LBS | RESPIRATION RATE: 14 BRPM | HEIGHT: 60 IN | BODY MASS INDEX: 27.48 KG/M2

## 2024-06-17 DIAGNOSIS — M51.36 DDD (DEGENERATIVE DISC DISEASE), LUMBAR: ICD-10-CM

## 2024-06-17 DIAGNOSIS — M99.06 LOWER LIMB REGION SOMATIC DYSFUNCTION: ICD-10-CM

## 2024-06-17 DIAGNOSIS — M99.05 PELVIC REGION SOMATIC DYSFUNCTION: ICD-10-CM

## 2024-06-17 DIAGNOSIS — M99.09 SOMATIC DYSFUNCTION OF ABDOMINAL REGION: ICD-10-CM

## 2024-06-17 DIAGNOSIS — M50.30 DDD (DEGENERATIVE DISC DISEASE), CERVICAL: ICD-10-CM

## 2024-06-17 DIAGNOSIS — M51.34 DDD (DEGENERATIVE DISC DISEASE), THORACIC: Primary | ICD-10-CM

## 2024-06-17 DIAGNOSIS — M99.03 SOMATIC DYSFUNCTION OF LUMBAR REGION: ICD-10-CM

## 2024-06-17 DIAGNOSIS — M99.07 UPPER EXTREMITY SOMATIC DYSFUNCTION: ICD-10-CM

## 2024-06-17 DIAGNOSIS — M99.08 RIB CAGE REGION SOMATIC DYSFUNCTION: ICD-10-CM

## 2024-06-17 DIAGNOSIS — M99.02 THORACIC REGION SOMATIC DYSFUNCTION: ICD-10-CM

## 2024-06-17 DIAGNOSIS — M99.04 SACRAL REGION SOMATIC DYSFUNCTION: ICD-10-CM

## 2024-06-17 DIAGNOSIS — M99.01 CERVICAL SOMATIC DYSFUNCTION: ICD-10-CM

## 2024-06-17 PROCEDURE — 99214 OFFICE O/P EST MOD 30 MIN: CPT | Performed by: FAMILY MEDICINE

## 2024-06-17 PROCEDURE — 98929 OSTEOPATH MANJ 9-10 REGIONS: CPT | Performed by: FAMILY MEDICINE

## 2024-06-17 NOTE — PROGRESS NOTES
HISTORY OF PRESENT ILLNESS    Mika Clark 1980 is a 44 y.o. year old male comes in today to be evaluated and treated for: pain upper back, left ribs    Since last appt 3/12/2024 has noticed pain and pop when holding daughter in pool and then worsened with moving office furniture around and filing cabinet. Pain level 6/10. Using stretches, PT on own without benefit.    IMAGING: MRI lumbar 4/23/2024  IMPRESSION  Tiny disc herniation at T9-T10.  No significant central canal or foraminal narrowing throughout the thoracic  spinal column.    XR cervical 12/14/2023  IMPRESSION:  C6-C7 congenital fusion.  Multilevel grade 1 retrolisthesis.  No disproportionate degenerative intervertebral disc space narrowing.     XR thoracic 12/14/2023  IMPRESSION:  1.  No acute pathology appreciated in the thoracic spine.   2.  S-shaped, rotoscoliotic thoracolumbar spine.  3.  Moderate sized paraesophageal hernia.     XR lumbar 12/14/2023  IMPRESSION:  No acute fracture or subluxation.     XR whole spine 8/6/2020  IMPRESSION:  1.  S-shaped thoracolumbar scoliosis. No significant coronal or sagittal  balance.  2.  Exaggerated thoracic kyphosis. Mild left-sided upward pelvic tilt.  3.  Mild to moderate degenerative changes above. Partial C6/C7 congenital  fusion.  4.  Transitional anatomy with 13 thoracic type vertebral bodies.     MRI thoracic 8/6/2020  IMPRESSION:  Mild rotoscoliosis convexity to the right  Minimal kyphotic angulation  Incidental hiatal hernia    Past Surgical History:   Procedure Laterality Date    APPENDECTOMY      lap    EP DEVICE PROCEDURE N/A 05/08/2023    Loop recorder removal performed by Sunny Mccracken MD at [facility] CARDIAC CATH LAB    HEENT  10/08 & 09/09    Reformed jaw bone, bone graft    HEENT      plastic surgery on both ears    HEENT      corrective hearing for left ear     IMPLANTATION PT-ACTIVATED CARDIAC EVENT RECORDER N/A 10/24/2018    Loop Recorder Insert performed by Sunny Mccracken MD at [facility]

## 2024-08-15 ENCOUNTER — OFFICE VISIT (OUTPATIENT)
Age: 44
End: 2024-08-15
Payer: COMMERCIAL

## 2024-08-15 VITALS
SYSTOLIC BLOOD PRESSURE: 88 MMHG | HEART RATE: 58 BPM | WEIGHT: 140 LBS | OXYGEN SATURATION: 97 % | BODY MASS INDEX: 27.34 KG/M2 | DIASTOLIC BLOOD PRESSURE: 58 MMHG

## 2024-08-15 DIAGNOSIS — Z01.810 PREOPERATIVE CARDIOVASCULAR EXAMINATION: Primary | ICD-10-CM

## 2024-08-15 DIAGNOSIS — Z72.0 TOBACCO USE: ICD-10-CM

## 2024-08-15 DIAGNOSIS — R94.31 ABNORMAL EKG: ICD-10-CM

## 2024-08-15 PROCEDURE — 99214 OFFICE O/P EST MOD 30 MIN: CPT | Performed by: INTERNAL MEDICINE

## 2024-08-15 NOTE — PROGRESS NOTES
History of Present Illness:  44 year-old male here for followup.  I saw him in May in anticipation of preoperative exam for a complex hiatal surgery to be done in the fall.  He was told he needed to stop smoking.  Echocardiogram and stress test were reviewed.  No new chest pain, dyspnea, PND, orthopnea or edema.      Impression:   Preoperative cardiovascular exam for complex abdominal surgery with sliding hernia.    Ongoing tobacco use.   History of exertional dyspnea.    Mildly abnormal EKG.   History of hypertension, but now running on the lower side with hypotension.    Remote loop recorder explanted 2023, no prolonged events ever documented or pauses.    Recent nuclear stress test with small amount of artifact, but low risk and no obvious ischemia.   Low normal ejection fraction by recent echocardiogram with mild diastolic dysfunction, but no evidence of heart failure.      Plan:  Ideally, he needs to stop smoking.  His stress test was low risk and echocardiogram showed low normal function.  His blood pressure has been chronically running low and we talked about chronic hydration.  Okay to proceed low risk from my standpoint for surgery.  He may be at risk for some post procedure hypotension or bradycardia.  I can see him back annually if there is no clinical change.        Wt Readings from Last 3 Encounters:   08/15/24 63.5 kg (140 lb)   07/22/24 63.5 kg (140 lb)   07/22/24 63.5 kg (140 lb)     Past Medical History:   Diagnosis Date    Agatston CAC score 100-199 02/04/2015    Coronary calcium score 0.    Anxiety     Arm fatigue     Forearms    Balance problems     Chronic cough     chronically coughing up sputum    Depression     Dizziness     Dyspnea     Esophageal reflux     Headache     History of echocardiogram 10/29/2014    EF 55-60%.  No RWMA.  Normal diastolic fx.  RVSP 30 mmHg.      Holter monitor, abnormal 10/29/2014    Normal Holter study.    Hypercholesterolemia     Lumbar pain     Myofascial pain

## 2024-09-09 ENCOUNTER — OFFICE VISIT (OUTPATIENT)
Age: 44
End: 2024-09-09
Payer: COMMERCIAL

## 2024-09-09 VITALS
DIASTOLIC BLOOD PRESSURE: 57 MMHG | TEMPERATURE: 98 F | WEIGHT: 140.6 LBS | RESPIRATION RATE: 17 BRPM | HEIGHT: 60 IN | BODY MASS INDEX: 27.61 KG/M2 | OXYGEN SATURATION: 98 % | SYSTOLIC BLOOD PRESSURE: 88 MMHG | HEART RATE: 68 BPM

## 2024-09-09 DIAGNOSIS — K21.9 GASTROESOPHAGEAL REFLUX DISEASE, UNSPECIFIED WHETHER ESOPHAGITIS PRESENT: ICD-10-CM

## 2024-09-09 DIAGNOSIS — R05.3 CHRONIC COUGH: ICD-10-CM

## 2024-09-09 DIAGNOSIS — R13.10 DYSPHAGIA, UNSPECIFIED TYPE: ICD-10-CM

## 2024-09-09 DIAGNOSIS — K44.9 HIATAL HERNIA: Primary | ICD-10-CM

## 2024-09-09 PROCEDURE — 99406 BEHAV CHNG SMOKING 3-10 MIN: CPT | Performed by: SURGERY

## 2024-09-09 PROCEDURE — 99212 OFFICE O/P EST SF 10 MIN: CPT | Performed by: SURGERY

## 2024-09-09 RX ORDER — NICOTINE 21 MG/24HR
PATCH, TRANSDERMAL 24 HOURS TRANSDERMAL
Qty: 14 PATCH | Refills: 0 | Status: SHIPPED | OUTPATIENT
Start: 2024-09-09

## 2024-09-09 RX ORDER — NICOTINE 21 MG/24HR
PATCH, TRANSDERMAL 24 HOURS TRANSDERMAL
Qty: 42 PATCH | Refills: 0 | Status: SHIPPED | OUTPATIENT
Start: 2024-09-09

## 2024-11-17 ENCOUNTER — APPOINTMENT (OUTPATIENT)
Facility: HOSPITAL | Age: 44
End: 2024-11-17
Payer: COMMERCIAL

## 2024-11-17 ENCOUNTER — HOSPITAL ENCOUNTER (EMERGENCY)
Facility: HOSPITAL | Age: 44
Discharge: HOME | End: 2024-11-17
Attending: STUDENT IN AN ORGANIZED HEALTH CARE EDUCATION/TRAINING PROGRAM
Payer: COMMERCIAL

## 2024-11-17 VITALS
HEART RATE: 66 BPM | WEIGHT: 140 LBS | HEIGHT: 65 IN | RESPIRATION RATE: 12 BRPM | BODY MASS INDEX: 23.32 KG/M2 | SYSTOLIC BLOOD PRESSURE: 106 MMHG | DIASTOLIC BLOOD PRESSURE: 69 MMHG | OXYGEN SATURATION: 99 % | TEMPERATURE: 97.4 F

## 2024-11-17 DIAGNOSIS — R07.9 CHEST PAIN, UNSPECIFIED TYPE: ICD-10-CM

## 2024-11-17 DIAGNOSIS — R00.2 PALPITATIONS: Primary | ICD-10-CM

## 2024-11-17 LAB
ANION GAP SERPL CALC-SCNC: 2 MMOL/L (ref 3–18)
BASOPHILS # BLD: 0 K/UL (ref 0–0.1)
BASOPHILS NFR BLD: 1 % (ref 0–2)
BUN SERPL-MCNC: 13 MG/DL (ref 7–18)
BUN/CREAT SERPL: 14 (ref 12–20)
CALCIUM SERPL-MCNC: 9.3 MG/DL (ref 8.5–10.1)
CHLORIDE SERPL-SCNC: 106 MMOL/L (ref 100–111)
CO2 SERPL-SCNC: 30 MMOL/L (ref 21–32)
CREAT SERPL-MCNC: 0.9 MG/DL (ref 0.6–1.3)
DIFFERENTIAL METHOD BLD: NORMAL
EOSINOPHIL # BLD: 0.2 K/UL (ref 0–0.4)
EOSINOPHIL NFR BLD: 2 % (ref 0–5)
ERYTHROCYTE [DISTWIDTH] IN BLOOD BY AUTOMATED COUNT: 12.8 % (ref 11.6–14.5)
GLUCOSE SERPL-MCNC: 112 MG/DL (ref 74–99)
HCT VFR BLD AUTO: 42.1 % (ref 36–48)
HGB BLD-MCNC: 14.4 G/DL (ref 13–16)
IMM GRANULOCYTES # BLD AUTO: 0 K/UL (ref 0–0.04)
IMM GRANULOCYTES NFR BLD AUTO: 0 % (ref 0–0.5)
LYMPHOCYTES # BLD: 2.6 K/UL (ref 0.9–3.6)
LYMPHOCYTES NFR BLD: 35 % (ref 21–52)
MAGNESIUM SERPL-MCNC: 2.1 MG/DL (ref 1.6–2.6)
MCH RBC QN AUTO: 30 PG (ref 24–34)
MCHC RBC AUTO-ENTMCNC: 34.2 G/DL (ref 31–37)
MCV RBC AUTO: 87.7 FL (ref 78–100)
MONOCYTES # BLD: 0.5 K/UL (ref 0.05–1.2)
MONOCYTES NFR BLD: 7 % (ref 3–10)
NEUTS SEG # BLD: 4.2 K/UL (ref 1.8–8)
NEUTS SEG NFR BLD: 56 % (ref 40–73)
NRBC # BLD: 0 K/UL (ref 0–0.01)
NRBC BLD-RTO: 0 PER 100 WBC
NT PRO BNP: 7 PG/ML (ref 0–450)
PLATELET # BLD AUTO: 195 K/UL (ref 135–420)
PMV BLD AUTO: 10 FL (ref 9.2–11.8)
POTASSIUM SERPL-SCNC: 3.9 MMOL/L (ref 3.5–5.5)
RBC # BLD AUTO: 4.8 M/UL (ref 4.35–5.65)
SODIUM SERPL-SCNC: 138 MMOL/L (ref 136–145)
TROPONIN I SERPL HS-MCNC: <3 NG/L (ref 0–78)
WBC # BLD AUTO: 7.6 K/UL (ref 4.6–13.2)

## 2024-11-17 PROCEDURE — 71045 X-RAY EXAM CHEST 1 VIEW: CPT

## 2024-11-17 PROCEDURE — 93005 ELECTROCARDIOGRAM TRACING: CPT | Performed by: STUDENT IN AN ORGANIZED HEALTH CARE EDUCATION/TRAINING PROGRAM

## 2024-11-17 PROCEDURE — 83880 ASSAY OF NATRIURETIC PEPTIDE: CPT

## 2024-11-17 PROCEDURE — 99285 EMERGENCY DEPT VISIT HI MDM: CPT

## 2024-11-17 PROCEDURE — 80048 BASIC METABOLIC PNL TOTAL CA: CPT

## 2024-11-17 PROCEDURE — 83735 ASSAY OF MAGNESIUM: CPT

## 2024-11-17 PROCEDURE — 85025 COMPLETE CBC W/AUTO DIFF WBC: CPT

## 2024-11-17 PROCEDURE — 84484 ASSAY OF TROPONIN QUANT: CPT

## 2024-11-17 ASSESSMENT — PAIN - FUNCTIONAL ASSESSMENT
PAIN_FUNCTIONAL_ASSESSMENT: ACTIVITIES ARE NOT PREVENTED
PAIN_FUNCTIONAL_ASSESSMENT: 0-10

## 2024-11-17 ASSESSMENT — PAIN DESCRIPTION - DESCRIPTORS: DESCRIPTORS: ACHING

## 2024-11-17 ASSESSMENT — PAIN DESCRIPTION - ONSET: ONSET: PROGRESSIVE

## 2024-11-17 ASSESSMENT — PAIN DESCRIPTION - ORIENTATION: ORIENTATION: MID

## 2024-11-17 ASSESSMENT — PAIN SCALES - GENERAL: PAINLEVEL_OUTOF10: 4

## 2024-11-17 ASSESSMENT — PAIN DESCRIPTION - LOCATION: LOCATION: CHEST

## 2024-11-17 ASSESSMENT — PAIN DESCRIPTION - FREQUENCY: FREQUENCY: CONTINUOUS

## 2024-11-17 ASSESSMENT — PAIN DESCRIPTION - PAIN TYPE: TYPE: ACUTE PAIN

## 2024-11-18 LAB
EKG ATRIAL RATE: 76 BPM
EKG DIAGNOSIS: NORMAL
EKG P AXIS: 70 DEGREES
EKG P-R INTERVAL: 154 MS
EKG Q-T INTERVAL: 386 MS
EKG QRS DURATION: 106 MS
EKG QTC CALCULATION (BAZETT): 434 MS
EKG R AXIS: 80 DEGREES
EKG T AXIS: 58 DEGREES
EKG VENTRICULAR RATE: 76 BPM

## 2024-11-18 PROCEDURE — 93010 ELECTROCARDIOGRAM REPORT: CPT | Performed by: INTERNAL MEDICINE

## 2024-11-18 NOTE — ED PROVIDER NOTES
EMERGENCY DEPARTMENT HISTORY AND PHYSICAL EXAM      Date: 11/17/2024  Patient Name: Mika Clark    History of Presenting Illness     Chief Complaint   Patient presents with    Chest Pain    Fatigue       History (Context): Mika Clark is a 44 y.o. male  presents to the ED today with chief complaint of palpitations, lightheadedness, and chest pain.  Patient states symptoms have been ongoing for over the past 1 to 2 weeks.  States has had similar symptoms in the past however not this persistent.  States that chest pain has resolved and has not had any palpitations since arrival in the emergency department.  Further denies any fever, chills, dyspnea, abdominal pain, nausea, or vomiting associated with his symptoms.      PCP: Grzegorz Sevilla, DO    No current facility-administered medications for this encounter.     Current Outpatient Medications   Medication Sig Dispense Refill    nicotine (NICODERM CQ) 14 MG/24HR For greater than 10 cigarettes per day: apply one 21mg patch once daily for 6 weeks (42 days). Then apply 14mg patch once daily for 2 weeks (14 days). Then apply 7mg patch once daily for 2 weeks (14 days). 14 patch 0    nicotine (NICODERM CQ) 7 MG/24HR For greater than 10 cigarettes per day: apply one 21mg patch once daily for 6 weeks (42 days). Then apply 14mg patch once daily for 2 weeks (14 days). Then apply 7mg patch once daily for 2 weeks (14 days). 14 each 0    nicotine (NICODERM CQ) 21 MG/24HR For greater than 10 cigarettes per day: apply one 21mg patch once daily for 6 weeks (42 days). Then apply 14mg patch once daily for 2 weeks (14 days). Then apply 7mg patch once daily for 2 weeks (14 days). 42 patch 0    omeprazole (PRILOSEC) 20 MG delayed release capsule Take 2 capsules by mouth daily      Multiple Vitamin (MULTIVITAMIN PO) Take 1 tablet by mouth daily      acetaminophen (TYLENOL) 500 MG tablet Take 1 tablet by mouth every 6 hours as needed      ALPRAZolam (XANAX) 0.25 MG tablet

## 2024-11-18 NOTE — RESULT ENCOUNTER NOTE
Contacted patient regarding hiatal hernia found on chest x-ray, he said that he is aware of this and has seen a GI doctor and they are considering surgery.  Patient given strict return precautions.

## 2024-11-18 NOTE — ED TRIAGE NOTES
Patient complains of skipping heart beats when laying down. 4/10 chest pain, endorses pain feels achy and deep. Endorses feeling dizzy and shortness of breath during fluttering episodes. H/o low BP, palpitations, large hernia that goes into chest wall.

## 2025-01-02 ENCOUNTER — OFFICE VISIT (OUTPATIENT)
Age: 45
End: 2025-01-02
Payer: COMMERCIAL

## 2025-01-02 ENCOUNTER — HOSPITAL ENCOUNTER (OUTPATIENT)
Facility: HOSPITAL | Age: 45
Discharge: HOME | End: 2025-01-05

## 2025-01-02 VITALS — WEIGHT: 144 LBS | BODY MASS INDEX: 23.96 KG/M2

## 2025-01-02 DIAGNOSIS — M51.34 DDD (DEGENERATIVE DISC DISEASE), THORACIC: ICD-10-CM

## 2025-01-02 DIAGNOSIS — M99.06 LOWER LIMB REGION SOMATIC DYSFUNCTION: ICD-10-CM

## 2025-01-02 DIAGNOSIS — M99.04 SACRAL REGION SOMATIC DYSFUNCTION: ICD-10-CM

## 2025-01-02 DIAGNOSIS — M99.01 CERVICAL SOMATIC DYSFUNCTION: ICD-10-CM

## 2025-01-02 DIAGNOSIS — M99.03 SOMATIC DYSFUNCTION OF LUMBAR REGION: ICD-10-CM

## 2025-01-02 DIAGNOSIS — M50.30 DDD (DEGENERATIVE DISC DISEASE), CERVICAL: ICD-10-CM

## 2025-01-02 DIAGNOSIS — M99.07 UPPER EXTREMITY SOMATIC DYSFUNCTION: ICD-10-CM

## 2025-01-02 DIAGNOSIS — M51.360 DEGENERATION OF INTERVERTEBRAL DISC OF LUMBAR REGION WITH DISCOGENIC BACK PAIN: ICD-10-CM

## 2025-01-02 DIAGNOSIS — M51.360 DEGENERATION OF INTERVERTEBRAL DISC OF LUMBAR REGION WITH DISCOGENIC BACK PAIN: Primary | ICD-10-CM

## 2025-01-02 DIAGNOSIS — M99.08 RIB CAGE REGION SOMATIC DYSFUNCTION: ICD-10-CM

## 2025-01-02 DIAGNOSIS — M99.09 SOMATIC DYSFUNCTION OF ABDOMINAL REGION: ICD-10-CM

## 2025-01-02 DIAGNOSIS — M99.02 THORACIC REGION SOMATIC DYSFUNCTION: ICD-10-CM

## 2025-01-02 DIAGNOSIS — M99.05 PELVIC REGION SOMATIC DYSFUNCTION: ICD-10-CM

## 2025-01-02 PROCEDURE — 98929 OSTEOPATH MANJ 9-10 REGIONS: CPT | Performed by: FAMILY MEDICINE

## 2025-01-02 PROCEDURE — 99214 OFFICE O/P EST MOD 30 MIN: CPT | Performed by: FAMILY MEDICINE

## 2025-01-02 NOTE — PROGRESS NOTES
HISTORY OF PRESENT ILLNESS    Mika Clark 1980 is a 44 y.o. year old male comes in today to be evaluated and treated for: back pain - chronic    Since last appt 6/17/2024 has noticed issues worse 3 different times in the last 4 months. Pain level 4/10. Using chiro with some benefit. Did try advil and admits minimal HEP. Voltaren 50mg or Voltaren gel helps a bit.    IMAGING: XR lumbar pending    MRI lumbar 4/23/2024  IMPRESSION  Tiny disc herniation at T9-T10.  No significant central canal or foraminal narrowing throughout the thoracic  spinal column.     XR cervical 12/14/2023  IMPRESSION:  C6-C7 congenital fusion.  Multilevel grade 1 retrolisthesis.  No disproportionate degenerative intervertebral disc space narrowing.     XR thoracic 12/14/2023  IMPRESSION:  1.  No acute pathology appreciated in the thoracic spine.   2.  S-shaped, rotoscoliotic thoracolumbar spine.  3.  Moderate sized paraesophageal hernia.     XR lumbar 12/14/2023  IMPRESSION:  No acute fracture or subluxation.     XR whole spine 8/6/2020  IMPRESSION:  1.  S-shaped thoracolumbar scoliosis. No significant coronal or sagittal  balance.  2.  Exaggerated thoracic kyphosis. Mild left-sided upward pelvic tilt.  3.  Mild to moderate degenerative changes above. Partial C6/C7 congenital  fusion.  4.  Transitional anatomy with 13 thoracic type vertebral bodies.     MRI thoracic 8/6/2020  IMPRESSION:  Mild rotoscoliosis convexity to the right  Minimal kyphotic angulation  Incidental hiatal hernia    Past Surgical History:   Procedure Laterality Date    APPENDECTOMY      lap    EP DEVICE PROCEDURE N/A 05/08/2023    Loop recorder removal performed by Sunny Mccracken MD at [facility] CARDIAC CATH LAB    HEENT  10/08 & 09/09    Reformed jaw bone, bone graft    HEENT      plastic surgery on both ears    HEENT      corrective hearing for left ear     IMPLANTATION PT-ACTIVATED CARDIAC EVENT RECORDER N/A 10/24/2018    Loop Recorder Insert performed by [facility]

## 2025-03-04 ENCOUNTER — OFFICE VISIT (OUTPATIENT)
Age: 45
End: 2025-03-04

## 2025-03-04 VITALS — BODY MASS INDEX: 23.46 KG/M2 | WEIGHT: 141 LBS

## 2025-03-04 DIAGNOSIS — M99.02 THORACIC REGION SOMATIC DYSFUNCTION: ICD-10-CM

## 2025-03-04 DIAGNOSIS — M99.08 RIB CAGE REGION SOMATIC DYSFUNCTION: ICD-10-CM

## 2025-03-04 DIAGNOSIS — M99.07 UPPER EXTREMITY SOMATIC DYSFUNCTION: ICD-10-CM

## 2025-03-04 DIAGNOSIS — M99.06 LOWER LIMB REGION SOMATIC DYSFUNCTION: ICD-10-CM

## 2025-03-04 DIAGNOSIS — M99.03 SOMATIC DYSFUNCTION OF LUMBAR REGION: ICD-10-CM

## 2025-03-04 DIAGNOSIS — M50.30 DDD (DEGENERATIVE DISC DISEASE), CERVICAL: ICD-10-CM

## 2025-03-04 DIAGNOSIS — M51.34 DDD (DEGENERATIVE DISC DISEASE), THORACIC: ICD-10-CM

## 2025-03-04 DIAGNOSIS — M25.811 SHOULDER IMPINGEMENT, RIGHT: Primary | ICD-10-CM

## 2025-03-04 DIAGNOSIS — M99.04 SACRAL REGION SOMATIC DYSFUNCTION: ICD-10-CM

## 2025-03-04 DIAGNOSIS — M99.05 PELVIC REGION SOMATIC DYSFUNCTION: ICD-10-CM

## 2025-03-04 DIAGNOSIS — M51.360 DEGENERATION OF INTERVERTEBRAL DISC OF LUMBAR REGION WITH DISCOGENIC BACK PAIN: ICD-10-CM

## 2025-03-04 DIAGNOSIS — M99.01 CERVICAL SOMATIC DYSFUNCTION: ICD-10-CM

## 2025-03-04 DIAGNOSIS — M99.09 SOMATIC DYSFUNCTION OF ABDOMINAL REGION: ICD-10-CM

## 2025-03-04 NOTE — PROGRESS NOTES
6. Pelvic region somatic dysfunction  OSTEOPATHIC MANIP,9-10 BODY REGN      7. Sacral region somatic dysfunction  OSTEOPATHIC MANIP,9-10 BODY REGN      8. Thoracic region somatic dysfunction  OSTEOPATHIC MANIP,9-10 BODY REGN      9. Rib cage region somatic dysfunction  OSTEOPATHIC MANIP,9-10 BODY REGN      10. Cervical somatic dysfunction  OSTEOPATHIC MANIP,9-10 BODY REGN      11. Lower limb region somatic dysfunction  OSTEOPATHIC MANIP,9-10 BODY REGN      12. Upper extremity somatic dysfunction  OSTEOPATHIC MANIP,9-10 BODY REGN      13. Somatic dysfunction of abdominal region  OSTEOPATHIC MANIP,9-10 BODY REGN          Patient (or guardian if minor) verbalizes understanding of evaluation and plan.    Verbal consent obtained.  Cervical, Thoracic, Rib, Lumbar, Pelvic, Sacral, Upper Ext, Lower Ext, and Abdominal SD treated with Myofascial and ME.  Correction of previous malalignments verified after Tx.    Pt tolerated well.  Notes improvement of Sx and pain is now rated 0/10.    HEP/stretches daily. Discussed stretching/strengthening/posture.    Will start HEP, PT, and Rx for Voltaren 50mg as above and plan follow-up 6 weeks. Discussed need PT as no HEP and needs strengthening for sure.

## 2025-03-19 ENCOUNTER — HOSPITAL ENCOUNTER (OUTPATIENT)
Facility: HOSPITAL | Age: 45
Setting detail: RECURRING SERIES
Discharge: HOME | End: 2025-03-22
Attending: FAMILY MEDICINE
Payer: COMMERCIAL

## 2025-03-19 PROCEDURE — 97162 PT EVAL MOD COMPLEX 30 MIN: CPT

## 2025-03-19 NOTE — THERAPY EVALUATION
JENNY HACKETT [facility]  [address] [place] [address] Ph:[phone] Fx: [phone]  Plan of Care / Statement of Necessity for Physical Therapy Services     Patient Name: Mika Clark : 1980   Medical   Diagnosis: Shoulder impingement, right [M25.811]  Degeneration of intervertebral disc of lumbar region with discogenic back pain [M51.360]  DDD (degenerative disc disease), thoracic [M51.34]  DDD (degenerative disc disease), cervical [M50.30] Treatment Diagnosis: M25.511  RIGHT SHOULDER PAIN, M79.621  Pain in right upper arm, and M79.622  Pain in left upper arm and M54.2, G89.29  CHRONIC NECK PAIN, M54.6,G89.29  CHRONIC THORACIC BACK PAIN, and M54.59, G89.29  CHRONIC LOWER BACK PAIN      Onset Date: Worsened during the last several months Payor :  [facility] / Product Type: *No Product type* /    Referral Source: Fabián Harris DO Start of Care (SOC): 3/19/2025   Prior Hospitalization: See medical history Provider #: [id number]   Prior Level of Function: mod ind with ADLs/house chores, Right dominant, taking care & playing with his 3 year old, computer work.    Comorbidities:  Other: scoliosis, chronic back/neck problem, depression, potential fibromyalgia, tobacco use       Assessment / allen information:  Mr. Mika Clark is a 43 y/o, M pt with CC of Right shoulder pain, neck pain & back pain. Problem has been chronic, worsened during the last several months. He notes burning all over shoulder/upper arm, worst at ant/Biceps. He starts having some symptoms with Left UE. Bending forwards for about 10 min aggravates back & upper buttock pain. He feels some burning & shakiness along the lat thighs along the day. He also got a script for vertigo/vestibular & headache.  Past imaging show Mild rotoscoliosis convexity to the right of to t/s; Incidental hiatal hernia; Tiny disc herniation at T9-T10; C6-C7 congenital

## 2025-03-19 NOTE — PROGRESS NOTES
PT DAILY TREATMENT NOTE/SHOULDER EVAL     Patient Name: Mika Clark    Date: 3/19/2025    : 1980  Insurance: Payor: JOSE / [facility] / Product Type: *No Product type* /      Patient  verified yes     Visit #   Current / Total 1 16   Time   In / Out 11:25 12:02   Pain   In / Out 5/10 5/10   Subjective Functional Status/Changes: See below       Treatment Area: Shoulder impingement, right  Degeneration of intervertebral disc of lumbar region with discogenic back pain  DDD (degenerative disc disease), thoracic  DDD (degenerative disc disease), cervical    If an interpreting service is utilized for treatment of this patient, the contents of this document represent the material reviewed with the patient via the .       SUBJECTIVE  Pain Level (0-10 scale): 3-10  [x]constant []intermittent []improving []worsening []no change since onset    Any medication changes, allergies to medications, adverse drug reactions, diagnosis change, or new procedure performed?: [x] No    [] Yes (see summary sheet for update)  Subjective functional status/changes:     PLOF: mod ind with ADLs/house chores, Right dominant, taking care & playing with his 3 year old, computer work.  Limitations to PLOF:   Mechanism of Injury:   Current symptoms/Complaints: Mr. Mika Clark is a 43 y/o, M pt with CC of Right shoulder pain, neck pain & back pain. Problem has been chronic, worsened during the last several months. He notes burning all over shoulder/upper arm, worst at ant/Biceps. He starts having some symptoms with Left UE. Bending forwards for about 10 min aggravates back & upper buttock pain. He feels some burning & shakiness along the lat thighs along the day. He also got a script for vertigo/vestibular & headache.  Past imaging show Mild rotoscoliosis convexity to the right of to t/s; Incidental hiatal hernia; Tiny disc herniation at T9-T10; C6-C7 congenital fusion; Multilevel grade 1

## 2025-04-02 ENCOUNTER — HOSPITAL ENCOUNTER (OUTPATIENT)
Facility: HOSPITAL | Age: 45
Setting detail: RECURRING SERIES
Discharge: HOME | End: 2025-04-05
Attending: FAMILY MEDICINE
Payer: COMMERCIAL

## 2025-04-02 PROCEDURE — 97110 THERAPEUTIC EXERCISES: CPT

## 2025-04-02 PROCEDURE — 97112 NEUROMUSCULAR REEDUCATION: CPT

## 2025-04-02 PROCEDURE — 97140 MANUAL THERAPY 1/> REGIONS: CPT

## 2025-04-02 PROCEDURE — 97530 THERAPEUTIC ACTIVITIES: CPT

## 2025-04-02 NOTE — PROGRESS NOTES
PHYSICAL / OCCUPATIONAL THERAPY - DAILY TREATMENT NOTE    Patient Name: Mika Clark    Date: 2025    : 1980  Insurance: Payor: JOSE / Plan: JOSE NOLAND HMO / Product Type: *No Product type* /      Patient  verified Yes     Visit #   Current / Total 2 16   Time   In / Out 10:41 11:20   Pain   In / Out 4/10 6/10 sore   Subjective Functional Status/Changes: Pt reports doing ok today. Some pain along side of neck & numbness along side of Right shin/leg.     TREATMENT AREA =  Shoulder impingement, right  Degeneration of intervertebral disc of lumbar region with discogenic back pain  DDD (degenerative disc disease), thoracic  DDD (degenerative disc disease), cervical  Other low back pain  Pain in right shoulder    OBJECTIVE      Therapeutic Procedures:    Tx Min Billable or 1:1 Min (if diff from Tx Min) Procedure, Rationale, Specifics   10  60481 Therapeutic Activity (timed):  use of dynamic activities replicating functional movements to increase ROM, strength, coordination, balance, and proprioception in order to improve patient's ability to progress to PLOF and address remaining functional goals.  (see flow sheet as applicable)     Details if applicable:       10  60730 Therapeutic Exercise (timed):  increase ROM, strength, coordination, balance, and proprioception to improve patient's ability to progress to PLOF and address remaining functional goals. (see flow sheet as applicable)     Details if applicable:     10  03168 Manual Therapy (timed):  decrease pain, increase ROM, increase tissue extensibility, and decrease trigger points to improve patient's ability to progress to PLOF and address remaining functional goals.  The manual therapy interventions were performed at a separate and distinct time from the therapeutic activities interventions . (see flow sheet as applicable)     Details if applicable:  DTM/TPR for B infraspinatus, Rhomboids, mid trap, DTM for c/s paraspinals, Uts, SCM,

## 2025-04-04 ENCOUNTER — APPOINTMENT (OUTPATIENT)
Facility: HOSPITAL | Age: 45
End: 2025-04-04
Attending: FAMILY MEDICINE
Payer: COMMERCIAL

## 2025-04-09 ENCOUNTER — TELEPHONE (OUTPATIENT)
Facility: HOSPITAL | Age: 45
End: 2025-04-09

## 2025-04-11 ENCOUNTER — HOSPITAL ENCOUNTER (OUTPATIENT)
Facility: HOSPITAL | Age: 45
Setting detail: RECURRING SERIES
Discharge: HOME | End: 2025-04-14
Attending: FAMILY MEDICINE
Payer: COMMERCIAL

## 2025-04-11 PROCEDURE — 97110 THERAPEUTIC EXERCISES: CPT

## 2025-04-11 PROCEDURE — 97535 SELF CARE MNGMENT TRAINING: CPT

## 2025-04-11 PROCEDURE — 97112 NEUROMUSCULAR REEDUCATION: CPT

## 2025-04-11 PROCEDURE — 97140 MANUAL THERAPY 1/> REGIONS: CPT

## 2025-04-11 NOTE — PROGRESS NOTES
PHYSICAL / OCCUPATIONAL THERAPY - DAILY TREATMENT NOTE    Patient Name: Mika Clark    Date: 2025    : 1980  Insurance: Payor: JOSE / Plan: JOSE DAN HMO / Product Type: *No Product type* /      Patient  verified Yes     Visit #   Current / Total 3 16   Time   In / Out 1050 1145   Pain   In / Out 6 4   Subjective Functional Status/Changes: Reports Pain at base of skull and along the right side of his neck.      TREATMENT AREA =  Shoulder impingement, right  Degeneration of intervertebral disc of lumbar region with discogenic back pain  DDD (degenerative disc disease), thoracic  DDD (degenerative disc disease), cervical  Other low back pain  Pain in right shoulder    OBJECTIVE    Modalities Rationale:     decrease pain to improve patient's ability to progress to PLOF and address remaining functional goals.     min [] Estim Unattended, type/location:                                      []  w/ice    []  w/heat    min [] Estim Attended, type/location:                                     []  w/US     []  w/ice    []  w/heat    []  TENS insruct      min []  Mechanical Traction: type/lbs                   []  pro   []  sup   []  int   []  cont    []  before manual    []  after manual    min []  Ultrasound, settings/location:     10 min  unbill []  Ice     [x]  Heat    location/position: Seated/ bilat sh    min []  Paraffin,  details:     min []  Vasopneumatic Device, press/temp:     min []  Whirlpool / Fluido:    If using vaso (only need to measure limb vaso being performed on)      pre-treatment girth :       post-treatment girth :       measured at (landmark location) :      min []  Other:    Skin assessment post-treatment:   Intact      Therapeutic Procedures:    Tx Min Billable or 1:1 Min (if diff from Tx Min) Procedure, Rationale, Specifics   12  80440 Therapeutic Exercise (timed):  increase ROM, strength, coordination, balance, and proprioception to improve patient's ability to

## 2025-04-16 ENCOUNTER — APPOINTMENT (OUTPATIENT)
Facility: HOSPITAL | Age: 45
End: 2025-04-16
Attending: FAMILY MEDICINE
Payer: COMMERCIAL

## 2025-04-18 ENCOUNTER — HOSPITAL ENCOUNTER (OUTPATIENT)
Facility: HOSPITAL | Age: 45
Setting detail: RECURRING SERIES
Discharge: HOME | End: 2025-04-21
Attending: FAMILY MEDICINE
Payer: COMMERCIAL

## 2025-04-18 PROCEDURE — 97112 NEUROMUSCULAR REEDUCATION: CPT

## 2025-04-18 PROCEDURE — 97110 THERAPEUTIC EXERCISES: CPT

## 2025-04-18 PROCEDURE — 97530 THERAPEUTIC ACTIVITIES: CPT

## 2025-04-18 PROCEDURE — 97140 MANUAL THERAPY 1/> REGIONS: CPT

## 2025-04-18 NOTE — PROGRESS NOTES
[facility]  [address] [place] [address] - Ph: [phone]   Fx: [phone]  PHYSICAL THERAPY PROGRESS NOTE  [x] Progress Note  [] Discharge Summary    Patient Name: Mika Clark : 1980   Treatment/Medical Diagnosis: Shoulder impingement, right [M25.811]  Degeneration of intervertebral disc of lumbar region with discogenic back pain [M51.360]  DDD (degenerative disc disease), thoracic [M51.34]  DDD (degenerative disc disease), cervical [M50.30]   Referral Source: Fabián Harris DO     Date of Initial Visit: 3/19/25 Attended Visits: 3 Missed Visits: 2       Prior Level of Function: mod ind with ADLs/house chores, Right dominant, taking care & playing with his 3 year old, computer work.    Comorbidities:  Other: scoliosis, chronic back/neck problem, depression, potential fibromyalgia, tobacco use     SUMMARY OF TREATMENT  Pt has had Eval and 3 visits so far . He has missed and cancelled a visit recently due to having to go to the ER for Low Blood Pressure. Pt continues to have left CS pain and right anterior neck pain. Pt admits to and right shoulder pain and radicular sx down his left UE along the Ulna Nerve distribution.   Vertigo is intermittent. Hallpike Noble Test is Negative bilaterally. Pt has increased muscle tightness to his upper back and shoulders due to postural dysfunction.    % improved =2% ( visits)  Quick Dash =54%  Oswestry= 42%  Forward and side plank x 30 sec.   BP= 92/56 pulse 84-pre  BP= 85/63 pulse 78 -post    Patient will continue to benefit from skilled PT / OT services to modify and progress therapeutic interventions, analyze and address functional mobility deficits, analyze and address ROM deficits, analyze and address strength deficits, analyze and address soft tissue restrictions, analyze and cue for proper movement patterns, analyze and modify for postural abnormalities, analyze and address

## 2025-04-18 NOTE — PROGRESS NOTES
PHYSICAL / OCCUPATIONAL THERAPY - DAILY TREATMENT NOTE    Patient Name: Mika Clark    Date: 2025    : 1980  Insurance: Payor: JOSE / Plan: JOSE DAN HMO / Product Type: *No Product type* /      Patient  verified Yes     Visit #   Current / Total 4 16   Time   In / Out 1042 1123   Pain   In / Out 4 c/s, 2 sh 4 c/s, 2 sh   Subjective Functional Status/Changes: Was in the ER for LBP and had to miss last session. Reports he had a spinning type feeling yesterday.       TREATMENT AREA =  Shoulder impingement, right  Degeneration of intervertebral disc of lumbar region with discogenic back pain  DDD (degenerative disc disease), thoracic  DDD (degenerative disc disease), cervical  Other low back pain  Pain in right shoulder    OBJECTIVE         Therapeutic Procedures:    Tx Min Billable or 1:1 Min (if diff from Tx Min) Procedure, Rationale, Specifics   10  40278 Therapeutic Exercise (timed):  increase ROM, strength, coordination, balance, and proprioception to improve patient's ability to progress to PLOF and address remaining functional goals. (see flow sheet as applicable)     Details if applicable:       15  54100 Neuromuscular Re-Education (timed):  improve balance, coordination, kinesthetic sense, posture, core stability and proprioception to improve patient's ability to develop conscious control of individual muscles and awareness of position of extremities in order to progress to PLOF and address remaining functional goals. (see flow sheet as applicable)     Details if applicable:     8  98964 Therapeutic Activity (timed):  use of dynamic activities replicating functional movements to increase ROM, strength, coordination, balance, and proprioception in order to improve patient's ability to progress to PLOF and address remaining functional goals.  (see flow sheet as applicable)     Details if applicable:     8  67049 Manual Therapy (timed):  decrease pain, increase ROM, and decrease

## 2025-04-22 ENCOUNTER — HOSPITAL ENCOUNTER (OUTPATIENT)
Facility: HOSPITAL | Age: 45
Setting detail: RECURRING SERIES
Discharge: HOME | End: 2025-04-25
Attending: FAMILY MEDICINE
Payer: COMMERCIAL

## 2025-04-22 PROCEDURE — 97530 THERAPEUTIC ACTIVITIES: CPT

## 2025-04-22 PROCEDURE — 97112 NEUROMUSCULAR REEDUCATION: CPT

## 2025-04-22 PROCEDURE — 97535 SELF CARE MNGMENT TRAINING: CPT

## 2025-04-22 PROCEDURE — 97110 THERAPEUTIC EXERCISES: CPT

## 2025-04-22 NOTE — PROGRESS NOTES
PHYSICAL / OCCUPATIONAL THERAPY - DAILY TREATMENT NOTE    Patient Name: Mika Clark    Date: 2025    : 1980  Insurance: Payor: JOSE [facility] / Product Type: *No Product type* /      Patient  verified Yes     Visit #   Current / Total 1 8   Time   In / Out 10:48 11:30   Pain   In / Out 6/10 c/s and 4/10 shoulder 4/10   Subjective Functional Status/Changes: Pt reports increased right shoulder pain with tasks like brushing his teeth or completing work on the computer. He states he still gets pain on the left side of his neck as well. He struggles with the neck stretches because they cause dizziness and then that worries him about something else going on. He has been cleared by ENT and his eye doctor. He goes to a neurologist on Friday. His c/s MRI was denied because he hadn't done therapy yet.      TREATMENT AREA =  Shoulder impingement, right  Degeneration of intervertebral disc of lumbar region with discogenic back pain  DDD (degenerative disc disease), thoracic  DDD (degenerative disc disease), cervical  Other low back pain  Pain in right shoulder    OBJECTIVE    Therapeutic Procedures:    Tx Min Billable or 1:1 Min (if diff from Tx Min) Procedure, Rationale, Specifics   14  96800 Therapeutic Exercise (timed):  increase ROM, strength, coordination, balance, and proprioception to improve patient's ability to progress to PLOF and address remaining functional goals. (see flow sheet as applicable)     Details if applicable:  see flow sheet     8  10142 Neuromuscular Re-Education (timed):  improve balance, coordination, kinesthetic sense, posture, core stability and proprioception to improve patient's ability to develop conscious control of individual muscles and awareness of position of extremities in order to progress to PLOF and address remaining functional goals. (see flow sheet as applicable)     Details if applicable:  posture re-ed   10  39151 Therapeutic Activity

## 2025-04-24 ENCOUNTER — HOSPITAL ENCOUNTER (OUTPATIENT)
Facility: HOSPITAL | Age: 45
Setting detail: RECURRING SERIES
Discharge: HOME | End: 2025-04-27
Attending: FAMILY MEDICINE
Payer: COMMERCIAL

## 2025-04-24 PROCEDURE — 97110 THERAPEUTIC EXERCISES: CPT

## 2025-04-24 PROCEDURE — 97112 NEUROMUSCULAR REEDUCATION: CPT

## 2025-04-24 PROCEDURE — 97530 THERAPEUTIC ACTIVITIES: CPT

## 2025-04-24 NOTE — PROGRESS NOTES
Capture    [x]  Patient Education billed concurrently with other procedures   [x] Review HEP    [] Progressed/Changed HEP  [] Other:    Objective Information/Functional Measures/Assessment    Performed variations of retractions in supine and standing (free standing and against wall).  Added wall wash with OH shrug to promote UT buttressing effect for head support.   Report of T/S cramping alleviated as patient performed repeated T/S extension with rolled towel over chair.     Patient will continue to benefit from skilled PT services to modify and progress therapeutic interventions, analyze and address functional mobility deficits, analyze and address ROM deficits, analyze and address strength deficits, analyze and address soft tissue restrictions, analyze and cue for proper movement patterns, and instruct in home and community integration to address functional deficits and attain remaining goals.    Progress toward goals / Updated goals:  []  See Progress Note/Recertification    Tolerated treatment well without complaints of progression, indicating improved functional capacity for ADL's.      PLAN  yes Continue plan of care  []  Upgrade activities as tolerated  []  Discharge: See DC Note  []  Other:    Manohar \"BJ\" Laron DPDUSTY, Cert. MDT, Cert. DN, Cert. SMT, Dip. Osteopractic    4/24/2025    3:20 PM  If an interpreting service was utilized for treatment of this patient, the contents of this document represent the material reviewed with the patient via the .  Future Appointments   Date Time Provider Department Center   4/30/2025 10:40 AM Fidelina Gore, PT [facility]   5/2/2025 10:40 AM Behrens, Laura M, PTA [facility]   5/6/2025  2:40 PM Behrens, Laura M, PTA [facility]   5/7/2025 11:00 AM Fabián Harris DO [facility]   5/9/2025  8:40 AM Fidelina Gore, PT [facility]   5/13/2025  2:40 PM Fidelina Gore, PT [facility]   6/4/2025  9:00 AM Sunny Mccracken MD [facility]   6/13/2025 10:00 AM Mina Ayala,

## 2025-04-29 ENCOUNTER — OFFICE VISIT (OUTPATIENT)
Age: 45
End: 2025-04-29
Payer: COMMERCIAL

## 2025-04-29 VITALS
SYSTOLIC BLOOD PRESSURE: 118 MMHG | HEIGHT: 65 IN | TEMPERATURE: 97.2 F | OXYGEN SATURATION: 98 % | BODY MASS INDEX: 23.43 KG/M2 | WEIGHT: 140.6 LBS | DIASTOLIC BLOOD PRESSURE: 58 MMHG | RESPIRATION RATE: 20 BRPM | HEART RATE: 84 BPM

## 2025-04-29 DIAGNOSIS — M62.81 MUSCLE WEAKNESS: ICD-10-CM

## 2025-04-29 DIAGNOSIS — H53.8 BLURRING OF VISION: ICD-10-CM

## 2025-04-29 DIAGNOSIS — H53.2 DIPLOPIA: ICD-10-CM

## 2025-04-29 DIAGNOSIS — H53.2 DIPLOPIA: Primary | ICD-10-CM

## 2025-04-29 DIAGNOSIS — G44.219 EPISODIC TENSION-TYPE HEADACHE, NOT INTRACTABLE: ICD-10-CM

## 2025-04-29 PROCEDURE — 99204 OFFICE O/P NEW MOD 45 MIN: CPT | Performed by: STUDENT IN AN ORGANIZED HEALTH CARE EDUCATION/TRAINING PROGRAM

## 2025-04-29 RX ORDER — FERROUS SULFATE 325(65) MG
1 TABLET ORAL DAILY
COMMUNITY
Start: 2025-04-18

## 2025-04-29 ASSESSMENT — ENCOUNTER SYMPTOMS
NAUSEA: 0
COUGH: 0
VOMITING: 0
SHORTNESS OF BREATH: 1
BACK PAIN: 1

## 2025-04-29 NOTE — PROGRESS NOTES
Have you been to the ER, urgent care clinic since your last visit?  Hospitalized since your last visit?   Yes Kate Plata 4/14/25    Have you seen or consulted any other health care providers outside our system since your last visit?   Yes Kate Plata              
degenerative changes.  Possible tension type headache versus cervicogenic headache.  Over-the-counter medications help.  Will follow the MRI.    Follow-up in 2 months time.               PLEASE NOTE:   This document has been produced using voice recognition software. Unrecognized errors in transcription may be present.

## 2025-04-30 ENCOUNTER — HOSPITAL ENCOUNTER (OUTPATIENT)
Facility: HOSPITAL | Age: 45
Setting detail: RECURRING SERIES
Discharge: HOME | End: 2025-05-03
Attending: FAMILY MEDICINE
Payer: COMMERCIAL

## 2025-04-30 PROCEDURE — 97140 MANUAL THERAPY 1/> REGIONS: CPT

## 2025-04-30 PROCEDURE — 97110 THERAPEUTIC EXERCISES: CPT

## 2025-04-30 PROCEDURE — 97112 NEUROMUSCULAR REEDUCATION: CPT

## 2025-04-30 NOTE — PROGRESS NOTES
remaining goals.    Progress toward goals / Updated goals:  []  See Progress Note/Recertification       Pt will improve Oswestry score by 13% points to show improvement with functional mobility performance.  Status at evaluation: 62%  Current: 42%     2. Pt will improve QuickDASH score by 19% points to show improvement with functional mobility performance.  Status at evaluation: 43.18%  Current: 54%     3. Pt will report at least 60% improvement of pain to improve ease with ADLs & house chores.  Status at evaluation: 3-7/10, constant pain  Current =2%     4. Pt will improve B lower trap strength to at least 4/5 to improve ease with OH lifting.  Status at evaluation: 3/5 B  Current= 3/5  Initiated supine lower trap strengthening (4/22/25)     5. Pt will be able to perform plank & side plank for at least 45 seconds indicating improving core strength & endurance for house chores.  Status at evaluation: TBD  Current: 30 sec all.      Next PN due 05/17/2025  Auth due (visit number/ date) 30 visits/3-19-25 to 12-31-25    PLAN  - Upgrade activities as tolerated    Fidelina Gore PT    4/30/2025    7:57 AM  If an interpreting service was utilized for treatment of this patient, the contents of this document represent the material reviewed with the patient via the .     Future Appointments   Date Time Provider Department Center   4/30/2025 10:40 AM Fidelina Gore PT [facility]   5/2/2025 10:40 AM Behrens, Laura M, YESSY [facility]   5/6/2025  2:40 PM Behrens, Laura M, PTA [facility]   5/9/2025  8:40 AM Fidelina Gore, PT [facility]   5/13/2025  2:40 PM Fidelina Gore, PT [facility]   5/19/2025 10:00 AM Fabián Harris DO [facility]   6/4/2025  9:00 AM Sunny Mccracken MD [facility] AM[facility]   7/2/2025  2:40 PM Mina Ayala MD [facility] BS AM[facility]

## 2025-05-02 ENCOUNTER — HOSPITAL ENCOUNTER (OUTPATIENT)
Facility: HOSPITAL | Age: 45
Setting detail: RECURRING SERIES
Discharge: HOME | End: 2025-05-05
Attending: FAMILY MEDICINE
Payer: COMMERCIAL

## 2025-05-02 PROCEDURE — 97530 THERAPEUTIC ACTIVITIES: CPT

## 2025-05-02 PROCEDURE — 97110 THERAPEUTIC EXERCISES: CPT

## 2025-05-02 PROCEDURE — 97112 NEUROMUSCULAR REEDUCATION: CPT

## 2025-05-02 NOTE — PROGRESS NOTES
PHYSICAL THERAPY - DAILY TREATMENT NOTE (updated )    Patient Name: Mika Clark    Date: 2025    : 1980  Insurance: Payor: JOSE / Plan: JOSE NOLAND HMO / Product Type: *No Product type* /      Patient  verified yes     Visit #   Current / Total 4 8   Time   In / Out 10:45 11:20   Pain   In / Out 3/10 LB  5/10 shoulders and C/S 3/10   Subjective Functional Status/Changes: Patient reports partial compliance with HEP.      TREATMENT AREA =  Shoulder impingement, right  Degeneration of intervertebral disc of lumbar region with discogenic back pain  DDD (degenerative disc disease), thoracic  DDD (degenerative disc disease), cervical  Other low back pain  Pain in right shoulder    Next PN due 2025  Auth due (visit number/ date) 30 visits/3-19-25 to 25    OBJECTIVE    Therapeutic Procedures:  Tx Min Billable or 1:1 Min (if diff from Tx Min) Procedure, Rationale, Specifics   8  38475 Therapeutic Exercise (timed):  increase ROM, strength, coordination, balance, and proprioception to improve patient's ability to progress to PLOF and address remaining functional goals. (see flow sheet as applicable)     Details if applicable:  supine pec stretch, open book     10  27915 Therapeutic Activity (timed):  use of dynamic activities replicating functional movements to increase ROM, strength, coordination, balance, and proprioception in order to improve patient's ability to progress to PLOF and address remaining functional goals.  (see flow sheet as applicable)     Details if applicable:  stationary bike/UBE, tband rows/ext   17  31668 Neuromuscular Re-Education (timed):  improve balance, coordination, kinesthetic sense, posture, core stability and proprioception to improve patient's ability to develop conscious control of individual muscles and awareness of position of extremities in order to progress to PLOF and address remaining functional goals. (see flow sheet as applicable)

## 2025-05-09 ENCOUNTER — APPOINTMENT (OUTPATIENT)
Facility: HOSPITAL | Age: 45
End: 2025-05-09
Attending: FAMILY MEDICINE
Payer: COMMERCIAL

## 2025-05-13 ENCOUNTER — APPOINTMENT (OUTPATIENT)
Facility: HOSPITAL | Age: 45
End: 2025-05-13
Attending: FAMILY MEDICINE
Payer: COMMERCIAL

## 2025-05-16 ENCOUNTER — HOSPITAL ENCOUNTER (OUTPATIENT)
Facility: HOSPITAL | Age: 45
Setting detail: RECURRING SERIES
Discharge: HOME | End: 2025-05-19
Attending: FAMILY MEDICINE
Payer: COMMERCIAL

## 2025-05-16 PROCEDURE — 97110 THERAPEUTIC EXERCISES: CPT

## 2025-05-16 PROCEDURE — 97530 THERAPEUTIC ACTIVITIES: CPT

## 2025-05-16 PROCEDURE — 97535 SELF CARE MNGMENT TRAINING: CPT

## 2025-05-16 PROCEDURE — 97112 NEUROMUSCULAR REEDUCATION: CPT

## 2025-05-16 NOTE — PROGRESS NOTES
PHYSICAL / OCCUPATIONAL THERAPY - DAILY TREATMENT NOTE    Patient Name: Mika Clark    Date: 2025    : 1980  Insurance: Payor: JOSE / Plan: JOSE DAN HMO / Product Type: *No Product type* /      Patient  verified Yes     Visit #   Current / Total 6 8   Time   In / Out 1125 1215   Pain   In / Out 6 CS  6   Subjective Functional Status/Changes: Reports he gets a halter monitor, will have MRI,and doppler. Balance is worst carrying his daughter in right arm      TREATMENT AREA =  Shoulder impingement, right  Degeneration of intervertebral disc of lumbar region with discogenic back pain  DDD (degenerative disc disease), thoracic  DDD (degenerative disc disease), cervical  Other low back pain  Pain in right shoulder    OBJECTIVE    Modalities Rationale:     decrease pain to improve patient's ability to progress to PLOF and address remaining functional goals.     min [] Estim Unattended, type/location:                                      []  w/ice    []  w/heat    min [] Estim Attended, type/location:                                     []  w/US     []  w/ice    []  w/heat    []  TENS insruct      min []  Mechanical Traction: type/lbs                   []  pro   []  sup   []  int   []  cont    []  before manual    []  after manual    min []  Ultrasound, settings/location:     10 min  unbill []  Ice     [x]  Heat    location/position: Seated/Shoulders/neck    min []  Paraffin,  details:     min []  Vasopneumatic Device, press/temp:     min []  Whirlpool / Fluido:    If using vaso (only need to measure limb vaso being performed on)      pre-treatment girth :       post-treatment girth :       measured at (landmark location) :      min []  Other:    Skin assessment post-treatment:   Intact      Therapeutic Procedures:    Tx Min Billable or 1:1 Min (if diff from Tx Min) Procedure, Rationale, Specifics   19  07064 Therapeutic Exercise (timed):  increase ROM, strength, coordination, balance, and

## 2025-05-16 NOTE — PROGRESS NOTES
[facility]  [address] [place] [address] - Ph: [phone]   Fx: [phone]  PHYSICAL THERAPY PROGRESS NOTE  [x] Progress Note  [] Discharge Summary    Patient Name: Mika Clark : 1980   Treatment/Medical Diagnosis: Shoulder impingement, right [M25.811]  Degeneration of intervertebral disc of lumbar region with discogenic back pain [M51.360]  DDD (degenerative disc disease), thoracic [M51.34]  DDD (degenerative disc disease), cervical [M50.30]   Referral Source: Fabián Harris DO     Date of Initial Visit: 3/19/25 Attended Visits: 3 Missed Visits: 10           SUMMARY OF TREATMENT  Pt continues to have CS, shoulder and back pain. He has been having dizziness and positional Vertigo for which he will be tested soon. Pt continues to have CS weakness and pain. Reports right shoulder pain with ER along the middle deltoid. Reports he gets a halter monitor, will have MRI,and doppler Test.   % improved =5%   Quick Dash = 34%  Oswestry= 52%  Forward  x 40 sec. Left Side lying 28 sec/ right side lying 7 sec with dizziness.     Patient will continue to benefit from skilled PT services to modify and progress therapeutic interventions, analyze and address functional mobility deficits, analyze and address ROM deficits, analyze and address strength deficits, analyze and address soft tissue restrictions, analyze and cue for proper movement patterns, and instruct in home and community integration to address functional deficits and attain remaining goals.     CURRENT STATUS/Progress towards Goals:    Pt will improve Oswestry score by 13% points to show improvement with functional mobility performance.  Status at evaluation: 62%  Current: NT     2. Pt will improve QuickDASH score by 19% points to show improvement with functional mobility performance.  Status at evaluation: 43.18%  Current: NT     3. Pt will report at least 60% improvement of

## 2025-05-19 ENCOUNTER — OFFICE VISIT (OUTPATIENT)
Age: 45
End: 2025-05-19
Payer: COMMERCIAL

## 2025-05-19 VITALS — RESPIRATION RATE: 14 BRPM | WEIGHT: 141 LBS | BODY MASS INDEX: 23.49 KG/M2 | HEIGHT: 65 IN

## 2025-05-19 DIAGNOSIS — M99.02 THORACIC REGION SOMATIC DYSFUNCTION: ICD-10-CM

## 2025-05-19 DIAGNOSIS — M51.34 DDD (DEGENERATIVE DISC DISEASE), THORACIC: ICD-10-CM

## 2025-05-19 DIAGNOSIS — M99.01 CERVICAL SOMATIC DYSFUNCTION: ICD-10-CM

## 2025-05-19 DIAGNOSIS — M99.03 SOMATIC DYSFUNCTION OF LUMBAR REGION: ICD-10-CM

## 2025-05-19 DIAGNOSIS — M99.05 PELVIC REGION SOMATIC DYSFUNCTION: ICD-10-CM

## 2025-05-19 DIAGNOSIS — M99.09 SOMATIC DYSFUNCTION OF ABDOMINAL REGION: ICD-10-CM

## 2025-05-19 DIAGNOSIS — M99.06 LOWER LIMB REGION SOMATIC DYSFUNCTION: ICD-10-CM

## 2025-05-19 DIAGNOSIS — M99.07 UPPER EXTREMITY SOMATIC DYSFUNCTION: ICD-10-CM

## 2025-05-19 DIAGNOSIS — M99.08 RIB CAGE REGION SOMATIC DYSFUNCTION: ICD-10-CM

## 2025-05-19 DIAGNOSIS — M50.30 DDD (DEGENERATIVE DISC DISEASE), CERVICAL: ICD-10-CM

## 2025-05-19 DIAGNOSIS — M51.360 DEGENERATION OF INTERVERTEBRAL DISC OF LUMBAR REGION WITH DISCOGENIC BACK PAIN: Primary | ICD-10-CM

## 2025-05-19 DIAGNOSIS — M99.04 SACRAL REGION SOMATIC DYSFUNCTION: ICD-10-CM

## 2025-05-19 PROCEDURE — 99214 OFFICE O/P EST MOD 30 MIN: CPT | Performed by: FAMILY MEDICINE

## 2025-05-19 PROCEDURE — 98929 OSTEOPATH MANJ 9-10 REGIONS: CPT | Performed by: FAMILY MEDICINE

## 2025-05-19 NOTE — PROGRESS NOTES
HISTORY OF PRESENT ILLNESS    Mika Clark 1980 is a 45 y.o. year old male comes in today to be evaluated and treated for: back pain - chronic    Since last appt 3/4/2025 has noticed Sx doing okay with some PT 4 weeks but has had issues with vertigo which has delayed things a bit. Pain level 6/10. Using Voltaren 50mg with benefit. Pulled chair on patio that messed up mid back pretty good. HEP minimal.    IMAGING: XR lumbar 1/2/2025  IMPRESSION:  No acute fracture or subluxation.     MRI lumbar 4/23/2024  IMPRESSION  Tiny disc herniation at T9-T10.  No significant central canal or foraminal narrowing throughout the thoracic  spinal column.     XR cervical 12/14/2023  IMPRESSION:  C6-C7 congenital fusion.  Multilevel grade 1 retrolisthesis.  No disproportionate degenerative intervertebral disc space narrowing.     XR thoracic 12/14/2023  IMPRESSION:  1.  No acute pathology appreciated in the thoracic spine.   2.  S-shaped, rotoscoliotic thoracolumbar spine.  3.  Moderate sized paraesophageal hernia.     XR lumbar 12/14/2023  IMPRESSION:  No acute fracture or subluxation.     XR whole spine 8/6/2020  IMPRESSION:  1.  S-shaped thoracolumbar scoliosis. No significant coronal or sagittal  balance.  2.  Exaggerated thoracic kyphosis. Mild left-sided upward pelvic tilt.  3.  Mild to moderate degenerative changes above. Partial C6/C7 congenital  fusion.  4.  Transitional anatomy with 13 thoracic type vertebral bodies.     MRI thoracic 8/6/2020  IMPRESSION:  Mild rotoscoliosis convexity to the right  Minimal kyphotic angulation  Incidental hiatal hernia    Past Surgical History:   Procedure Laterality Date    APPENDECTOMY      lap    EP DEVICE PROCEDURE N/A 05/08/2023    Loop recorder removal performed by Sunny Mccracken MD at [facility] CARDIAC CATH LAB    HEENT  10/08 & 09/09    Reformed jaw bone, bone graft    HEENT      plastic surgery on both ears    HEENT      corrective hearing for left ear     IMPLANTATION

## 2025-05-20 ENCOUNTER — APPOINTMENT (OUTPATIENT)
Facility: HOSPITAL | Age: 45
End: 2025-05-20
Attending: FAMILY MEDICINE
Payer: COMMERCIAL

## 2025-05-22 ENCOUNTER — APPOINTMENT (OUTPATIENT)
Facility: HOSPITAL | Age: 45
End: 2025-05-22
Attending: FAMILY MEDICINE
Payer: COMMERCIAL

## 2025-05-23 ENCOUNTER — HOSPITAL ENCOUNTER (OUTPATIENT)
Facility: HOSPITAL | Age: 45
Setting detail: RECURRING SERIES
End: 2025-05-23
Attending: FAMILY MEDICINE
Payer: COMMERCIAL

## 2025-05-23 NOTE — PROGRESS NOTES
PHYSICAL / OCCUPATIONAL THERAPY - DAILY TREATMENT NOTE    Patient Name: Mika Clark    Date: 2025    : 1980  Insurance: Payor: JOSE / Plan: JOSE DAN HMO / Product Type: *No Product type* /      Patient  verified Yes     Visit #   Current / Total 7 8   Time   In / Out *** ***   Pain   In / Out *** ***   Subjective Functional Status/Changes: ***     TREATMENT AREA =  Shoulder impingement, right  Degeneration of intervertebral disc of lumbar region with discogenic back pain  DDD (degenerative disc disease), thoracic  DDD (degenerative disc disease), cervical  Other low back pain  Pain in right shoulder    OBJECTIVE    Modalities Rationale:     decrease pain to improve patient's ability to progress to PLOF and address remaining functional goals.     min [] Estim Unattended, type/location:                                      []  w/ice    []  w/heat    min [] Estim Attended, type/location:                                     []  w/US     []  w/ice    []  w/heat    []  TENS insruct      min []  Mechanical Traction: type/lbs                   []  pro   []  sup   []  int   []  cont    []  before manual    []  after manual    min []  Ultrasound, settings/location:      min  unbill []  Ice     []  Heat    location/position:     min []  Paraffin,  details:     min []  Vasopneumatic Device, press/temp:     min []  Whirlpool / Fluido:    If using vaso (only need to measure limb vaso being performed on)      pre-treatment girth :       post-treatment girth :       measured at (landmark location) :      min []  Other:    Skin assessment post-treatment:   Intact      Therapeutic Procedures:    Tx Min Billable or 1:1 Min (if diff from Tx Min) Procedure, Rationale, Specifics     53284 Therapeutic Exercise (timed):  increase ROM, strength, coordination, balance, and proprioception to improve patient's ability to progress to PLOF and address remaining functional goals. (see flow sheet as applicable)

## 2025-05-27 ENCOUNTER — APPOINTMENT (OUTPATIENT)
Facility: HOSPITAL | Age: 45
End: 2025-05-27
Attending: FAMILY MEDICINE
Payer: COMMERCIAL

## 2025-05-30 ENCOUNTER — HOSPITAL ENCOUNTER (OUTPATIENT)
Facility: HOSPITAL | Age: 45
Setting detail: RECURRING SERIES
End: 2025-05-30
Attending: FAMILY MEDICINE
Payer: COMMERCIAL

## 2025-05-30 PROCEDURE — 97112 NEUROMUSCULAR REEDUCATION: CPT

## 2025-05-30 PROCEDURE — 97530 THERAPEUTIC ACTIVITIES: CPT

## 2025-05-30 PROCEDURE — 97110 THERAPEUTIC EXERCISES: CPT

## 2025-05-30 NOTE — PROGRESS NOTES
PHYSICAL / OCCUPATIONAL THERAPY - DAILY TREATMENT NOTE    Patient Name: Mika Clark    Date: 2025    : 1980  Insurance: Payor: JOSE / Plan: JOSE DAN HMO / Product Type: *No Product type* /      Patient  verified Yes     Visit #   Current / Total 1 8   Time   In / Out 1004 1053   Pain   In / Out 6 fatigue   Subjective Functional Status/Changes: Reports he Moved a sofa and his back is hurting. He's not sure it's a disc.      TREATMENT AREA =  Shoulder impingement, right  Degeneration of intervertebral disc of lumbar region with discogenic back pain  DDD (degenerative disc disease), thoracic  DDD (degenerative disc disease), cervical  Other low back pain  Pain in right shoulder    OBJECTIVE    Modalities Rationale:     decrease inflammation and decrease pain to improve patient's ability to progress to PLOF and address remaining functional goals.     min [] Estim Unattended, type/location:                                      []  w/ice    []  w/heat    min [] Estim Attended, type/location:                                     []  w/US     []  w/ice    []  w/heat    []  TENS insruct      min []  Mechanical Traction: type/lbs                   []  pro   []  sup   []  int   []  cont    []  before manual    []  after manual    min []  Ultrasound, settings/location:     10 min  unbill []  Ice     [x]  Heat    location/position: Seated/shulders/neck    min []  Paraffin,  details:     min []  Vasopneumatic Device, press/temp:     min []  Whirlpool / Fluido:    If using vaso (only need to measure limb vaso being performed on)      pre-treatment girth :       post-treatment girth :       measured at (landmark location) :      min []  Other:    Skin assessment post-treatment:   Intact      Therapeutic Procedures:    Tx Min Billable or 1:1 Min (if diff from Tx Min) Procedure, Rationale, Specifics   20  47653 Therapeutic Exercise (timed):  increase ROM, strength, coordination, balance, and

## 2025-06-02 ENCOUNTER — HOSPITAL ENCOUNTER (OUTPATIENT)
Facility: HOSPITAL | Age: 45
Setting detail: RECURRING SERIES
Discharge: HOME | End: 2025-06-05
Attending: FAMILY MEDICINE
Payer: COMMERCIAL

## 2025-06-02 PROCEDURE — 97112 NEUROMUSCULAR REEDUCATION: CPT

## 2025-06-02 PROCEDURE — 97530 THERAPEUTIC ACTIVITIES: CPT

## 2025-06-02 PROCEDURE — 97110 THERAPEUTIC EXERCISES: CPT

## 2025-06-02 NOTE — PROGRESS NOTES
PHYSICAL / OCCUPATIONAL THERAPY - DAILY TREATMENT NOTE    Patient Name: Mika Clark    Date: 2025    : 1980  Insurance: Payor: CHRISSIESAW / Plan: JOSE DAN HMO / Product Type: *No Product type* /      Patient  verified Yes     Visit #   Current / Total 2 8   Time   In / Out 326 419   Pain   In / Out 4cs, 4 sh, 6 LS CS5 LS 4 Sh 4   Subjective Functional Status/Changes: Reports he irritaded his back moving stuff out of his garage. MRI results are back and looks normal.     TREATMENT AREA =  Shoulder impingement, right  Degeneration of intervertebral disc of lumbar region with discogenic back pain  DDD (degenerative disc disease), thoracic  DDD (degenerative disc disease), cervical  Other low back pain  Pain in right shoulder    OBJECTIVE    Modalities Rationale:     decrease inflammation and decrease pain to improve patient's ability to progress to PLOF and address remaining functional goals.     min [] Estim Unattended, type/location:                                      []  w/ice    []  w/heat    min [] Estim Attended, type/location:                                     []  w/US     []  w/ice    []  w/heat    []  TENS insruct      min []  Mechanical Traction: type/lbs                   []  pro   []  sup   []  int   []  cont    []  before manual    []  after manual    min []  Ultrasound, settings/location:     10 min  unbill []  Ice     [x]  Heat    location/position: Seated/shoulders/neck/ TS    min []  Paraffin,  details:     min []  Vasopneumatic Device, press/temp:     min []  Whirlpool / Fluido:    If using vaso (only need to measure limb vaso being performed on)      pre-treatment girth :       post-treatment girth :       measured at (landmark location) :      min []  Other:    Skin assessment post-treatment:   Intact      Therapeutic Procedures:    Tx Min Billable or 1:1 Min (if diff from Tx Min) Procedure, Rationale, Specifics   24  77333 Therapeutic Exercise (timed):  increase ROM,

## 2025-06-04 ENCOUNTER — OFFICE VISIT (OUTPATIENT)
Age: 45
End: 2025-06-04
Payer: COMMERCIAL

## 2025-06-04 VITALS
HEART RATE: 67 BPM | BODY MASS INDEX: 23.16 KG/M2 | DIASTOLIC BLOOD PRESSURE: 60 MMHG | SYSTOLIC BLOOD PRESSURE: 90 MMHG | OXYGEN SATURATION: 98 % | WEIGHT: 139 LBS | HEIGHT: 65 IN

## 2025-06-04 DIAGNOSIS — R94.31 ABNORMAL EKG: ICD-10-CM

## 2025-06-04 DIAGNOSIS — R06.00 DYSPNEA, UNSPECIFIED TYPE: Primary | ICD-10-CM

## 2025-06-04 DIAGNOSIS — R94.31 ABNORMAL ELECTROCARDIOGRAPHY: ICD-10-CM

## 2025-06-04 PROCEDURE — 99214 OFFICE O/P EST MOD 30 MIN: CPT | Performed by: INTERNAL MEDICINE

## 2025-06-04 ASSESSMENT — PATIENT HEALTH QUESTIONNAIRE - PHQ9
SUM OF ALL RESPONSES TO PHQ QUESTIONS 1-9: 0
SUM OF ALL RESPONSES TO PHQ QUESTIONS 1-9: 0
1. LITTLE INTEREST OR PLEASURE IN DOING THINGS: NOT AT ALL
SUM OF ALL RESPONSES TO PHQ QUESTIONS 1-9: 0
2. FEELING DOWN, DEPRESSED OR HOPELESS: NOT AT ALL
SUM OF ALL RESPONSES TO PHQ QUESTIONS 1-9: 0

## 2025-06-04 NOTE — PROGRESS NOTES
HPI: 45-year-old male here for a followup. In general, he is doing okay but his  apolipoprotein B is a little bit high but his overall LDL is normal. He continues to smoke. He has had low weight for a while and his blood pressure is low as well. No chest pain or dyspnea, however, or bleeding issues. He does have a known hernia.     Impression:   1. Ongoing tobacco use, discontinuation discussed.   2. History of mildly abnormal EKG.   3. History of hypertension historically but now running on the low side.  4. Remote loop recorder explant 2023 without any prolonged events   5. History of nuclear stress test last year with small amount of artifact but low risk, no obvious ischemia.  6. History of  low-normal ejection fraction in the past but no evidence of heart failure     Plan:  Given his previous borderline echocardiogram and weight loss with low blood pressure, I am going to obtain an echocardiogram for completeness. Briefly we talked about a statin as well since his apolipoprotein subgroup is a bit high but overall LDL is normal. I have encouraged smoking cessation. As long as his echo is stable, I can see back in a year.          Wt Readings from Last 3 Encounters:   06/04/25 63 kg (139 lb)   05/19/25 64 kg (141 lb)   04/29/25 63.8 kg (140 lb 9.6 oz)     Past Medical History:   Diagnosis Date    Agatston CAC score 100-199 02/04/2015    Coronary calcium score 0.    Anxiety     Arm fatigue     Forearms    Balance problems     Chronic cough     chronically coughing up sputum    Depression     Dizziness     Dyspnea     Esophageal reflux     Headache     History of echocardiogram 10/29/2014    EF 55-60%.  No RWMA.  Normal diastolic fx.  RVSP 30 mmHg.      Holter monitor, abnormal 10/29/2014    Normal Holter study.    Hypercholesterolemia     Low iron     Lumbar pain     Myofascial pain     Neck pain     Numbness and tingling     PAC (premature atrial contraction)     Palpitations     presumably benign    Panic

## 2025-06-04 NOTE — PROGRESS NOTES
[id number]. Have you been to the ER, urgent care clinic since your last visit?  Hospitalized since your last visit? ER 2x's  low BP and palpitations    2. Have you seen or consulted any other health care providers outside of the [facility] since your last visit?  Include any pap smears or colon screening. No    3.  Since your last visit, have you had any of the following symptoms?      palpitations, shortness of breath, and dizziness.     4.  Have you had any blood work, X-rays or cardiac testing?      Yes [facility] and [facility]     Requested: Yes     [facility]

## 2025-06-09 ENCOUNTER — HOSPITAL ENCOUNTER (OUTPATIENT)
Facility: HOSPITAL | Age: 45
Setting detail: RECURRING SERIES
Discharge: HOME | End: 2025-06-12
Attending: FAMILY MEDICINE
Payer: COMMERCIAL

## 2025-06-09 PROCEDURE — 97530 THERAPEUTIC ACTIVITIES: CPT

## 2025-06-09 PROCEDURE — 97110 THERAPEUTIC EXERCISES: CPT

## 2025-06-09 PROCEDURE — 97112 NEUROMUSCULAR REEDUCATION: CPT

## 2025-06-09 NOTE — PROGRESS NOTES
PHYSICAL THERAPY - DAILY TREATMENT NOTE (updated )    Patient Name: Mika Clark    Date: 2025    : 1980  Insurance: Payor: JOSE / Plan: JOSE [employer] HMO / Product Type: *No Product type* /      Patient  verified yes     Visit #   Current / Total 3 8   Time   In / Out 333 407   Pain   In / Out 4 3   Subjective Functional Status/Changes: \"Sorry I'm late, I had to take my daughter to the pediatrician. She's got hand foot and mouth disease. \"  \"I ended up watching the [employer] show last night. It was 2 hours long and I did great. Just some stiffness overall.      TREATMENT AREA =  Shoulder impingement, right  Degeneration of intervertebral disc of lumbar region with discogenic back pain  DDD (degenerative disc disease), thoracic  DDD (degenerative disc disease), cervical  Other low back pain  Pain in right shoulder    Next PN/ RC due 6/15/25     OBJECTIVE    Therapeutic Procedures:  Tx Min Procedure, Rationale, Specifics   8 58278 Therapeutic Exercise (timed):  increase ROM, strength, coordination, balance, and proprioception to improve patient's ability to progress to PLOF and address remaining functional goals. (see flow sheet as applicable)     Details if applicable:       8 67739 Therapeutic Activity (timed):  use of dynamic activities replicating functional movements to increase ROM, strength, coordination, balance, and proprioception in order to improve patient's ability to progress to PLOF and address remaining functional goals.  (see flow sheet as applicable)     Details if applicable:     8 12369 Neuromuscular Re-Education (timed):  improve balance, coordination, kinesthetic sense, posture, core stability and proprioception to improve patient's ability to develop conscious control of individual muscles and awareness of position of extremities in order to progress to PLOF and address remaining functional goals. (see flow sheet as applicable)     Details if applicable:

## 2025-06-11 ENCOUNTER — HOSPITAL ENCOUNTER (OUTPATIENT)
Facility: HOSPITAL | Age: 45
Setting detail: RECURRING SERIES
Discharge: HOME | End: 2025-06-14
Attending: FAMILY MEDICINE
Payer: COMMERCIAL

## 2025-06-11 PROCEDURE — 97110 THERAPEUTIC EXERCISES: CPT

## 2025-06-11 PROCEDURE — 97112 NEUROMUSCULAR REEDUCATION: CPT

## 2025-06-11 PROCEDURE — 97535 SELF CARE MNGMENT TRAINING: CPT

## 2025-06-11 PROCEDURE — 97530 THERAPEUTIC ACTIVITIES: CPT

## 2025-06-11 NOTE — PROGRESS NOTES
[facility]ACK[facility]  [address] [place] [address] Ph:[phone] Fx: [phone]  DISCHARGE SUMMARY  Patient Name: Mika Clark : 1980   Treatment/Medical Diagnosis: Shoulder impingement, right  Degeneration of intervertebral disc of lumbar region with discogenic back pain  DDD (degenerative disc disease), thoracic  DDD (degenerative disc disease), cervical  Other low back pain  Pain in right shoulder   Referral Source: Fabián Harris DO     Date of Initial Visit: *** Attended Visits: *** Missed Visits: ***     SUMMARY OF TREATMENT  Patient's POC has consisted of therex, therapeutic activities, manual therapy prn, modalities prn, pt. education, and a comprehensive HEP. Treatment strategies used to address functional mobility deficits, ROM deficits, strength deficits, analyze and address soft tissue restrictions, analyze and cue movement patterns, analyze and modify body mechanics/ergonomics, assess and modify postural abnormalities and instruct in home and community integration.   CURRENT STATUS  ***    GOALS/MEASURE OF PROGRESS Goal Met?         RECOMMENDATIONS  ***    If you have any questions/comments please contact us directly at [phone].   Thank you for allowing us to assist in the care of your patient.  Therapist Signature: Manohar \"MARIA FERNANDA\" AVEL Larry, Cert. MDT, Cert. DN, Cert. SMT, Dip. Osteopractic Date: ***   Reporting Period:  Certification Period: ***  *** Time: 2:40 PM     NOTE TO PHYSICIAN:  PLEASE COMPLETE THE ORDERS BELOW AND FAX TO   [facility]: [phone]  If you are unable to process this request in 24 hours please contact our office: [phone]    ___ I have read the above report and request that my patient continue as recommended.   ___ I have read the above report and request that my patient continue therapy with the following changes/special

## 2025-06-11 NOTE — PROGRESS NOTES
PHYSICAL THERAPY - DAILY TREATMENT NOTE (updated )    Patient Name: Mika Clark    Date: 2025    : 1980  Insurance: Payor: JOSE / [facility] / Product Type: *No Product type* /      Patient  verified yes     Visit #   Current / Total 4 8   Time   In / Out 315 401   Pain   In / Out 3-4 3   Subjective Functional Status/Changes: \"I'm ready to wrap up\"     TREATMENT AREA =  Shoulder impingement, right  Degeneration of intervertebral disc of lumbar region with discogenic back pain  DDD (degenerative disc disease), thoracic  DDD (degenerative disc disease), cervical  Other low back pain  Pain in right shoulder    Next PN due 6/15/25    OBJECTIVE    Therapeutic Procedures:  Tx Min Procedure, Rationale, Specifics   10 99396 Therapeutic Exercise (timed):  increase ROM, strength, coordination, balance, and proprioception to improve patient's ability to progress to PLOF and address remaining functional goals. (see flow sheet as applicable)     Details if applicable:       10 35364 Therapeutic Activity (timed):  use of dynamic activities replicating functional movements to increase ROM, strength, coordination, balance, and proprioception in order to improve patient's ability to progress to PLOF and address remaining functional goals.  (see flow sheet as applicable)     Details if applicable:     8 33016 Neuromuscular Re-Education (timed):  improve balance, coordination, kinesthetic sense, posture, core stability and proprioception to improve patient's ability to develop conscious control of individual muscles and awareness of position of extremities in order to progress to PLOF and address remaining functional goals. (see flow sheet as applicable)     Details if applicable:     8 51747 Self Care/Home Management (timed):  improve patient knowledge and understanding of home injury/symptom/pain management, positioning, posture/ergonomics, home safety, activity modification, transfer

## 2025-06-17 ENCOUNTER — HOSPITAL ENCOUNTER (OUTPATIENT)
Facility: HOSPITAL | Age: 45
Setting detail: RECURRING SERIES
Discharge: HOME | End: 2025-06-20
Attending: FAMILY MEDICINE
Payer: COMMERCIAL

## 2025-06-17 PROCEDURE — 97161 PT EVAL LOW COMPLEX 20 MIN: CPT

## 2025-06-17 NOTE — PROGRESS NOTES
PHYSICAL / OCCUPATIONAL THERAPY - DAILY TREATMENT NOTE    Patient Name: Mika Clrak    Date: 2025    : 1980  Insurance: Payor: JOSE / Plan: JOSE DAN HMO / Product Type: *No Product type* /      Patient  verified Yes     Visit #   Current / Total 1 16   Time   In / Out 1044 1124   Pain   In / Out 0 0   Subjective Functional Status/Changes:  Pt is a 45 yr old Male with ongoing and intermittent Dizziness that started approx 6 months ago. Pt reports sx are getting worst. Dizziness is worst with bending over, turning to the right and looking up. Pt reports CS tightness and has tenderness to his CS occiput/ UT/ SCM bilaterally. Pt reports HA's approx 2x/week along the temporal and occipital area.      TREATMENT AREA =  Benign paroxysmal vertigo, right ear    OBJECTIVE         Therapeutic Procedures:    Tx Min Billable or 1:1 Min (if diff from Tx Min) Procedure, Rationale, Specifics   23  66095 Neuromuscular Re-Education (timed):  improve balance, coordination, kinesthetic sense, posture, core stability and proprioception to improve patient's ability to develop conscious control of individual muscles and awareness of position of extremities in order to progress to PLOF and address remaining functional goals. (see flow sheet as applicable)     Details if applicable:              Details if applicable:            Details if applicable:            Details if applicable:            Details if applicable:     [facility] Totals Reminder: bill using total billable min of TIMED therapeutic procedures (example: do not include dry needle or estim unattended, both untimed codes, in totals to left)  8-22 min = 1 unit; 23-37 min = 2 units; 38-52 min = 3 units; 53-67 min = 4 units; 68-82 min = 5 units   Total Total     Charge Capture    [x]  Patient Education billed concurrently with other procedures   [x] Review HEP    [] Progressed/Changed HEP, detail:    [] Other detail:       Objective

## 2025-06-17 NOTE — PROGRESS NOTES
JENNY HACKETT [facility]  [address] [place] [address] Ph:[phone] Fx: [phone]  Plan of Care / Statement of Necessity for Physical Therapy Services     Patient Name: Mika Clark : 1980   Medical   Diagnosis: Benign paroxysmal vertigo, right ear Treatment Diagnosis: R42   Dizziness and giddiness      Onset Date: 6 months Payor :  Payor: SENTARA / Plan: S[employer]ARA [place]ANT[place] HMO / Product Type: *No Product type* /    Referral Source: Cornell Grant MD Start of Care (SOC): 2025   Prior Hospitalization: See medical history Provider #: [id number]   Prior Level of Function: mod ind with ADLs/house chores, Right dominant, taking care & playing with his 3 year old, computer work.    Comorbidities:  Other: scoliosis, chronic back/neck problem, depression, potential fibromyalgia, tobacco use      Assessment / key information:  Pt is a 45 yr old Male with ongoing and intermittent Dizziness that started approx 6 months ago. Pt reports sx are getting worst. Dizziness is worst with bending over, turning to the right and looking up. Pt reports CS tightness and has tenderness to his CS occiput/ UT/ SCM bilaterally. Pt reports HA's approx 2x/week along the temporal and occipital area.   Hallpike Maria Fernanda Test was Negative Bilaterally for Nystagmus. He did have mild dizziness upon rising up from  the right.   CS Rotation right =50 deg, CS rotation left= 56 deg, extension=36 deg.   Right SB=8 deg, left SB=12 deg. FGA=28/30  SOT to determine level of Sensory input will be assessed in the future.   Pt would benefit from skilled PT to address these deficits above to improve the ability to perform ADLs and recreational activities comfortably and safely       Evaluation Complexity:  History:  MEDIUM  Complexity : 1-2 comorbidities / personal factors will impact the outcome/ POC ; Examination:  MEDIUM Complexity : 3 Standardized tests and measures addressin body

## 2025-07-02 ENCOUNTER — TELEPHONE (OUTPATIENT)
Facility: HOSPITAL | Age: 45
End: 2025-07-02

## 2025-07-02 NOTE — TELEPHONE ENCOUNTER
Left message regarding no show and next visit. Educated on attendance policy and DC after 2 consecutive no shows.

## 2025-07-08 ENCOUNTER — TELEPHONE (OUTPATIENT)
Facility: HOSPITAL | Age: 45
End: 2025-07-08

## 2025-07-08 NOTE — TELEPHONE ENCOUNTER
called about missed appointment. pt. reports he is sick and fogot to call. he was reminded of next appointment time.

## 2025-07-16 ENCOUNTER — HOSPITAL ENCOUNTER (OUTPATIENT)
Facility: HOSPITAL | Age: 45
Setting detail: RECURRING SERIES
Discharge: HOME | End: 2025-07-19
Attending: FAMILY MEDICINE
Payer: COMMERCIAL

## 2025-07-16 PROCEDURE — 97530 THERAPEUTIC ACTIVITIES: CPT

## 2025-07-16 PROCEDURE — 97112 NEUROMUSCULAR REEDUCATION: CPT

## 2025-07-16 PROCEDURE — 97110 THERAPEUTIC EXERCISES: CPT

## 2025-07-16 NOTE — PROGRESS NOTES
PHYSICAL / OCCUPATIONAL THERAPY - DAILY TREATMENT NOTE    Patient Name: Mika Clark    Date: 2025    : 1980  Insurance: Payor: JOSE / Plan: JOSE DAN HMO / Product Type: *No Product type* /      Patient  verified Yes     Visit #   Current / Total 2 16   Time   In / Out 1049 1128   Pain   In / Out 6 neck 0 dizzy 6neck 0 dizzy   Subjective Functional Status/Changes: Reports he hs been sick and had to travel for his work. Doing ex's periodically.      TREATMENT AREA =  Benign paroxysmal vertigo, right ear    OBJECTIVE         Therapeutic Procedures:    Tx Min Billable or 1:1 Min (if diff from Tx Min) Procedure, Rationale, Specifics   8  98815 Therapeutic Exercise (timed):  increase ROM, strength, coordination, balance, and proprioception to improve patient's ability to progress to PLOF and address remaining functional goals. (see flow sheet as applicable)     Details if applicable:       21  11618 Neuromuscular Re-Education (timed):  improve balance, coordination, kinesthetic sense, posture, core stability and proprioception to improve patient's ability to develop conscious control of individual muscles and awareness of position of extremities in order to progress to PLOF and address remaining functional goals. (see flow sheet as applicable)     Details if applicable:     10  10890 Therapeutic Activity (timed):  use of dynamic activities replicating functional movements to increase ROM, strength, coordination, balance, and proprioception in order to improve patient's ability to progress to PLOF and address remaining functional goals.  (see flow sheet as applicable)     Details if applicable:            Details if applicable:            Details if applicable:     39  [facility] Totals Reminder: bill using total billable min of TIMED therapeutic procedures (example: do not include dry needle or estim unattended, both untimed codes, in totals to left)  8-22 min = 1 unit; 23-37 min = 2 units;

## 2025-07-18 NOTE — PROGRESS NOTES
[facility]  [address] [address] - Ph: [phone]   Fx: [phone]  PHYSICAL THERAPY PROGRESS NOTE  [x] Progress Note  [] Discharge Summary    Patient Name: Mika Clark : 1980   Treatment/Medical Diagnosis: Benign paroxysmal vertigo, right ear   Referral Source: Cornell Grant MD     Date of Initial Visit: 25 Attended Visits: 2 Missed Visits: 2       Prior Level of Function: mod ind with ADLs/house chores, Right dominant, taking care & playing with his 3 year old, computer work.    Comorbidities:  Other: scoliosis, chronic back/neck problem, depression, potential fibromyalgia, tobacco use      SUMMARY OF TREATMENT    Pt has attended 2 visits so far due to illness and travel for work.   He has been semi compliant with HEP. The dizziness continues to be intermittent. Pt continues to report HA's and dizziness with positional changes.   SOT was done and all Sensory Input was WNL. Composite Score was 72. Pt reports he feels like he is moving when in stationary positions.   Pt continues to have CS pain, stiffness and HA's.     Patient will continue to benefit from skilled PT / OT services to modify and progress therapeutic interventions, analyze and address functional mobility deficits, analyze and address ROM deficits, analyze and address strength deficits, analyze and address soft tissue restrictions, analyze and cue for proper movement patterns, analyze and modify for postural abnormalities, analyze and address imbalance/dizziness, and instruct in home and community integration to address functional deficits and attain remaining goals.       Barriers to progress:   Poor follow through due to factors not listed above    CURRENT STATUS/Progress towards Goals:    Goal: Pt  will be compliant with [place] Connerf, Tandem ex's as well as a general HEP (CS stretches) to improve balance function.   Status at evaluation:

## 2025-07-28 ENCOUNTER — HOSPITAL ENCOUNTER (OUTPATIENT)
Facility: HOSPITAL | Age: 45
Setting detail: RECURRING SERIES
Discharge: HOME | End: 2025-07-31
Attending: FAMILY MEDICINE
Payer: COMMERCIAL

## 2025-07-28 PROCEDURE — 97140 MANUAL THERAPY 1/> REGIONS: CPT

## 2025-07-28 PROCEDURE — 97530 THERAPEUTIC ACTIVITIES: CPT

## 2025-07-28 PROCEDURE — 97112 NEUROMUSCULAR REEDUCATION: CPT

## 2025-07-30 ENCOUNTER — HOSPITAL ENCOUNTER (OUTPATIENT)
Facility: HOSPITAL | Age: 45
Setting detail: RECURRING SERIES
Discharge: HOME | End: 2025-08-02
Attending: FAMILY MEDICINE
Payer: COMMERCIAL

## 2025-07-30 PROCEDURE — 97140 MANUAL THERAPY 1/> REGIONS: CPT

## 2025-07-30 PROCEDURE — 97530 THERAPEUTIC ACTIVITIES: CPT

## 2025-07-30 PROCEDURE — 97112 NEUROMUSCULAR REEDUCATION: CPT

## 2025-07-30 NOTE — PROGRESS NOTES
PHYSICAL / OCCUPATIONAL THERAPY - DAILY TREATMENT NOTE    Patient Name: Mika Clark    Date: 2025    : 1980  Insurance: Payor: JOSE / Plan: [employer]SE [employer] / Product Type: *No Product type* /      Patient  verified Yes     Visit #   Current / Total 4 16   Time   In / Out [id number]   Pain   In / Out 5 dizzy, 5  4   Subjective Functional Status/Changes: 24 hrs of lightheadedness after leaving last session     TREATMENT AREA =  Benign paroxysmal vertigo, right ear    OBJECTIVE    Modalities Rationale:     decrease pain to improve patient's ability to progress to PLOF and address remaining functional goals.     min [] Estim Unattended, type/location:                                      []  w/ice    []  w/heat    min [] Estim Attended, type/location:                                     []  w/US     []  w/ice    []  w/heat    []  TENS insruct      min []  Mechanical Traction: type/lbs                   []  pro   []  sup   []  int   []  cont    []  before manual    []  after manual    min []  Ultrasound, settings/location:     10 min  unbill []  Ice     [x]  Heat    location/position: Seated/bilat UT.     min []  Paraffin,  details:     min []  Vasopneumatic Device, press/temp:     min []  Whirlpool / Fluido:    If using vaso (only need to measure limb vaso being performed on)      pre-treatment girth :       post-treatment girth :       measured at (landmark location) :      min []  Other:    Skin assessment post-treatment:   Intact      Therapeutic Procedures:    Tx Min Billable or 1:1 Min (if diff from Tx Min) Procedure, Rationale, Specifics   23  [id number] Neuromuscular Re-Education (timed):  improve balance, coordination, kinesthetic sense, posture, core stability and proprioception to improve patient's ability to develop conscious control of individual muscles and awareness of position of extremities in order to progress to PLOF and address remaining functional goals. (see flow sheet as

## 2025-08-08 ENCOUNTER — HOSPITAL ENCOUNTER (OUTPATIENT)
Facility: HOSPITAL | Age: 45
Setting detail: RECURRING SERIES
Discharge: HOME | End: 2025-08-11
Attending: FAMILY MEDICINE
Payer: COMMERCIAL

## 2025-08-08 PROCEDURE — 97530 THERAPEUTIC ACTIVITIES: CPT

## 2025-08-08 PROCEDURE — 97110 THERAPEUTIC EXERCISES: CPT

## 2025-08-08 PROCEDURE — 97140 MANUAL THERAPY 1/> REGIONS: CPT

## 2025-08-13 ENCOUNTER — HOSPITAL ENCOUNTER (OUTPATIENT)
Facility: HOSPITAL | Age: 45
Setting detail: RECURRING SERIES
Discharge: HOME | End: 2025-08-16
Attending: FAMILY MEDICINE
Payer: COMMERCIAL

## 2025-08-13 PROCEDURE — 97112 NEUROMUSCULAR REEDUCATION: CPT

## 2025-08-13 PROCEDURE — 97530 THERAPEUTIC ACTIVITIES: CPT

## 2025-08-13 PROCEDURE — 97535 SELF CARE MNGMENT TRAINING: CPT

## 2025-08-20 ENCOUNTER — OFFICE VISIT (OUTPATIENT)
Age: 45
End: 2025-08-20
Payer: COMMERCIAL

## 2025-08-20 VITALS — BODY MASS INDEX: 23.3 KG/M2 | WEIGHT: 140 LBS

## 2025-08-20 DIAGNOSIS — M99.05 PELVIC REGION SOMATIC DYSFUNCTION: ICD-10-CM

## 2025-08-20 DIAGNOSIS — M99.07 UPPER EXTREMITY SOMATIC DYSFUNCTION: ICD-10-CM

## 2025-08-20 DIAGNOSIS — M99.06 LOWER LIMB REGION SOMATIC DYSFUNCTION: ICD-10-CM

## 2025-08-20 DIAGNOSIS — M50.30 DDD (DEGENERATIVE DISC DISEASE), CERVICAL: ICD-10-CM

## 2025-08-20 DIAGNOSIS — M99.01 CERVICAL SOMATIC DYSFUNCTION: ICD-10-CM

## 2025-08-20 DIAGNOSIS — M99.04 SACRAL REGION SOMATIC DYSFUNCTION: ICD-10-CM

## 2025-08-20 DIAGNOSIS — M99.08 RIB CAGE REGION SOMATIC DYSFUNCTION: ICD-10-CM

## 2025-08-20 DIAGNOSIS — M51.360 DEGENERATION OF INTERVERTEBRAL DISC OF LUMBAR REGION WITH DISCOGENIC BACK PAIN: Primary | ICD-10-CM

## 2025-08-20 DIAGNOSIS — M51.34 DDD (DEGENERATIVE DISC DISEASE), THORACIC: ICD-10-CM

## 2025-08-20 DIAGNOSIS — M99.03 SOMATIC DYSFUNCTION OF LUMBAR REGION: ICD-10-CM

## 2025-08-20 DIAGNOSIS — M99.09 SOMATIC DYSFUNCTION OF ABDOMINAL REGION: ICD-10-CM

## 2025-08-20 DIAGNOSIS — M99.02 THORACIC REGION SOMATIC DYSFUNCTION: ICD-10-CM

## 2025-08-20 DIAGNOSIS — G44.86 CERVICOGENIC HEADACHE: ICD-10-CM

## 2025-08-20 PROCEDURE — 99214 OFFICE O/P EST MOD 30 MIN: CPT | Performed by: FAMILY MEDICINE

## 2025-08-20 PROCEDURE — 98929 OSTEOPATH MANJ 9-10 REGIONS: CPT | Performed by: FAMILY MEDICINE

## 2025-08-27 ENCOUNTER — APPOINTMENT (OUTPATIENT)
Facility: HOSPITAL | Age: 45
End: 2025-08-27
Attending: FAMILY MEDICINE
Payer: COMMERCIAL

## (undated) DEVICE — 3M™ IOBAN™ 2 ANTIMICROBIAL INCISE DRAPE 6640EZ: Brand: IOBAN™ 2

## (undated) DEVICE — KENDALL RADIOLUCENT FOAM MONITORING ELECTRODE RECTANGULAR SHAPE: Brand: KENDALL

## (undated) DEVICE — LIMB HOLDER, WRIST/ANKLE: Brand: DEROYAL

## (undated) DEVICE — ELECTRODE PT RET AD L9FT HI MOIST COND ADH HYDRGEL CORDED

## (undated) DEVICE — SUTURE MCRYL SZ 4 0 L18IN ABSRB VLT PS 1 L24MM 3 8 CIR REV Y682H

## (undated) DEVICE — Z DISCONTINUED PER MEDLINE DRESSING ALG W9XL10CM SIL CVR WTRPRF VIR BACT BARR ANTIMIC

## (undated) DEVICE — DRAPE SURG W25XL50IN E OPN CIR BND BG

## (undated) DEVICE — PLASMABLADE PS200-040 4.0: Brand: PLASMABLADE™